# Patient Record
Sex: FEMALE | Race: OTHER | Employment: UNEMPLOYED | URBAN - METROPOLITAN AREA
[De-identification: names, ages, dates, MRNs, and addresses within clinical notes are randomized per-mention and may not be internally consistent; named-entity substitution may affect disease eponyms.]

---

## 2017-01-08 PROBLEM — Z72.0 TOBACCO USE: Status: ACTIVE | Noted: 2017-01-08

## 2017-01-08 PROBLEM — N30.00 ACUTE CYSTITIS WITHOUT HEMATURIA: Status: ACTIVE | Noted: 2017-01-08

## 2017-01-08 PROBLEM — J18.9 PNEUMONIA DUE TO INFECTIOUS ORGANISM: Status: ACTIVE | Noted: 2017-01-08

## 2017-02-14 ENCOUNTER — APPOINTMENT (OUTPATIENT)
Dept: GENERAL RADIOLOGY | Age: 47
DRG: 420 | End: 2017-02-14
Payer: MEDICARE

## 2017-02-14 ENCOUNTER — HOSPITAL ENCOUNTER (INPATIENT)
Age: 47
LOS: 2 days | Discharge: HOME | DRG: 420 | End: 2017-02-16
Attending: EMERGENCY MEDICINE | Admitting: INTERNAL MEDICINE
Payer: MEDICARE

## 2017-02-14 DIAGNOSIS — E13.10 DIABETIC KETOACIDOSIS WITHOUT COMA ASSOCIATED WITH OTHER SPECIFIED DIABETES MELLITUS (HCC): Primary | ICD-10-CM

## 2017-02-14 PROBLEM — E11.10 DIABETIC KETOACIDOSIS WITHOUT COMA ASSOCIATED WITH TYPE 2 DIABETES MELLITUS (HCC): Status: ACTIVE | Noted: 2017-02-14

## 2017-02-14 PROBLEM — E87.3 RESPIRATORY ALKALOSIS: Status: ACTIVE | Noted: 2017-02-14

## 2017-02-14 PROBLEM — E87.1 HYPONATREMIA: Status: ACTIVE | Noted: 2017-02-14

## 2017-02-14 PROBLEM — E87.29 HIGH ANION GAP METABOLIC ACIDOSIS: Status: ACTIVE | Noted: 2017-02-14

## 2017-02-14 LAB
-: NORMAL
ABSOLUTE EOS #: 0.1 K/UL (ref 0–0.4)
ABSOLUTE LYMPH #: 1.9 K/UL (ref 1–4.8)
ABSOLUTE MONO #: 0.4 K/UL (ref 0.1–1.2)
ALBUMIN SERPL-MCNC: 4.5 G/DL (ref 3.5–5.2)
ALBUMIN/GLOBULIN RATIO: 1.3 (ref 1–2.5)
ALP BLD-CCNC: 99 U/L (ref 35–104)
ALT SERPL-CCNC: 33 U/L (ref 5–33)
AMORPHOUS: NORMAL
AMPHETAMINE SCREEN URINE: NEGATIVE
ANION GAP SERPL CALCULATED.3IONS-SCNC: 16 MMOL/L (ref 9–17)
ANION GAP SERPL CALCULATED.3IONS-SCNC: 17 MMOL/L (ref 9–17)
ANION GAP SERPL CALCULATED.3IONS-SCNC: 23 MMOL/L (ref 9–17)
ANION GAP: 13 MMOL/L (ref 8–16)
AST SERPL-CCNC: 32 U/L
BACTERIA: NORMAL
BARBITURATE SCREEN URINE: NEGATIVE
BASOPHILS # BLD: 1 % (ref 0–2)
BASOPHILS ABSOLUTE: 0 K/UL (ref 0–0.2)
BENZODIAZEPINE SCREEN, URINE: NEGATIVE
BETA-HYDROXYBUTYRATE: 0.82 MMOL/L (ref 0.02–0.27)
BILIRUB SERPL-MCNC: 0.43 MG/DL (ref 0.3–1.2)
BILIRUBIN DIRECT: 0.19 MG/DL
BILIRUBIN URINE: NEGATIVE
BILIRUBIN, INDIRECT: 0.24 MG/DL (ref 0–1)
BUN BLDV-MCNC: 7 MG/DL (ref 6–20)
BUN BLDV-MCNC: 7 MG/DL (ref 6–20)
BUN BLDV-MCNC: 8 MG/DL (ref 6–20)
BUN/CREAT BLD: ABNORMAL (ref 9–20)
BUPRENORPHINE URINE: ABNORMAL
CALCIUM IONIZED: 1.08 MMOL/L (ref 1.13–1.33)
CALCIUM SERPL-MCNC: 8.3 MG/DL (ref 8.6–10.4)
CALCIUM SERPL-MCNC: 8.3 MG/DL (ref 8.6–10.4)
CALCIUM SERPL-MCNC: 9.2 MG/DL (ref 8.6–10.4)
CANNABINOID SCREEN URINE: NEGATIVE
CASTS UA: NORMAL /LPF (ref 0–8)
CHLORIDE BLD-SCNC: 100 MMOL/L (ref 98–107)
CHLORIDE BLD-SCNC: 92 MMOL/L (ref 98–107)
CHLORIDE BLD-SCNC: 99 MMOL/L (ref 98–107)
CO2: 16 MMOL/L (ref 20–31)
CO2: 20 MMOL/L (ref 20–31)
CO2: 21 MMOL/L (ref 20–31)
COCAINE METABOLITE, URINE: POSITIVE
COLOR: ABNORMAL
COMMENT UA: ABNORMAL
CREAT SERPL-MCNC: 0.31 MG/DL (ref 0.5–0.9)
CREAT SERPL-MCNC: 0.36 MG/DL (ref 0.5–0.9)
CREAT SERPL-MCNC: 0.38 MG/DL (ref 0.5–0.9)
CRYSTALS, UA: NORMAL /HPF
DIFFERENTIAL TYPE: ABNORMAL
DIRECT EXAM: NORMAL
EOSINOPHILS RELATIVE PERCENT: 1 % (ref 1–4)
EPITHELIAL CELLS UA: NORMAL /HPF (ref 0–5)
ESTIMATED AVERAGE GLUCOSE: 194 MG/DL
ETHANOL PERCENT: <0.01 %
ETHANOL: <10 MG/DL
FIO2: ABNORMAL
GFR AFRICAN AMERICAN: >60 ML/MIN
GFR NON-AFRICAN AMERICAN: >60 ML/MIN
GFR SERPL CREATININE-BSD FRML MDRD: ABNORMAL ML/MIN/{1.73_M2}
GLOBULIN: ABNORMAL G/DL (ref 1.5–3.8)
GLUCOSE BLD-MCNC: 117 MG/DL (ref 70–99)
GLUCOSE BLD-MCNC: 127 MG/DL (ref 65–105)
GLUCOSE BLD-MCNC: 130 MG/DL (ref 65–105)
GLUCOSE BLD-MCNC: 131 MG/DL (ref 70–99)
GLUCOSE BLD-MCNC: 170 MG/DL (ref 65–105)
GLUCOSE BLD-MCNC: 195 MG/DL (ref 65–105)
GLUCOSE BLD-MCNC: 202 MG/DL (ref 65–105)
GLUCOSE BLD-MCNC: 234 MG/DL (ref 65–105)
GLUCOSE BLD-MCNC: 286 MG/DL (ref 65–105)
GLUCOSE BLD-MCNC: 328 MG/DL (ref 65–105)
GLUCOSE BLD-MCNC: 330 MG/DL (ref 70–99)
GLUCOSE BLD-MCNC: 339 MG/DL (ref 74–106)
GLUCOSE URINE: ABNORMAL
HBA1C MFR BLD: 8.4 % (ref 4–6)
HCG QUALITATIVE: NEGATIVE
HCO3 VENOUS: 18.2 MMOL/L (ref 24–30)
HCO3 VENOUS: 18.7 MMOL/L (ref 24–30)
HCT VFR BLD CALC: 43.2 % (ref 36–46)
HEMOGLOBIN: 15 G/DL (ref 12–16)
KETONES, URINE: ABNORMAL
LACTIC ACID, WHOLE BLOOD: 1.7 MMOL/L (ref 0.7–2.1)
LACTIC ACID: NORMAL MMOL/L
LEUKOCYTE ESTERASE, URINE: NEGATIVE
LIPASE: 39 U/L (ref 13–60)
LYMPHOCYTES # BLD: 25 % (ref 24–44)
Lab: NORMAL
MAGNESIUM: 1.8 MG/DL (ref 1.6–2.6)
MAGNESIUM: 1.9 MG/DL (ref 1.6–2.6)
MCH RBC QN AUTO: 29.6 PG (ref 26–34)
MCHC RBC AUTO-ENTMCNC: 34.8 G/DL (ref 31–37)
MCV RBC AUTO: 85.3 FL (ref 80–100)
MDMA URINE: ABNORMAL
METHADONE SCREEN, URINE: NEGATIVE
METHAMPHETAMINE, URINE: ABNORMAL
MONOCYTES # BLD: 5 % (ref 2–11)
MUCUS: NORMAL
NEGATIVE BASE EXCESS, VEN: 4 (ref 0–2)
NEGATIVE BASE EXCESS, VEN: 5 (ref 0–2)
NITRITE, URINE: NEGATIVE
O2 DEVICE/FLOW/%: ABNORMAL
O2 SAT, VEN: 85 %
OPIATES, URINE: POSITIVE
OTHER OBSERVATIONS UA: NORMAL
OXYCODONE SCREEN URINE: NEGATIVE
PATIENT TEMP: ABNORMAL
PCO2, VEN: 21 MM HG (ref 39–55)
PCO2, VEN: 23 MM HG (ref 39–55)
PDW BLD-RTO: 15.7 % (ref 12.5–15.4)
PH UA: 6.5 (ref 5–8)
PH VENOUS: 7.51 (ref 7.32–7.42)
PH VENOUS: 7.56 (ref 7.32–7.42)
PHENCYCLIDINE, URINE: NEGATIVE
PHOSPHORUS: 1.8 MG/DL (ref 2.6–4.5)
PHOSPHORUS: 2 MG/DL (ref 2.6–4.5)
PLATELET # BLD: 258 K/UL (ref 140–450)
PLATELET ESTIMATE: ABNORMAL
PMV BLD AUTO: 7 FL (ref 6–12)
PO2, VEN: 43 MM HG (ref 30–50)
POC BUN: 8 MG/DL (ref 6–20)
POC CHLORIDE: 104 MMOL/L (ref 98–110)
POC HEMATOCRIT: 45 % (ref 36–46)
POC HEMOGLOBIN: 15.3 GM/DL (ref 12–16)
POC LACTIC ACID, VEN: 2.6 MMOL/L (ref 0.9–1.7)
POC PCO2 TEMP: ABNORMAL MM HG
POC PH TEMP: ABNORMAL
POC PO2 TEMP: ABNORMAL MM HG
POC POTASSIUM: 4 MMOL/L (ref 3.5–5.1)
POC SODIUM: 132 MMOL/L (ref 136–145)
POSITIVE BASE EXCESS, VEN: ABNORMAL (ref 0–2)
POSITIVE BASE EXCESS, VEN: ABNORMAL (ref 0–2)
POTASSIUM SERPL-SCNC: 3.2 MMOL/L (ref 3.7–5.3)
POTASSIUM SERPL-SCNC: 3.3 MMOL/L (ref 3.7–5.3)
POTASSIUM SERPL-SCNC: 4.2 MMOL/L (ref 3.7–5.3)
PROPOXYPHENE, URINE: ABNORMAL
PROTEIN UA: ABNORMAL
RBC # BLD: 5.07 M/UL (ref 4–5.2)
RBC # BLD: ABNORMAL 10*6/UL
RBC UA: NORMAL /HPF (ref 0–4)
RENAL EPITHELIAL, UA: NORMAL /HPF
SEG NEUTROPHILS: 68 % (ref 36–66)
SEGMENTED NEUTROPHILS ABSOLUTE COUNT: 5.4 K/UL (ref 1.8–7.7)
SODIUM BLD-SCNC: 131 MMOL/L (ref 135–144)
SODIUM BLD-SCNC: 136 MMOL/L (ref 135–144)
SODIUM BLD-SCNC: 137 MMOL/L (ref 135–144)
SPECIFIC GRAVITY UA: 1.03 (ref 1–1.03)
SPECIMEN DESCRIPTION: NORMAL
STATUS: NORMAL
TEST INFORMATION: ABNORMAL
TOTAL CK: 35 U/L (ref 26–192)
TOTAL CO2, VENOUS: 19 MM HG (ref 25–31)
TOTAL CO2, VENOUS: 19 MM HG (ref 25–31)
TOTAL PROTEIN: 8.1 G/DL (ref 6.4–8.3)
TRICHOMONAS: NORMAL
TRICYCLIC ANTIDEPRESSANTS, UR: ABNORMAL
TURBIDITY: ABNORMAL
URINE HGB: ABNORMAL
UROBILINOGEN, URINE: NORMAL
WBC # BLD: 7.9 K/UL (ref 3.5–11)
WBC # BLD: ABNORMAL 10*3/UL
WBC UA: NORMAL /HPF (ref 0–5)
YEAST: NORMAL

## 2017-02-14 PROCEDURE — 99285 EMERGENCY DEPT VISIT HI MDM: CPT

## 2017-02-14 PROCEDURE — 96376 TX/PRO/DX INJ SAME DRUG ADON: CPT

## 2017-02-14 PROCEDURE — 96375 TX/PRO/DX INJ NEW DRUG ADDON: CPT

## 2017-02-14 PROCEDURE — 83605 ASSAY OF LACTIC ACID: CPT

## 2017-02-14 PROCEDURE — 82435 ASSAY OF BLOOD CHLORIDE: CPT

## 2017-02-14 PROCEDURE — 85014 HEMATOCRIT: CPT

## 2017-02-14 PROCEDURE — 81001 URINALYSIS AUTO W/SCOPE: CPT

## 2017-02-14 PROCEDURE — 80076 HEPATIC FUNCTION PANEL: CPT

## 2017-02-14 PROCEDURE — 82803 BLOOD GASES ANY COMBINATION: CPT

## 2017-02-14 PROCEDURE — 6370000000 HC RX 637 (ALT 250 FOR IP): Performed by: EMERGENCY MEDICINE

## 2017-02-14 PROCEDURE — G0480 DRUG TEST DEF 1-7 CLASSES: HCPCS

## 2017-02-14 PROCEDURE — 85025 COMPLETE CBC W/AUTO DIFF WBC: CPT

## 2017-02-14 PROCEDURE — 83690 ASSAY OF LIPASE: CPT

## 2017-02-14 PROCEDURE — 96365 THER/PROPH/DIAG IV INF INIT: CPT

## 2017-02-14 PROCEDURE — 6370000000 HC RX 637 (ALT 250 FOR IP): Performed by: INTERNAL MEDICINE

## 2017-02-14 PROCEDURE — 84703 CHORIONIC GONADOTROPIN ASSAY: CPT

## 2017-02-14 PROCEDURE — 87086 URINE CULTURE/COLONY COUNT: CPT

## 2017-02-14 PROCEDURE — 6360000002 HC RX W HCPCS: Performed by: INTERNAL MEDICINE

## 2017-02-14 PROCEDURE — 6360000002 HC RX W HCPCS

## 2017-02-14 PROCEDURE — 36415 COLL VENOUS BLD VENIPUNCTURE: CPT

## 2017-02-14 PROCEDURE — 84132 ASSAY OF SERUM POTASSIUM: CPT

## 2017-02-14 PROCEDURE — 82947 ASSAY GLUCOSE BLOOD QUANT: CPT

## 2017-02-14 PROCEDURE — 71020 XR CHEST STANDARD TWO VW: CPT

## 2017-02-14 PROCEDURE — 6370000000 HC RX 637 (ALT 250 FOR IP): Performed by: STUDENT IN AN ORGANIZED HEALTH CARE EDUCATION/TRAINING PROGRAM

## 2017-02-14 PROCEDURE — 76937 US GUIDE VASCULAR ACCESS: CPT

## 2017-02-14 PROCEDURE — 2580000003 HC RX 258: Performed by: STUDENT IN AN ORGANIZED HEALTH CARE EDUCATION/TRAINING PROGRAM

## 2017-02-14 PROCEDURE — 82330 ASSAY OF CALCIUM: CPT

## 2017-02-14 PROCEDURE — 84295 ASSAY OF SERUM SODIUM: CPT

## 2017-02-14 PROCEDURE — 71020 XR CHEST STANDARD TWO VW: CPT | Performed by: RADIOLOGY

## 2017-02-14 PROCEDURE — 80048 BASIC METABOLIC PNL TOTAL CA: CPT

## 2017-02-14 PROCEDURE — 2580000003 HC RX 258: Performed by: INTERNAL MEDICINE

## 2017-02-14 PROCEDURE — 84100 ASSAY OF PHOSPHORUS: CPT

## 2017-02-14 PROCEDURE — 82550 ASSAY OF CK (CPK): CPT

## 2017-02-14 PROCEDURE — 80307 DRUG TEST PRSMV CHEM ANLYZR: CPT

## 2017-02-14 PROCEDURE — 87804 INFLUENZA ASSAY W/OPTIC: CPT

## 2017-02-14 PROCEDURE — 84520 ASSAY OF UREA NITROGEN: CPT

## 2017-02-14 PROCEDURE — 6360000002 HC RX W HCPCS: Performed by: EMERGENCY MEDICINE

## 2017-02-14 PROCEDURE — 83036 HEMOGLOBIN GLYCOSYLATED A1C: CPT

## 2017-02-14 PROCEDURE — 2500000003 HC RX 250 WO HCPCS: Performed by: STUDENT IN AN ORGANIZED HEALTH CARE EDUCATION/TRAINING PROGRAM

## 2017-02-14 PROCEDURE — 82010 KETONE BODYS QUAN: CPT

## 2017-02-14 PROCEDURE — 83735 ASSAY OF MAGNESIUM: CPT

## 2017-02-14 PROCEDURE — 2060000000 HC ICU INTERMEDIATE R&B

## 2017-02-14 PROCEDURE — 2580000003 HC RX 258: Performed by: EMERGENCY MEDICINE

## 2017-02-14 RX ORDER — INSULIN GLARGINE 100 [IU]/ML
3 INJECTION, SOLUTION SUBCUTANEOUS NIGHTLY
Status: DISCONTINUED | OUTPATIENT
Start: 2017-02-14 | End: 2017-02-15

## 2017-02-14 RX ORDER — ONDANSETRON 2 MG/ML
4 INJECTION INTRAMUSCULAR; INTRAVENOUS ONCE
Status: COMPLETED | OUTPATIENT
Start: 2017-02-14 | End: 2017-02-14

## 2017-02-14 RX ORDER — QUETIAPINE FUMARATE 100 MG/1
100 TABLET, FILM COATED ORAL 2 TIMES DAILY
Status: DISCONTINUED | OUTPATIENT
Start: 2017-02-14 | End: 2017-02-16 | Stop reason: HOSPADM

## 2017-02-14 RX ORDER — 0.9 % SODIUM CHLORIDE 0.9 %
15 INTRAVENOUS SOLUTION INTRAVENOUS ONCE
Status: DISCONTINUED | OUTPATIENT
Start: 2017-02-14 | End: 2017-02-15

## 2017-02-14 RX ORDER — POTASSIUM CHLORIDE 7.45 MG/ML
10 INJECTION INTRAVENOUS PRN
Status: DISCONTINUED | OUTPATIENT
Start: 2017-02-14 | End: 2017-02-16 | Stop reason: HOSPADM

## 2017-02-14 RX ORDER — SODIUM CHLORIDE 9 MG/ML
INJECTION, SOLUTION INTRAVENOUS CONTINUOUS
Status: DISCONTINUED | OUTPATIENT
Start: 2017-02-14 | End: 2017-02-15

## 2017-02-14 RX ORDER — MORPHINE SULFATE 4 MG/ML
4 INJECTION, SOLUTION INTRAMUSCULAR; INTRAVENOUS EVERY 4 HOURS PRN
Status: CANCELLED | OUTPATIENT
Start: 2017-02-14

## 2017-02-14 RX ORDER — ONDANSETRON 2 MG/ML
4 INJECTION INTRAMUSCULAR; INTRAVENOUS EVERY 6 HOURS PRN
Status: DISCONTINUED | OUTPATIENT
Start: 2017-02-14 | End: 2017-02-16 | Stop reason: HOSPADM

## 2017-02-14 RX ORDER — DEXTROSE MONOHYDRATE 25 G/50ML
12.5 INJECTION, SOLUTION INTRAVENOUS PRN
Status: DISCONTINUED | OUTPATIENT
Start: 2017-02-14 | End: 2017-02-16 | Stop reason: HOSPADM

## 2017-02-14 RX ORDER — VENLAFAXINE HYDROCHLORIDE 75 MG/1
150 CAPSULE, EXTENDED RELEASE ORAL
Status: DISCONTINUED | OUTPATIENT
Start: 2017-02-15 | End: 2017-02-16 | Stop reason: HOSPADM

## 2017-02-14 RX ORDER — ROPINIROLE 1 MG/1
1 TABLET, FILM COATED ORAL 2 TIMES DAILY
Status: DISCONTINUED | OUTPATIENT
Start: 2017-02-14 | End: 2017-02-16 | Stop reason: HOSPADM

## 2017-02-14 RX ORDER — ONDANSETRON 2 MG/ML
4 INJECTION INTRAMUSCULAR; INTRAVENOUS EVERY 6 HOURS PRN
Status: CANCELLED | OUTPATIENT
Start: 2017-02-14

## 2017-02-14 RX ORDER — SODIUM CHLORIDE AND POTASSIUM CHLORIDE .9; .15 G/100ML; G/100ML
150 SOLUTION INTRAVENOUS CONTINUOUS
Status: CANCELLED | OUTPATIENT
Start: 2017-02-14 | End: 2017-02-15

## 2017-02-14 RX ORDER — GABAPENTIN 300 MG/1
300 CAPSULE ORAL 3 TIMES DAILY
Status: DISCONTINUED | OUTPATIENT
Start: 2017-02-14 | End: 2017-02-16 | Stop reason: HOSPADM

## 2017-02-14 RX ORDER — 0.9 % SODIUM CHLORIDE 0.9 %
1000 INTRAVENOUS SOLUTION INTRAVENOUS ONCE
Status: COMPLETED | OUTPATIENT
Start: 2017-02-14 | End: 2017-02-14

## 2017-02-14 RX ORDER — POTASSIUM CHLORIDE 20 MEQ/1
40 TABLET, EXTENDED RELEASE ORAL PRN
Status: DISCONTINUED | OUTPATIENT
Start: 2017-02-14 | End: 2017-02-16 | Stop reason: HOSPADM

## 2017-02-14 RX ORDER — DEXTROSE AND SODIUM CHLORIDE 5; .45 G/100ML; G/100ML
INJECTION, SOLUTION INTRAVENOUS CONTINUOUS PRN
Status: DISCONTINUED | OUTPATIENT
Start: 2017-02-14 | End: 2017-02-15

## 2017-02-14 RX ORDER — 0.9 % SODIUM CHLORIDE 0.9 %
1000 INTRAVENOUS SOLUTION INTRAVENOUS ONCE
Status: DISCONTINUED | OUTPATIENT
Start: 2017-02-14 | End: 2017-02-16 | Stop reason: HOSPADM

## 2017-02-14 RX ORDER — POTASSIUM CHLORIDE 20MEQ/15ML
40 LIQUID (ML) ORAL PRN
Status: DISCONTINUED | OUTPATIENT
Start: 2017-02-14 | End: 2017-02-16 | Stop reason: HOSPADM

## 2017-02-14 RX ORDER — DIPHENHYDRAMINE HCL 25 MG
25 TABLET ORAL EVERY 6 HOURS PRN
Status: DISCONTINUED | OUTPATIENT
Start: 2017-02-14 | End: 2017-02-16 | Stop reason: HOSPADM

## 2017-02-14 RX ORDER — ONDANSETRON 2 MG/ML
INJECTION INTRAMUSCULAR; INTRAVENOUS
Status: COMPLETED
Start: 2017-02-14 | End: 2017-02-14

## 2017-02-14 RX ORDER — POTASSIUM CHLORIDE 7.45 MG/ML
10 INJECTION INTRAVENOUS PRN
Status: DISCONTINUED | OUTPATIENT
Start: 2017-02-14 | End: 2017-02-14

## 2017-02-14 RX ORDER — MORPHINE SULFATE 4 MG/ML
4 INJECTION, SOLUTION INTRAMUSCULAR; INTRAVENOUS ONCE
Status: COMPLETED | OUTPATIENT
Start: 2017-02-14 | End: 2017-02-14

## 2017-02-14 RX ADMIN — QUETIAPINE FUMARATE 100 MG: 100 TABLET ORAL at 18:01

## 2017-02-14 RX ADMIN — INSULIN GLARGINE 3 UNITS: 100 INJECTION, SOLUTION SUBCUTANEOUS at 20:54

## 2017-02-14 RX ADMIN — POTASSIUM CHLORIDE 10 MEQ: 7.46 INJECTION, SOLUTION INTRAVENOUS at 18:01

## 2017-02-14 RX ADMIN — POTASSIUM CHLORIDE 40 MEQ: 40 SOLUTION ORAL at 20:51

## 2017-02-14 RX ADMIN — ONDANSETRON 4 MG: 2 INJECTION, SOLUTION INTRAMUSCULAR; INTRAVENOUS at 16:30

## 2017-02-14 RX ADMIN — SODIUM PHOSPHATE, MONOBASIC, MONOHYDRATE AND SODIUM PHOSPHATE, DIBASIC, ANHYDROUS 9.12 MMOL: 276; 142 INJECTION, SOLUTION INTRAVENOUS at 21:36

## 2017-02-14 RX ADMIN — SODIUM CHLORIDE 1000 ML: 9 INJECTION, SOLUTION INTRAVENOUS at 10:33

## 2017-02-14 RX ADMIN — SODIUM CHLORIDE: 9 INJECTION, SOLUTION INTRAVENOUS at 21:38

## 2017-02-14 RX ADMIN — MORPHINE SULFATE 4 MG: 4 INJECTION, SOLUTION INTRAMUSCULAR; INTRAVENOUS at 10:35

## 2017-02-14 RX ADMIN — ONDANSETRON 4 MG: 2 INJECTION, SOLUTION INTRAMUSCULAR; INTRAVENOUS at 10:37

## 2017-02-14 RX ADMIN — DEXTROSE AND SODIUM CHLORIDE: 5; 450 INJECTION, SOLUTION INTRAVENOUS at 15:26

## 2017-02-14 RX ADMIN — SODIUM CHLORIDE 2 UNITS/HR: 9 INJECTION, SOLUTION INTRAVENOUS at 11:40

## 2017-02-14 RX ADMIN — ENOXAPARIN SODIUM 40 MG: 40 INJECTION SUBCUTANEOUS at 18:01

## 2017-02-14 RX ADMIN — ONDANSETRON 4 MG: 2 INJECTION, SOLUTION INTRAMUSCULAR; INTRAVENOUS at 12:18

## 2017-02-14 RX ADMIN — GABAPENTIN 300 MG: 300 CAPSULE ORAL at 20:50

## 2017-02-14 RX ADMIN — ROPINIROLE HYDROCHLORIDE 1 MG: 1 TABLET, FILM COATED ORAL at 20:50

## 2017-02-14 ASSESSMENT — PAIN DESCRIPTION - ORIENTATION: ORIENTATION: MID

## 2017-02-14 ASSESSMENT — ENCOUNTER SYMPTOMS
WHEEZING: 0
VOMITING: 1
ORTHOPNEA: 0
ABDOMINAL PAIN: 1
BLURRED VISION: 0
COUGH: 1
NAUSEA: 1
HEMOPTYSIS: 0
SHORTNESS OF BREATH: 0
RHINORRHEA: 1
ANAL BLEEDING: 0
RECTAL PAIN: 0
DIARRHEA: 0
CONSTIPATION: 0
DOUBLE VISION: 0
ABDOMINAL DISTENTION: 0
BLOOD IN STOOL: 0

## 2017-02-14 ASSESSMENT — PAIN DESCRIPTION - DESCRIPTORS
DESCRIPTORS: ACHING;DISCOMFORT;STABBING
DESCRIPTORS: ACHING

## 2017-02-14 ASSESSMENT — PAIN SCALES - GENERAL
PAINLEVEL_OUTOF10: 7
PAINLEVEL_OUTOF10: 7
PAINLEVEL_OUTOF10: 10

## 2017-02-14 ASSESSMENT — PAIN DESCRIPTION - ONSET: ONSET: UNABLE TO TELL

## 2017-02-14 ASSESSMENT — PAIN DESCRIPTION - LOCATION
LOCATION: ABDOMEN
LOCATION: ABDOMEN

## 2017-02-14 ASSESSMENT — PAIN DESCRIPTION - PROGRESSION: CLINICAL_PROGRESSION: NOT CHANGED

## 2017-02-14 ASSESSMENT — PAIN DESCRIPTION - PAIN TYPE: TYPE: ACUTE PAIN

## 2017-02-14 ASSESSMENT — PAIN DESCRIPTION - FREQUENCY: FREQUENCY: CONTINUOUS

## 2017-02-15 LAB
ABSOLUTE EOS #: 0.2 K/UL (ref 0–0.4)
ABSOLUTE LYMPH #: 3.1 K/UL (ref 1–4.8)
ABSOLUTE MONO #: 0.5 K/UL (ref 0.1–1.2)
ANION GAP SERPL CALCULATED.3IONS-SCNC: 12 MMOL/L (ref 9–17)
ANION GAP SERPL CALCULATED.3IONS-SCNC: 12 MMOL/L (ref 9–17)
ANION GAP SERPL CALCULATED.3IONS-SCNC: 15 MMOL/L (ref 9–17)
BASOPHILS # BLD: 1 % (ref 0–2)
BASOPHILS ABSOLUTE: 0 K/UL (ref 0–0.2)
BUN BLDV-MCNC: 6 MG/DL (ref 6–20)
BUN BLDV-MCNC: 7 MG/DL (ref 6–20)
BUN BLDV-MCNC: 8 MG/DL (ref 6–20)
BUN/CREAT BLD: ABNORMAL (ref 9–20)
CALCIUM SERPL-MCNC: 7.9 MG/DL (ref 8.6–10.4)
CALCIUM SERPL-MCNC: 8.5 MG/DL (ref 8.6–10.4)
CALCIUM SERPL-MCNC: 8.6 MG/DL (ref 8.6–10.4)
CHLORIDE BLD-SCNC: 100 MMOL/L (ref 98–107)
CHLORIDE BLD-SCNC: 102 MMOL/L (ref 98–107)
CHLORIDE BLD-SCNC: 103 MMOL/L (ref 98–107)
CO2: 19 MMOL/L (ref 20–31)
CO2: 21 MMOL/L (ref 20–31)
CO2: 21 MMOL/L (ref 20–31)
CREAT SERPL-MCNC: 0.37 MG/DL (ref 0.5–0.9)
CREAT SERPL-MCNC: 0.48 MG/DL (ref 0.5–0.9)
CREAT SERPL-MCNC: 0.52 MG/DL (ref 0.5–0.9)
CULTURE: NORMAL
CULTURE: NORMAL
DIFFERENTIAL TYPE: ABNORMAL
EOSINOPHILS RELATIVE PERCENT: 3 % (ref 1–4)
GFR AFRICAN AMERICAN: >60 ML/MIN
GFR NON-AFRICAN AMERICAN: >60 ML/MIN
GFR SERPL CREATININE-BSD FRML MDRD: ABNORMAL ML/MIN/{1.73_M2}
GLUCOSE BLD-MCNC: 178 MG/DL (ref 65–105)
GLUCOSE BLD-MCNC: 242 MG/DL (ref 65–105)
GLUCOSE BLD-MCNC: 242 MG/DL (ref 65–105)
GLUCOSE BLD-MCNC: 257 MG/DL (ref 65–105)
GLUCOSE BLD-MCNC: 262 MG/DL (ref 70–99)
GLUCOSE BLD-MCNC: 269 MG/DL (ref 70–99)
GLUCOSE BLD-MCNC: 280 MG/DL (ref 65–105)
GLUCOSE BLD-MCNC: 326 MG/DL (ref 70–99)
GLUCOSE BLD-MCNC: 93 MG/DL (ref 65–105)
HCT VFR BLD CALC: 32.9 % (ref 36–46)
HEMOGLOBIN: 11.3 G/DL (ref 12–16)
LYMPHOCYTES # BLD: 49 % (ref 24–44)
Lab: NORMAL
MAGNESIUM: 1.5 MG/DL (ref 1.6–2.6)
MAGNESIUM: 1.7 MG/DL (ref 1.6–2.6)
MAGNESIUM: 1.7 MG/DL (ref 1.6–2.6)
MCH RBC QN AUTO: 30 PG (ref 26–34)
MCHC RBC AUTO-ENTMCNC: 34.5 G/DL (ref 31–37)
MCV RBC AUTO: 86.9 FL (ref 80–100)
MONOCYTES # BLD: 8 % (ref 2–11)
PDW BLD-RTO: 15.7 % (ref 12.5–15.4)
PHOSPHORUS: 2.4 MG/DL (ref 2.6–4.5)
PHOSPHORUS: 2.7 MG/DL (ref 2.6–4.5)
PHOSPHORUS: 2.8 MG/DL (ref 2.6–4.5)
PLATELET # BLD: 214 K/UL (ref 140–450)
PLATELET ESTIMATE: ABNORMAL
PMV BLD AUTO: 7 FL (ref 6–12)
POTASSIUM SERPL-SCNC: 3.9 MMOL/L (ref 3.7–5.3)
POTASSIUM SERPL-SCNC: 4 MMOL/L (ref 3.7–5.3)
POTASSIUM SERPL-SCNC: 4 MMOL/L (ref 3.7–5.3)
RBC # BLD: 3.79 M/UL (ref 4–5.2)
RBC # BLD: ABNORMAL 10*6/UL
SEG NEUTROPHILS: 39 % (ref 36–66)
SEGMENTED NEUTROPHILS ABSOLUTE COUNT: 2.5 K/UL (ref 1.8–7.7)
SODIUM BLD-SCNC: 134 MMOL/L (ref 135–144)
SODIUM BLD-SCNC: 135 MMOL/L (ref 135–144)
SODIUM BLD-SCNC: 136 MMOL/L (ref 135–144)
SPECIMEN DESCRIPTION: NORMAL
STATUS: NORMAL
WBC # BLD: 6.3 K/UL (ref 3.5–11)
WBC # BLD: ABNORMAL 10*3/UL

## 2017-02-15 PROCEDURE — 6370000000 HC RX 637 (ALT 250 FOR IP): Performed by: INTERNAL MEDICINE

## 2017-02-15 PROCEDURE — 36415 COLL VENOUS BLD VENIPUNCTURE: CPT

## 2017-02-15 PROCEDURE — 99223 1ST HOSP IP/OBS HIGH 75: CPT | Performed by: INTERNAL MEDICINE

## 2017-02-15 PROCEDURE — 82947 ASSAY GLUCOSE BLOOD QUANT: CPT

## 2017-02-15 PROCEDURE — 2580000003 HC RX 258: Performed by: INTERNAL MEDICINE

## 2017-02-15 PROCEDURE — 84100 ASSAY OF PHOSPHORUS: CPT

## 2017-02-15 PROCEDURE — 83735 ASSAY OF MAGNESIUM: CPT

## 2017-02-15 PROCEDURE — 2580000003 HC RX 258: Performed by: HOSPITALIST

## 2017-02-15 PROCEDURE — 6360000002 HC RX W HCPCS: Performed by: STUDENT IN AN ORGANIZED HEALTH CARE EDUCATION/TRAINING PROGRAM

## 2017-02-15 PROCEDURE — 80048 BASIC METABOLIC PNL TOTAL CA: CPT

## 2017-02-15 PROCEDURE — 2580000003 HC RX 258: Performed by: STUDENT IN AN ORGANIZED HEALTH CARE EDUCATION/TRAINING PROGRAM

## 2017-02-15 PROCEDURE — 6360000002 HC RX W HCPCS: Performed by: HOSPITALIST

## 2017-02-15 PROCEDURE — 85025 COMPLETE CBC W/AUTO DIFF WBC: CPT

## 2017-02-15 PROCEDURE — 94762 N-INVAS EAR/PLS OXIMTRY CONT: CPT

## 2017-02-15 PROCEDURE — 6360000002 HC RX W HCPCS: Performed by: INTERNAL MEDICINE

## 2017-02-15 PROCEDURE — 6370000000 HC RX 637 (ALT 250 FOR IP): Performed by: HOSPITALIST

## 2017-02-15 PROCEDURE — 2060000000 HC ICU INTERMEDIATE R&B

## 2017-02-15 RX ORDER — NICOTINE POLACRILEX 4 MG
15 LOZENGE BUCCAL PRN
Status: DISCONTINUED | OUTPATIENT
Start: 2017-02-15 | End: 2017-02-16 | Stop reason: HOSPADM

## 2017-02-15 RX ORDER — DEXTROSE MONOHYDRATE 25 G/50ML
12.5 INJECTION, SOLUTION INTRAVENOUS PRN
Status: DISCONTINUED | OUTPATIENT
Start: 2017-02-15 | End: 2017-02-16 | Stop reason: HOSPADM

## 2017-02-15 RX ORDER — DEXTROSE MONOHYDRATE 50 MG/ML
100 INJECTION, SOLUTION INTRAVENOUS PRN
Status: DISCONTINUED | OUTPATIENT
Start: 2017-02-15 | End: 2017-02-16 | Stop reason: HOSPADM

## 2017-02-15 RX ORDER — SODIUM CHLORIDE 450 MG/100ML
INJECTION, SOLUTION INTRAVENOUS CONTINUOUS
Status: DISCONTINUED | OUTPATIENT
Start: 2017-02-15 | End: 2017-02-16 | Stop reason: HOSPADM

## 2017-02-15 RX ORDER — INSULIN GLARGINE 100 [IU]/ML
20 INJECTION, SOLUTION SUBCUTANEOUS 2 TIMES DAILY
Status: DISCONTINUED | OUTPATIENT
Start: 2017-02-15 | End: 2017-02-15

## 2017-02-15 RX ADMIN — SODIUM CHLORIDE: 4.5 INJECTION, SOLUTION INTRAVENOUS at 08:37

## 2017-02-15 RX ADMIN — INSULIN HUMAN 50 UNITS: 100 INJECTION, SUSPENSION SUBCUTANEOUS at 21:32

## 2017-02-15 RX ADMIN — ENOXAPARIN SODIUM 40 MG: 40 INJECTION SUBCUTANEOUS at 08:37

## 2017-02-15 RX ADMIN — GABAPENTIN 300 MG: 300 CAPSULE ORAL at 08:37

## 2017-02-15 RX ADMIN — INSULIN LISPRO 6 UNITS: 100 INJECTION, SOLUTION INTRAVENOUS; SUBCUTANEOUS at 13:06

## 2017-02-15 RX ADMIN — SODIUM CHLORIDE: 9 INJECTION, SOLUTION INTRAVENOUS at 05:50

## 2017-02-15 RX ADMIN — MAGNESIUM SULFATE IN DEXTROSE 1 G: 10 INJECTION, SOLUTION INTRAVENOUS at 11:42

## 2017-02-15 RX ADMIN — QUETIAPINE FUMARATE 100 MG: 100 TABLET ORAL at 13:32

## 2017-02-15 RX ADMIN — INSULIN LISPRO 2 UNITS: 100 INJECTION, SOLUTION INTRAVENOUS; SUBCUTANEOUS at 22:50

## 2017-02-15 RX ADMIN — GABAPENTIN 300 MG: 300 CAPSULE ORAL at 21:30

## 2017-02-15 RX ADMIN — QUETIAPINE FUMARATE 100 MG: 100 TABLET ORAL at 08:37

## 2017-02-15 RX ADMIN — GABAPENTIN 300 MG: 300 CAPSULE ORAL at 13:32

## 2017-02-15 RX ADMIN — ROPINIROLE HYDROCHLORIDE 1 MG: 1 TABLET, FILM COATED ORAL at 21:30

## 2017-02-15 RX ADMIN — ROPINIROLE HYDROCHLORIDE 1 MG: 1 TABLET, FILM COATED ORAL at 08:37

## 2017-02-15 RX ADMIN — VENLAFAXINE HYDROCHLORIDE 150 MG: 75 CAPSULE, EXTENDED RELEASE ORAL at 08:37

## 2017-02-15 RX ADMIN — INSULIN HUMAN 40 UNITS: 100 INJECTION, SUSPENSION SUBCUTANEOUS at 08:38

## 2017-02-15 RX ADMIN — MAGNESIUM SULFATE IN DEXTROSE 1 G: 10 INJECTION, SOLUTION INTRAVENOUS at 10:16

## 2017-02-15 RX ADMIN — CALCIUM GLUCONATE 2 G: 94 INJECTION, SOLUTION INTRAVENOUS at 02:25

## 2017-02-15 ASSESSMENT — PAIN SCALES - GENERAL: PAINLEVEL_OUTOF10: 0

## 2017-02-16 VITALS
RESPIRATION RATE: 16 BRPM | OXYGEN SATURATION: 99 % | HEART RATE: 75 BPM | BODY MASS INDEX: 22.36 KG/M2 | DIASTOLIC BLOOD PRESSURE: 63 MMHG | TEMPERATURE: 98.4 F | WEIGHT: 131 LBS | SYSTOLIC BLOOD PRESSURE: 129 MMHG | HEIGHT: 64 IN

## 2017-02-16 LAB
ANION GAP SERPL CALCULATED.3IONS-SCNC: 12 MMOL/L (ref 9–17)
BUN BLDV-MCNC: 13 MG/DL (ref 6–20)
BUN/CREAT BLD: ABNORMAL (ref 9–20)
CALCIUM SERPL-MCNC: 9.2 MG/DL (ref 8.6–10.4)
CHLORIDE BLD-SCNC: 101 MMOL/L (ref 98–107)
CO2: 23 MMOL/L (ref 20–31)
CREAT SERPL-MCNC: 0.4 MG/DL (ref 0.5–0.9)
GFR AFRICAN AMERICAN: >60 ML/MIN
GFR NON-AFRICAN AMERICAN: >60 ML/MIN
GFR SERPL CREATININE-BSD FRML MDRD: ABNORMAL ML/MIN/{1.73_M2}
GFR SERPL CREATININE-BSD FRML MDRD: ABNORMAL ML/MIN/{1.73_M2}
GLUCOSE BLD-MCNC: 130 MG/DL (ref 70–99)
GLUCOSE BLD-MCNC: 141 MG/DL (ref 65–105)
GLUCOSE BLD-MCNC: 276 MG/DL (ref 65–105)
MAGNESIUM: 1.8 MG/DL (ref 1.6–2.6)
POTASSIUM SERPL-SCNC: 3.8 MMOL/L (ref 3.7–5.3)
SODIUM BLD-SCNC: 136 MMOL/L (ref 135–144)

## 2017-02-16 PROCEDURE — 82947 ASSAY GLUCOSE BLOOD QUANT: CPT

## 2017-02-16 PROCEDURE — 6370000000 HC RX 637 (ALT 250 FOR IP): Performed by: HOSPITALIST

## 2017-02-16 PROCEDURE — 83735 ASSAY OF MAGNESIUM: CPT

## 2017-02-16 PROCEDURE — 94762 N-INVAS EAR/PLS OXIMTRY CONT: CPT

## 2017-02-16 PROCEDURE — 99238 HOSP IP/OBS DSCHRG MGMT 30/<: CPT | Performed by: INTERNAL MEDICINE

## 2017-02-16 PROCEDURE — 36415 COLL VENOUS BLD VENIPUNCTURE: CPT

## 2017-02-16 PROCEDURE — 6370000000 HC RX 637 (ALT 250 FOR IP): Performed by: INTERNAL MEDICINE

## 2017-02-16 PROCEDURE — 80048 BASIC METABOLIC PNL TOTAL CA: CPT

## 2017-02-16 RX ORDER — UBIQUINOL 100 MG
CAPSULE ORAL
Qty: 100 EACH | Refills: 3 | Status: SHIPPED | OUTPATIENT
Start: 2017-02-16 | End: 2017-08-11

## 2017-02-16 RX ORDER — GLUCOSAMINE HCL/CHONDROITIN SU 500-400 MG
CAPSULE ORAL
Qty: 100 STRIP | Refills: 11 | Status: SHIPPED | OUTPATIENT
Start: 2017-02-16 | End: 2017-08-11

## 2017-02-16 RX ORDER — BLOOD-GLUCOSE METER
KIT MISCELLANEOUS
Qty: 1 KIT | Refills: 0 | Status: ON HOLD | OUTPATIENT
Start: 2017-02-16 | End: 2018-08-27

## 2017-02-16 RX ORDER — CALCIUM CARB/VITAMIN D3/VIT K1 500-100-40
1 TABLET,CHEWABLE ORAL DAILY
Qty: 100 EACH | Refills: 3 | Status: SHIPPED | OUTPATIENT
Start: 2017-02-16 | End: 2017-08-11

## 2017-02-16 RX ORDER — LANCETS 28 GAUGE
1 EACH MISCELLANEOUS DAILY
Qty: 100 EACH | Refills: 3 | Status: ON HOLD | OUTPATIENT
Start: 2017-02-16 | End: 2018-08-27

## 2017-02-16 RX ORDER — SYRING-NEEDL,DISP,INSUL,0.3 ML 30 GX5/16"
1 SYRINGE, EMPTY DISPOSABLE MISCELLANEOUS ONCE
Qty: 100 DEVICE | Refills: 0 | Status: SHIPPED | OUTPATIENT
Start: 2017-02-16 | End: 2017-08-11

## 2017-02-16 RX ADMIN — GABAPENTIN 300 MG: 300 CAPSULE ORAL at 08:05

## 2017-02-16 RX ADMIN — QUETIAPINE FUMARATE 100 MG: 100 TABLET ORAL at 08:05

## 2017-02-16 RX ADMIN — INSULIN HUMAN 40 UNITS: 100 INJECTION, SUSPENSION SUBCUTANEOUS at 10:29

## 2017-02-16 RX ADMIN — ROPINIROLE HYDROCHLORIDE 1 MG: 1 TABLET, FILM COATED ORAL at 08:08

## 2017-02-16 RX ADMIN — GABAPENTIN 300 MG: 300 CAPSULE ORAL at 14:04

## 2017-02-16 RX ADMIN — QUETIAPINE FUMARATE 100 MG: 100 TABLET ORAL at 14:04

## 2017-02-16 RX ADMIN — INSULIN LISPRO 6 UNITS: 100 INJECTION, SOLUTION INTRAVENOUS; SUBCUTANEOUS at 14:04

## 2017-02-16 RX ADMIN — VENLAFAXINE HYDROCHLORIDE 150 MG: 75 CAPSULE, EXTENDED RELEASE ORAL at 08:05

## 2017-08-11 ENCOUNTER — HOSPITAL ENCOUNTER (EMERGENCY)
Age: 47
Discharge: HOME | End: 2017-08-11
Attending: EMERGENCY MEDICINE

## 2017-08-11 VITALS
DIASTOLIC BLOOD PRESSURE: 80 MMHG | TEMPERATURE: 97.8 F | SYSTOLIC BLOOD PRESSURE: 129 MMHG | WEIGHT: 120 LBS | HEIGHT: 64 IN | RESPIRATION RATE: 16 BRPM | HEART RATE: 82 BPM | BODY MASS INDEX: 20.49 KG/M2 | OXYGEN SATURATION: 99 %

## 2017-08-11 DIAGNOSIS — G47.00 INSOMNIA, UNSPECIFIED TYPE: ICD-10-CM

## 2017-08-11 DIAGNOSIS — F31.9 BIPOLAR 1 DISORDER (HCC): ICD-10-CM

## 2017-08-11 DIAGNOSIS — R73.9 HYPERGLYCEMIA: Primary | ICD-10-CM

## 2017-08-11 LAB
ABSOLUTE EOS #: 0.1 K/UL (ref 0–0.4)
ABSOLUTE LYMPH #: 1.7 K/UL (ref 1–4.8)
ABSOLUTE MONO #: 0.4 K/UL (ref 0.1–1.3)
ANION GAP SERPL CALCULATED.3IONS-SCNC: 16 MMOL/L (ref 9–17)
BASOPHILS # BLD: 1 %
BASOPHILS ABSOLUTE: 0 K/UL (ref 0–0.2)
BETA-HYDROXYBUTYRATE: 0.12 MMOL/L (ref 0.02–0.27)
BUN BLDV-MCNC: 15 MG/DL (ref 6–20)
BUN/CREAT BLD: ABNORMAL (ref 9–20)
CALCIUM SERPL-MCNC: 9.8 MG/DL (ref 8.6–10.4)
CHLORIDE BLD-SCNC: 89 MMOL/L (ref 98–107)
CHP ED QC CHECK: NORMAL
CHP ED QC CHECK: YES
CO2: 24 MMOL/L (ref 20–31)
CREAT SERPL-MCNC: 0.4 MG/DL (ref 0.5–0.9)
DIFFERENTIAL TYPE: NORMAL
EOSINOPHILS RELATIVE PERCENT: 3 %
GFR AFRICAN AMERICAN: >60 ML/MIN
GFR NON-AFRICAN AMERICAN: >60 ML/MIN
GFR SERPL CREATININE-BSD FRML MDRD: ABNORMAL ML/MIN/{1.73_M2}
GFR SERPL CREATININE-BSD FRML MDRD: ABNORMAL ML/MIN/{1.73_M2}
GLUCOSE BLD-MCNC: 272 MG/DL
GLUCOSE BLD-MCNC: 272 MG/DL (ref 65–105)
GLUCOSE BLD-MCNC: 359 MG/DL
GLUCOSE BLD-MCNC: 359 MG/DL (ref 65–105)
GLUCOSE BLD-MCNC: 458 MG/DL (ref 70–99)
HCT VFR BLD CALC: 45.1 % (ref 36–46)
HEMOGLOBIN: 15.4 G/DL (ref 12–16)
LYMPHOCYTES # BLD: 34 %
MCH RBC QN AUTO: 29.9 PG (ref 26–34)
MCHC RBC AUTO-ENTMCNC: 34.1 G/DL (ref 31–37)
MCV RBC AUTO: 87.7 FL (ref 80–100)
MONOCYTES # BLD: 8 %
PDW BLD-RTO: 14.3 % (ref 11.5–14.9)
PLATELET # BLD: 204 K/UL (ref 150–450)
PLATELET ESTIMATE: NORMAL
PMV BLD AUTO: 7.7 FL (ref 6–12)
POTASSIUM SERPL-SCNC: 4.1 MMOL/L (ref 3.7–5.3)
RBC # BLD: 5.14 M/UL (ref 4–5.2)
RBC # BLD: NORMAL 10*6/UL
SEG NEUTROPHILS: 54 %
SEGMENTED NEUTROPHILS ABSOLUTE COUNT: 2.8 K/UL (ref 1.3–9.1)
SODIUM BLD-SCNC: 129 MMOL/L (ref 135–144)
WBC # BLD: 5 K/UL (ref 3.5–11)
WBC # BLD: NORMAL 10*3/UL

## 2017-08-11 PROCEDURE — 2580000003 HC RX 258: Performed by: EMERGENCY MEDICINE

## 2017-08-11 PROCEDURE — 36415 COLL VENOUS BLD VENIPUNCTURE: CPT

## 2017-08-11 PROCEDURE — 96372 THER/PROPH/DIAG INJ SC/IM: CPT

## 2017-08-11 PROCEDURE — 80048 BASIC METABOLIC PNL TOTAL CA: CPT

## 2017-08-11 PROCEDURE — 99284 EMERGENCY DEPT VISIT MOD MDM: CPT

## 2017-08-11 PROCEDURE — 82947 ASSAY GLUCOSE BLOOD QUANT: CPT

## 2017-08-11 PROCEDURE — 82010 KETONE BODYS QUAN: CPT

## 2017-08-11 PROCEDURE — 6370000000 HC RX 637 (ALT 250 FOR IP): Performed by: EMERGENCY MEDICINE

## 2017-08-11 PROCEDURE — 85025 COMPLETE CBC W/AUTO DIFF WBC: CPT

## 2017-08-11 RX ORDER — QUETIAPINE FUMARATE 100 MG/1
100 TABLET, FILM COATED ORAL ONCE
Status: COMPLETED | OUTPATIENT
Start: 2017-08-11 | End: 2017-08-11

## 2017-08-11 RX ORDER — SYRING-NEEDL,DISP,INSUL,0.3 ML 30 GX5/16"
1 SYRINGE, EMPTY DISPOSABLE MISCELLANEOUS ONCE
Qty: 100 DEVICE | Refills: 0 | Status: ON HOLD | OUTPATIENT
Start: 2017-08-11 | End: 2018-08-27 | Stop reason: HOSPADM

## 2017-08-11 RX ORDER — 0.9 % SODIUM CHLORIDE 0.9 %
1000 INTRAVENOUS SOLUTION INTRAVENOUS ONCE
Status: COMPLETED | OUTPATIENT
Start: 2017-08-11 | End: 2017-08-11

## 2017-08-11 RX ORDER — GLUCOSAMINE HCL/CHONDROITIN SU 500-400 MG
CAPSULE ORAL
Qty: 100 STRIP | Refills: 11 | Status: ON HOLD | OUTPATIENT
Start: 2017-08-11 | End: 2018-08-27

## 2017-08-11 RX ORDER — CALCIUM CARB/VITAMIN D3/VIT K1 500-100-40
1 TABLET,CHEWABLE ORAL DAILY
Qty: 100 EACH | Refills: 3 | Status: ON HOLD | OUTPATIENT
Start: 2017-08-11 | End: 2018-08-27

## 2017-08-11 RX ORDER — QUETIAPINE FUMARATE 100 MG/1
100 TABLET, FILM COATED ORAL 2 TIMES DAILY
Qty: 10 TABLET | Refills: 0 | Status: ON HOLD | OUTPATIENT
Start: 2017-08-11 | End: 2018-08-27 | Stop reason: HOSPADM

## 2017-08-11 RX ORDER — QUETIAPINE FUMARATE 100 MG/1
100 TABLET, FILM COATED ORAL 2 TIMES DAILY
Status: DISCONTINUED | OUTPATIENT
Start: 2017-08-11 | End: 2017-08-11

## 2017-08-11 RX ORDER — UBIQUINOL 100 MG
CAPSULE ORAL
Qty: 100 EACH | Refills: 3 | Status: ON HOLD | OUTPATIENT
Start: 2017-08-11 | End: 2018-08-27

## 2017-08-11 RX ADMIN — INSULIN LISPRO 10 UNITS: 100 INJECTION, SOLUTION INTRAVENOUS; SUBCUTANEOUS at 17:12

## 2017-08-11 RX ADMIN — QUETIAPINE FUMARATE 100 MG: 100 TABLET, FILM COATED ORAL at 17:39

## 2017-08-11 RX ADMIN — SODIUM CHLORIDE 1000 ML: 9 INJECTION, SOLUTION INTRAVENOUS at 17:12

## 2017-08-11 ASSESSMENT — ENCOUNTER SYMPTOMS
BACK PAIN: 0
NAUSEA: 0
VOMITING: 0
ABDOMINAL PAIN: 0
COUGH: 0
SHORTNESS OF BREATH: 0

## 2017-08-11 ASSESSMENT — PAIN DESCRIPTION - LOCATION: LOCATION: GENERALIZED

## 2017-08-11 ASSESSMENT — PAIN DESCRIPTION - DESCRIPTORS: DESCRIPTORS: ACHING

## 2017-08-11 ASSESSMENT — PAIN SCALES - GENERAL: PAINLEVEL_OUTOF10: 9

## 2017-08-11 ASSESSMENT — PAIN DESCRIPTION - PAIN TYPE: TYPE: CHRONIC PAIN

## 2018-05-23 ENCOUNTER — HOSPITAL ENCOUNTER (EMERGENCY)
Age: 48
Discharge: HOME | End: 2018-05-24
Attending: EMERGENCY MEDICINE
Payer: MEDICAID

## 2018-05-23 DIAGNOSIS — H60.392 INFECTIVE OTITIS EXTERNA OF LEFT EAR: Primary | ICD-10-CM

## 2018-05-23 PROCEDURE — G0381 LEV 2 HOSP TYPE B ED VISIT: HCPCS

## 2018-05-23 PROCEDURE — 6370000000 HC RX 637 (ALT 250 FOR IP): Performed by: EMERGENCY MEDICINE

## 2018-05-23 RX ORDER — OXYCODONE HYDROCHLORIDE AND ACETAMINOPHEN 5; 325 MG/1; MG/1
2 TABLET ORAL ONCE
Status: COMPLETED | OUTPATIENT
Start: 2018-05-23 | End: 2018-05-23

## 2018-05-23 RX ORDER — IBUPROFEN 600 MG/1
600 TABLET ORAL EVERY 6 HOURS PRN
Qty: 30 TABLET | Refills: 0 | Status: ON HOLD | OUTPATIENT
Start: 2018-05-23 | End: 2018-08-27 | Stop reason: HOSPADM

## 2018-05-23 RX ORDER — NEOMYCIN SULFATE, POLYMYXIN B SULFATE AND HYDROCORTISONE 10; 3.5; 1 MG/ML; MG/ML; [USP'U]/ML
3 SUSPENSION/ DROPS AURICULAR (OTIC) EVERY 6 HOURS SCHEDULED
Status: DISCONTINUED | OUTPATIENT
Start: 2018-05-24 | End: 2018-05-24 | Stop reason: HOSPADM

## 2018-05-23 RX ORDER — AMOXICILLIN AND CLAVULANATE POTASSIUM 875; 125 MG/1; MG/1
1 TABLET, FILM COATED ORAL EVERY 12 HOURS SCHEDULED
Status: DISCONTINUED | OUTPATIENT
Start: 2018-05-23 | End: 2018-05-24 | Stop reason: HOSPADM

## 2018-05-23 RX ORDER — NEOMYCIN SULFATE, POLYMYXIN B SULFATE, HYDROCORTISONE 3.5; 10000; 1 MG/ML; [USP'U]/ML; MG/ML
2 SOLUTION/ DROPS AURICULAR (OTIC) EVERY 8 HOURS SCHEDULED
Qty: 1 BOTTLE | Refills: 0 | Status: SHIPPED | OUTPATIENT
Start: 2018-05-23 | End: 2018-05-24

## 2018-05-23 RX ORDER — CETIRIZINE HYDROCHLORIDE 10 MG/1
10 TABLET ORAL DAILY
Status: DISCONTINUED | OUTPATIENT
Start: 2018-05-24 | End: 2018-05-24 | Stop reason: HOSPADM

## 2018-05-23 RX ORDER — AMOXICILLIN AND CLAVULANATE POTASSIUM 875; 125 MG/1; MG/1
1 TABLET, FILM COATED ORAL 2 TIMES DAILY
Qty: 20 TABLET | Refills: 0 | Status: SHIPPED | OUTPATIENT
Start: 2018-05-23 | End: 2018-05-24

## 2018-05-23 RX ORDER — IBUPROFEN 800 MG/1
800 TABLET ORAL ONCE
Status: COMPLETED | OUTPATIENT
Start: 2018-05-24 | End: 2018-05-23

## 2018-05-23 RX ORDER — OXYCODONE HYDROCHLORIDE AND ACETAMINOPHEN 5; 325 MG/1; MG/1
1-2 TABLET ORAL EVERY 6 HOURS PRN
Qty: 16 TABLET | Refills: 0 | Status: SHIPPED | OUTPATIENT
Start: 2018-05-23 | End: 2018-05-30

## 2018-05-23 RX ADMIN — OXYCODONE HYDROCHLORIDE AND ACETAMINOPHEN 2 TABLET: 5; 325 TABLET ORAL at 23:59

## 2018-05-23 RX ADMIN — IBUPROFEN 800 MG: 800 TABLET ORAL at 23:58

## 2018-05-23 RX ADMIN — NEOMYCIN SULFATE, POLYMYXIN B SULFATE AND HYDROCORTISONE 3 DROP: 10; 3.5; 1 SUSPENSION/ DROPS AURICULAR (OTIC) at 23:59

## 2018-05-23 RX ADMIN — AMOXICILLIN AND CLAVULANATE POTASSIUM 1 TABLET: 875; 125 TABLET, FILM COATED ORAL at 23:58

## 2018-05-23 ASSESSMENT — PAIN DESCRIPTION - LOCATION: LOCATION: EAR

## 2018-05-23 ASSESSMENT — PAIN DESCRIPTION - PROGRESSION: CLINICAL_PROGRESSION: RAPIDLY WORSENING

## 2018-05-23 ASSESSMENT — PAIN DESCRIPTION - ONSET: ONSET: PROGRESSIVE

## 2018-05-23 ASSESSMENT — PAIN DESCRIPTION - PAIN TYPE: TYPE: ACUTE PAIN

## 2018-05-23 ASSESSMENT — PAIN DESCRIPTION - DESCRIPTORS: DESCRIPTORS: BURNING;ACHING

## 2018-05-23 ASSESSMENT — PAIN DESCRIPTION - FREQUENCY: FREQUENCY: CONTINUOUS

## 2018-05-23 ASSESSMENT — PAIN SCALES - GENERAL: PAINLEVEL_OUTOF10: 10

## 2018-05-23 ASSESSMENT — PAIN DESCRIPTION - ORIENTATION: ORIENTATION: LEFT

## 2018-05-24 VITALS
OXYGEN SATURATION: 100 % | RESPIRATION RATE: 18 BRPM | SYSTOLIC BLOOD PRESSURE: 130 MMHG | DIASTOLIC BLOOD PRESSURE: 79 MMHG | TEMPERATURE: 97.7 F | HEART RATE: 100 BPM

## 2018-05-24 VITALS
HEART RATE: 98 BPM | HEIGHT: 64 IN | BODY MASS INDEX: 20.49 KG/M2 | WEIGHT: 120 LBS | SYSTOLIC BLOOD PRESSURE: 137 MMHG | DIASTOLIC BLOOD PRESSURE: 71 MMHG | OXYGEN SATURATION: 99 % | TEMPERATURE: 98.2 F | RESPIRATION RATE: 18 BRPM

## 2018-05-24 DIAGNOSIS — H60.392 INFECTIVE OTITIS EXTERNA OF LEFT EAR: Primary | ICD-10-CM

## 2018-05-24 PROCEDURE — 99282 EMERGENCY DEPT VISIT SF MDM: CPT

## 2018-05-24 RX ORDER — AMOXICILLIN AND CLAVULANATE POTASSIUM 875; 125 MG/1; MG/1
1 TABLET, FILM COATED ORAL 2 TIMES DAILY
Qty: 20 TABLET | Refills: 0 | Status: SHIPPED | OUTPATIENT
Start: 2018-05-24 | End: 2018-05-24 | Stop reason: SDUPTHER

## 2018-05-24 RX ORDER — NEOMYCIN SULFATE, POLYMYXIN B SULFATE, HYDROCORTISONE 3.5; 10000; 1 MG/ML; [USP'U]/ML; MG/ML
2 SOLUTION/ DROPS AURICULAR (OTIC) EVERY 8 HOURS SCHEDULED
Qty: 1 BOTTLE | Refills: 0 | Status: SHIPPED | OUTPATIENT
Start: 2018-05-24 | End: 2018-05-24 | Stop reason: SDUPTHER

## 2018-05-24 ASSESSMENT — PAIN DESCRIPTION - DESCRIPTORS: DESCRIPTORS: DISCOMFORT

## 2018-05-24 ASSESSMENT — PAIN DESCRIPTION - ORIENTATION: ORIENTATION: LEFT

## 2018-05-24 ASSESSMENT — PAIN DESCRIPTION - PAIN TYPE: TYPE: ACUTE PAIN

## 2018-05-24 ASSESSMENT — PAIN SCALES - GENERAL: PAINLEVEL_OUTOF10: 9

## 2018-05-24 ASSESSMENT — PAIN DESCRIPTION - LOCATION: LOCATION: EAR

## 2018-05-26 ASSESSMENT — ENCOUNTER SYMPTOMS
CONSTIPATION: 0
SHORTNESS OF BREATH: 0
EYE DISCHARGE: 0
NAUSEA: 0
DIARRHEA: 0
ABDOMINAL PAIN: 0
SINUS PRESSURE: 1
CHEST TIGHTNESS: 0
SORE THROAT: 0

## 2018-08-26 ENCOUNTER — HOSPITAL ENCOUNTER (OUTPATIENT)
Age: 48
Setting detail: OBSERVATION
Discharge: HOME | End: 2018-08-27
Attending: EMERGENCY MEDICINE | Admitting: EMERGENCY MEDICINE
Payer: MEDICAID

## 2018-08-26 DIAGNOSIS — R73.9 HYPERGLYCEMIA: ICD-10-CM

## 2018-08-26 DIAGNOSIS — R10.13 ABDOMINAL PAIN, EPIGASTRIC: Primary | ICD-10-CM

## 2018-08-26 DIAGNOSIS — R11.2 NON-INTRACTABLE VOMITING WITH NAUSEA, UNSPECIFIED VOMITING TYPE: ICD-10-CM

## 2018-08-26 LAB
ABSOLUTE EOS #: 0.14 K/UL (ref 0–0.44)
ABSOLUTE IMMATURE GRANULOCYTE: 0.03 K/UL (ref 0–0.3)
ABSOLUTE LYMPH #: 1.63 K/UL (ref 1.1–3.7)
ABSOLUTE MONO #: 0.55 K/UL (ref 0.1–1.2)
ALBUMIN SERPL-MCNC: 3.9 G/DL (ref 3.5–5.2)
ALBUMIN/GLOBULIN RATIO: 1.1 (ref 1–2.5)
ALLEN TEST: ABNORMAL
ALP BLD-CCNC: 131 U/L (ref 35–104)
ALT SERPL-CCNC: 16 U/L (ref 5–33)
ANION GAP SERPL CALCULATED.3IONS-SCNC: 14 MMOL/L (ref 9–17)
ANION GAP: 5 MMOL/L (ref 7–16)
AST SERPL-CCNC: 25 U/L
BASOPHILS # BLD: 1 % (ref 0–2)
BASOPHILS ABSOLUTE: 0.04 K/UL (ref 0–0.2)
BETA-HYDROXYBUTYRATE: 0.18 MMOL/L (ref 0.02–0.27)
BILIRUB SERPL-MCNC: 0.2 MG/DL (ref 0.3–1.2)
BUN BLDV-MCNC: 12 MG/DL (ref 6–20)
BUN/CREAT BLD: ABNORMAL (ref 9–20)
CALCIUM SERPL-MCNC: 9 MG/DL (ref 8.6–10.4)
CHLORIDE BLD-SCNC: 90 MMOL/L (ref 98–107)
CHP ED QC CHECK: YES
CHP ED QC CHECK: YES
CO2: 18 MMOL/L (ref 20–31)
CREAT SERPL-MCNC: 0.37 MG/DL (ref 0.5–0.9)
DIFFERENTIAL TYPE: ABNORMAL
EOSINOPHILS RELATIVE PERCENT: 2 % (ref 1–4)
FIO2: ABNORMAL
GFR AFRICAN AMERICAN: >60 ML/MIN
GFR NON-AFRICAN AMERICAN: >60 ML/MIN
GFR SERPL CREATININE-BSD FRML MDRD: ABNORMAL ML/MIN/{1.73_M2}
GFR SERPL CREATININE-BSD FRML MDRD: ABNORMAL ML/MIN/{1.73_M2}
GLUCOSE BLD-MCNC: 188 MG/DL (ref 65–105)
GLUCOSE BLD-MCNC: 298 MG/DL
GLUCOSE BLD-MCNC: 298 MG/DL (ref 65–105)
GLUCOSE BLD-MCNC: 340 MG/DL (ref 65–105)
GLUCOSE BLD-MCNC: 383 MG/DL
GLUCOSE BLD-MCNC: 383 MG/DL (ref 70–99)
GLUCOSE BLD-MCNC: 383 MG/DL (ref 74–100)
HCG QUANTITATIVE: <1 IU/L
HCO3 VENOUS: 25.6 MMOL/L (ref 22–29)
HCT VFR BLD CALC: 41 % (ref 36.3–47.1)
HEMOGLOBIN: 15.3 G/DL (ref 11.9–15.1)
IMMATURE GRANULOCYTES: 0 %
LIPASE: 46 U/L (ref 13–60)
LYMPHOCYTES # BLD: 22 % (ref 24–43)
MCH RBC QN AUTO: 31.4 PG (ref 25.2–33.5)
MCHC RBC AUTO-ENTMCNC: 37.3 G/DL (ref 28.4–34.8)
MCV RBC AUTO: 84.2 FL (ref 82.6–102.9)
MODE: ABNORMAL
MONOCYTES # BLD: 7 % (ref 3–12)
NEGATIVE BASE EXCESS, VEN: 2 (ref 0–2)
NRBC AUTOMATED: 0 PER 100 WBC
O2 DEVICE/FLOW/%: ABNORMAL
O2 SAT, VEN: 53 % (ref 60–85)
PATIENT TEMP: ABNORMAL
PCO2, VEN: 53.6 MM HG (ref 41–51)
PDW BLD-RTO: 12.8 % (ref 11.8–14.4)
PH VENOUS: 7.29 (ref 7.32–7.43)
PLATELET # BLD: 236 K/UL (ref 138–453)
PLATELET ESTIMATE: ABNORMAL
PMV BLD AUTO: 10 FL (ref 8.1–13.5)
PO2, VEN: 32.1 MM HG (ref 30–50)
POC CHLORIDE: 104 MMOL/L (ref 98–107)
POC HEMATOCRIT: 46 % (ref 36–46)
POC HEMOGLOBIN: 15.8 G/DL (ref 12–16)
POC IONIZED CALCIUM: 1.1 MMOL/L (ref 1.15–1.33)
POC LACTIC ACID: 2.58 MMOL/L (ref 0.56–1.39)
POC PCO2 TEMP: ABNORMAL MM HG
POC PH TEMP: ABNORMAL
POC PO2 TEMP: ABNORMAL MM HG
POC SODIUM: 135 MMOL/L (ref 138–146)
POSITIVE BASE EXCESS, VEN: ABNORMAL (ref 0–3)
POTASSIUM SERPL-SCNC: 4.7 MMOL/L (ref 3.7–5.3)
RBC # BLD: 4.87 M/UL (ref 3.95–5.11)
RBC # BLD: ABNORMAL 10*6/UL
SAMPLE SITE: ABNORMAL
SEG NEUTROPHILS: 68 % (ref 36–65)
SEGMENTED NEUTROPHILS ABSOLUTE COUNT: 5.01 K/UL (ref 1.5–8.1)
SODIUM BLD-SCNC: 122 MMOL/L (ref 135–144)
TOTAL CO2, VENOUS: 27 MMOL/L (ref 23–30)
TOTAL PROTEIN: 7.5 G/DL (ref 6.4–8.3)
WBC # BLD: 7.4 K/UL (ref 3.5–11.3)
WBC # BLD: ABNORMAL 10*3/UL

## 2018-08-26 PROCEDURE — 85025 COMPLETE CBC W/AUTO DIFF WBC: CPT

## 2018-08-26 PROCEDURE — 84295 ASSAY OF SERUM SODIUM: CPT

## 2018-08-26 PROCEDURE — 6360000002 HC RX W HCPCS: Performed by: EMERGENCY MEDICINE

## 2018-08-26 PROCEDURE — 82565 ASSAY OF CREATININE: CPT

## 2018-08-26 PROCEDURE — 82330 ASSAY OF CALCIUM: CPT

## 2018-08-26 PROCEDURE — 96375 TX/PRO/DX INJ NEW DRUG ADDON: CPT

## 2018-08-26 PROCEDURE — 82010 KETONE BODYS QUAN: CPT

## 2018-08-26 PROCEDURE — 6370000000 HC RX 637 (ALT 250 FOR IP): Performed by: STUDENT IN AN ORGANIZED HEALTH CARE EDUCATION/TRAINING PROGRAM

## 2018-08-26 PROCEDURE — 96376 TX/PRO/DX INJ SAME DRUG ADON: CPT

## 2018-08-26 PROCEDURE — 82435 ASSAY OF BLOOD CHLORIDE: CPT

## 2018-08-26 PROCEDURE — 85014 HEMATOCRIT: CPT

## 2018-08-26 PROCEDURE — 82947 ASSAY GLUCOSE BLOOD QUANT: CPT

## 2018-08-26 PROCEDURE — 83690 ASSAY OF LIPASE: CPT

## 2018-08-26 PROCEDURE — G0378 HOSPITAL OBSERVATION PER HR: HCPCS

## 2018-08-26 PROCEDURE — 6360000002 HC RX W HCPCS: Performed by: STUDENT IN AN ORGANIZED HEALTH CARE EDUCATION/TRAINING PROGRAM

## 2018-08-26 PROCEDURE — 2580000003 HC RX 258: Performed by: STUDENT IN AN ORGANIZED HEALTH CARE EDUCATION/TRAINING PROGRAM

## 2018-08-26 PROCEDURE — 82803 BLOOD GASES ANY COMBINATION: CPT

## 2018-08-26 PROCEDURE — 96361 HYDRATE IV INFUSION ADD-ON: CPT

## 2018-08-26 PROCEDURE — 99285 EMERGENCY DEPT VISIT HI MDM: CPT

## 2018-08-26 PROCEDURE — 6370000000 HC RX 637 (ALT 250 FOR IP): Performed by: EMERGENCY MEDICINE

## 2018-08-26 PROCEDURE — 84132 ASSAY OF SERUM POTASSIUM: CPT

## 2018-08-26 PROCEDURE — 96372 THER/PROPH/DIAG INJ SC/IM: CPT

## 2018-08-26 PROCEDURE — 84702 CHORIONIC GONADOTROPIN TEST: CPT

## 2018-08-26 PROCEDURE — 96374 THER/PROPH/DIAG INJ IV PUSH: CPT

## 2018-08-26 PROCEDURE — 80053 COMPREHEN METABOLIC PANEL: CPT

## 2018-08-26 PROCEDURE — 2580000003 HC RX 258: Performed by: EMERGENCY MEDICINE

## 2018-08-26 PROCEDURE — 83605 ASSAY OF LACTIC ACID: CPT

## 2018-08-26 RX ORDER — ONDANSETRON 4 MG/1
4 TABLET, ORALLY DISINTEGRATING ORAL ONCE
Status: COMPLETED | OUTPATIENT
Start: 2018-08-26 | End: 2018-08-26

## 2018-08-26 RX ORDER — FENTANYL CITRATE 50 UG/ML
50 INJECTION, SOLUTION INTRAMUSCULAR; INTRAVENOUS EVERY 4 HOURS PRN
Status: DISCONTINUED | OUTPATIENT
Start: 2018-08-26 | End: 2018-08-27 | Stop reason: HOSPADM

## 2018-08-26 RX ORDER — SODIUM CHLORIDE 0.9 % (FLUSH) 0.9 %
10 SYRINGE (ML) INJECTION PRN
Status: DISCONTINUED | OUTPATIENT
Start: 2018-08-26 | End: 2018-08-27 | Stop reason: HOSPADM

## 2018-08-26 RX ORDER — ONDANSETRON 2 MG/ML
4 INJECTION INTRAMUSCULAR; INTRAVENOUS EVERY 6 HOURS PRN
Status: DISCONTINUED | OUTPATIENT
Start: 2018-08-26 | End: 2018-08-27 | Stop reason: HOSPADM

## 2018-08-26 RX ORDER — PROMETHAZINE HYDROCHLORIDE 25 MG/ML
12.5 INJECTION, SOLUTION INTRAMUSCULAR; INTRAVENOUS ONCE
Status: COMPLETED | OUTPATIENT
Start: 2018-08-26 | End: 2018-08-26

## 2018-08-26 RX ORDER — 0.9 % SODIUM CHLORIDE 0.9 %
75 INTRAVENOUS SOLUTION INTRAVENOUS ONCE
Status: COMPLETED | OUTPATIENT
Start: 2018-08-26 | End: 2018-08-27

## 2018-08-26 RX ORDER — FENTANYL CITRATE 50 UG/ML
50 INJECTION, SOLUTION INTRAMUSCULAR; INTRAVENOUS ONCE
Status: COMPLETED | OUTPATIENT
Start: 2018-08-26 | End: 2018-08-26

## 2018-08-26 RX ORDER — SODIUM CHLORIDE 0.9 % (FLUSH) 0.9 %
10 SYRINGE (ML) INJECTION EVERY 12 HOURS SCHEDULED
Status: DISCONTINUED | OUTPATIENT
Start: 2018-08-26 | End: 2018-08-27 | Stop reason: HOSPADM

## 2018-08-26 RX ORDER — 0.9 % SODIUM CHLORIDE 0.9 %
1000 INTRAVENOUS SOLUTION INTRAVENOUS ONCE
Status: COMPLETED | OUTPATIENT
Start: 2018-08-26 | End: 2018-08-26

## 2018-08-26 RX ADMIN — SODIUM CHLORIDE 1000 ML: 9 INJECTION, SOLUTION INTRAVENOUS at 13:44

## 2018-08-26 RX ADMIN — INSULIN LISPRO 2 UNITS: 100 INJECTION, SOLUTION INTRAVENOUS; SUBCUTANEOUS at 22:49

## 2018-08-26 RX ADMIN — FENTANYL CITRATE 50 MCG: 50 INJECTION, SOLUTION INTRAMUSCULAR; INTRAVENOUS at 19:34

## 2018-08-26 RX ADMIN — ONDANSETRON 4 MG: 4 TABLET, ORALLY DISINTEGRATING ORAL at 13:44

## 2018-08-26 RX ADMIN — INSULIN HUMAN 5 UNITS: 100 INJECTION, SOLUTION PARENTERAL at 15:25

## 2018-08-26 RX ADMIN — FENTANYL CITRATE 50 MCG: 50 INJECTION, SOLUTION INTRAMUSCULAR; INTRAVENOUS at 14:06

## 2018-08-26 RX ADMIN — FENTANYL CITRATE 50 MCG: 50 INJECTION, SOLUTION INTRAMUSCULAR; INTRAVENOUS at 15:24

## 2018-08-26 RX ADMIN — Medication 10 ML: at 22:49

## 2018-08-26 RX ADMIN — PROMETHAZINE HYDROCHLORIDE 12.5 MG: 25 INJECTION, SOLUTION INTRAMUSCULAR; INTRAVENOUS at 15:38

## 2018-08-26 RX ADMIN — ONDANSETRON 4 MG: 2 INJECTION INTRAMUSCULAR; INTRAVENOUS at 19:12

## 2018-08-26 ASSESSMENT — PAIN DESCRIPTION - PAIN TYPE
TYPE: ACUTE PAIN
TYPE: CHRONIC PAIN

## 2018-08-26 ASSESSMENT — PAIN SCALES - GENERAL
PAINLEVEL_OUTOF10: 7
PAINLEVEL_OUTOF10: 7
PAINLEVEL_OUTOF10: 8
PAINLEVEL_OUTOF10: 7
PAINLEVEL_OUTOF10: 9

## 2018-08-26 ASSESSMENT — PAIN DESCRIPTION - LOCATION
LOCATION: ABDOMEN
LOCATION: ABDOMEN

## 2018-08-26 ASSESSMENT — PAIN DESCRIPTION - PROGRESSION: CLINICAL_PROGRESSION: NOT CHANGED

## 2018-08-26 ASSESSMENT — PAIN DESCRIPTION - FREQUENCY: FREQUENCY: CONTINUOUS

## 2018-08-26 ASSESSMENT — ENCOUNTER SYMPTOMS
SHORTNESS OF BREATH: 0
SORE THROAT: 0
BACK PAIN: 1
ABDOMINAL PAIN: 1
NAUSEA: 1
VOMITING: 1

## 2018-08-26 ASSESSMENT — PAIN DESCRIPTION - DESCRIPTORS: DESCRIPTORS: ACHING

## 2018-08-26 ASSESSMENT — PAIN DESCRIPTION - ONSET: ONSET: ON-GOING

## 2018-08-26 ASSESSMENT — PAIN DESCRIPTION - ORIENTATION: ORIENTATION: UPPER

## 2018-08-26 NOTE — ED PROVIDER NOTES
[address] [facility]     Emergency Department     Faculty Attestation    I performed a history and physical examination of the patient and discussed management with the resident. I reviewed the residents note and agree with the documented findings including all diagnostic interpretations and plan of care. Any areas of disagreement are noted on the chart. I was personally present for the key portions of any procedures. I have documented in the chart those procedures where I was not present during the key portions. I have reviewed the emergency nurses triage note. I agree with the chief complaint, past medical history, past surgical history, allergies, medications, social and family history as documented unless otherwise noted below. Documentation of the HPI, Physical Exam and Medical Decision Making performed by scribankit is based on my personal performance of the HPI, PE and MDM. For Physician Assistant/ Nurse Practitioner cases/documentation I have personally evaluated this patient and have completed at least one if not all key elements of the E/M (history, physical exam, and MDM). Additional findings are as noted. Primary Care Physician: Avtar Del Toro MD    History: This is a 51 y.o. female who presents to the Emergency Department with complaint of abdominal pain, vomiting. Ongoing for 3 days. No specific trigger. History of pancreatitis. No diarrhea. No pain or burning urination. No vaginal discharge. Physical:     height is 5' 4\" (1.626 m) and weight is 120 lb (54.4 kg). Her oral temperature is 98.5 °F (36.9 °C). Her blood pressure is 150/78 (abnormal) and her pulse is 115.  Her respiration is 16 and oxygen saturation is 97%.    51 y.o. female no acute distress, appears uncomfortable, cardiac exam regular rate and rhythm no murmurs or gallops complications A clear to auscultation bilaterally, abdomen is soft, tender in the epigastrium no rebound no guarding    Impression: Abdominal pain, suspect exacerbation of chronic pancreatitis    Plan: Fluids, belly labs, analgesia, antiemetics      Narinder Lowery MD  Attending Emergency Physician        Karli Rosario MD  08/26/18 [id number]

## 2018-08-26 NOTE — ED PROVIDER NOTES
Topics Concern    Not on file     Social History Narrative    No narrative on file       Family History   Problem Relation Age of Onset    Diabetes Mother     Coronary Art Dis Mother     Cancer Father         Colon    Diabetes Sister     Diabetes Brother     Bipolar Disorder Brother     Alcohol Abuse Brother     Substance Abuse Brother        Allergies:  Patient has no known allergies. Home Medications:  Prior to Admission medications    Medication Sig Start Date End Date Taking?  Authorizing Provider   ibuprofen (ADVIL;MOTRIN) 600 MG tablet Take 1 tablet by mouth every 6 hours as needed for Pain 5/23/18   Darvin Ba MD   QUEtiapine (SEROQUEL) 100 MG tablet Take 1 tablet by mouth 2 times daily 8/11/17   Meka Espana MD   insulin lispro protamine & lispro (HUMALOG MIX 75/25) (75-25) 100 UNIT per ML SUSP injection vial Inject 50 Units into the skin 2 times daily (with meals) 8/11/17   Mkea Espana MD   Glucose Blood (BLOOD GLUCOSE TEST STRIPS) STRP Test 4 times daily Diagnosis 8/11/17   Meka Espana MD   Alcohol Swabs (ALCOHOL PREP) 70 % PADS 4 time daily 8/11/17   Meka Espana MD   Lancet Device MISC 1 Device by Does not apply route once for 1 dose 8/11/17 8/11/17  Meka Espana MD   Insulin Syringe-Needle U-100 (INSULIN SYRINGE 1CC/31GX5/16\") 31G X 5/16\" 1 ML MISC 1 each by Does not apply route daily 8/11/17   Meka Espana MD   glucose monitoring kit (FREESTYLE) monitoring kit 4 times daily 2/16/17   Gillian Bingham MD   FREESTYLE LANCETS MISC 1 each by Does not apply route daily 2/16/17   Gillian Bingham MD   gabapentin (NEURONTIN) 300 MG capsule Take 1 capsule by mouth 3 times daily 12/2/16   Primitivo Bender MD   mirtazapine (REMERON) 15 MG tablet Take 1 tablet by mouth nightly 12/2/16   Primitivo Bender MD   venlafaxine (EFFEXOR XR) 150 MG extended release capsule Take 1 capsule by mouth daily (with breakfast) 12/2/16   Primitivo Bender MD   rOPINIRole (REQUIP) 1 MG tablet Take 1 tablet by mouth 2 times daily 12/2/16   Nisreen Seaman MD   QUEtiapine (SEROQUEL) 100 MG tablet Take 1 tablet by mouth 2 times daily at 0800 and 1400 11/22/16  Exact dose unknown. Pt's pharmacy closed for the day. 12/2/16   Nisreen Seaman MD   ibuprofen (ADVIL;MOTRIN) 800 MG tablet Take 1 tablet by mouth every 8 hours as needed for Pain. 2/16/15   Lopez Knight MD       REVIEW OF SYSTEMS    (2-9 systems for level 4, 10 or more for level 5)      Review of Systems   Constitutional: Negative for chills and fever. HENT: Negative for congestion and sore throat. Eyes: Negative for visual disturbance. Respiratory: Negative for shortness of breath. Cardiovascular: Negative for chest pain. Gastrointestinal: Positive for abdominal pain, nausea and vomiting. Genitourinary: Negative for dysuria, hematuria and urgency. Musculoskeletal: Positive for back pain. Negative for neck pain and neck stiffness. Skin: Negative for pallor. Neurological: Negative for headaches. PHYSICAL EXAM   (up to 7 for level 4, 8 or more for level 5)      INITIAL VITALS:   /78   Pulse 89   Temp 97.9 °F (36.6 °C) (Oral)   Resp 18   Ht 5' 4\" (1.626 m)   Wt 120 lb (54.4 kg)   SpO2 99%   BMI 20.60 kg/m²     Physical Exam   Constitutional: She is oriented to person, place, and time. She appears well-developed and well-nourished. She appears distressed. HENT:   Head: Normocephalic and atraumatic. Eyes: Pupils are equal, round, and reactive to light. EOM are normal.   Neck: Normal range of motion. Cardiovascular: Regular rhythm and normal heart sounds. tachycardia   Pulmonary/Chest: Effort normal. No stridor. No respiratory distress. She has no wheezes. Abdominal: Soft. She exhibits no distension. There is tenderness in the epigastric area. There is no rigidity, no guarding and no CVA tenderness. Musculoskeletal: Normal range of motion. She exhibits no edema.    Neurological: She (HUMALOG) injection vial 0-3 Units    0.9 % sodium chloride bolus    fentaNYL (SUBLIMAZE) injection 50 mcg       DDX: GERD, PUD, pancreatitis, cholangitis, DKA    DIAGNOSTIC RESULTS / EMERGENCY DEPARTMENT COURSE / MDM     LABS:  Results for orders placed or performed during the hospital encounter of 08/26/18   CBC Auto Differential   Result Value Ref Range    WBC 7.4 3.5 - 11.3 k/uL    RBC 4.87 3.95 - 5.11 m/uL    Hemoglobin 15.3 (H) 11.9 - 15.1 g/dL    Hematocrit 41.0 36.3 - 47.1 %    MCV 84.2 82.6 - 102.9 fL    MCH 31.4 25.2 - 33.5 pg    MCHC 37.3 (H) 28.4 - 34.8 g/dL    RDW 12.8 11.8 - 14.4 %    Platelets 702 202 - 729 k/uL    MPV 10.0 8.1 - 13.5 fL    NRBC Automated 0.0 0.0 per 100 WBC    Differential Type NOT REPORTED     Seg Neutrophils 68 (H) 36 - 65 %    Lymphocytes 22 (L) 24 - 43 %    Monocytes 7 3 - 12 %    Eosinophils % 2 1 - 4 %    Basophils 1 0 - 2 %    Immature Granulocytes 0 0 %    Segs Absolute 5.01 1.50 - 8.10 k/uL    Absolute Lymph # 1.63 1.10 - 3.70 k/uL    Absolute Mono # 0.55 0.10 - 1.20 k/uL    Absolute Eos # 0.14 0.00 - 0.44 k/uL    Basophils # 0.04 0.00 - 0.20 k/uL    Absolute Immature Granulocyte 0.03 0.00 - 0.30 k/uL    WBC Morphology NOT REPORTED     RBC Morphology NOT REPORTED     Platelet Estimate NOT REPORTED    Comprehensive Metabolic Panel   Result Value Ref Range    Glucose 383 (H) 70 - 99 mg/dL    BUN 12 6 - 20 mg/dL    CREATININE 0.37 (L) 0.50 - 0.90 mg/dL    Bun/Cre Ratio NOT REPORTED 9 - 20    Calcium 9.0 8.6 - 10.4 mg/dL    Sodium 122 (L) 135 - 144 mmol/L    Potassium 4.7 3.7 - 5.3 mmol/L    Chloride 90 (L) 98 - 107 mmol/L    CO2 18 (L) 20 - 31 mmol/L    Anion Gap 14 9 - 17 mmol/L    Alkaline Phosphatase 131 (H) 35 - 104 U/L    ALT 16 5 - 33 U/L    AST 25 <32 U/L    Total Bilirubin 0.20 (L) 0.3 - 1.2 mg/dL    Total Protein 7.5 6.4 - 8.3 g/dL    Alb 3.9 3.5 - 5.2 g/dL    Albumin/Globulin Ratio 1.1 1.0 - 2.5    GFR Non-African American >60 >60 mL/min    GFR African American >60 vomiting with nausea, unspecified vomiting type          DISPOSITION / PLAN     DISPOSITION  Admission      PATIENT REFERRED TO:  Estephania Franz MD  [address] [address] [facility] [phone]            DISCHARGE MEDICATIONS:  Current Discharge Medication List          Violette Adame DO  Emergency Medicine Resident    (Please note that portions of this note were completed with a voice recognition program.  Efforts were made to edit the dictations but occasionally words are mis-transcribed.)        Violette Adame DO  08/26/18 DO Price  08/30/18 [id number]

## 2018-08-27 VITALS
SYSTOLIC BLOOD PRESSURE: 135 MMHG | DIASTOLIC BLOOD PRESSURE: 85 MMHG | HEIGHT: 64 IN | BODY MASS INDEX: 20.49 KG/M2 | RESPIRATION RATE: 18 BRPM | TEMPERATURE: 98.2 F | OXYGEN SATURATION: 100 % | HEART RATE: 89 BPM | WEIGHT: 120 LBS

## 2018-08-27 LAB
ABSOLUTE EOS #: 0.25 K/UL (ref 0–0.44)
ABSOLUTE IMMATURE GRANULOCYTE: 0.03 K/UL (ref 0–0.3)
ABSOLUTE LYMPH #: 3.35 K/UL (ref 1.1–3.7)
ABSOLUTE MONO #: 0.58 K/UL (ref 0.1–1.2)
ANION GAP SERPL CALCULATED.3IONS-SCNC: 12 MMOL/L (ref 9–17)
BASOPHILS # BLD: 1 % (ref 0–2)
BASOPHILS ABSOLUTE: 0.06 K/UL (ref 0–0.2)
BUN BLDV-MCNC: 6 MG/DL (ref 6–20)
BUN/CREAT BLD: ABNORMAL (ref 9–20)
CALCIUM SERPL-MCNC: 8.5 MG/DL (ref 8.6–10.4)
CHLORIDE BLD-SCNC: 104 MMOL/L (ref 98–107)
CO2: 23 MMOL/L (ref 20–31)
CREAT SERPL-MCNC: 0.36 MG/DL (ref 0.5–0.9)
DIFFERENTIAL TYPE: ABNORMAL
EOSINOPHILS RELATIVE PERCENT: 4 % (ref 1–4)
GFR AFRICAN AMERICAN: >60 ML/MIN
GFR NON-AFRICAN AMERICAN: >60 ML/MIN
GFR NON-AFRICAN AMERICAN: >60 ML/MIN
GFR SERPL CREATININE-BSD FRML MDRD: >60 ML/MIN
GFR SERPL CREATININE-BSD FRML MDRD: ABNORMAL ML/MIN/{1.73_M2}
GFR SERPL CREATININE-BSD FRML MDRD: ABNORMAL ML/MIN/{1.73_M2}
GFR SERPL CREATININE-BSD FRML MDRD: NORMAL ML/MIN/{1.73_M2}
GLUCOSE BLD-MCNC: 219 MG/DL (ref 70–99)
GLUCOSE BLD-MCNC: 239 MG/DL (ref 65–105)
GLUCOSE BLD-MCNC: 266 MG/DL (ref 65–105)
HCT VFR BLD CALC: 35.7 % (ref 36.3–47.1)
HEMOGLOBIN: 12.5 G/DL (ref 11.9–15.1)
IMMATURE GRANULOCYTES: 1 %
LYMPHOCYTES # BLD: 56 % (ref 24–43)
MCH RBC QN AUTO: 29.2 PG (ref 25.2–33.5)
MCHC RBC AUTO-ENTMCNC: 35 G/DL (ref 28.4–34.8)
MCV RBC AUTO: 83.4 FL (ref 82.6–102.9)
MONOCYTES # BLD: 10 % (ref 3–12)
NRBC AUTOMATED: 0 PER 100 WBC
PDW BLD-RTO: 13.2 % (ref 11.8–14.4)
PLATELET # BLD: 170 K/UL (ref 138–453)
PLATELET ESTIMATE: ABNORMAL
PMV BLD AUTO: 10.3 FL (ref 8.1–13.5)
POC CREATININE: 0.51 MG/DL (ref 0.51–1.19)
POC POTASSIUM: 7.9 MMOL/L (ref 3.5–4.5)
POTASSIUM SERPL-SCNC: 4 MMOL/L (ref 3.7–5.3)
RBC # BLD: 4.28 M/UL (ref 3.95–5.11)
RBC # BLD: ABNORMAL 10*6/UL
SEG NEUTROPHILS: 28 % (ref 36–65)
SEGMENTED NEUTROPHILS ABSOLUTE COUNT: 1.67 K/UL (ref 1.5–8.1)
SODIUM BLD-SCNC: 139 MMOL/L (ref 135–144)
WBC # BLD: 5.9 K/UL (ref 3.5–11.3)
WBC # BLD: ABNORMAL 10*3/UL

## 2018-08-27 PROCEDURE — G0378 HOSPITAL OBSERVATION PER HR: HCPCS

## 2018-08-27 PROCEDURE — 82947 ASSAY GLUCOSE BLOOD QUANT: CPT

## 2018-08-27 PROCEDURE — 36415 COLL VENOUS BLD VENIPUNCTURE: CPT

## 2018-08-27 PROCEDURE — 6370000000 HC RX 637 (ALT 250 FOR IP): Performed by: EMERGENCY MEDICINE

## 2018-08-27 PROCEDURE — 96376 TX/PRO/DX INJ SAME DRUG ADON: CPT

## 2018-08-27 PROCEDURE — 6370000000 HC RX 637 (ALT 250 FOR IP): Performed by: STUDENT IN AN ORGANIZED HEALTH CARE EDUCATION/TRAINING PROGRAM

## 2018-08-27 PROCEDURE — 96372 THER/PROPH/DIAG INJ SC/IM: CPT

## 2018-08-27 PROCEDURE — 6360000002 HC RX W HCPCS: Performed by: EMERGENCY MEDICINE

## 2018-08-27 PROCEDURE — 2580000003 HC RX 258: Performed by: EMERGENCY MEDICINE

## 2018-08-27 PROCEDURE — 80048 BASIC METABOLIC PNL TOTAL CA: CPT

## 2018-08-27 PROCEDURE — 85025 COMPLETE CBC W/AUTO DIFF WBC: CPT

## 2018-08-27 PROCEDURE — G0108 DIAB MANAGE TRN  PER INDIV: HCPCS

## 2018-08-27 RX ORDER — CALCIUM CARB/VITAMIN D3/VIT K1 500-100-40
1 TABLET,CHEWABLE ORAL DAILY
Qty: 100 EACH | Refills: 3 | Status: SHIPPED | OUTPATIENT
Start: 2018-08-27 | End: 2018-11-12 | Stop reason: SDUPTHER

## 2018-08-27 RX ORDER — ONDANSETRON 4 MG/1
4 TABLET, FILM COATED ORAL EVERY 8 HOURS PRN
Qty: 15 TABLET | Refills: 0 | Status: SHIPPED | OUTPATIENT
Start: 2018-08-27 | End: 2018-09-01

## 2018-08-27 RX ORDER — GLUCOSAMINE HCL/CHONDROITIN SU 500-400 MG
CAPSULE ORAL
Qty: 100 STRIP | Refills: 11 | Status: SHIPPED | OUTPATIENT
Start: 2018-08-27 | End: 2018-11-12 | Stop reason: SDUPTHER

## 2018-08-27 RX ORDER — MIRTAZAPINE 15 MG/1
15 TABLET, FILM COATED ORAL NIGHTLY
Qty: 30 TABLET | Refills: 0 | Status: SHIPPED | OUTPATIENT
Start: 2018-08-27 | End: 2018-11-12 | Stop reason: SDUPTHER

## 2018-08-27 RX ORDER — VENLAFAXINE HYDROCHLORIDE 150 MG/1
150 CAPSULE, EXTENDED RELEASE ORAL
Qty: 30 CAPSULE | Refills: 0 | Status: SHIPPED | OUTPATIENT
Start: 2018-08-27 | End: 2018-11-12 | Stop reason: SDUPTHER

## 2018-08-27 RX ORDER — QUETIAPINE FUMARATE 100 MG/1
100 TABLET, FILM COATED ORAL 2 TIMES DAILY
Qty: 60 TABLET | Refills: 0 | Status: SHIPPED | OUTPATIENT
Start: 2018-08-27 | End: 2018-11-12 | Stop reason: SDUPTHER

## 2018-08-27 RX ORDER — DEXTROSE MONOHYDRATE 25 G/50ML
12.5 INJECTION, SOLUTION INTRAVENOUS PRN
Status: DISCONTINUED | OUTPATIENT
Start: 2018-08-27 | End: 2018-08-27 | Stop reason: HOSPADM

## 2018-08-27 RX ORDER — ROPINIROLE 1 MG/1
1 TABLET, FILM COATED ORAL NIGHTLY
Qty: 30 TABLET | Refills: 0 | Status: SHIPPED | OUTPATIENT
Start: 2018-08-27 | End: 2018-11-12 | Stop reason: SDUPTHER

## 2018-08-27 RX ORDER — NICOTINE POLACRILEX 4 MG
15 LOZENGE BUCCAL PRN
Status: DISCONTINUED | OUTPATIENT
Start: 2018-08-27 | End: 2018-08-27 | Stop reason: HOSPADM

## 2018-08-27 RX ORDER — GABAPENTIN 300 MG/1
300 CAPSULE ORAL 2 TIMES DAILY
Status: DISCONTINUED | OUTPATIENT
Start: 2018-08-27 | End: 2018-08-27 | Stop reason: HOSPADM

## 2018-08-27 RX ORDER — LANCETS 28 GAUGE
1 EACH MISCELLANEOUS DAILY
Qty: 100 EACH | Refills: 3 | Status: SHIPPED | OUTPATIENT
Start: 2018-08-27 | End: 2018-11-12 | Stop reason: SDUPTHER

## 2018-08-27 RX ORDER — GABAPENTIN 300 MG/1
300 CAPSULE ORAL 2 TIMES DAILY
Qty: 60 CAPSULE | Refills: 0 | Status: SHIPPED | OUTPATIENT
Start: 2018-08-27 | End: 2018-09-21 | Stop reason: SDUPTHER

## 2018-08-27 RX ORDER — OXYCODONE HYDROCHLORIDE AND ACETAMINOPHEN 5; 325 MG/1; MG/1
1 TABLET ORAL EVERY 6 HOURS PRN
Qty: 28 TABLET | Refills: 0 | Status: SHIPPED | OUTPATIENT
Start: 2018-08-27 | End: 2018-09-03

## 2018-08-27 RX ORDER — PROMETHAZINE HYDROCHLORIDE 25 MG/1
25 TABLET ORAL EVERY 8 HOURS PRN
Qty: 15 TABLET | Refills: 0 | Status: SHIPPED | OUTPATIENT
Start: 2018-08-27 | End: 2018-09-01

## 2018-08-27 RX ORDER — BLOOD-GLUCOSE METER
KIT MISCELLANEOUS
Qty: 1 KIT | Refills: 0 | Status: SHIPPED | OUTPATIENT
Start: 2018-08-27 | End: 2020-02-18 | Stop reason: SDUPTHER

## 2018-08-27 RX ORDER — OXYCODONE HYDROCHLORIDE AND ACETAMINOPHEN 5; 325 MG/1; MG/1
2 TABLET ORAL EVERY 6 HOURS PRN
Status: DISCONTINUED | OUTPATIENT
Start: 2018-08-27 | End: 2018-08-27 | Stop reason: HOSPADM

## 2018-08-27 RX ORDER — DEXTROSE MONOHYDRATE 50 MG/ML
100 INJECTION, SOLUTION INTRAVENOUS PRN
Status: DISCONTINUED | OUTPATIENT
Start: 2018-08-27 | End: 2018-08-27 | Stop reason: HOSPADM

## 2018-08-27 RX ORDER — UBIQUINOL 100 MG
CAPSULE ORAL
Qty: 100 EACH | Refills: 3 | Status: SHIPPED | OUTPATIENT
Start: 2018-08-27 | End: 2018-11-12 | Stop reason: SDUPTHER

## 2018-08-27 RX ADMIN — Medication 10 ML: at 08:14

## 2018-08-27 RX ADMIN — INSULIN LISPRO 2 UNITS: 100 INJECTION, SOLUTION INTRAVENOUS; SUBCUTANEOUS at 12:51

## 2018-08-27 RX ADMIN — OXYCODONE HYDROCHLORIDE AND ACETAMINOPHEN 2 TABLET: 5; 325 TABLET ORAL at 12:50

## 2018-08-27 RX ADMIN — INSULIN LISPRO 50 UNITS: 100 INJECTION, SUSPENSION SUBCUTANEOUS at 09:29

## 2018-08-27 RX ADMIN — INSULIN LISPRO 3 UNITS: 100 INJECTION, SOLUTION INTRAVENOUS; SUBCUTANEOUS at 08:15

## 2018-08-27 RX ADMIN — SODIUM CHLORIDE 75 ML: 0.9 INJECTION, SOLUTION INTRAVENOUS at 16:09

## 2018-08-27 RX ADMIN — ENOXAPARIN SODIUM 40 MG: 40 INJECTION SUBCUTANEOUS at 08:14

## 2018-08-27 RX ADMIN — FENTANYL CITRATE 50 MCG: 50 INJECTION, SOLUTION INTRAMUSCULAR; INTRAVENOUS at 01:28

## 2018-08-27 RX ADMIN — FLUCONAZOLE 150 MG: 100 TABLET ORAL at 09:38

## 2018-08-27 RX ADMIN — GABAPENTIN 300 MG: 300 CAPSULE ORAL at 10:01

## 2018-08-27 RX ADMIN — FENTANYL CITRATE 50 MCG: 50 INJECTION, SOLUTION INTRAMUSCULAR; INTRAVENOUS at 05:01

## 2018-08-27 RX ADMIN — FENTANYL CITRATE 50 MCG: 50 INJECTION, SOLUTION INTRAMUSCULAR; INTRAVENOUS at 09:38

## 2018-08-27 ASSESSMENT — PAIN DESCRIPTION - DESCRIPTORS
DESCRIPTORS: ACHING
DESCRIPTORS: ACHING

## 2018-08-27 ASSESSMENT — PAIN SCALES - GENERAL
PAINLEVEL_OUTOF10: 7
PAINLEVEL_OUTOF10: 8

## 2018-08-27 ASSESSMENT — PAIN DESCRIPTION - LOCATION
LOCATION: ABDOMEN
LOCATION: ABDOMEN

## 2018-08-27 ASSESSMENT — PAIN DESCRIPTION - PAIN TYPE
TYPE: ACUTE PAIN
TYPE: ACUTE PAIN

## 2018-08-27 ASSESSMENT — PAIN DESCRIPTION - ONSET
ONSET: ON-GOING
ONSET: ON-GOING

## 2018-08-27 ASSESSMENT — PAIN DESCRIPTION - ORIENTATION
ORIENTATION: UPPER
ORIENTATION: UPPER

## 2018-08-27 ASSESSMENT — PAIN DESCRIPTION - FREQUENCY
FREQUENCY: CONTINUOUS
FREQUENCY: CONTINUOUS

## 2018-08-27 NOTE — PLAN OF CARE
Problem: Safety:  Goal: Free from accidental physical injury  Free from accidental physical injury   Outcome: Ongoing      Problem: Pain:  Goal: Control of acute pain  Control of acute pain   Outcome: Ongoing

## 2018-08-27 NOTE — CARE COORDINATION
Followed up with pt this morning. Pt states that she plans to return home to her boyfriends house. Pt confirmed that pt is verbally abusive and controlling but has never physically hurt her. She has information on support groups and shelters provided to her from previous . She has 4 sons and a daughter in the area she can stay with if she feels unsafe in the home. Pt has been seen at the [facility] for depression. Her last appointment was approximately a year ago. Pt has received inpatient treatment at [facility] and [facility] units. One inpatient stay was after a suicide attempt and the others were for depression. Pt denies any current feelings of harming herself. She states she has a strong maurice that keeps her going. She has applied for social security/disabililty benefits in the past but did not qualify. She plans to follow up with the [facility] clinic at discharge and is hopeful she will now qualify due to her medical issues. Provided her web site to apply for disability benefits. Pt has united healthcare insurance which covers prescriptions. Pt also receives food stamps. Pts boyfriend will provide transport at discharge.

## 2018-08-27 NOTE — PROGRESS NOTES
[facility] [facility]  CDU / OBSERVATION eNCOUnter  Attending NOte       I performed a history and physical examination of the patient and discussed management with the resident. I reviewed the residents note and agree with the documented findings and plan of care. Any areas of disagreement are noted on the chart. I was personally present for the key portions of any procedures. I have documented in the chart those procedures where I was not present during the key portions. I have reviewed the nurses notes. I agree with the chief complaint, past medical history, past surgical history, allergies, medications, social and family history as documented unless otherwise noted below. The Family history, social history, and ROS are effectively unchanged since admission unless noted elsewhere in the chart. History of chronic pancreatitis. Patient without primary care physician. Patient with poor glucose control. We will treat symptomatically. Will advance diet. We will make PCP appointment for patient.     Morgan Alonzo MD  Attending Emergency  Physician

## 2018-08-27 NOTE — H&P
[address] [facility]  CDU / OBSERVATION eNCOUnter  Resident Note     Pt Name: Amparo Bailon  MRN: [id number]  [place] 1970  Date of evaluation: 8/27/18  Patient's PCP is :  Cheko Marks MD    [address] [place] [address]       Chief Complaint   Patient presents with    Pancreatitis         HISTORY OF PRESENT ILLNESS    Amparo Bailon is a 51 y.o. female who presents acute on chronic epigastric Abdominal pain for last 4 days. She states that she is a history of chronic otitis and states that this feels exactly the same. She describes her pain is a sharp, burning, tearing pain located over her epigastrium and radiating around to the back bilaterally. She rates her pain 7/10 currently, and is unaware of any alleviating or palliating factors for her pain. She does report that she has some vaginal itching, a similar to past yeast infections, but denies any unusual vaginal bleeding or vaginal discharge. She further denies any recent fevers, chills, chest pain, shortness of breath, vomiting, diarrhea, constipation, hematochezia, melena, dysuria, hematuria, or other urinary symptoms. Her workup in the emergency department did not reveal an etiology for her pain, and she was admitted for symptom control. Additionally, she reports that she has not been able to control her diabetes well because she does have a primary care physician. She denies any problems with medication affordability. Location/Symptom: 4 days   Timing/Onset: constant  Provocation: none  Quality: sharp, burning tearing. Radiation: around the side to the back.   Severity: 7/10, 9/10  Timing/Duration: constant  Modifying Factors: none    REVIEW OF SYSTEMS     General ROS - No fevers, No malaise   Ophthalmic ROS - No discharge, No changes in vision  ENT ROS -  No sore throat, No rhinorrhea,   Respiratory ROS - no shortness of breath, no cough, no  wheezing  Cardiovascular ROS - No chest pain, no dyspnea on exertion  Gastrointestinal ROS - No abdominal pain, positive for nausea, no vomiting, no change in bowel habits, no black or bloody stools  Genito-Urinary ROS - No dysuria, trouble voiding, or hematuria. Positive for mild vaginal itching, denies unusual vaginal bleeding or discharge. Musculoskeletal ROS - No myalgias, No arthalgias  Neurological ROS - No headache, no dizziness/lightheadedness, No focal weakness, no loss of sensation  Dermatological ROS - No lesions, No rash     (PQRS) Advance directives on face sheet per hospital policy. No change unless specifically mentioned in chart    PAST MEDICAL HISTORY    has a past medical history of Depression; Diabetes mellitus ([facility] [address] Hyperlipidemia; Pancreatitis; Pneumonia due to infectious organism; and Substance abuse. I have reviewed the past medical history with the patient and it is pertinent to this complaint. SURGICAL HISTORY      has a past surgical history that includes Cholecystectomy and Ectopic pregnancy surgery. I have reviewed and agree with Surgical History entered and it is pertinent to this complaint. CURRENT MEDICATIONS       insulin lispro protamine & lispro (HUMALOG MIX) (75-25) 100 UNIT per ML injection vial SUSP 50 Units BID WC   sodium chloride flush 0.9 % injection 10 mL 2 times per day   sodium chloride flush 0.9 % injection 10 mL PRN   magnesium hydroxide (MILK OF MAGNESIA) 400 MG/5ML suspension 30 mL Daily PRN   ondansetron (ZOFRAN) injection 4 mg Q6H PRN   enoxaparin (LOVENOX) injection 40 mg Daily   insulin lispro (HUMALOG) injection vial 0-6 Units TID WC   insulin lispro (HUMALOG) injection vial 0-3 Units Nightly   0.9 % sodium chloride bolus Once   fentaNYL (SUBLIMAZE) injection 50 mcg Q4H PRN     All medication charted and reviewed. ALLERGIES     has No Known Allergies. FAMILY HISTORY     indicated that the status of her mother is unknown. She indicated that the status of her father is unknown. She indicated that the status of her sister is unknown.  She indicated that the status of her brother is unknown.      family history includes Alcohol Abuse in her brother; Bipolar Disorder in her brother; Cancer in her father; Coronary Art Dis in her mother; Diabetes in her brother, mother, and sister; Substance Abuse in her brother. The patient denies any pertinent family history. I have reviewed and agree with the family history entered. I have reviewed the Family History and it is not significant to the case    SOCIAL HISTORY      reports that she has been smoking Cigarettes. She has a 30.00 pack-year smoking history. She has never used smokeless tobacco. She reports that she uses drugs, including Cocaine. She reports that she does not drink alcohol. I have reviewed and agree with all Social.  There are no concerns for substance abuse/use. PHYSICAL EXAM     INITIAL VITALS:  height is 5' 4\" (1.626 m) and weight is 120 lb (54.4 kg). Her temperature is 98.2 °F (36.8 °C). Her blood pressure is 135/85 and her pulse is 89. Her respiration is 18 and oxygen saturation is 100%. CONSTITUTIONAL: AOx4, no apparent distress, appears stated age   HEAD: normocephalic, atraumatic   EYES: PERRLA, No scleral icterus    ENT: moist mucous membranes, uvula midline   NECK: supple, symmetric   BACK: symmetric   LUNGS: clear to auscultation bilaterally   CARDIOVASCULAR: regular rate and rhythm, no murmurs, rubs or gallops   ABDOMEN: Tenderness to palpation over the epigastric area. Abdomen is otherwise soft, non-distended. No rebound or guarding. Normal bowel sounds.      NEUROLOGIC:  MAEx4, no focal sensory or motor deficits   MUSCULOSKELETAL: no clubbing, cyanosis or edema   SKIN: no rash or wounds over exposed skin     DIFFERENTIAL DIAGNOSIS/MDM:   Abdominal Pain:  DDX: GERD, PUD, pancreatitis, cholecystitis, GB colic, cholangitis, [id number], ACS/ MI, pneumonia, SBO, DKA, AAA, mesenteric ischemia, perforated viscous, acute gastroenteritis, NSAP, pyelonephritis, kidney limits   VENOUS BLOOD GAS, POINT OF CARE - Abnormal; Notable for the following:     pH, Sony 7.288 (*)     pCO2, Sony 53.6 (*)     O2 Sat, Sony 53 (*)     All other components within normal limits   LACTIC ACID,POINT OF CARE - Abnormal; Notable for the following:     POC Lactic Acid 2.58 (*)     All other components within normal limits   POCT GLUCOSE - Abnormal; Notable for the following:     POC Glucose 383 (*)     All other components within normal limits   ANION GAP (CALC) POC - Abnormal; Notable for the following: Anion Gap 5 (*)     All other components within normal limits   POC GLUCOSE FINGERSTICK - Abnormal; Notable for the following:     POC Glucose 298 (*)     All other components within normal limits   POC GLUCOSE FINGERSTICK - Abnormal; Notable for the following:     POC Glucose 188 (*)     All other components within normal limits   POC GLUCOSE FINGERSTICK - Abnormal; Notable for the following:     POC Glucose 340 (*)     All other components within normal limits   POC GLUCOSE FINGERSTICK - Abnormal; Notable for the following:     POC Glucose 266 (*)     All other components within normal limits   POCT GLUCOSE - Normal   POCT GLUCOSE - Normal   LIPASE   HCG, QUANTITATIVE, PREGNANCY   BETA-HYDROXYBUTYRATE   HGB/HCT   CHLORIDE (POC)   POC BLOOD GAS   CREATININE W/GFR POINT OF CARE   POCT GLUCOSE   POCT GLUCOSE   POCT GLUCOSE     CDU IMPRESSION / PLAN      Moncho Acevedo is a 51 y.o. female who presents with:    1. Acute on chronic epigastric abdominal pain with associated nausea, secondary to chronic pancreatitis:  -Workup in the emergency department did not reveal any acute abnormalities, and patient states that this is consistent with her chronic pancreatitis. -Patient is without vomiting, but is quite nauseated. She expressed improvement in the emergency department with the IV Phenergan and IV fentanyl.   She will be treated symptomatically with IV Zofran, IV fentanyl or by mouth Percocet if she is

## 2018-08-28 NOTE — DISCHARGE SUMMARY
CDU Discharge Summary        Patient:  Ravi Jaquez  YOB: 1970    MRN: [id number]   Acct: [de-identified]    Primary Care Physician: Sheryl Rider MD    Admit date:  2018  1:21 PM  Discharge date: 2018  4:33 PM     Discharge Diagnoses:     Acute on chronic epigastric abdominal pain with associated nausea, secondary to chronic pancreatitis:  Treated with IV zofran, IV fentanyl, PO percocet    Chronic diabetes mellitus type 2, with current long-term use of insulin  -Treated with sliding scale.  -Patient saw diabetic educator and given prescription of insulin. Follow-up:  Call today/tomorrow for a follow up appointment with Sheryl Rider MD , or return to the Emergency Room with worsening symptoms    Stressed to patient the importance of following up with primary care doctor for further workup/management of symptoms. Pt verbalizes understanding and agrees with plan. Discharge Medications:  Changes to medications: refilled patient's medication and started on insulin regimen. Shmuel GU   Home Medication Instructions [facility]    Printed on:18   Medication Information                      Alcohol Swabs (ALCOHOL PREP) 70 % PADS  4 time daily             blood glucose monitor strips  Test 4 times daily Diagnosis             blood glucose test strips (FREESTYLE LITE) strip  1 each by In Vitro route daily As needed. FREESTYLE LANCETS MISC  1 each by Does not apply route daily             gabapentin (NEURONTIN) 300 MG capsule  Take 1 capsule by mouth 2 times daily for 30 days. Uptake [employer]              glucose monitoring kit (FREESTYLE) monitoring kit  4 times daily             insulin lispro protamine & lispro (HUMALOG MIX 75/25) (75-25) 100 UNIT per ML SUSP injection vial  Inject 50 Units into the skin 2 times daily (with meals)             Insulin Syringe-Needle U-100 (INSULIN SYRINGE 1CC/31GX5/16\") 31G X 5/16\" 1 ML MISC  1 each by Does not apply route daily mirtazapine (REMERON) 15 MG tablet  Take 1 tablet by mouth nightly             ondansetron (ZOFRAN) 4 MG tablet  Take 1 tablet by mouth every 8 hours as needed for Nausea or Vomiting             oxyCODONE-acetaminophen (PERCOCET) 5-325 MG per tablet  Take 1 tablet by mouth every 6 hours as needed for Pain for up to 7 days. .             promethazine (PHENERGAN) 25 MG tablet  Take 1 tablet by mouth every 8 hours as needed for Nausea WARNING:  May cause drowsiness. May impair ability to operate vehicles or machinery. Do not use in combination with alcohol. QUEtiapine (SEROQUEL) 100 MG tablet  Take 1 tablet by mouth 2 times daily 11/22/16  Exact dose unknown. Pt's pharmacy closed for the day.              rOPINIRole (REQUIP) 1 MG tablet  Take 1 tablet by mouth nightly             venlafaxine (EFFEXOR XR) 150 MG extended release capsule  Take 1 capsule by mouth daily (with breakfast)                 Diet:    , Advance as tolerated     Activity:  As tolerated    Consultants: IP CONSULT TO SOCIAL WORK  IP CONSULT TO DIABETES EDUCATOR  IP CONSULT TO SOCIAL WORK  IP CONSULT TO SPIRITUAL SERVICES    Procedures:  Not indicated    Diagnostic Test:   Results for orders placed or performed during the hospital encounter of 08/26/18   CBC Auto Differential   Result Value Ref Range    WBC 7.4 3.5 - 11.3 k/uL    RBC 4.87 3.95 - 5.11 m/uL    Hemoglobin 15.3 (H) 11.9 - 15.1 g/dL    Hematocrit 41.0 36.3 - 47.1 %    MCV 84.2 82.6 - 102.9 fL    MCH 31.4 25.2 - 33.5 pg    MCHC 37.3 (H) 28.4 - 34.8 g/dL    RDW 12.8 11.8 - 14.4 %    Platelets 691 300 - 733 k/uL    MPV 10.0 8.1 - 13.5 fL    NRBC Automated 0.0 0.0 per 100 WBC    Differential Type NOT REPORTED     Seg Neutrophils 68 (H) 36 - 65 %    Lymphocytes 22 (L) 24 - 43 %    Monocytes 7 3 - 12 %    Eosinophils % 2 1 - 4 %    Basophils 1 0 - 2 %    Immature Granulocytes 0 0 %    Segs Absolute 5.01 1.50 - 8.10 k/uL    Absolute Lymph # 1.63 1.10 - 3.70 k/uL    Absolute Mono Sodium 139 135 - 144 mmol/L    Potassium 4.0 3.7 - 5.3 mmol/L    Chloride 104 98 - 107 mmol/L    CO2 23 20 - 31 mmol/L    Anion Gap 12 9 - 17 mmol/L    GFR Non-African American >60 >60 mL/min    GFR African American >60 >60 mL/min    GFR Comment          GFR Staging NOT REPORTED    CBC auto differential   Result Value Ref Range    WBC 5.9 3.5 - 11.3 k/uL    RBC 4.28 3.95 - 5.11 m/uL    Hemoglobin 12.5 11.9 - 15.1 g/dL    Hematocrit 35.7 (L) 36.3 - 47.1 %    MCV 83.4 82.6 - 102.9 fL    MCH 29.2 25.2 - 33.5 pg    MCHC 35.0 (H) 28.4 - 34.8 g/dL    RDW 13.2 11.8 - 14.4 %    Platelets 599 713 - 523 k/uL    MPV 10.3 8.1 - 13.5 fL    NRBC Automated 0.0 0.0 per 100 WBC    Differential Type NOT REPORTED     WBC Morphology NOT REPORTED     RBC Morphology NOT REPORTED     Platelet Estimate NOT REPORTED     Seg Neutrophils 28 (L) 36 - 65 %    Lymphocytes 56 (H) 24 - 43 %    Monocytes 10 3 - 12 %    Eosinophils % 4 1 - 4 %    Basophils 1 0 - 2 %    Immature Granulocytes 1 (H) 0 %    Segs Absolute 1.67 1.50 - 8.10 k/uL    Absolute Lymph # 3.35 1.10 - 3.70 k/uL    Absolute Mono # 0.58 0.10 - 1.20 k/uL    Absolute Eos # 0.25 0.00 - 0.44 k/uL    Basophils # 0.06 0.00 - 0.20 k/uL    Absolute Immature Granulocyte 0.03 0.00 - 0.30 k/uL   POCT Glucose   Result Value Ref Range    Glucose 383 mg/dL    QC OK?  yes    Venous Blood Gas, POC   Result Value Ref Range    pH, Sony 7.288 (L) 7.320 - 7.430    pCO2, Sony 53.6 (H) 41.0 - 51.0 mm Hg    pO2, Sony 32.1 30.0 - 50.0 mm Hg    HCO3, Venous 25.6 22.0 - 29.0 mmol/L    Total CO2, Venous 27 23.0 - 30.0 mmol/L    Negative Base Excess, Sony 2 0.0 - 2.0    Positive Base Excess, Sony NOT REPORTED 0.0 - 3.0    O2 Sat, Sony 53 (L) 60.0 - 85.0 %    O2 Device/Flow/% NOT REPORTED     Nura Test NOT REPORTED     Sample Site NOT REPORTED     Mode NOT REPORTED     FIO2 NOT REPORTED     Pt Temp NOT REPORTED     POC pH Temp NOT REPORTED     POC pCO2 Temp NOT REPORTED mm Hg    POC pO2 Temp NOT REPORTED mm Hg Creatinine W/GFR Point of Care   Result Value Ref Range    POC Creatinine 0.51 0.51 - 1.19 mg/dL    GFR Comment >60 >60 mL/min    GFR Non-African American >60 >60 mL/min    GFR Comment         Lactic Acid, POC   Result Value Ref Range    POC Lactic Acid 2.58 (H) 0.56 - 1.39 mmol/L   POCT Glucose   Result Value Ref Range    POC Glucose 383 (H) 74 - 100 mg/dL   Anion Gap (Calc) POC   Result Value Ref Range    Anion Gap 5 (L) 7 - 16 mmol/L   POCT Glucose   Result Value Ref Range    Glucose 298 mg/dL    QC OK? yes    POC Glucose Fingerstick   Result Value Ref Range    POC Glucose 298 (H) 65 - 105 mg/dL   POC Glucose Fingerstick   Result Value Ref Range    POC Glucose 188 (H) 65 - 105 mg/dL   POC Glucose Fingerstick   Result Value Ref Range    POC Glucose 340 (H) 65 - 105 mg/dL   POC Glucose Fingerstick   Result Value Ref Range    POC Glucose 266 (H) 65 - 105 mg/dL   POC Glucose Fingerstick   Result Value Ref Range    POC Glucose 239 (H) 65 - 105 mg/dL     No results found. Physical Exam:    General appearance - NAD, AOx 3   Lungs -CTAB, no R/R/R  Heart - RRR, no M/R/G  Abdomen - TTP over the epigastrium. Otherwise Soft, NT/ND  Neurological:  MAEx4, No focal motor deficit, sensory loss  Extremities - No leg TTP or edema. Skin -warm, dry      Hospital Course:  Clinical course has improved, labs and imaging reviewed. Saúl Grover originally presented to the hospital on 8/26/2018  1:21 PM. with Acute on chronic epigastric abdominal pain with associated nausea, secondary to chronic pancreatitis. At that time it was determined that She required further observation and symptom control. She was admitted and labs and imaging were followed daily. Her diet was advanced and she felt significant improvement in her symptoms. Imaging results as above. She saw the diabetes educator as her blood glucose was high on admission, and she stated that she had not taken her insulin in some time.   She is medically stable to be discharged. Disposition: Home    Patient stated that they will not drive themselves home from the hospital if they have gotten pain killers/ narcotics earlier that day and that they will arrange for transportation on their own or work with the  for a ride. Patient counseled NOT to drive while under the influence of narcotics/ pain killers. Condition: Good    Patient stable and ready for discharge home. I have discussed plan of care with patient and they are in understanding. They were instructed to read discharge paperwork. All of their questions and concerns were addressed. Time Spent: 0 day    --  DO Clau Serrato  Emergency Medicine Resident Physician    This dictation was generated by voice recognition computer software. Although all attempts are made to edit the dictation for accuracy, there may be errors in the transcription that are not intended.

## 2018-09-21 ENCOUNTER — OFFICE VISIT (OUTPATIENT)
Dept: FAMILY MEDICINE CLINIC | Age: 48
End: 2018-09-21
Payer: MEDICAID

## 2018-09-21 VITALS
DIASTOLIC BLOOD PRESSURE: 82 MMHG | SYSTOLIC BLOOD PRESSURE: 121 MMHG | BODY MASS INDEX: 19.33 KG/M2 | TEMPERATURE: 97.3 F | WEIGHT: 113.2 LBS | HEART RATE: 116 BPM | HEIGHT: 64 IN

## 2018-09-21 DIAGNOSIS — E11.42 TYPE 2 DIABETES MELLITUS WITH DIABETIC POLYNEUROPATHY, WITH LONG-TERM CURRENT USE OF INSULIN (HCC): Primary | ICD-10-CM

## 2018-09-21 DIAGNOSIS — Z87.19 H/O CHRONIC PANCREATITIS: ICD-10-CM

## 2018-09-21 DIAGNOSIS — Z23 ENCOUNTER FOR VACCINATION: ICD-10-CM

## 2018-09-21 DIAGNOSIS — Z79.4 TYPE 2 DIABETES MELLITUS WITH DIABETIC POLYNEUROPATHY, WITH LONG-TERM CURRENT USE OF INSULIN (HCC): Primary | ICD-10-CM

## 2018-09-21 DIAGNOSIS — K21.00 GERD WITH ESOPHAGITIS: ICD-10-CM

## 2018-09-21 LAB — HBA1C MFR BLD: 13.3 %

## 2018-09-21 PROCEDURE — 99214 OFFICE O/P EST MOD 30 MIN: CPT | Performed by: STUDENT IN AN ORGANIZED HEALTH CARE EDUCATION/TRAINING PROGRAM

## 2018-09-21 PROCEDURE — G0009 ADMIN PNEUMOCOCCAL VACCINE: HCPCS | Performed by: FAMILY MEDICINE

## 2018-09-21 PROCEDURE — G8427 DOCREV CUR MEDS BY ELIG CLIN: HCPCS | Performed by: STUDENT IN AN ORGANIZED HEALTH CARE EDUCATION/TRAINING PROGRAM

## 2018-09-21 PROCEDURE — 3046F HEMOGLOBIN A1C LEVEL >9.0%: CPT | Performed by: STUDENT IN AN ORGANIZED HEALTH CARE EDUCATION/TRAINING PROGRAM

## 2018-09-21 PROCEDURE — 83036 HEMOGLOBIN GLYCOSYLATED A1C: CPT | Performed by: STUDENT IN AN ORGANIZED HEALTH CARE EDUCATION/TRAINING PROGRAM

## 2018-09-21 PROCEDURE — G8420 CALC BMI NORM PARAMETERS: HCPCS | Performed by: STUDENT IN AN ORGANIZED HEALTH CARE EDUCATION/TRAINING PROGRAM

## 2018-09-21 PROCEDURE — 4004F PT TOBACCO SCREEN RCVD TLK: CPT | Performed by: STUDENT IN AN ORGANIZED HEALTH CARE EDUCATION/TRAINING PROGRAM

## 2018-09-21 PROCEDURE — 90715 TDAP VACCINE 7 YRS/> IM: CPT

## 2018-09-21 PROCEDURE — G0008 ADMIN INFLUENZA VIRUS VAC: HCPCS | Performed by: FAMILY MEDICINE

## 2018-09-21 PROCEDURE — 96160 PT-FOCUSED HLTH RISK ASSMT: CPT | Performed by: STUDENT IN AN ORGANIZED HEALTH CARE EDUCATION/TRAINING PROGRAM

## 2018-09-21 PROCEDURE — 99213 OFFICE O/P EST LOW 20 MIN: CPT | Performed by: STUDENT IN AN ORGANIZED HEALTH CARE EDUCATION/TRAINING PROGRAM

## 2018-09-21 PROCEDURE — G0008 ADMIN INFLUENZA VIRUS VAC: HCPCS

## 2018-09-21 PROCEDURE — 87804 INFLUENZA ASSAY W/OPTIC: CPT | Performed by: STUDENT IN AN ORGANIZED HEALTH CARE EDUCATION/TRAINING PROGRAM

## 2018-09-21 PROCEDURE — 2022F DILAT RTA XM EVC RTNOPTHY: CPT | Performed by: STUDENT IN AN ORGANIZED HEALTH CARE EDUCATION/TRAINING PROGRAM

## 2018-09-21 RX ORDER — OMEPRAZOLE 20 MG/1
20 CAPSULE, DELAYED RELEASE ORAL 2 TIMES DAILY
Qty: 30 CAPSULE | Refills: 3 | Status: SHIPPED | OUTPATIENT
Start: 2018-09-21 | End: 2018-11-12 | Stop reason: SDUPTHER

## 2018-09-21 RX ORDER — GABAPENTIN 400 MG/1
400 CAPSULE ORAL 2 TIMES DAILY
Qty: 60 CAPSULE | Refills: 0 | Status: SHIPPED | OUTPATIENT
Start: 2018-09-21 | End: 2018-11-12 | Stop reason: SDUPTHER

## 2018-09-21 ASSESSMENT — PATIENT HEALTH QUESTIONNAIRE - PHQ9
SUM OF ALL RESPONSES TO PHQ QUESTIONS 1-9: 15
4. FEELING TIRED OR HAVING LITTLE ENERGY: 3
3. TROUBLE FALLING OR STAYING ASLEEP: 3
5. POOR APPETITE OR OVEREATING: 3
10. IF YOU CHECKED OFF ANY PROBLEMS, HOW DIFFICULT HAVE THESE PROBLEMS MADE IT FOR YOU TO DO YOUR WORK, TAKE CARE OF THINGS AT HOME, OR GET ALONG WITH OTHER PEOPLE: 2
9. THOUGHTS THAT YOU WOULD BE BETTER OFF DEAD, OR OF HURTING YOURSELF: 0
7. TROUBLE CONCENTRATING ON THINGS, SUCH AS READING THE NEWSPAPER OR WATCHING TELEVISION: 1
1. LITTLE INTEREST OR PLEASURE IN DOING THINGS: 0
SUM OF ALL RESPONSES TO PHQ9 QUESTIONS 1 & 2: 3
6. FEELING BAD ABOUT YOURSELF - OR THAT YOU ARE A FAILURE OR HAVE LET YOURSELF OR YOUR FAMILY DOWN: 1
SUM OF ALL RESPONSES TO PHQ QUESTIONS 1-9: 15
2. FEELING DOWN, DEPRESSED OR HOPELESS: 3
8. MOVING OR SPEAKING SO SLOWLY THAT OTHER PEOPLE COULD HAVE NOTICED. OR THE OPPOSITE, BEING SO FIGETY OR RESTLESS THAT YOU HAVE BEEN MOVING AROUND A LOT MORE THAN USUAL: 1

## 2018-09-21 ASSESSMENT — ENCOUNTER SYMPTOMS
PHOTOPHOBIA: 0
NAUSEA: 0
RHINORRHEA: 0
VOMITING: 0
COUGH: 0
SORE THROAT: 0
WHEEZING: 0
SINUS PRESSURE: 1
SHORTNESS OF BREATH: 0
EYES NEGATIVE: 1
EYE PAIN: 0
ABDOMINAL PAIN: 1
RESPIRATORY NEGATIVE: 1

## 2018-09-21 NOTE — PROGRESS NOTES
Visit Information    Have you changed or started any medications since your last visit including any over-the-counter medicines, vitamins, or herbal medicines? no   Have you stopped taking any of your medications? Is so, why? -  no  Are you having any side effects from any of your medications? - no    Have you seen any other physician or provider since your last visit? Yes - [address] [facility].  Have you had any other diagnostic tests since your last visit?  no   Have you been seen in the emergency room and/or had an admission in a hospital since we last saw you?  yes - [facility]   Have you had your routine dental cleaning in the past 6 months?  no     Do you have an active MyChart account? If no, what is the barrier?   No: Pending    Patient Care Team:  Kendra Olivares MD as PCP - General (Family Medicine)    Medical History Review  Past Medical, Family, and Social History reviewed and does not contribute to the patient presenting condition    Health Maintenance   Topic Date Due    Diabetic foot exam  08/13/1979    Diabetic retinal exam  08/13/1979    Diabetic microalbuminuria test  08/13/1987    DTaP/Tdap/Td vaccine (1 - Tdap) 08/13/1988    Pneumococcal med risk (1 of 1 - PPSV23) 08/13/1988    Cervical cancer screen  08/13/1990    Lipid screen  11/15/2013    A1C test (Diabetic or Prediabetic)  02/14/2018    Flu vaccine (1) 09/01/2018    HIV screen  Completed
8.5 (L) 08/27/2018    PHOS 2.4 (L) 02/15/2017     Lab Results   Component Value Date    LDLCHOLESTEROL Unable to calculate due to Trig >400 11/15/2012    LDLDIRECT 31 11/15/2012       Assessment and Plan:    1. Type 2 diabetes mellitus with diabetic polyneuropathy, with long-term current use of insulin (HCC)  - not well controlled, A1c 13.3 today  - start Lantus 20 units QD   - sliding-scale Novolog before meals; patient given scale  - BGL log and close follow up with this clinic    2. Chronic Pancreatitis  - Stable today  - Lipase lab ordered    3. Health maintenance  - Pt received flu, pneumonia and Tdap vaccines  - Microalbumin ordered today  - Lipid screen ordered  - Will address other needs at follow up visit       Requested Prescriptions     Signed Prescriptions Disp Refills    insulin glargine (LANTUS SOLOSTAR) 100 UNIT/ML injection pen 5 pen 3     Sig: Inject 20 Units into the skin nightly    insulin aspart (NOVOLOG FLEXPEN) 100 UNIT/ML injection pen 5 pen 3     Sig: Follow scale:    Sugars 150-200: 2 Units   Sugars 201-300: 3 Units  Sugars 301-400: 4 Units  Sugars >401: 5 Units    gabapentin (NEURONTIN) 400 MG capsule 60 capsule 0     Sig: Take 1 capsule by mouth 2 times daily for 30 days. Suzette Kerns omeprazole (PRILOSEC) 20 MG delayed release capsule 30 capsule 3     Sig: Take 1 capsule by mouth 2 times daily       Medications Discontinued During This Encounter   Medication Reason    insulin lispro protamine & lispro (HUMALOG MIX 75/25) (75-25) 100 UNIT per ML SUSP injection vial Alternate therapy    gabapentin (NEURONTIN) 300 MG capsule REORDER       Fang received counseling on the following healthy behaviors: nutrition, exercise and medication adherence    Discussed use, benefit, and side effects of prescribed medications. Barriers to medication compliance addressed. All patient questions answered. Pt voiced understanding.      Return in about 4 weeks (around 10/19/2018) for DMII, chronic

## 2018-09-27 ENCOUNTER — TELEPHONE (OUTPATIENT)
Dept: FAMILY MEDICINE CLINIC | Age: 48
End: 2018-09-27

## 2018-11-12 ENCOUNTER — OFFICE VISIT (OUTPATIENT)
Dept: FAMILY MEDICINE CLINIC | Age: 48
End: 2018-11-12

## 2018-11-12 VITALS
TEMPERATURE: 97.9 F | DIASTOLIC BLOOD PRESSURE: 74 MMHG | BODY MASS INDEX: 19.91 KG/M2 | SYSTOLIC BLOOD PRESSURE: 120 MMHG | HEART RATE: 107 BPM | WEIGHT: 116 LBS

## 2018-11-12 DIAGNOSIS — M25.512 LEFT SHOULDER PAIN, UNSPECIFIED CHRONICITY: ICD-10-CM

## 2018-11-12 DIAGNOSIS — Z76.0 MEDICATION REFILL: ICD-10-CM

## 2018-11-12 DIAGNOSIS — E11.42 TYPE 2 DIABETES MELLITUS WITH DIABETIC POLYNEUROPATHY, WITH LONG-TERM CURRENT USE OF INSULIN (HCC): Primary | ICD-10-CM

## 2018-11-12 DIAGNOSIS — Z79.4 TYPE 2 DIABETES MELLITUS WITH DIABETIC POLYNEUROPATHY, WITH LONG-TERM CURRENT USE OF INSULIN (HCC): Primary | ICD-10-CM

## 2018-11-12 PROCEDURE — 99213 OFFICE O/P EST LOW 20 MIN: CPT | Performed by: STUDENT IN AN ORGANIZED HEALTH CARE EDUCATION/TRAINING PROGRAM

## 2018-11-12 RX ORDER — GABAPENTIN 400 MG/1
800 CAPSULE ORAL 2 TIMES DAILY
Qty: 120 CAPSULE | Refills: 0 | Status: ON HOLD | OUTPATIENT
Start: 2018-11-12 | End: 2019-02-17 | Stop reason: HOSPADM

## 2018-11-12 RX ORDER — OMEPRAZOLE 20 MG/1
20 CAPSULE, DELAYED RELEASE ORAL 2 TIMES DAILY
Qty: 30 CAPSULE | Refills: 3 | Status: SHIPPED | OUTPATIENT
Start: 2018-11-12 | End: 2019-02-28

## 2018-11-12 RX ORDER — LANCETS 28 GAUGE
1 EACH MISCELLANEOUS DAILY
Qty: 100 EACH | Refills: 3 | Status: SHIPPED | OUTPATIENT
Start: 2018-11-12 | End: 2021-12-10 | Stop reason: SDUPTHER

## 2018-11-12 RX ORDER — IBUPROFEN 600 MG/1
600 TABLET ORAL EVERY 6 HOURS PRN
Qty: 120 TABLET | Refills: 3 | Status: SHIPPED | OUTPATIENT
Start: 2018-11-12 | End: 2019-03-22 | Stop reason: ALTCHOICE

## 2018-11-12 RX ORDER — CALCIUM CARB/VITAMIN D3/VIT K1 500-100-40
1 TABLET,CHEWABLE ORAL DAILY
Qty: 100 EACH | Refills: 3 | Status: SHIPPED | OUTPATIENT
Start: 2018-11-12 | End: 2021-12-03 | Stop reason: CLARIF

## 2018-11-12 RX ORDER — ROPINIROLE 1 MG/1
1 TABLET, FILM COATED ORAL NIGHTLY
Qty: 30 TABLET | Refills: 0 | Status: SHIPPED | OUTPATIENT
Start: 2018-11-12 | End: 2019-02-28

## 2018-11-12 RX ORDER — VENLAFAXINE HYDROCHLORIDE 150 MG/1
150 CAPSULE, EXTENDED RELEASE ORAL
Qty: 30 CAPSULE | Refills: 0 | Status: SHIPPED | OUTPATIENT
Start: 2018-11-12 | End: 2019-02-28

## 2018-11-12 RX ORDER — GLUCOSAMINE HCL/CHONDROITIN SU 500-400 MG
CAPSULE ORAL
Qty: 100 STRIP | Refills: 11 | Status: SHIPPED | OUTPATIENT
Start: 2018-11-12 | End: 2019-05-20 | Stop reason: SDUPTHER

## 2018-11-12 RX ORDER — MIRTAZAPINE 15 MG/1
15 TABLET, FILM COATED ORAL NIGHTLY
Qty: 30 TABLET | Refills: 0 | Status: SHIPPED | OUTPATIENT
Start: 2018-11-12 | End: 2019-02-28

## 2018-11-12 RX ORDER — QUETIAPINE FUMARATE 100 MG/1
100 TABLET, FILM COATED ORAL 2 TIMES DAILY
Qty: 60 TABLET | Refills: 0 | Status: SHIPPED | OUTPATIENT
Start: 2018-11-12 | End: 2019-02-28

## 2018-11-12 RX ORDER — UBIQUINOL 100 MG
CAPSULE ORAL
Qty: 100 EACH | Refills: 3 | Status: SHIPPED | OUTPATIENT
Start: 2018-11-12 | End: 2019-05-27 | Stop reason: SDUPTHER

## 2018-11-12 ASSESSMENT — ENCOUNTER SYMPTOMS
SHORTNESS OF BREATH: 0
CHEST TIGHTNESS: 0

## 2018-11-12 NOTE — PROGRESS NOTES
Sitting, Cuff Size: Medium Adult)   Pulse 107   Temp 97.9 °F (36.6 °C) (Oral)   Wt 116 lb (52.6 kg)   LMP 05/21/2018   BMI 19.91 kg/m²    BP Readings from Last 3 Encounters:   11/12/18 120/74   09/21/18 121/82   08/27/18 135/85       Physical Exam   Constitutional: She is oriented to person, place, and time. She appears well-developed and well-nourished. Cardiovascular: Normal rate and regular rhythm. Pulmonary/Chest: Effort normal and breath sounds normal.   Musculoskeletal:        Left shoulder: She exhibits decreased range of motion, pain and decreased strength. She exhibits no tenderness, no swelling and no spasm. Neurological: She is alert and oriented to person, place, and time. Lab Results   Component Value Date    WBC 5.9 08/27/2018    HGB 12.5 08/27/2018    HCT 35.7 (L) 08/27/2018     08/27/2018    CHOL 499 (H) 11/15/2012    TRIG 533 (H) 11/18/2012    HDL 23 (L) 11/15/2012    LDLDIRECT 31 11/15/2012    ALT 16 08/26/2018    AST 25 08/26/2018     08/27/2018    K 4.0 08/27/2018     08/27/2018    CREATININE 0.36 (L) 08/27/2018    BUN 6 08/27/2018    CO2 23 08/27/2018    INR 1.0 11/16/2012    LABA1C 13.3 09/21/2018     Lab Results   Component Value Date    CALCIUM 8.5 (L) 08/27/2018    PHOS 2.4 (L) 02/15/2017     Lab Results   Component Value Date    LDLCHOLESTEROL Unable to calculate due to Trig >400 11/15/2012    LDLDIRECT 31 11/15/2012       Assessment and Plan:    1. Type 2 diabetes mellitus with diabetic polyneuropathy, with long-term current use of insulin (HCC)  - Alcohol Swabs (ALCOHOL PREP) 70 % PADS; 4 time daily  Dispense: 100 each; Refill: 3  - blood glucose monitor strips; Test 4 times daily Diagnosis  Dispense: 100 strip; Refill: 11  - blood glucose test strips (FREESTYLE LITE) strip; 1 each by In Vitro route daily As needed. Dispense: 100 each; Refill: 3  - FREESTYLE LANCETS MISC; 1 each by Does not apply route daily  Dispense: 100 each;  Refill: 3  - LANCETS MISC 100 each 3     Si each by Does not apply route daily    gabapentin (NEURONTIN) 400 MG capsule 120 capsule 0     Sig: Take 2 capsules by mouth 2 times daily for 30 days. Burnis Begun insulin aspart (NOVOLOG FLEXPEN) 100 UNIT/ML injection pen 5 pen 3     Sig: Follow scale:    Sugars 150-200: 2 Units   Sugars 201-300: 3 Units  Sugars 301-400: 4 Units  Sugars >401: 5 Units    insulin glargine (LANTUS SOLOSTAR) 100 UNIT/ML injection pen 5 pen 3     Sig: Inject 20 Units into the skin nightly    Insulin Syringe-Needle U-100 (INSULIN SYRINGE 1CC/31GX5/16\") 31G X \" 1 ML MISC 100 each 3     Si each by Does not apply route daily    mirtazapine (REMERON) 15 MG tablet 30 tablet 0     Sig: Take 1 tablet by mouth nightly    omeprazole (PRILOSEC) 20 MG delayed release capsule 30 capsule 3     Sig: Take 1 capsule by mouth 2 times daily    QUEtiapine (SEROQUEL) 100 MG tablet 60 tablet 0     Sig: Take 1 tablet by mouth 2 times daily 16  Exact dose unknown. Pt's pharmacy closed for the day.     rOPINIRole (REQUIP) 1 MG tablet 30 tablet 0     Sig: Take 1 tablet by mouth nightly    venlafaxine (EFFEXOR XR) 150 MG extended release capsule 30 capsule 0     Sig: Take 1 capsule by mouth daily (with breakfast)    Diabetic Shoe MISC 1 each 0     Sig: by Does not apply route    ibuprofen (ADVIL;MOTRIN) 600 MG tablet 120 tablet 3     Sig: Take 1 tablet by mouth every 6 hours as needed for Pain       Medications Discontinued During This Encounter   Medication Reason    Alcohol Swabs (ALCOHOL PREP) 70 % PADS REORDER    blood glucose monitor strips REORDER    blood glucose test strips (FREESTYLE LITE) strip REORDER    FREESTYLE LANCETS MISC REORDER    gabapentin (NEURONTIN) 400 MG capsule REORDER    insulin aspart (NOVOLOG FLEXPEN) 100 UNIT/ML injection pen REORDER    insulin glargine (LANTUS SOLOSTAR) 100 UNIT/ML injection pen REORDER    Insulin Syringe-Needle U-100 (INSULIN SYRINGE 1CC/31GX5/16\") 31G X

## 2018-11-12 NOTE — PROGRESS NOTES
Visit Information    Have you changed or started any medications since your last visit including any over-the-counter medicines, vitamins, or herbal medicines? no   Have you stopped taking any of your medications? Is so, why? -  no  Are you having any side effects from any of your medications? - no    Have you seen any other physician or provider since your last visit?  no   Have you had any other diagnostic tests since your last visit?  no   Have you been seen in the emergency room and/or had an admission in a hospital since we last saw you?  no   Have you had your routine dental cleaning in the past 6 months?  no     Do you have an active MyChart account? If no, what is the barrier?   No:     Patient Care Team:  Tigre Jasso MD as PCP - General (Family Medicine)    Medical History Review  Past Medical, Family, and Social History reviewed and does not contribute to the patient presenting condition    Health Maintenance   Topic Date Due    Diabetic foot exam  08/13/1979    Diabetic retinal exam  08/13/1979    Diabetic microalbuminuria test  08/13/1987    Cervical cancer screen  08/13/1990    Lipid screen  11/15/2013    A1C test (Diabetic or Prediabetic)  12/21/2018    DTaP/Tdap/Td vaccine (2 - Td) 09/21/2028    Flu vaccine  Completed    Pneumococcal med risk  Completed    HIV screen  Completed

## 2018-11-12 NOTE — PROGRESS NOTES
Attending Physician Statement    Wt Readings from Last 3 Encounters:   11/12/18 116 lb (52.6 kg)   09/21/18 113 lb 3.2 oz (51.3 kg)   08/26/18 120 lb (54.4 kg)     Temp Readings from Last 3 Encounters:   11/12/18 97.9 °F (36.6 °C) (Oral)   09/21/18 97.3 °F (36.3 °C) (Oral)   08/27/18 98.2 °F (36.8 °C) (Oral)     BP Readings from Last 3 Encounters:   11/12/18 120/74   09/21/18 121/82   08/27/18 135/85     Pulse Readings from Last 3 Encounters:   11/12/18 107   09/21/18 116   08/27/18 89         I have discussed the care of Latonya Vaz, including pertinent history and exam findings,  with the resident. I have reviewed the key elements of all parts of the encounter with the resident. I agree with the assessment, plan and orders as documented by the resident.   (GE Modifier)  Noncompliance an issue  Watch lipids

## 2018-12-03 ENCOUNTER — HOSPITAL ENCOUNTER (EMERGENCY)
Age: 48
Discharge: HOME | End: 2018-12-03
Attending: EMERGENCY MEDICINE
Payer: MEDICAID

## 2018-12-03 ENCOUNTER — APPOINTMENT (OUTPATIENT)
Dept: GENERAL RADIOLOGY | Age: 48
End: 2018-12-03
Payer: MEDICAID

## 2018-12-03 VITALS
BODY MASS INDEX: 20.49 KG/M2 | OXYGEN SATURATION: 97 % | DIASTOLIC BLOOD PRESSURE: 79 MMHG | SYSTOLIC BLOOD PRESSURE: 145 MMHG | HEART RATE: 105 BPM | TEMPERATURE: 97.5 F | RESPIRATION RATE: 18 BRPM | WEIGHT: 120 LBS | HEIGHT: 64 IN

## 2018-12-03 DIAGNOSIS — Z79.4 TYPE 2 DIABETES MELLITUS WITH DIABETIC POLYNEUROPATHY, WITH LONG-TERM CURRENT USE OF INSULIN (HCC): ICD-10-CM

## 2018-12-03 DIAGNOSIS — E11.42 TYPE 2 DIABETES MELLITUS WITH DIABETIC POLYNEUROPATHY, WITH LONG-TERM CURRENT USE OF INSULIN (HCC): ICD-10-CM

## 2018-12-03 DIAGNOSIS — R73.9 HYPERGLYCEMIA: ICD-10-CM

## 2018-12-03 DIAGNOSIS — R10.13 EPIGASTRIC PAIN: Primary | ICD-10-CM

## 2018-12-03 LAB
ABSOLUTE EOS #: 0.14 K/UL (ref 0–0.44)
ABSOLUTE IMMATURE GRANULOCYTE: <0.03 K/UL (ref 0–0.3)
ABSOLUTE LYMPH #: 2.68 K/UL (ref 1.1–3.7)
ABSOLUTE MONO #: 0.51 K/UL (ref 0.1–1.2)
ALBUMIN SERPL-MCNC: 4.1 G/DL (ref 3.5–5.2)
ALBUMIN/GLOBULIN RATIO: 1.4 (ref 1–2.5)
ALP BLD-CCNC: 102 U/L (ref 35–104)
ALT SERPL-CCNC: 19 U/L (ref 5–33)
ANION GAP SERPL CALCULATED.3IONS-SCNC: 11 MMOL/L (ref 9–17)
AST SERPL-CCNC: 16 U/L
BASOPHILS # BLD: 1 % (ref 0–2)
BASOPHILS ABSOLUTE: 0.04 K/UL (ref 0–0.2)
BETA-HYDROXYBUTYRATE: 0.07 MMOL/L (ref 0.02–0.27)
BILIRUB SERPL-MCNC: 0.35 MG/DL (ref 0.3–1.2)
BILIRUBIN DIRECT: 0.09 MG/DL
BILIRUBIN, INDIRECT: 0.26 MG/DL (ref 0–1)
BUN BLDV-MCNC: 11 MG/DL (ref 6–20)
BUN/CREAT BLD: ABNORMAL (ref 9–20)
CALCIUM SERPL-MCNC: 9.6 MG/DL (ref 8.6–10.4)
CHLORIDE BLD-SCNC: 99 MMOL/L (ref 98–107)
CO2: 26 MMOL/L (ref 20–31)
CREAT SERPL-MCNC: 0.44 MG/DL (ref 0.5–0.9)
DIFFERENTIAL TYPE: NORMAL
EKG ATRIAL RATE: 97 BPM
EKG P AXIS: 63 DEGREES
EKG P-R INTERVAL: 124 MS
EKG Q-T INTERVAL: 360 MS
EKG QRS DURATION: 84 MS
EKG QTC CALCULATION (BAZETT): 457 MS
EKG R AXIS: 85 DEGREES
EKG T AXIS: 70 DEGREES
EKG VENTRICULAR RATE: 97 BPM
EOSINOPHILS RELATIVE PERCENT: 2 % (ref 1–4)
GFR AFRICAN AMERICAN: >60 ML/MIN
GFR NON-AFRICAN AMERICAN: >60 ML/MIN
GFR SERPL CREATININE-BSD FRML MDRD: ABNORMAL ML/MIN/{1.73_M2}
GFR SERPL CREATININE-BSD FRML MDRD: ABNORMAL ML/MIN/{1.73_M2}
GLOBULIN: NORMAL G/DL (ref 1.5–3.8)
GLUCOSE BLD-MCNC: 305 MG/DL (ref 70–99)
HCG QUALITATIVE: NEGATIVE
HCT VFR BLD CALC: 39.4 % (ref 36.3–47.1)
HEMOGLOBIN: 13.4 G/DL (ref 11.9–15.1)
IMMATURE GRANULOCYTES: 0 %
LIPASE: 28 U/L (ref 13–60)
LYMPHOCYTES # BLD: 35 % (ref 24–43)
MCH RBC QN AUTO: 28.9 PG (ref 25.2–33.5)
MCHC RBC AUTO-ENTMCNC: 34 G/DL (ref 28.4–34.8)
MCV RBC AUTO: 85.1 FL (ref 82.6–102.9)
MONOCYTES # BLD: 7 % (ref 3–12)
NRBC AUTOMATED: 0 PER 100 WBC
PDW BLD-RTO: 13.7 % (ref 11.8–14.4)
PLATELET # BLD: 276 K/UL (ref 138–453)
PLATELET ESTIMATE: NORMAL
PMV BLD AUTO: 9.2 FL (ref 8.1–13.5)
POTASSIUM SERPL-SCNC: 4.2 MMOL/L (ref 3.7–5.3)
RBC # BLD: 4.63 M/UL (ref 3.95–5.11)
RBC # BLD: NORMAL 10*6/UL
SEG NEUTROPHILS: 55 % (ref 36–65)
SEGMENTED NEUTROPHILS ABSOLUTE COUNT: 4.22 K/UL (ref 1.5–8.1)
SODIUM BLD-SCNC: 136 MMOL/L (ref 135–144)
TOTAL PROTEIN: 7.1 G/DL (ref 6.4–8.3)
WBC # BLD: 7.6 K/UL (ref 3.5–11.3)
WBC # BLD: NORMAL 10*3/UL

## 2018-12-03 PROCEDURE — 99284 EMERGENCY DEPT VISIT MOD MDM: CPT

## 2018-12-03 PROCEDURE — 96374 THER/PROPH/DIAG INJ IV PUSH: CPT

## 2018-12-03 PROCEDURE — 96372 THER/PROPH/DIAG INJ SC/IM: CPT

## 2018-12-03 PROCEDURE — 2580000003 HC RX 258: Performed by: EMERGENCY MEDICINE

## 2018-12-03 PROCEDURE — 82010 KETONE BODYS QUAN: CPT

## 2018-12-03 PROCEDURE — 80076 HEPATIC FUNCTION PANEL: CPT

## 2018-12-03 PROCEDURE — 93005 ELECTROCARDIOGRAM TRACING: CPT

## 2018-12-03 PROCEDURE — 84703 CHORIONIC GONADOTROPIN ASSAY: CPT

## 2018-12-03 PROCEDURE — 80048 BASIC METABOLIC PNL TOTAL CA: CPT

## 2018-12-03 PROCEDURE — 6360000002 HC RX W HCPCS: Performed by: EMERGENCY MEDICINE

## 2018-12-03 PROCEDURE — 74022 RADEX COMPL AQT ABD SERIES: CPT

## 2018-12-03 PROCEDURE — 85025 COMPLETE CBC W/AUTO DIFF WBC: CPT

## 2018-12-03 PROCEDURE — 6370000000 HC RX 637 (ALT 250 FOR IP): Performed by: EMERGENCY MEDICINE

## 2018-12-03 PROCEDURE — 83690 ASSAY OF LIPASE: CPT

## 2018-12-03 RX ORDER — ONDANSETRON 2 MG/ML
4 INJECTION INTRAMUSCULAR; INTRAVENOUS ONCE
Status: COMPLETED | OUTPATIENT
Start: 2018-12-03 | End: 2018-12-03

## 2018-12-03 RX ORDER — 0.9 % SODIUM CHLORIDE 0.9 %
1000 INTRAVENOUS SOLUTION INTRAVENOUS ONCE
Status: COMPLETED | OUTPATIENT
Start: 2018-12-03 | End: 2018-12-03

## 2018-12-03 RX ORDER — DICYCLOMINE HYDROCHLORIDE 10 MG/ML
10 INJECTION INTRAMUSCULAR ONCE
Status: COMPLETED | OUTPATIENT
Start: 2018-12-03 | End: 2018-12-03

## 2018-12-03 RX ADMIN — INSULIN LISPRO 6 UNITS: 100 INJECTION, SOLUTION INTRAVENOUS; SUBCUTANEOUS at 14:39

## 2018-12-03 RX ADMIN — ONDANSETRON 4 MG: 2 INJECTION INTRAMUSCULAR; INTRAVENOUS at 13:27

## 2018-12-03 RX ADMIN — DICYCLOMINE HYDROCHLORIDE 10 MG: 20 INJECTION, SOLUTION INTRAMUSCULAR at 13:27

## 2018-12-03 RX ADMIN — SODIUM CHLORIDE 1000 ML: 9 INJECTION, SOLUTION INTRAVENOUS at 13:26

## 2018-12-03 ASSESSMENT — ENCOUNTER SYMPTOMS
EYE PAIN: 0
COUGH: 0
EYE DISCHARGE: 0
ABDOMINAL PAIN: 1
SORE THROAT: 0
NAUSEA: 1
VOMITING: 0
SHORTNESS OF BREATH: 0
DIARRHEA: 0

## 2018-12-03 ASSESSMENT — PAIN DESCRIPTION - PAIN TYPE: TYPE: ACUTE PAIN

## 2018-12-03 ASSESSMENT — PAIN DESCRIPTION - ONSET: ONSET: GRADUAL

## 2018-12-03 ASSESSMENT — PAIN DESCRIPTION - ORIENTATION: ORIENTATION: LEFT;UPPER

## 2018-12-03 ASSESSMENT — PAIN DESCRIPTION - FREQUENCY: FREQUENCY: CONTINUOUS

## 2018-12-03 ASSESSMENT — PAIN DESCRIPTION - PROGRESSION: CLINICAL_PROGRESSION: GRADUALLY WORSENING

## 2018-12-03 ASSESSMENT — PAIN SCALES - GENERAL: PAINLEVEL_OUTOF10: 10

## 2018-12-03 ASSESSMENT — PAIN DESCRIPTION - DESCRIPTORS: DESCRIPTORS: ACHING;CONSTANT;CRAMPING

## 2018-12-03 ASSESSMENT — PAIN DESCRIPTION - LOCATION: LOCATION: ABDOMEN;BACK

## 2018-12-03 NOTE — ED NOTES
JACEK met with patient who has no insurance currently and needs her diabetes medications. Patient states she is working and did not re-certify for her Medicaid due to her work hours. HELP met with patient and will apply on her behalf. She was given an envelope to send in her proof-of-income. Patient approved for the Med Assist Program.  SW faxed approval for Lantus and Novolog, patient will pick them up at discharge today. Blood sugar monitor provided to patient as she states she does not have one. Patient stating she feels sick, was given water and crackers. Bing Carter.  Kevin Swanson  12/03/18 [id number]

## 2018-12-03 NOTE — ED PROVIDER NOTES
Diabetes Mother     Coronary Art Dis Mother     Cancer Father         Colon    Diabetes Sister     Diabetes Brother     Bipolar Disorder Brother     Alcohol Abuse Brother     Substance Abuse Brother        Allergies:  Patient has no known allergies. Home Medications:  Prior to Admission medications    Medication Sig Start Date End Date Taking? Authorizing Provider   insulin aspart (NOVOLOG FLEXPEN) 100 UNIT/ML injection pen Follow scale:    Sugars 150-200: 2 Units   Sugars 201-300: 3 Units  Sugars 301-400: 4 Units  Sugars >401: 5 Units 12/3/18  Yes RICCARDO, DO   insulin glargine (LANTUS SOLOSTAR) 100 UNIT/ML injection pen Inject 20 Units into the skin nightly 12/3/18  Yes Shubham Bobo, DO   Alcohol Swabs (ALCOHOL PREP) 70 % PADS 4 time daily 11/12/18   Esther Craft MD   blood glucose monitor strips Test 4 times daily Diagnosis 11/12/18   Esther Craft MD   blood glucose test strips (FREESTYLE LITE) strip 1 each by In Vitro route daily As needed. 11/12/18   Esther Craft MD   FREESTYLE LANCETS MISC 1 each by Does not apply route daily 11/12/18   Esther Craft MD   gabapentin (NEURONTIN) 400 MG capsule Take 2 capsules by mouth 2 times daily for 30 days. . 11/12/18 12/12/18  Esther Craft MD   Insulin Syringe-Needle U-100 (INSULIN SYRINGE 1CC/31GX5/16\") 31G X 5/16\" 1 ML MISC 1 each by Does not apply route daily 11/12/18   Esther Craft MD   mirtazapine (REMERON) 15 MG tablet Take 1 tablet by mouth nightly 11/12/18   Esther Craft MD   omeprazole (PRILOSEC) 20 MG delayed release capsule Take 1 capsule by mouth 2 times daily 11/12/18   Esther Craft MD   QUEtiapine (SEROQUEL) 100 MG tablet Take 1 tablet by mouth 2 times daily 11/22/16  Exact dose unknown. Pt's pharmacy closed for the day.  11/12/18   Esther Craft MD   rOPINIRole (REQUIP) 1 MG tablet Take 1 tablet by mouth nightly 11/12/18   Esther Craft MD   venlafaxine (EFFEXOR XR) 150 MG extended release capsule Take 1 capsule by mouth daily (with breakfast) 11/12/18   Nina Dietz MD   Diabetic Shoe MISC by Does not apply route 11/12/18   Nina Dietz MD   ibuprofen (ADVIL;MOTRIN) 600 MG tablet Take 1 tablet by mouth every 6 hours as needed for Pain 11/12/18   Nina Dietz MD   glucose monitoring kit (FREESTYLE) monitoring kit 4 times daily 8/27/18   Mart Micah, DO       REVIEW OF SYSTEMS    (2-9 systems for level 4, 10 or more for level 5)      Review of Systems   Constitutional: Negative for chills, diaphoresis and fever. HENT: Negative for congestion and sore throat. Eyes: Negative for pain and discharge. Respiratory: Negative for cough and shortness of breath. Cardiovascular: Positive for chest pain. Negative for leg swelling. Gastrointestinal: Positive for abdominal pain and nausea. Negative for diarrhea and vomiting. Endocrine: Negative for polydipsia and polyuria. Genitourinary: Negative for dysuria and hematuria. Musculoskeletal: Negative for neck pain and neck stiffness. Skin: Negative for pallor and rash. Allergic/Immunologic: Negative for environmental allergies and food allergies. Neurological: Positive for headaches. Negative for dizziness, weakness and numbness. Hematological: Negative for adenopathy. Does not bruise/bleed easily. Psychiatric/Behavioral: Negative for hallucinations and suicidal ideas. PHYSICAL EXAM   (up to 7 for level 4, 8 or more for level 5)      INITIAL VITALS:   BP (!) 145/79   Pulse 105   Temp 97.5 °F (36.4 °C) (Oral)   Resp 18   Ht 5' 4\" (1.626 m)   Wt 120 lb (54.4 kg)   LMP 05/21/2018   SpO2 97%   BMI 20.60 kg/m²     Physical Exam   Constitutional: She is oriented to person, place, and time. She appears well-developed and well-nourished. No distress. Patient appears dehydrated, uncomfortable but in no distress   HENT:   Head: Normocephalic and atraumatic.    Mouth/Throat: Oropharynx is clear and moist.   No signs of trauma to scalp or face   Eyes: Pupils are equal,

## 2018-12-13 ENCOUNTER — TELEPHONE (OUTPATIENT)
Dept: FAMILY MEDICINE CLINIC | Age: 48
End: 2018-12-13

## 2019-02-14 ENCOUNTER — APPOINTMENT (OUTPATIENT)
Dept: GENERAL RADIOLOGY | Age: 49
DRG: 282 | End: 2019-02-14
Payer: MEDICAID

## 2019-02-14 ENCOUNTER — HOSPITAL ENCOUNTER (INPATIENT)
Age: 49
LOS: 2 days | Discharge: HOME | DRG: 282 | End: 2019-02-17
Attending: EMERGENCY MEDICINE | Admitting: EMERGENCY MEDICINE
Payer: MEDICAID

## 2019-02-14 DIAGNOSIS — K86.1 ACUTE ON CHRONIC PANCREATITIS (HCC): Primary | ICD-10-CM

## 2019-02-14 DIAGNOSIS — K85.90 ACUTE ON CHRONIC PANCREATITIS (HCC): Primary | ICD-10-CM

## 2019-02-14 LAB
ABSOLUTE EOS #: 0.33 K/UL (ref 0–0.44)
ABSOLUTE IMMATURE GRANULOCYTE: 0.1 K/UL (ref 0–0.3)
ABSOLUTE LYMPH #: 1.96 K/UL (ref 1.1–3.7)
ABSOLUTE MONO #: 0.62 K/UL (ref 0.1–1.2)
ALBUMIN SERPL-MCNC: 4 G/DL (ref 3.5–5.2)
ALBUMIN/GLOBULIN RATIO: 1.1 (ref 1–2.5)
ALP BLD-CCNC: 153 U/L (ref 35–104)
ALT SERPL-CCNC: 26 U/L (ref 5–33)
ANION GAP SERPL CALCULATED.3IONS-SCNC: 12 MMOL/L (ref 9–17)
AST SERPL-CCNC: 17 U/L
BASOPHILS # BLD: 1 % (ref 0–2)
BASOPHILS ABSOLUTE: 0.06 K/UL (ref 0–0.2)
BILIRUB SERPL-MCNC: 0.26 MG/DL (ref 0.3–1.2)
BILIRUBIN DIRECT: 0.11 MG/DL
BILIRUBIN URINE: NEGATIVE
BILIRUBIN, INDIRECT: 0.15 MG/DL (ref 0–1)
BUN BLDV-MCNC: 10 MG/DL (ref 6–20)
BUN/CREAT BLD: ABNORMAL (ref 9–20)
CALCIUM SERPL-MCNC: 9.4 MG/DL (ref 8.6–10.4)
CHLORIDE BLD-SCNC: 95 MMOL/L (ref 98–107)
CO2: 23 MMOL/L (ref 20–31)
COLOR: YELLOW
COMMENT UA: ABNORMAL
CREAT SERPL-MCNC: 0.4 MG/DL (ref 0.5–0.9)
DIFFERENTIAL TYPE: ABNORMAL
EKG ATRIAL RATE: 93 BPM
EKG P AXIS: 60 DEGREES
EKG P-R INTERVAL: 134 MS
EKG Q-T INTERVAL: 360 MS
EKG QRS DURATION: 82 MS
EKG QTC CALCULATION (BAZETT): 447 MS
EKG R AXIS: 86 DEGREES
EKG T AXIS: 75 DEGREES
EKG VENTRICULAR RATE: 93 BPM
EOSINOPHILS RELATIVE PERCENT: 3 % (ref 1–4)
GFR AFRICAN AMERICAN: >60 ML/MIN
GFR NON-AFRICAN AMERICAN: >60 ML/MIN
GFR SERPL CREATININE-BSD FRML MDRD: ABNORMAL ML/MIN/{1.73_M2}
GFR SERPL CREATININE-BSD FRML MDRD: ABNORMAL ML/MIN/{1.73_M2}
GLOBULIN: ABNORMAL G/DL (ref 1.5–3.8)
GLUCOSE BLD-MCNC: 301 MG/DL (ref 65–105)
GLUCOSE BLD-MCNC: 359 MG/DL (ref 65–105)
GLUCOSE BLD-MCNC: 392 MG/DL (ref 70–99)
GLUCOSE URINE: ABNORMAL
HCT VFR BLD CALC: 40.2 % (ref 36.3–47.1)
HEMOGLOBIN: 13.8 G/DL (ref 11.9–15.1)
IMMATURE GRANULOCYTES: 1 %
KETONES, URINE: NEGATIVE
LEUKOCYTE ESTERASE, URINE: NEGATIVE
LIPASE: 108 U/L (ref 13–60)
LYMPHOCYTES # BLD: 20 % (ref 24–43)
MCH RBC QN AUTO: 28.9 PG (ref 25.2–33.5)
MCHC RBC AUTO-ENTMCNC: 34.3 G/DL (ref 28.4–34.8)
MCV RBC AUTO: 84.1 FL (ref 82.6–102.9)
MONOCYTES # BLD: 6 % (ref 3–12)
NITRITE, URINE: NEGATIVE
NRBC AUTOMATED: 0 PER 100 WBC
PDW BLD-RTO: 12.8 % (ref 11.8–14.4)
PH UA: 5 (ref 5–8)
PLATELET # BLD: 267 K/UL (ref 138–453)
PLATELET ESTIMATE: ABNORMAL
PMV BLD AUTO: 9.6 FL (ref 8.1–13.5)
POTASSIUM SERPL-SCNC: 4.2 MMOL/L (ref 3.7–5.3)
PROTEIN UA: NEGATIVE
RBC # BLD: 4.78 M/UL (ref 3.95–5.11)
RBC # BLD: ABNORMAL 10*6/UL
SEG NEUTROPHILS: 69 % (ref 36–65)
SEGMENTED NEUTROPHILS ABSOLUTE COUNT: 6.79 K/UL (ref 1.5–8.1)
SODIUM BLD-SCNC: 130 MMOL/L (ref 135–144)
SPECIFIC GRAVITY UA: 1.03 (ref 1–1.03)
TOTAL PROTEIN: 7.6 G/DL (ref 6.4–8.3)
TROPONIN INTERP: NORMAL
TROPONIN T: NORMAL NG/ML
TROPONIN, HIGH SENSITIVITY: <6 NG/L (ref 0–14)
TURBIDITY: CLEAR
URINE HGB: NEGATIVE
UROBILINOGEN, URINE: NORMAL
WBC # BLD: 9.9 K/UL (ref 3.5–11.3)
WBC # BLD: ABNORMAL 10*3/UL

## 2019-02-14 PROCEDURE — 6370000000 HC RX 637 (ALT 250 FOR IP): Performed by: STUDENT IN AN ORGANIZED HEALTH CARE EDUCATION/TRAINING PROGRAM

## 2019-02-14 PROCEDURE — G0378 HOSPITAL OBSERVATION PER HR: HCPCS

## 2019-02-14 PROCEDURE — 6360000002 HC RX W HCPCS: Performed by: STUDENT IN AN ORGANIZED HEALTH CARE EDUCATION/TRAINING PROGRAM

## 2019-02-14 PROCEDURE — 96376 TX/PRO/DX INJ SAME DRUG ADON: CPT

## 2019-02-14 PROCEDURE — 73030 X-RAY EXAM OF SHOULDER: CPT

## 2019-02-14 PROCEDURE — 84484 ASSAY OF TROPONIN QUANT: CPT

## 2019-02-14 PROCEDURE — 93005 ELECTROCARDIOGRAM TRACING: CPT

## 2019-02-14 PROCEDURE — 81003 URINALYSIS AUTO W/O SCOPE: CPT

## 2019-02-14 PROCEDURE — 96375 TX/PRO/DX INJ NEW DRUG ADDON: CPT

## 2019-02-14 PROCEDURE — 80076 HEPATIC FUNCTION PANEL: CPT

## 2019-02-14 PROCEDURE — 96374 THER/PROPH/DIAG INJ IV PUSH: CPT

## 2019-02-14 PROCEDURE — 85025 COMPLETE CBC W/AUTO DIFF WBC: CPT

## 2019-02-14 PROCEDURE — 80048 BASIC METABOLIC PNL TOTAL CA: CPT

## 2019-02-14 PROCEDURE — 2580000003 HC RX 258: Performed by: STUDENT IN AN ORGANIZED HEALTH CARE EDUCATION/TRAINING PROGRAM

## 2019-02-14 PROCEDURE — 99285 EMERGENCY DEPT VISIT HI MDM: CPT

## 2019-02-14 PROCEDURE — 82947 ASSAY GLUCOSE BLOOD QUANT: CPT

## 2019-02-14 PROCEDURE — 96361 HYDRATE IV INFUSION ADD-ON: CPT

## 2019-02-14 PROCEDURE — 83690 ASSAY OF LIPASE: CPT

## 2019-02-14 RX ORDER — DEXTROSE MONOHYDRATE 25 G/50ML
12.5 INJECTION, SOLUTION INTRAVENOUS PRN
Status: DISCONTINUED | OUTPATIENT
Start: 2019-02-14 | End: 2019-02-17 | Stop reason: HOSPADM

## 2019-02-14 RX ORDER — 0.9 % SODIUM CHLORIDE 0.9 %
1000 INTRAVENOUS SOLUTION INTRAVENOUS ONCE
Status: COMPLETED | OUTPATIENT
Start: 2019-02-14 | End: 2019-02-14

## 2019-02-14 RX ORDER — OXYCODONE HYDROCHLORIDE AND ACETAMINOPHEN 5; 325 MG/1; MG/1
1 TABLET ORAL EVERY 6 HOURS PRN
Status: DISCONTINUED | OUTPATIENT
Start: 2019-02-14 | End: 2019-02-15

## 2019-02-14 RX ORDER — NICOTINE POLACRILEX 4 MG
15 LOZENGE BUCCAL PRN
Status: DISCONTINUED | OUTPATIENT
Start: 2019-02-14 | End: 2019-02-17 | Stop reason: HOSPADM

## 2019-02-14 RX ORDER — PANTOPRAZOLE SODIUM 40 MG/1
40 TABLET, DELAYED RELEASE ORAL
Status: DISCONTINUED | OUTPATIENT
Start: 2019-02-15 | End: 2019-02-17 | Stop reason: HOSPADM

## 2019-02-14 RX ORDER — MIRTAZAPINE 15 MG/1
15 TABLET, FILM COATED ORAL NIGHTLY
Status: DISCONTINUED | OUTPATIENT
Start: 2019-02-14 | End: 2019-02-17 | Stop reason: HOSPADM

## 2019-02-14 RX ORDER — DEXTROSE MONOHYDRATE 50 MG/ML
100 INJECTION, SOLUTION INTRAVENOUS PRN
Status: DISCONTINUED | OUTPATIENT
Start: 2019-02-14 | End: 2019-02-17 | Stop reason: HOSPADM

## 2019-02-14 RX ORDER — FENTANYL CITRATE 50 UG/ML
50 INJECTION, SOLUTION INTRAMUSCULAR; INTRAVENOUS ONCE
Status: DISCONTINUED | OUTPATIENT
Start: 2019-02-14 | End: 2019-02-14

## 2019-02-14 RX ORDER — ONDANSETRON 2 MG/ML
4 INJECTION INTRAMUSCULAR; INTRAVENOUS EVERY 8 HOURS PRN
Status: DISCONTINUED | OUTPATIENT
Start: 2019-02-14 | End: 2019-02-17 | Stop reason: HOSPADM

## 2019-02-14 RX ORDER — MORPHINE SULFATE 4 MG/ML
4 INJECTION, SOLUTION INTRAMUSCULAR; INTRAVENOUS ONCE
Status: DISCONTINUED | OUTPATIENT
Start: 2019-02-14 | End: 2019-02-14

## 2019-02-14 RX ORDER — SODIUM CHLORIDE 9 MG/ML
INJECTION, SOLUTION INTRAVENOUS CONTINUOUS
Status: DISCONTINUED | OUTPATIENT
Start: 2019-02-14 | End: 2019-02-17 | Stop reason: HOSPADM

## 2019-02-14 RX ORDER — MORPHINE SULFATE 4 MG/ML
4 INJECTION, SOLUTION INTRAMUSCULAR; INTRAVENOUS ONCE
Status: COMPLETED | OUTPATIENT
Start: 2019-02-14 | End: 2019-02-14

## 2019-02-14 RX ORDER — SODIUM CHLORIDE 0.9 % (FLUSH) 0.9 %
10 SYRINGE (ML) INJECTION PRN
Status: DISCONTINUED | OUTPATIENT
Start: 2019-02-14 | End: 2019-02-17 | Stop reason: HOSPADM

## 2019-02-14 RX ORDER — MORPHINE SULFATE 2 MG/ML
2 INJECTION, SOLUTION INTRAMUSCULAR; INTRAVENOUS
Status: COMPLETED | OUTPATIENT
Start: 2019-02-14 | End: 2019-02-14

## 2019-02-14 RX ORDER — SODIUM CHLORIDE 0.9 % (FLUSH) 0.9 %
10 SYRINGE (ML) INJECTION EVERY 12 HOURS SCHEDULED
Status: DISCONTINUED | OUTPATIENT
Start: 2019-02-14 | End: 2019-02-17 | Stop reason: HOSPADM

## 2019-02-14 RX ORDER — FENTANYL CITRATE 50 UG/ML
50 INJECTION, SOLUTION INTRAMUSCULAR; INTRAVENOUS ONCE
Status: COMPLETED | OUTPATIENT
Start: 2019-02-14 | End: 2019-02-14

## 2019-02-14 RX ORDER — ACETAMINOPHEN 325 MG/1
650 TABLET ORAL EVERY 4 HOURS PRN
Status: DISCONTINUED | OUTPATIENT
Start: 2019-02-14 | End: 2019-02-17 | Stop reason: HOSPADM

## 2019-02-14 RX ORDER — KETOROLAC TROMETHAMINE 30 MG/ML
15 INJECTION, SOLUTION INTRAMUSCULAR; INTRAVENOUS ONCE
Status: COMPLETED | OUTPATIENT
Start: 2019-02-14 | End: 2019-02-14

## 2019-02-14 RX ORDER — ONDANSETRON 2 MG/ML
4 INJECTION INTRAMUSCULAR; INTRAVENOUS ONCE
Status: COMPLETED | OUTPATIENT
Start: 2019-02-14 | End: 2019-02-14

## 2019-02-14 RX ADMIN — KETOROLAC TROMETHAMINE 15 MG: 30 INJECTION INTRAMUSCULAR; INTRAVENOUS at 13:00

## 2019-02-14 RX ADMIN — ONDANSETRON 4 MG: 2 INJECTION INTRAMUSCULAR; INTRAVENOUS at 13:00

## 2019-02-14 RX ADMIN — MORPHINE SULFATE 2 MG: 2 INJECTION, SOLUTION INTRAMUSCULAR; INTRAVENOUS at 21:11

## 2019-02-14 RX ADMIN — MIRTAZAPINE 15 MG: 15 TABLET, FILM COATED ORAL at 21:11

## 2019-02-14 RX ADMIN — SODIUM CHLORIDE 1000 ML: 9 INJECTION, SOLUTION INTRAVENOUS at 12:59

## 2019-02-14 RX ADMIN — MORPHINE SULFATE 4 MG: 4 INJECTION INTRAVENOUS at 15:23

## 2019-02-14 RX ADMIN — ONDANSETRON 4 MG: 2 INJECTION INTRAMUSCULAR; INTRAVENOUS at 23:07

## 2019-02-14 RX ADMIN — SODIUM CHLORIDE: 9 INJECTION, SOLUTION INTRAVENOUS at 18:25

## 2019-02-14 RX ADMIN — FENTANYL CITRATE 50 MCG: 50 INJECTION, SOLUTION INTRAMUSCULAR; INTRAVENOUS at 14:03

## 2019-02-14 RX ADMIN — INSULIN LISPRO 2 UNITS: 100 INJECTION, SOLUTION INTRAVENOUS; SUBCUTANEOUS at 22:56

## 2019-02-14 RX ADMIN — OXYCODONE HYDROCHLORIDE AND ACETAMINOPHEN 1 TABLET: 5; 325 TABLET ORAL at 18:25

## 2019-02-14 ASSESSMENT — PAIN SCALES - GENERAL
PAINLEVEL_OUTOF10: 9
PAINLEVEL_OUTOF10: 10
PAINLEVEL_OUTOF10: 7
PAINLEVEL_OUTOF10: 8
PAINLEVEL_OUTOF10: 7
PAINLEVEL_OUTOF10: 0
PAINLEVEL_OUTOF10: 7

## 2019-02-14 ASSESSMENT — PAIN DESCRIPTION - PAIN TYPE
TYPE: ACUTE PAIN
TYPE: ACUTE PAIN

## 2019-02-14 ASSESSMENT — PAIN DESCRIPTION - FREQUENCY: FREQUENCY: CONTINUOUS

## 2019-02-14 ASSESSMENT — PAIN DESCRIPTION - LOCATION
LOCATION: ABDOMEN
LOCATION: ABDOMEN

## 2019-02-14 ASSESSMENT — PAIN DESCRIPTION - ORIENTATION
ORIENTATION: LEFT;UPPER
ORIENTATION: RIGHT;UPPER

## 2019-02-14 ASSESSMENT — ENCOUNTER SYMPTOMS
VOMITING: 0
SHORTNESS OF BREATH: 0
SORE THROAT: 0
NAUSEA: 1
ABDOMINAL PAIN: 1

## 2019-02-14 ASSESSMENT — PAIN - FUNCTIONAL ASSESSMENT: PAIN_FUNCTIONAL_ASSESSMENT: PREVENTS OR INTERFERES SOME ACTIVE ACTIVITIES AND ADLS

## 2019-02-14 ASSESSMENT — PAIN DESCRIPTION - ONSET: ONSET: ON-GOING

## 2019-02-14 ASSESSMENT — PAIN DESCRIPTION - DESCRIPTORS: DESCRIPTORS: ACHING

## 2019-02-14 ASSESSMENT — PAIN DESCRIPTION - PROGRESSION: CLINICAL_PROGRESSION: NOT CHANGED

## 2019-02-15 LAB
ABSOLUTE EOS #: 0.18 K/UL (ref 0–0.44)
ABSOLUTE IMMATURE GRANULOCYTE: 0.04 K/UL (ref 0–0.3)
ABSOLUTE LYMPH #: 2.24 K/UL (ref 1.1–3.7)
ABSOLUTE MONO #: 0.48 K/UL (ref 0.1–1.2)
ALBUMIN SERPL-MCNC: 2.9 G/DL (ref 3.5–5.2)
ALBUMIN/GLOBULIN RATIO: 1.1 (ref 1–2.5)
ALP BLD-CCNC: 111 U/L (ref 35–104)
ALT SERPL-CCNC: 95 U/L (ref 5–33)
ANION GAP SERPL CALCULATED.3IONS-SCNC: 9 MMOL/L (ref 9–17)
AST SERPL-CCNC: 155 U/L
BASOPHILS # BLD: 0 % (ref 0–2)
BASOPHILS ABSOLUTE: <0.03 K/UL (ref 0–0.2)
BILIRUB SERPL-MCNC: 0.3 MG/DL (ref 0.3–1.2)
BILIRUBIN DIRECT: 0.17 MG/DL
BILIRUBIN, INDIRECT: 0.13 MG/DL (ref 0–1)
BUN BLDV-MCNC: 6 MG/DL (ref 6–20)
BUN/CREAT BLD: ABNORMAL (ref 9–20)
CALCIUM SERPL-MCNC: 7.9 MG/DL (ref 8.6–10.4)
CHLORIDE BLD-SCNC: 105 MMOL/L (ref 98–107)
CO2: 21 MMOL/L (ref 20–31)
CREAT SERPL-MCNC: 0.32 MG/DL (ref 0.5–0.9)
DIFFERENTIAL TYPE: ABNORMAL
EOSINOPHILS RELATIVE PERCENT: 3 % (ref 1–4)
GFR AFRICAN AMERICAN: >60 ML/MIN
GFR NON-AFRICAN AMERICAN: >60 ML/MIN
GFR SERPL CREATININE-BSD FRML MDRD: ABNORMAL ML/MIN/{1.73_M2}
GFR SERPL CREATININE-BSD FRML MDRD: ABNORMAL ML/MIN/{1.73_M2}
GLOBULIN: ABNORMAL G/DL (ref 1.5–3.8)
GLUCOSE BLD-MCNC: 133 MG/DL (ref 65–105)
GLUCOSE BLD-MCNC: 162 MG/DL (ref 65–105)
GLUCOSE BLD-MCNC: 193 MG/DL (ref 70–99)
GLUCOSE BLD-MCNC: 195 MG/DL (ref 65–105)
GLUCOSE BLD-MCNC: 196 MG/DL (ref 65–105)
HCT VFR BLD CALC: 31.3 % (ref 36.3–47.1)
HCT VFR BLD CALC: 32.7 % (ref 36.3–47.1)
HEMOGLOBIN: 10.4 G/DL (ref 11.9–15.1)
HEMOGLOBIN: 10.8 G/DL (ref 11.9–15.1)
IMMATURE GRANULOCYTES: 1 %
LIPASE: 32 U/L (ref 13–60)
LYMPHOCYTES # BLD: 34 % (ref 24–43)
MCH RBC QN AUTO: 29.4 PG (ref 25.2–33.5)
MCHC RBC AUTO-ENTMCNC: 33.2 G/DL (ref 28.4–34.8)
MCV RBC AUTO: 88.4 FL (ref 82.6–102.9)
MONOCYTES # BLD: 7 % (ref 3–12)
NRBC AUTOMATED: 0 PER 100 WBC
PDW BLD-RTO: 13 % (ref 11.8–14.4)
PLATELET # BLD: 204 K/UL (ref 138–453)
PLATELET ESTIMATE: ABNORMAL
PMV BLD AUTO: 9.8 FL (ref 8.1–13.5)
POTASSIUM SERPL-SCNC: 3.7 MMOL/L (ref 3.7–5.3)
RBC # BLD: 3.54 M/UL (ref 3.95–5.11)
RBC # BLD: ABNORMAL 10*6/UL
SEG NEUTROPHILS: 56 % (ref 36–65)
SEGMENTED NEUTROPHILS ABSOLUTE COUNT: 3.72 K/UL (ref 1.5–8.1)
SODIUM BLD-SCNC: 135 MMOL/L (ref 135–144)
TOTAL PROTEIN: 5.5 G/DL (ref 6.4–8.3)
WBC # BLD: 6.7 K/UL (ref 3.5–11.3)
WBC # BLD: ABNORMAL 10*3/UL

## 2019-02-15 PROCEDURE — 6370000000 HC RX 637 (ALT 250 FOR IP): Performed by: STUDENT IN AN ORGANIZED HEALTH CARE EDUCATION/TRAINING PROGRAM

## 2019-02-15 PROCEDURE — 96376 TX/PRO/DX INJ SAME DRUG ADON: CPT

## 2019-02-15 PROCEDURE — 96361 HYDRATE IV INFUSION ADD-ON: CPT

## 2019-02-15 PROCEDURE — G0108 DIAB MANAGE TRN  PER INDIV: HCPCS

## 2019-02-15 PROCEDURE — 6360000002 HC RX W HCPCS: Performed by: STUDENT IN AN ORGANIZED HEALTH CARE EDUCATION/TRAINING PROGRAM

## 2019-02-15 PROCEDURE — 36415 COLL VENOUS BLD VENIPUNCTURE: CPT

## 2019-02-15 PROCEDURE — 80076 HEPATIC FUNCTION PANEL: CPT

## 2019-02-15 PROCEDURE — 83690 ASSAY OF LIPASE: CPT

## 2019-02-15 PROCEDURE — 80048 BASIC METABOLIC PNL TOTAL CA: CPT

## 2019-02-15 PROCEDURE — 85025 COMPLETE CBC W/AUTO DIFF WBC: CPT

## 2019-02-15 PROCEDURE — 1200000000 HC SEMI PRIVATE

## 2019-02-15 PROCEDURE — 82947 ASSAY GLUCOSE BLOOD QUANT: CPT

## 2019-02-15 PROCEDURE — 85018 HEMOGLOBIN: CPT

## 2019-02-15 PROCEDURE — 2580000003 HC RX 258: Performed by: STUDENT IN AN ORGANIZED HEALTH CARE EDUCATION/TRAINING PROGRAM

## 2019-02-15 PROCEDURE — 85014 HEMATOCRIT: CPT

## 2019-02-15 RX ORDER — MORPHINE SULFATE 4 MG/ML
4 INJECTION, SOLUTION INTRAMUSCULAR; INTRAVENOUS EVERY 4 HOURS PRN
Status: DISCONTINUED | OUTPATIENT
Start: 2019-02-15 | End: 2019-02-16

## 2019-02-15 RX ORDER — MORPHINE SULFATE 4 MG/ML
4 INJECTION, SOLUTION INTRAMUSCULAR; INTRAVENOUS EVERY 6 HOURS PRN
Status: DISCONTINUED | OUTPATIENT
Start: 2019-02-15 | End: 2019-02-15

## 2019-02-15 RX ADMIN — MORPHINE SULFATE 4 MG: 4 INJECTION INTRAVENOUS at 09:21

## 2019-02-15 RX ADMIN — ONDANSETRON 4 MG: 2 INJECTION INTRAMUSCULAR; INTRAVENOUS at 08:38

## 2019-02-15 RX ADMIN — MORPHINE SULFATE 4 MG: 4 INJECTION INTRAVENOUS at 22:11

## 2019-02-15 RX ADMIN — OXYCODONE HYDROCHLORIDE AND ACETAMINOPHEN 1 TABLET: 5; 325 TABLET ORAL at 08:28

## 2019-02-15 RX ADMIN — PANTOPRAZOLE SODIUM 40 MG: 40 TABLET, DELAYED RELEASE ORAL at 08:28

## 2019-02-15 RX ADMIN — MORPHINE SULFATE 4 MG: 4 INJECTION INTRAVENOUS at 13:07

## 2019-02-15 RX ADMIN — OXYCODONE HYDROCHLORIDE AND ACETAMINOPHEN 1 TABLET: 5; 325 TABLET ORAL at 01:19

## 2019-02-15 RX ADMIN — ONDANSETRON 4 MG: 2 INJECTION INTRAMUSCULAR; INTRAVENOUS at 19:40

## 2019-02-15 RX ADMIN — MORPHINE SULFATE 4 MG: 4 INJECTION INTRAVENOUS at 17:38

## 2019-02-15 RX ADMIN — MIRTAZAPINE 15 MG: 15 TABLET, FILM COATED ORAL at 22:10

## 2019-02-15 RX ADMIN — INSULIN LISPRO 1 UNITS: 100 INJECTION, SOLUTION INTRAVENOUS; SUBCUTANEOUS at 13:07

## 2019-02-15 RX ADMIN — INSULIN LISPRO 1 UNITS: 100 INJECTION, SOLUTION INTRAVENOUS; SUBCUTANEOUS at 08:32

## 2019-02-15 RX ADMIN — SODIUM CHLORIDE: 9 INJECTION, SOLUTION INTRAVENOUS at 17:38

## 2019-02-15 ASSESSMENT — PAIN SCALES - GENERAL
PAINLEVEL_OUTOF10: 7
PAINLEVEL_OUTOF10: 7
PAINLEVEL_OUTOF10: 3
PAINLEVEL_OUTOF10: 6
PAINLEVEL_OUTOF10: 7
PAINLEVEL_OUTOF10: 8
PAINLEVEL_OUTOF10: 5
PAINLEVEL_OUTOF10: 3
PAINLEVEL_OUTOF10: 8
PAINLEVEL_OUTOF10: 3
PAINLEVEL_OUTOF10: 7

## 2019-02-15 ASSESSMENT — PAIN DESCRIPTION - LOCATION: LOCATION: ABDOMEN

## 2019-02-15 ASSESSMENT — PAIN DESCRIPTION - PAIN TYPE: TYPE: ACUTE PAIN;CHRONIC PAIN

## 2019-02-16 LAB
ABSOLUTE EOS #: 0.22 K/UL (ref 0–0.44)
ABSOLUTE IMMATURE GRANULOCYTE: 0.03 K/UL (ref 0–0.3)
ABSOLUTE LYMPH #: 2.67 K/UL (ref 1.1–3.7)
ABSOLUTE MONO #: 0.76 K/UL (ref 0.1–1.2)
ALBUMIN SERPL-MCNC: 3.2 G/DL (ref 3.5–5.2)
ALBUMIN/GLOBULIN RATIO: 1.1 (ref 1–2.5)
ALP BLD-CCNC: 140 U/L (ref 35–104)
ALT SERPL-CCNC: 116 U/L (ref 5–33)
ANION GAP SERPL CALCULATED.3IONS-SCNC: 11 MMOL/L (ref 9–17)
AST SERPL-CCNC: 81 U/L
BASOPHILS # BLD: 0 % (ref 0–2)
BASOPHILS ABSOLUTE: 0.04 K/UL (ref 0–0.2)
BILIRUB SERPL-MCNC: 0.34 MG/DL (ref 0.3–1.2)
BUN BLDV-MCNC: 4 MG/DL (ref 6–20)
BUN/CREAT BLD: ABNORMAL (ref 9–20)
CALCIUM SERPL-MCNC: 8.7 MG/DL (ref 8.6–10.4)
CHLORIDE BLD-SCNC: 105 MMOL/L (ref 98–107)
CHOLESTEROL/HDL RATIO: 6.9
CHOLESTEROL: 213 MG/DL
CO2: 23 MMOL/L (ref 20–31)
CREAT SERPL-MCNC: 0.33 MG/DL (ref 0.5–0.9)
DIFFERENTIAL TYPE: ABNORMAL
EOSINOPHILS RELATIVE PERCENT: 2 % (ref 1–4)
GFR AFRICAN AMERICAN: >60 ML/MIN
GFR NON-AFRICAN AMERICAN: >60 ML/MIN
GFR SERPL CREATININE-BSD FRML MDRD: ABNORMAL ML/MIN/{1.73_M2}
GFR SERPL CREATININE-BSD FRML MDRD: ABNORMAL ML/MIN/{1.73_M2}
GLUCOSE BLD-MCNC: 182 MG/DL (ref 65–105)
GLUCOSE BLD-MCNC: 194 MG/DL (ref 70–99)
GLUCOSE BLD-MCNC: 215 MG/DL (ref 65–105)
GLUCOSE BLD-MCNC: 267 MG/DL (ref 65–105)
GLUCOSE BLD-MCNC: 335 MG/DL (ref 65–105)
HCT VFR BLD CALC: 33.1 % (ref 36.3–47.1)
HDLC SERPL-MCNC: 31 MG/DL
HEMOGLOBIN: 10.9 G/DL (ref 11.9–15.1)
IMMATURE GRANULOCYTES: 0 %
LDL CHOLESTEROL DIRECT: 91 MG/DL
LDL CHOLESTEROL: ABNORMAL MG/DL (ref 0–130)
LYMPHOCYTES # BLD: 27 % (ref 24–43)
MCH RBC QN AUTO: 28.1 PG (ref 25.2–33.5)
MCHC RBC AUTO-ENTMCNC: 32.9 G/DL (ref 28.4–34.8)
MCV RBC AUTO: 85.3 FL (ref 82.6–102.9)
MONOCYTES # BLD: 8 % (ref 3–12)
NRBC AUTOMATED: 0 PER 100 WBC
PDW BLD-RTO: 12.9 % (ref 11.8–14.4)
PLATELET # BLD: 214 K/UL (ref 138–453)
PLATELET ESTIMATE: ABNORMAL
PMV BLD AUTO: 9.8 FL (ref 8.1–13.5)
POTASSIUM SERPL-SCNC: 4.1 MMOL/L (ref 3.7–5.3)
RBC # BLD: 3.88 M/UL (ref 3.95–5.11)
RBC # BLD: ABNORMAL 10*6/UL
SEG NEUTROPHILS: 63 % (ref 36–65)
SEGMENTED NEUTROPHILS ABSOLUTE COUNT: 6.27 K/UL (ref 1.5–8.1)
SODIUM BLD-SCNC: 139 MMOL/L (ref 135–144)
TOTAL PROTEIN: 6.1 G/DL (ref 6.4–8.3)
TRIGL SERPL-MCNC: 508 MG/DL
VLDLC SERPL CALC-MCNC: ABNORMAL MG/DL (ref 1–30)
WBC # BLD: 10 K/UL (ref 3.5–11.3)
WBC # BLD: ABNORMAL 10*3/UL

## 2019-02-16 PROCEDURE — 82947 ASSAY GLUCOSE BLOOD QUANT: CPT

## 2019-02-16 PROCEDURE — 1200000000 HC SEMI PRIVATE

## 2019-02-16 PROCEDURE — 6360000002 HC RX W HCPCS: Performed by: STUDENT IN AN ORGANIZED HEALTH CARE EDUCATION/TRAINING PROGRAM

## 2019-02-16 PROCEDURE — 80061 LIPID PANEL: CPT

## 2019-02-16 PROCEDURE — 6370000000 HC RX 637 (ALT 250 FOR IP): Performed by: STUDENT IN AN ORGANIZED HEALTH CARE EDUCATION/TRAINING PROGRAM

## 2019-02-16 PROCEDURE — 85025 COMPLETE CBC W/AUTO DIFF WBC: CPT

## 2019-02-16 PROCEDURE — 6370000000 HC RX 637 (ALT 250 FOR IP): Performed by: EMERGENCY MEDICINE

## 2019-02-16 PROCEDURE — 83721 ASSAY OF BLOOD LIPOPROTEIN: CPT

## 2019-02-16 PROCEDURE — 36415 COLL VENOUS BLD VENIPUNCTURE: CPT

## 2019-02-16 PROCEDURE — 2580000003 HC RX 258: Performed by: STUDENT IN AN ORGANIZED HEALTH CARE EDUCATION/TRAINING PROGRAM

## 2019-02-16 PROCEDURE — 80053 COMPREHEN METABOLIC PANEL: CPT

## 2019-02-16 RX ORDER — DICYCLOMINE HYDROCHLORIDE 10 MG/5ML
20 SOLUTION ORAL 3 TIMES DAILY PRN
Status: DISCONTINUED | OUTPATIENT
Start: 2019-02-16 | End: 2019-02-17 | Stop reason: HOSPADM

## 2019-02-16 RX ORDER — FENOFIBRATE 145 MG/1
145 TABLET, COATED ORAL DAILY
Qty: 30 TABLET | Refills: 3 | Status: CANCELLED | OUTPATIENT
Start: 2019-02-16

## 2019-02-16 RX ORDER — OXYCODONE HYDROCHLORIDE AND ACETAMINOPHEN 5; 325 MG/1; MG/1
1 TABLET ORAL EVERY 6 HOURS PRN
Status: DISCONTINUED | OUTPATIENT
Start: 2019-02-16 | End: 2019-02-17 | Stop reason: HOSPADM

## 2019-02-16 RX ORDER — FENOFIBRATE 160 MG/1
160 TABLET ORAL DAILY
Status: DISCONTINUED | OUTPATIENT
Start: 2019-02-16 | End: 2019-02-17 | Stop reason: HOSPADM

## 2019-02-16 RX ADMIN — INSULIN LISPRO 2 UNITS: 100 INJECTION, SOLUTION INTRAVENOUS; SUBCUTANEOUS at 21:35

## 2019-02-16 RX ADMIN — PANTOPRAZOLE SODIUM 40 MG: 40 TABLET, DELAYED RELEASE ORAL at 08:14

## 2019-02-16 RX ADMIN — INSULIN LISPRO 3 UNITS: 100 INJECTION, SOLUTION INTRAVENOUS; SUBCUTANEOUS at 12:25

## 2019-02-16 RX ADMIN — ONDANSETRON 4 MG: 2 INJECTION INTRAMUSCULAR; INTRAVENOUS at 08:14

## 2019-02-16 RX ADMIN — DICYCLOMINE HYDROCHLORIDE 20 MG: 10 SOLUTION ORAL at 21:30

## 2019-02-16 RX ADMIN — INSULIN LISPRO 2 UNITS: 100 INJECTION, SOLUTION INTRAVENOUS; SUBCUTANEOUS at 16:00

## 2019-02-16 RX ADMIN — OXYCODONE HYDROCHLORIDE AND ACETAMINOPHEN 1 TABLET: 5; 325 TABLET ORAL at 16:32

## 2019-02-16 RX ADMIN — MORPHINE SULFATE 4 MG: 4 INJECTION INTRAVENOUS at 08:14

## 2019-02-16 RX ADMIN — MIRTAZAPINE 15 MG: 15 TABLET, FILM COATED ORAL at 21:35

## 2019-02-16 RX ADMIN — Medication 10 ML: at 08:14

## 2019-02-16 RX ADMIN — OXYCODONE HYDROCHLORIDE AND ACETAMINOPHEN 1 TABLET: 5; 325 TABLET ORAL at 22:34

## 2019-02-16 RX ADMIN — MORPHINE SULFATE 4 MG: 4 INJECTION INTRAVENOUS at 03:27

## 2019-02-16 RX ADMIN — INSULIN LISPRO 1 UNITS: 100 INJECTION, SOLUTION INTRAVENOUS; SUBCUTANEOUS at 08:14

## 2019-02-16 RX ADMIN — SODIUM CHLORIDE: 9 INJECTION, SOLUTION INTRAVENOUS at 03:30

## 2019-02-16 RX ADMIN — MORPHINE SULFATE 4 MG: 4 INJECTION INTRAVENOUS at 12:22

## 2019-02-16 RX ADMIN — FENOFIBRATE 160 MG: 160 TABLET ORAL at 15:59

## 2019-02-16 ASSESSMENT — PAIN DESCRIPTION - FREQUENCY: FREQUENCY: CONTINUOUS

## 2019-02-16 ASSESSMENT — PAIN SCALES - GENERAL
PAINLEVEL_OUTOF10: 3
PAINLEVEL_OUTOF10: 5
PAINLEVEL_OUTOF10: 7
PAINLEVEL_OUTOF10: 3
PAINLEVEL_OUTOF10: 2
PAINLEVEL_OUTOF10: 7
PAINLEVEL_OUTOF10: 6
PAINLEVEL_OUTOF10: 8
PAINLEVEL_OUTOF10: 6
PAINLEVEL_OUTOF10: 5
PAINLEVEL_OUTOF10: 2

## 2019-02-16 ASSESSMENT — PAIN DESCRIPTION - ORIENTATION: ORIENTATION: RIGHT;MID

## 2019-02-16 ASSESSMENT — PAIN DESCRIPTION - ONSET: ONSET: ON-GOING

## 2019-02-16 ASSESSMENT — PAIN DESCRIPTION - PAIN TYPE: TYPE: ACUTE PAIN

## 2019-02-16 ASSESSMENT — PAIN DESCRIPTION - LOCATION: LOCATION: ABDOMEN

## 2019-02-16 ASSESSMENT — PAIN DESCRIPTION - DESCRIPTORS: DESCRIPTORS: ACHING;CRAMPING

## 2019-02-16 ASSESSMENT — PAIN DESCRIPTION - PROGRESSION: CLINICAL_PROGRESSION: NOT CHANGED

## 2019-02-17 VITALS
WEIGHT: 120 LBS | SYSTOLIC BLOOD PRESSURE: 134 MMHG | HEART RATE: 88 BPM | RESPIRATION RATE: 15 BRPM | BODY MASS INDEX: 20.49 KG/M2 | TEMPERATURE: 98.4 F | HEIGHT: 64 IN | OXYGEN SATURATION: 95 % | DIASTOLIC BLOOD PRESSURE: 66 MMHG

## 2019-02-17 LAB — GLUCOSE BLD-MCNC: 351 MG/DL (ref 65–105)

## 2019-02-17 PROCEDURE — 6370000000 HC RX 637 (ALT 250 FOR IP): Performed by: STUDENT IN AN ORGANIZED HEALTH CARE EDUCATION/TRAINING PROGRAM

## 2019-02-17 PROCEDURE — 6370000000 HC RX 637 (ALT 250 FOR IP): Performed by: EMERGENCY MEDICINE

## 2019-02-17 PROCEDURE — 2580000003 HC RX 258: Performed by: STUDENT IN AN ORGANIZED HEALTH CARE EDUCATION/TRAINING PROGRAM

## 2019-02-17 PROCEDURE — 82947 ASSAY GLUCOSE BLOOD QUANT: CPT

## 2019-02-17 RX ORDER — FENOFIBRATE 160 MG/1
160 TABLET ORAL DAILY
Qty: 30 TABLET | Refills: 3 | Status: SHIPPED | OUTPATIENT
Start: 2019-02-18 | End: 2020-02-18

## 2019-02-17 RX ORDER — FENOFIBRATE 160 MG/1
160 TABLET ORAL DAILY
Qty: 30 TABLET | Refills: 3 | Status: SHIPPED | OUTPATIENT
Start: 2019-02-18 | End: 2019-02-17

## 2019-02-17 RX ORDER — OXYCODONE HYDROCHLORIDE AND ACETAMINOPHEN 5; 325 MG/1; MG/1
1 TABLET ORAL EVERY 6 HOURS PRN
Qty: 12 TABLET | Refills: 0 | Status: SHIPPED | OUTPATIENT
Start: 2019-02-17 | End: 2019-02-20

## 2019-02-17 RX ADMIN — DICYCLOMINE HYDROCHLORIDE 20 MG: 10 SOLUTION ORAL at 04:20

## 2019-02-17 RX ADMIN — SODIUM CHLORIDE: 9 INJECTION, SOLUTION INTRAVENOUS at 04:20

## 2019-02-17 RX ADMIN — OXYCODONE HYDROCHLORIDE AND ACETAMINOPHEN 1 TABLET: 5; 325 TABLET ORAL at 04:50

## 2019-02-17 RX ADMIN — FENOFIBRATE 160 MG: 160 TABLET ORAL at 08:12

## 2019-02-17 RX ADMIN — INSULIN LISPRO 5 UNITS: 100 INJECTION, SOLUTION INTRAVENOUS; SUBCUTANEOUS at 07:28

## 2019-02-17 RX ADMIN — OXYCODONE HYDROCHLORIDE AND ACETAMINOPHEN 1 TABLET: 5; 325 TABLET ORAL at 09:45

## 2019-02-17 RX ADMIN — PANTOPRAZOLE SODIUM 40 MG: 40 TABLET, DELAYED RELEASE ORAL at 08:12

## 2019-02-17 ASSESSMENT — PAIN SCALES - GENERAL
PAINLEVEL_OUTOF10: 3
PAINLEVEL_OUTOF10: 7
PAINLEVEL_OUTOF10: 5

## 2019-02-19 ENCOUNTER — TELEPHONE (OUTPATIENT)
Dept: FAMILY MEDICINE CLINIC | Age: 49
End: 2019-02-19

## 2019-02-21 ENCOUNTER — TELEPHONE (OUTPATIENT)
Dept: PHARMACY | Age: 49
End: 2019-02-21

## 2019-02-21 ENCOUNTER — HOSPITAL ENCOUNTER (OUTPATIENT)
Age: 49
Setting detail: SPECIMEN
Discharge: HOME | End: 2019-02-21

## 2019-02-21 ENCOUNTER — OFFICE VISIT (OUTPATIENT)
Dept: FAMILY MEDICINE CLINIC | Age: 49
End: 2019-02-21
Payer: MEDICAID

## 2019-02-21 VITALS
BODY MASS INDEX: 20.11 KG/M2 | TEMPERATURE: 98.3 F | SYSTOLIC BLOOD PRESSURE: 113 MMHG | WEIGHT: 117.8 LBS | HEART RATE: 102 BPM | HEIGHT: 64 IN | DIASTOLIC BLOOD PRESSURE: 76 MMHG

## 2019-02-21 DIAGNOSIS — M25.519 CHRONIC SHOULDER PAIN, UNSPECIFIED LATERALITY: ICD-10-CM

## 2019-02-21 DIAGNOSIS — K86.1 OTHER CHRONIC PANCREATITIS (HCC): ICD-10-CM

## 2019-02-21 DIAGNOSIS — E11.42 TYPE 2 DIABETES MELLITUS WITH DIABETIC POLYNEUROPATHY, WITH LONG-TERM CURRENT USE OF INSULIN (HCC): ICD-10-CM

## 2019-02-21 DIAGNOSIS — Z79.4 TYPE 2 DIABETES MELLITUS WITH DIABETIC POLYNEUROPATHY, WITH LONG-TERM CURRENT USE OF INSULIN (HCC): ICD-10-CM

## 2019-02-21 DIAGNOSIS — E11.42 TYPE 2 DIABETES MELLITUS WITH DIABETIC POLYNEUROPATHY, WITH LONG-TERM CURRENT USE OF INSULIN (HCC): Primary | ICD-10-CM

## 2019-02-21 DIAGNOSIS — G89.29 CHRONIC SHOULDER PAIN, UNSPECIFIED LATERALITY: ICD-10-CM

## 2019-02-21 DIAGNOSIS — Z79.4 TYPE 2 DIABETES MELLITUS WITH DIABETIC POLYNEUROPATHY, WITH LONG-TERM CURRENT USE OF INSULIN (HCC): Primary | ICD-10-CM

## 2019-02-21 LAB
CREATININE URINE: 34.7 MG/DL (ref 28–217)
HBA1C MFR BLD: ABNORMAL %
MICROALBUMIN/CREAT 24H UR: 51 MG/L
MICROALBUMIN/CREAT UR-RTO: 147 MCG/MG CREAT

## 2019-02-21 PROCEDURE — 83036 HEMOGLOBIN GLYCOSYLATED A1C: CPT | Performed by: STUDENT IN AN ORGANIZED HEALTH CARE EDUCATION/TRAINING PROGRAM

## 2019-02-21 PROCEDURE — 99213 OFFICE O/P EST LOW 20 MIN: CPT | Performed by: STUDENT IN AN ORGANIZED HEALTH CARE EDUCATION/TRAINING PROGRAM

## 2019-02-21 PROCEDURE — 99211 OFF/OP EST MAY X REQ PHY/QHP: CPT | Performed by: STUDENT IN AN ORGANIZED HEALTH CARE EDUCATION/TRAINING PROGRAM

## 2019-02-21 RX ORDER — INSULIN GLARGINE 100 [IU]/ML
20 INJECTION, SOLUTION SUBCUTANEOUS NIGHTLY
Qty: 1 VIAL | Refills: 3 | Status: SHIPPED | OUTPATIENT
Start: 2019-02-21 | End: 2019-05-20 | Stop reason: SDUPTHER

## 2019-02-21 RX ORDER — ATORVASTATIN CALCIUM 40 MG/1
40 TABLET, FILM COATED ORAL DAILY
Qty: 30 TABLET | Refills: 5 | Status: SHIPPED | OUTPATIENT
Start: 2019-02-21 | End: 2019-05-20 | Stop reason: SDUPTHER

## 2019-02-21 ASSESSMENT — ENCOUNTER SYMPTOMS
VOMITING: 0
SORE THROAT: 0
COUGH: 0
WHEEZING: 0
NAUSEA: 0
SHORTNESS OF BREATH: 0
RHINORRHEA: 0
PHOTOPHOBIA: 0
EYE PAIN: 0
ABDOMINAL PAIN: 0

## 2019-02-28 ENCOUNTER — HOSPITAL ENCOUNTER (OUTPATIENT)
Dept: PAIN MANAGEMENT | Age: 49
Discharge: HOME | End: 2019-02-28
Payer: MEDICAID

## 2019-02-28 VITALS
SYSTOLIC BLOOD PRESSURE: 108 MMHG | DIASTOLIC BLOOD PRESSURE: 69 MMHG | TEMPERATURE: 98.2 F | RESPIRATION RATE: 14 BRPM | HEART RATE: 68 BPM

## 2019-02-28 DIAGNOSIS — M47.817 LUMBOSACRAL SPONDYLOSIS WITHOUT MYELOPATHY: ICD-10-CM

## 2019-02-28 DIAGNOSIS — M47.812 CERVICAL SPONDYLOSIS WITHOUT MYELOPATHY: Primary | ICD-10-CM

## 2019-02-28 PROCEDURE — 99204 OFFICE O/P NEW MOD 45 MIN: CPT | Performed by: PAIN MEDICINE

## 2019-02-28 PROCEDURE — 99204 OFFICE O/P NEW MOD 45 MIN: CPT

## 2019-02-28 ASSESSMENT — ENCOUNTER SYMPTOMS
BACK PAIN: 1
COUGH: 0
BLURRED VISION: 0
BOWEL INCONTINENCE: 0
NAIL CHANGES: 0

## 2019-03-05 ENCOUNTER — HOSPITAL ENCOUNTER (OUTPATIENT)
Dept: PHYSICAL THERAPY | Age: 49
Setting detail: THERAPIES SERIES
Discharge: HOME | End: 2019-03-05
Payer: MEDICAID

## 2019-03-12 ENCOUNTER — HOSPITAL ENCOUNTER (OUTPATIENT)
Dept: MRI IMAGING | Age: 49
Discharge: HOME | End: 2019-03-14
Payer: MEDICAID

## 2019-03-12 DIAGNOSIS — M47.817 LUMBOSACRAL SPONDYLOSIS WITHOUT MYELOPATHY: ICD-10-CM

## 2019-03-12 DIAGNOSIS — M47.812 CERVICAL SPONDYLOSIS WITHOUT MYELOPATHY: ICD-10-CM

## 2019-03-12 PROCEDURE — 72141 MRI NECK SPINE W/O DYE: CPT

## 2019-03-12 PROCEDURE — 72148 MRI LUMBAR SPINE W/O DYE: CPT

## 2019-03-22 ENCOUNTER — HOSPITAL ENCOUNTER (OUTPATIENT)
Dept: PAIN MANAGEMENT | Age: 49
Discharge: HOME | End: 2019-03-22
Payer: MEDICAID

## 2019-03-22 VITALS
RESPIRATION RATE: 16 BRPM | WEIGHT: 117 LBS | DIASTOLIC BLOOD PRESSURE: 76 MMHG | SYSTOLIC BLOOD PRESSURE: 118 MMHG | TEMPERATURE: 97.8 F | HEIGHT: 64 IN | HEART RATE: 68 BPM | BODY MASS INDEX: 19.97 KG/M2

## 2019-03-22 DIAGNOSIS — M54.2 NECK PAIN: Primary | ICD-10-CM

## 2019-03-22 DIAGNOSIS — M25.512 LEFT SHOULDER PAIN, UNSPECIFIED CHRONICITY: ICD-10-CM

## 2019-03-22 PROCEDURE — 99214 OFFICE O/P EST MOD 30 MIN: CPT | Performed by: PAIN MEDICINE

## 2019-03-22 PROCEDURE — 99214 OFFICE O/P EST MOD 30 MIN: CPT

## 2019-03-22 ASSESSMENT — ENCOUNTER SYMPTOMS
BOWEL INCONTINENCE: 0
PHOTOPHOBIA: 0
TROUBLE SWALLOWING: 0
BACK PAIN: 1
ABDOMINAL PAIN: 1
VISUAL CHANGE: 0

## 2019-04-02 DIAGNOSIS — M54.5 CHRONIC LOW BACK PAIN, UNSPECIFIED BACK PAIN LATERALITY, WITH SCIATICA PRESENCE UNSPECIFIED: ICD-10-CM

## 2019-04-02 DIAGNOSIS — M54.2 NECK PAIN: Primary | ICD-10-CM

## 2019-04-02 DIAGNOSIS — G89.29 CHRONIC LOW BACK PAIN, UNSPECIFIED BACK PAIN LATERALITY, WITH SCIATICA PRESENCE UNSPECIFIED: ICD-10-CM

## 2019-04-03 ENCOUNTER — OFFICE VISIT (OUTPATIENT)
Dept: ORTHOPEDIC SURGERY | Age: 49
End: 2019-04-03
Payer: MEDICAID

## 2019-04-03 VITALS — BODY MASS INDEX: 19.99 KG/M2 | HEIGHT: 64 IN | WEIGHT: 117.06 LBS

## 2019-04-03 DIAGNOSIS — M75.82 ROTATOR CUFF TENDONITIS, LEFT: Primary | ICD-10-CM

## 2019-04-03 DIAGNOSIS — M75.22 BICEPS TENDONITIS, LEFT: ICD-10-CM

## 2019-04-03 PROCEDURE — G8420 CALC BMI NORM PARAMETERS: HCPCS | Performed by: ORTHOPAEDIC SURGERY

## 2019-04-03 PROCEDURE — G8427 DOCREV CUR MEDS BY ELIG CLIN: HCPCS | Performed by: ORTHOPAEDIC SURGERY

## 2019-04-03 PROCEDURE — 99243 OFF/OP CNSLTJ NEW/EST LOW 30: CPT | Performed by: ORTHOPAEDIC SURGERY

## 2019-04-03 RX ORDER — MELOXICAM 15 MG/1
15 TABLET ORAL DAILY
Qty: 30 TABLET | Refills: 3 | Status: SHIPPED | OUTPATIENT
Start: 2019-04-03 | End: 2019-05-20 | Stop reason: SDUPTHER

## 2019-04-03 RX ORDER — METHYLPREDNISOLONE 4 MG/1
TABLET ORAL
Qty: 1 KIT | Refills: 0 | Status: SHIPPED | OUTPATIENT
Start: 2019-04-03 | End: 2019-04-09

## 2019-04-03 ASSESSMENT — ENCOUNTER SYMPTOMS
BACK PAIN: 1
NAUSEA: 0
DIARRHEA: 0
COUGH: 0
CONSTIPATION: 0

## 2019-04-03 NOTE — PROGRESS NOTES
[facility]  [facility]  [address] [place], [place] [address] [address] [address]  Dept: [phone]    Ambulatory Orthopedic Consult      CHIEF COMPLAINT:    Chief Complaint   Patient presents with    Shoulder Pain     left       HISTORY OF PRESENT ILLNESS:      The patient is a 51 y.o. female who is being seen at the request of  Zaheer Bhatti MD for consultation and evaluation of  Left shoulder pain. Ty Gar  is a 51 y.o. Right hand dominant  female who has had left shoulder pain for 2 year(s). The pain is  worse at night and when doing overhead activities. Weakness of the shoulder has been noted. The pain restricts activities such as having trouble washing her underarm and doing any activites that causes her to raise her left arm. The pain has not improved with time. The pain is  exacerbated at night. The patient does notice loss in ROM of the shoulder. Physical therapy has not been tried. Corticosteriod injection has not been administered. There has not been previous history of surgery performed on the shoulder. Only intervention that has been done so far for the left shoulder was a \"cream\" that was prescribed by Pain Management. Patient does not recall the name. Patient is not currently on any anti-inflammatories. Patient has a referral to [address] [place] [address] for her neck and will continue to see  for her back. Past Medical History:    Past Medical History:   Diagnosis Date    Depression     Diabetes mellitus ([facility] [place] [address]     Hyperlipidemia     Pancreatitis     Pneumonia due to infectious organism 1/8/2017    Substance abuse ([facility] [place] [address]        Past Surgical History:    Past Surgical History:   Procedure Laterality Date    CARPAL TUNNEL RELEASE  2014    jerome carpal tunnel    CHOLECYSTECTOMY      ECTOPIC PREGNANCY SURGERY         Current Medications:   Current Outpatient Medications   Medication Sig Dispense Refill    methylPREDNISolone (MEDROL DOSEPACK) 4 MG tablet Take by mouth.  1 kit 0    EXAM:  Ht 5' 4.02\" (1.626 m)   Wt 117 lb 1 oz (53.1 kg)   LMP 05/21/2018   BMI 20.08 kg/m²  Body mass index is 20.08 kg/m². Physical Exam  Gen: alert and oriented to person and place. Psych:  Appropriate affect; Appropriate knowledge base; Appropriate mood; No hallucinations; Head: normocephalic, atraumatic   Chest: symmetric chest excursion; nonlabored respiratory effort. Pelvis: stable; no obvious pelvis deformity  Ortho Exam  Extremity:  Left shoulder evaluation shows no significant shoulder girdle muscle atrophy or skin lesions. Left shoulder F=90, AB=90, FR= left flank. Tender over biceps and sub-scap insertion on the left shoulder. Sub maximal effort on muscle strength testing on the left shoulder. As a result, rotator cuff integrity and strength is a little difficult to assess fully. No gross rotator cuff deficiency is appreciated however. No shoulder instability on the left is noted. Patient's full range of motion of the left elbow wrist and fingers. Motor, sensory, vascular examination to left upper extremity is grossly intact without focal deficits. Mild decrease extension cervical spine. Otherwise patient's full range of motion of the cervical spine. Negative Spurling's test is noted bilaterally. Biceps, triceps, brachioradialis reflexes are symmetric and intact bilaterally.     Radiology:     Narrative   EXAMINATION:   MRI OF THE CERVICAL SPINE WITHOUT CONTRAST 3/12/2019 11:42 am       TECHNIQUE:   Multiplanar multisequence MRI of the cervical spine was performed without the   administration of intravenous contrast.       COMPARISON:   None.       HISTORY:   ORDERING SYSTEM PROVIDED HISTORY: Cervical spondylosis without myelopathy       FINDINGS:   BONES/ALIGNMENT: Vertebral body heights are maintained.  There is   age-appropriate bone marrow signal.  There is multilevel degenerative disc   disease evidenced by loss of disc signal.  There is disc space narrowing most   pronounced at the acromioclavicular   joints appear maintained.  There is no evidence of dislocation.  The soft   tissues appear unremarkable.           Impression   No acute abnormality identified of the left shoulder. ASSESSMENT:     1. Rotator cuff tendonitis, left    2. Biceps tendonitis, left       PLAN:       Discussed etiology and natural history of left rotator cuff and left biceps tendonitis. The treatment options may include oral anti-inflammatories, bracing, injections, advanced imaging, activity modification, physical therapy and/or surgical intervention. The patient would like to proceed with physical therapy  medrol dose pack and mobic. Did tell patient to watch her blood sugars and use her sliding scale as directed  The patient will follow up in 6 weeks. We discussed that the patient should call us with any concerns or questions. Return in about 6 weeks (around 5/15/2019) for re- evaluation. Orders Placed This Encounter   Medications    methylPREDNISolone (MEDROL DOSEPACK) 4 MG tablet     Sig: Take by mouth. Dispense:  1 kit     Refill:  0    meloxicam (MOBIC) 15 MG tablet     Sig: Take 1 tablet by mouth daily     Dispense:  30 tablet     Refill:  3       Orders Placed This Encounter   Procedures    Ambulatory referral to Physical Therapy     Referral Priority:   Routine     Referral Type:   Eval and Treat     Referral Reason:   Specialty Services Required     Number of Visits Requested:   1     I, Javier Nissen, RN am scribing for and in the presence of Dr. Marlena Evans  4/4/2019 2:14 PM    I have reviewed and made changes accordingly to the work scribed by Javier Nissen, RN. The documentation accurately reflects work and decisions made by me. I have also reviewed documentation completed by clinical staff.     Marlena Evans DO, [facility]  4/4/2019 2:16 PM    This note is created with the assistance of a speech recognition program.  While intending to generate a document that actually reflects the content of the visit, the document can still have some errors including those of syntax and sound a like substitutions which may escape proof reading.  In such instances, actual meaning can be extrapolated by contextual diversion      Electronically signed by Henderson Sandifer, DO, FAOAO on 4/4/2019 at 2:14 PM

## 2019-04-05 ENCOUNTER — OFFICE VISIT (OUTPATIENT)
Dept: NEUROSURGERY | Age: 49
End: 2019-04-05
Payer: MEDICAID

## 2019-04-05 VITALS
BODY MASS INDEX: 19.29 KG/M2 | SYSTOLIC BLOOD PRESSURE: 116 MMHG | HEIGHT: 64 IN | DIASTOLIC BLOOD PRESSURE: 77 MMHG | HEART RATE: 84 BPM | WEIGHT: 113 LBS

## 2019-04-05 DIAGNOSIS — M47.816 LUMBAR SPONDYLOSIS: ICD-10-CM

## 2019-04-05 DIAGNOSIS — M47.12 CERVICAL SPONDYLOSIS WITH MYELOPATHY AND RADICULOPATHY: Primary | ICD-10-CM

## 2019-04-05 DIAGNOSIS — M47.22 CERVICAL SPONDYLOSIS WITH MYELOPATHY AND RADICULOPATHY: Primary | ICD-10-CM

## 2019-04-05 DIAGNOSIS — R26.81 GAIT INSTABILITY: ICD-10-CM

## 2019-04-05 PROCEDURE — 99202 OFFICE O/P NEW SF 15 MIN: CPT | Performed by: PHYSICIAN ASSISTANT

## 2019-04-05 PROCEDURE — G8420 CALC BMI NORM PARAMETERS: HCPCS | Performed by: PHYSICIAN ASSISTANT

## 2019-04-05 PROCEDURE — G8427 DOCREV CUR MEDS BY ELIG CLIN: HCPCS | Performed by: PHYSICIAN ASSISTANT
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
## 2019-04-11 ENCOUNTER — HOSPITAL ENCOUNTER (OUTPATIENT)
Dept: PAIN MANAGEMENT | Age: 49
Discharge: HOME | End: 2019-04-11
Payer: MEDICAID

## 2019-04-11 VITALS
BODY MASS INDEX: 19.29 KG/M2 | HEART RATE: 108 BPM | WEIGHT: 113 LBS | TEMPERATURE: 98 F | DIASTOLIC BLOOD PRESSURE: 107 MMHG | RESPIRATION RATE: 16 BRPM | HEIGHT: 64 IN | SYSTOLIC BLOOD PRESSURE: 124 MMHG

## 2019-04-11 DIAGNOSIS — E11.42 TYPE 2 DIABETES MELLITUS WITH DIABETIC POLYNEUROPATHY, WITH LONG-TERM CURRENT USE OF INSULIN (HCC): Primary | ICD-10-CM

## 2019-04-11 DIAGNOSIS — Z79.4 TYPE 2 DIABETES MELLITUS WITH DIABETIC POLYNEUROPATHY, WITH LONG-TERM CURRENT USE OF INSULIN (HCC): Primary | ICD-10-CM

## 2019-04-11 LAB
GLUCOSE BLD-MCNC: 320 MG/DL (ref 65–105)
HCG, PREGNANCY URINE (POC): NEGATIVE

## 2019-04-11 PROCEDURE — 99214 OFFICE O/P EST MOD 30 MIN: CPT

## 2019-04-11 PROCEDURE — 84703 CHORIONIC GONADOTROPIN ASSAY: CPT

## 2019-04-11 PROCEDURE — 99212 OFFICE O/P EST SF 10 MIN: CPT | Performed by: PAIN MEDICINE

## 2019-04-11 PROCEDURE — 82947 ASSAY GLUCOSE BLOOD QUANT: CPT

## 2019-04-11 ASSESSMENT — ENCOUNTER SYMPTOMS
COUGH: 0
ABDOMINAL PAIN: 0
BLURRED VISION: 0
BOWEL INCONTINENCE: 0
BACK PAIN: 1

## 2019-04-11 NOTE — PROGRESS NOTES
HPI:     Back Pain   This is a chronic problem. The current episode started more than 1 year ago. The problem occurs constantly. The problem is unchanged. The pain is present in the lumbar spine and gluteal. The quality of the pain is described as aching. The pain radiates to the left knee and right knee. The pain is at a severity of 7/10. The pain is moderate. The pain is worse during the night. The symptoms are aggravated by bending, twisting, standing, sitting, position and lying down. Stiffness is present all day. Associated symptoms include headaches, leg pain, numbness, tingling and weakness. Pertinent negatives include no abdominal pain, bladder incontinence, bowel incontinence, chest pain or fever. She has tried heat and NSAIDs for the symptoms. The treatment provided mild relief. Chronic low back pain. Presented today for diagnostic facet blocks. Blood sugar today over 300. MRI lumbar spine with degenerative changes at L4-5 and L5-S1    Patient denies any new neurological symptoms. Nobowel or bladder incontinence, no weakness, and no falling. Review of OARRS does not show any aberrant prescription behavior. Medication is helping the patient stay active. Patient denies any side effects and reports adequate analgesia. No sign ofmisuse/abuse.     Past Medical History:   Diagnosis Date    Depression     [facility] ([facility] [address]     Hyperlipidemia     Pancreatitis     Pneumonia due to infectious organism 1/8/2017    Substance abuse ([facility] [address]        Past Surgical History:   Procedure Laterality Date    CARPAL TUNNEL RELEASE  2014    jerome carpal tunnel    CHOLECYSTECTOMY      ECTOPIC PREGNANCY SURGERY         No Known Allergies      Current Outpatient Medications:     meloxicam (MOBIC) 15 MG tablet, Take 1 tablet by mouth daily, Disp: 30 tablet, Rfl: 3    atorvastatin (LIPITOR) 40 MG tablet, Take 1 tablet by mouth daily, Disp: 30 tablet, Rfl: 5    metFORMIN (GLUCOPHAGE) 500 MG tablet, Take 1 tablet by mouth 2 times daily (with meals), Disp: 180 tablet, Rfl: 1    insulin glargine (LANTUS) 100 UNIT/ML injection vial, Inject 20 Units into the skin nightly, Disp: 1 vial, Rfl: 3    fenofibrate 160 MG tablet, Take 1 tablet by mouth daily, Disp: 30 tablet, Rfl: 3    insulin aspart (NOVOLOG FLEXPEN) 100 UNIT/ML injection pen, Follow scale:  Sugars 150-200: 2 Units  Sugars 201-300: 3 Units Sugars 301-400: 4 Units Sugars >401: 5 Units, Disp: 5 pen, Rfl: 0    Alcohol Swabs (ALCOHOL PREP) 70 % PADS, 4 time daily, Disp: 100 each, Rfl: 3    blood glucose monitor strips, Test 4 times daily Diagnosis, Disp: 100 strip, Rfl: 11    blood glucose test strips (FREESTYLE LITE) strip, 1 each by In Vitro route daily As needed. , Disp: 100 each, Rfl: 3    FREESTYLE LANCETS MISC, 1 each by Does not apply route daily, Disp: 100 each, Rfl: 3    Insulin Syringe-Needle U-100 (INSULIN SYRINGE 1CC/31GX5/16\") 31G X 5/16\" 1 ML MISC, 1 each by Does not apply route daily, Disp: 100 each, Rfl: 3    Diabetic Shoe MISC, by Does not apply route, Disp: 1 each, Rfl: 0    glucose monitoring kit (FREESTYLE) monitoring kit, 4 times daily, Disp: 1 kit, Rfl: 0    Family History   Problem Relation Age of Onset    Diabetes Mother     Coronary Art Dis Mother     Cancer Father         Colon    Diabetes Sister     Diabetes Brother     Bipolar Disorder Brother     Alcohol Abuse Brother     Substance Abuse Brother        Social History     Socioeconomic History    Marital status: Single     Spouse name: Not on file    Number of children: Not on file    Years of education: Not on file    Highest education level: Not on file   Occupational History    Not on file   Social Needs    Financial resource strain: Not on file    Food insecurity:     Worry: Not on file     Inability: Not on file    Transportation needs:     Medical: Not on file     Non-medical: Not on file   Tobacco Use    Smoking status: Current Every Day Smoker     Packs/day: substance abuse      Treatment Plan:  DISCUSSION: Treatment options discussed with patient and allquestions answered to patient's satisfaction. OARRS Review: Reviewed and acceptable for medications prescribed. TREATMENT OPTIONS:     Discussed the importance of continued physical therapy and exercise program as well. We'll hold off on interventions until her blood sugar better controlled, over 300 today, we'll obtain an A1c today. Continue working with her primary care physician and consider endocrinology follow-up as well. History of substance abuse, no plans for opioid therapy      Izabella Proctor M.D. I have reviewed the chief complaint and history of present illness (including ROS and PFSH) and vital documentation by my staff and I agree with their documentation and have added where applicable.

## 2019-05-03 ENCOUNTER — HOSPITAL ENCOUNTER (EMERGENCY)
Age: 49
Discharge: HOME | End: 2019-05-03
Attending: EMERGENCY MEDICINE
Payer: MEDICAID

## 2019-05-03 VITALS
HEART RATE: 103 BPM | DIASTOLIC BLOOD PRESSURE: 78 MMHG | OXYGEN SATURATION: 99 % | SYSTOLIC BLOOD PRESSURE: 128 MMHG | TEMPERATURE: 96.6 F | RESPIRATION RATE: 17 BRPM

## 2019-05-03 DIAGNOSIS — K08.89 PAIN, DENTAL: Primary | ICD-10-CM

## 2019-05-03 PROCEDURE — 64400 NJX AA&/STRD TRIGEMINAL NRV: CPT

## 2019-05-03 PROCEDURE — 99282 EMERGENCY DEPT VISIT SF MDM: CPT

## 2019-05-03 PROCEDURE — 6370000000 HC RX 637 (ALT 250 FOR IP): Performed by: EMERGENCY MEDICINE

## 2019-05-03 RX ORDER — IBUPROFEN 800 MG/1
800 TABLET ORAL EVERY 8 HOURS PRN
Qty: 30 TABLET | Refills: 0 | Status: SHIPPED | OUTPATIENT
Start: 2019-05-03 | End: 2019-05-20

## 2019-05-03 RX ORDER — PENICILLIN V POTASSIUM 500 MG/1
500 TABLET ORAL 4 TIMES DAILY
Qty: 28 TABLET | Refills: 0 | Status: SHIPPED | OUTPATIENT
Start: 2019-05-03 | End: 2019-05-10

## 2019-05-03 RX ORDER — PENICILLIN V POTASSIUM 250 MG/1
500 TABLET ORAL ONCE
Status: COMPLETED | OUTPATIENT
Start: 2019-05-03 | End: 2019-05-03

## 2019-05-03 RX ORDER — ACETAMINOPHEN 325 MG/1
325 TABLET ORAL EVERY 6 HOURS PRN
Qty: 120 TABLET | Refills: 1 | Status: SHIPPED | OUTPATIENT
Start: 2019-05-03 | End: 2019-05-20

## 2019-05-03 RX ORDER — IBUPROFEN 800 MG/1
800 TABLET ORAL ONCE
Status: COMPLETED | OUTPATIENT
Start: 2019-05-03 | End: 2019-05-03

## 2019-05-03 RX ADMIN — IBUPROFEN 800 MG: 800 TABLET, FILM COATED ORAL at 01:55

## 2019-05-03 RX ADMIN — PENICILLIN V POTASSIUM 500 MG: 250 TABLET ORAL at 02:12

## 2019-05-03 ASSESSMENT — PAIN DESCRIPTION - LOCATION: LOCATION: TEETH

## 2019-05-03 ASSESSMENT — ENCOUNTER SYMPTOMS
GASTROINTESTINAL NEGATIVE: 1
RESPIRATORY NEGATIVE: 1
EYES NEGATIVE: 1
ALLERGIC/IMMUNOLOGIC NEGATIVE: 1

## 2019-05-03 ASSESSMENT — PAIN DESCRIPTION - FREQUENCY: FREQUENCY: CONTINUOUS

## 2019-05-03 ASSESSMENT — PAIN DESCRIPTION - DESCRIPTORS: DESCRIPTORS: ACHING

## 2019-05-03 ASSESSMENT — PAIN DESCRIPTION - PAIN TYPE: TYPE: ACUTE PAIN

## 2019-05-03 ASSESSMENT — PAIN SCALES - GENERAL
PAINLEVEL_OUTOF10: 10
PAINLEVEL_OUTOF10: 10

## 2019-05-03 ASSESSMENT — PAIN DESCRIPTION - ORIENTATION: ORIENTATION: LEFT

## 2019-05-03 NOTE — ED NOTES
[facility]  [address] [facility]  Phone: [phone]  Fax: [phone]    Patient Referral Fax     To:  Patient Referral Coordinator Fax:  [phone]  Referral from:  [address]  51 y.o.      [phone] (home)      Additional Notes: Tuesday, May 7th 2019 at 915am     Signature: Willem Glover Date: 5/3/19           Willem Glover RN  05/03/19 0210

## 2019-05-03 NOTE — ED NOTES
[facility]  [address] [facility]  Phone: [phone]  Fax: [phone]    Patient Appointment Information    To schedule an appointment at the [facility] for a patient please call:    [phone] for [address] / [phone] for Adults    Appointments for children are available the day the call is placed. Adult appointments are generally available within 48 to 72 hours. Information required making an appointment:    Rm Sy  51 y.o. 1970 [phone] (home)    Chief Complaint   Patient presents with    Dental Pain     left sided tooth pain         Appointment day and time: Tuesday, May, 7 2019 at 915am     Please as the patient to bring Medicaid card, Medicaid HMO card, or other insurance card. For self-pay, cost of emergency appointment is $38 for adults or $25 for children age 21 and under. The [facility] Center is not a free clinic and fees are due at time of service.       Signature:  Lupe Fernández Date:  5/3/19       Lupe Fernández RN  05/03/19 0210

## 2019-05-03 NOTE — ED NOTES
Pt presents to the ER with complaints of left sided tooth pain. She states this started a few days ago and called a dentist with no call back.        Bimal Lau RN  05/03/19 [id number]

## 2019-05-03 NOTE — ED PROVIDER NOTES
[facility]  ED  Emergency Department Encounter  EmergencyMedicine Resident     Pt Name:Fang Gomez  MRN: [id number]  [place] 1970  Date of evaluation: 5/3/19  PCP:  Sebastian Willis MD    [address] [place] [address]       Chief Complaint   Patient presents with    Dental Pain     left sided tooth pain       HISTORY OF PRESENT ILLNESS  (Location/Symptom, Timing/Onset, Context/Setting, Quality, Duration, Modifying Factors, Severity.)      Tristan Pan is a 51 y.o. female who presents with left sided tooth ache. Patient reports that she has a history of bad teeth but hasn't followed up with a dentist, states that the pain worsened since yesterday and now it is so intense that she has to gargle water every few seconds. She denies any fevers or chills, denies any difficulty swallowing or controlling oral secretions. PAST MEDICAL / SURGICAL / SOCIAL / FAMILY HISTORY      has a past medical history of Depression, Diabetes mellitus ([facility] [address] Hyperlipidemia, Pancreatitis, Pneumonia due to infectious organism, and Substance abuse ([facility] [address] has a past surgical history that includes Cholecystectomy; Ectopic pregnancy surgery; and Carpal tunnel release (2014).     Social History     Socioeconomic History    Marital status: Single     Spouse name: Not on file    Number of children: Not on file    Years of education: Not on file    Highest education level: Not on file   Occupational History    Not on file   Social Needs    Financial resource strain: Not on file    Food insecurity:     Worry: Not on file     Inability: Not on file    Transportation needs:     Medical: Not on file     Non-medical: Not on file   Tobacco Use    Smoking status: Current Every Day Smoker     Packs/day: 0.25     Years: 30.00     Pack years: 7.50     Types: Cigarettes    Smokeless tobacco: Never Used    Tobacco comment: 0-6 cigarettes per day   Substance and Sexual Activity    Alcohol use: Yes     Comment: Occasional    Drug insulin glargine (LANTUS) 100 UNIT/ML injection vial Inject 20 Units into the skin nightly 2/21/19   Sebastian Willis MD   fenofibrate 160 MG tablet Take 1 tablet by mouth daily 2/18/19   Kelli Cox,    insulin aspart (NOVOLOG FLEXPEN) 100 UNIT/ML injection pen Follow scale:    Sugars 150-200: 2 Units   Sugars 201-300: 3 Units  Sugars 301-400: 4 Units  Sugars >401: 5 Units 12/3/18   Josie Mcmahon, DO   Alcohol Swabs (ALCOHOL PREP) 70 % PADS 4 time daily 11/12/18   Radha Lambert MD   blood glucose monitor strips Test 4 times daily Diagnosis 11/12/18   Radha Lambert MD   blood glucose test strips (FREESTYLE LITE) strip 1 each by In Vitro route daily As needed. 11/12/18   Radha Lambert MD   FREESTYLE LANCETS MISC 1 each by Does not apply route daily 11/12/18   Radha Lambert MD   Insulin Syringe-Needle U-100 (INSULIN SYRINGE 1CC/31GX5/16\") 31G X 5/16\" 1 ML MISC 1 each by Does not apply route daily 11/12/18   Radha Lambert MD   Diabetic Shoe MISC by Does not apply route 11/12/18   Radha Lambert MD   glucose monitoring kit (FREESTYLE) monitoring kit 4 times daily 8/27/18   Juan Pablo Malhotra, DO       REVIEW OF SYSTEMS    (2-9 systems for level 4, 10 or more for level 5)      Review of Systems   Constitutional: Negative. HENT: Positive for dental problem. Eyes: Negative. Respiratory: Negative. Cardiovascular: Negative. Gastrointestinal: Negative. Endocrine: Negative. Genitourinary: Negative. Musculoskeletal: Negative. Skin: Negative. Allergic/Immunologic: Negative. Neurological: Negative. Hematological: Negative. Psychiatric/Behavioral: Negative. PHYSICAL EXAM   (up to 7 for level 4, 8 or more for level 5)      INITIAL VITALS:   /78   Pulse 103   Temp 96.6 °F (35.9 °C) (Oral)   Resp 17   LMP 05/21/2018   SpO2 99%     Physical Exam   Constitutional: She is oriented to person, place, and time. HENT:   Head: Normocephalic and atraumatic.    Right Ear: External ear normal.   Left Ear: External ear normal.   Mouth/Throat: Oropharynx is clear and moist and mucous membranes are normal. No trismus in the jaw. Abnormal dentition. Dental caries present. No dental abscesses or uvula swelling. No oropharyngeal exudate, posterior oropharyngeal edema or posterior oropharyngeal erythema. Tonsils are 1+ on the right. Tonsils are 1+ on the left. Eyes: Pupils are equal, round, and reactive to light. EOM are normal. Right eye exhibits no discharge. Left eye exhibits no discharge. Neck: Neck supple. No JVD present. No tracheal deviation present. Cardiovascular: Normal rate, regular rhythm and normal heart sounds. Exam reveals no gallop and no friction rub. No murmur heard. Pulmonary/Chest: No respiratory distress. She has no wheezes. She exhibits no tenderness. Abdominal: Soft. Bowel sounds are normal. She exhibits no distension. There is no tenderness. There is no guarding. Musculoskeletal: Normal range of motion. She exhibits no edema or deformity. Lymphadenopathy:     She has no cervical adenopathy. Neurological: She is alert and oriented to person, place, and time. No cranial nerve deficit. She exhibits normal muscle tone. Skin: Skin is warm and dry. No rash noted. She is not diaphoretic. Nursing note and vitals reviewed.       DIFFERENTIAL  DIAGNOSIS     PLAN (Kendra Ireland / Erin Jaime / EKG):  Orders Placed This Encounter   Procedures    Vital signs       MEDICATIONS ORDERED:  Orders Placed This Encounter   Medications    ibuprofen (ADVIL;MOTRIN) tablet 800 mg    acetaminophen (TYLENOL) 325 MG tablet     Sig: Take 1 tablet by mouth every 6 hours as needed for Pain     Dispense:  120 tablet     Refill:  1    ibuprofen (ADVIL;MOTRIN) 800 MG tablet     Sig: Take 1 tablet by mouth every 8 hours as needed for Pain     Dispense:  30 tablet     Refill:  0    penicillin v potassium (VEETID) 500 MG tablet     Sig: Take 1 tablet by mouth 4 times daily for 7 days     Dispense:  28 mouth every 6 hours as needed for Pain, Disp-120 tablet, R-1Print      ibuprofen (ADVIL;MOTRIN) 800 MG tablet Take 1 tablet by mouth every 8 hours as needed for Pain, Disp-30 tablet, R-0Print      penicillin v potassium (VEETID) 500 MG tablet Take 1 tablet by mouth 4 times daily for 7 days, Disp-28 tablet, R-0Print             Mervyn Sacks, MD  Emergency Medicine Resident    (Please note that portions of thisnote were completed with a voice recognition program.  Efforts were made to edit the dictations but occasionally words are mis-transcribed.)       Mervyn Sacks, MD  Resident  05/03/19 [id number]

## 2019-05-03 NOTE — ED PROVIDER NOTES
[facility]     Emergency Department     Faculty Attestation    I performed a history and physical examination of the patient and discussed management with the resident. I reviewed the residents note and agree with the documented findings and plan of care. Any areas of disagreement are noted on the chart. I was personally present for the key portions of any procedures. I have documented in the chart those procedures where I was not present during the key portions. I have reviewed the emergency nurses triage note. I agree with the chief complaint, past medical history, past surgical history, allergies, medications, social and family history as documented unless otherwise noted below. For Physician Assistant/ Nurse Practitioner cases/documentation I have personally evaluated this patient and have completed at least one if not all key elements of the E/M (history, physical exam, and MDM). Additional findings are as noted. Patient presents complaining of dental pain. She says it has been bothering her for the past few days. She denies fever, chills, nausea or vomiting. On exam, patient is sitting on the side of the bed and appears mildly comfortable. Lungs clear to auscultation bilaterally. Heart sounds are tachycardic, regular. Patient does have poor dentition but no dental abscesses seen. Resident will treat pain with a dental block and Motrin. We'll have patient follow up with the dental clinic.       Rocky De Luna MD  Attending Emergency  Physician              Jenaro Winters MD  05/03/19 [id number]

## 2019-05-17 ENCOUNTER — OFFICE VISIT (OUTPATIENT)
Dept: ORTHOPEDIC SURGERY | Age: 49
End: 2019-05-17
Payer: MEDICAID

## 2019-05-17 VITALS — WEIGHT: 113.1 LBS | BODY MASS INDEX: 19.31 KG/M2 | HEIGHT: 64 IN

## 2019-05-17 DIAGNOSIS — M75.82 ROTATOR CUFF TENDONITIS, LEFT: Primary | ICD-10-CM

## 2019-05-17 DIAGNOSIS — M75.22 BICEPS TENDONITIS, LEFT: ICD-10-CM

## 2019-05-17 PROCEDURE — 4004F PT TOBACCO SCREEN RCVD TLK: CPT | Performed by: ORTHOPAEDIC SURGERY

## 2019-05-17 PROCEDURE — 99213 OFFICE O/P EST LOW 20 MIN: CPT | Performed by: ORTHOPAEDIC SURGERY

## 2019-05-17 PROCEDURE — G8427 DOCREV CUR MEDS BY ELIG CLIN: HCPCS | Performed by: ORTHOPAEDIC SURGERY

## 2019-05-17 PROCEDURE — G8420 CALC BMI NORM PARAMETERS: HCPCS | Performed by: ORTHOPAEDIC SURGERY

## 2019-05-17 ASSESSMENT — ENCOUNTER SYMPTOMS
CHEST TIGHTNESS: 0
VOMITING: 0
APNEA: 0
COUGH: 0
ABDOMINAL PAIN: 0

## 2019-05-17 NOTE — PROGRESS NOTES
[address] [place] [address]  Dept: [phone]  Dept Fax: [phone]        Ambulatory Follow Up      Subjective:   Jessica Brink is a 51y.o. year old female who presents to our office today for routine followup regarding her   1. Rotator cuff tendonitis, left    2. Biceps tendonitis, left    . Chief Complaint   Patient presents with    Shoulder Pain     left       HPI- Jessica Brink  is a 51 y.o. Right hand dominant  female who presents today in follow for left rotator cuff tendonitis and left biceps tendonitis. The patient was last seen on 4/3/2019 and underwent treatment in the form of medrol dose pack, mobic daily, and formal physical therapy. The patient notes 0% improvement with the previous treatment. However, Patient states that her neurosurgeon told her not do physical therapy until she found out what was wrong with her shoulder. Review of Systems   Constitutional: Negative for chills and fever. Respiratory: Negative for apnea, cough and chest tightness. Cardiovascular: Negative for chest pain and palpitations. Gastrointestinal: Negative for abdominal pain and vomiting. Genitourinary: Negative for difficulty urinating. Musculoskeletal: Positive for arthralgias (Left shoulder). Negative for gait problem, joint swelling and myalgias. Neurological: Negative for dizziness, weakness and numbness. I have reviewed the CC, HPI, ROS, PMH, FHX, Social History, and if not present in this note, I have reviewed in the patient's chart. I agree with the documentation provided by other staff and have reviewed their documentation prior to providing my signature indicating agreement. Objective :   Ht 5' 4.02\" (1.626 m)   Wt 113 lb 1.5 oz (51.3 kg)   LMP 05/21/2018   BMI 19.40 kg/m²  Body mass index is 19.4 kg/m².   General: Jessica Brink is a 51 y.o. female who is alert and oriented and sitting comfortably in our made changes accordingly to the work scribed by Constantino Rush LPN. The documentation accurately reflects work and decisions made by me. I have also reviewed documentation completed by clinical staff.     Ema Vo DO, [facility]  5/19/2019 2:04 AM    This note is created with the assistance of a speech recognition program.  While intending to generate a document that actually reflects the content of the visit, the document can still have some errors including those of syntax and sound a like substitutions which may escape proof reading.  In such instances, actual meaning can be extrapolated by contextual diversion      Electronically signed by Guille Murdock on 5/19/2019 at 2:03 AM

## 2019-05-20 ENCOUNTER — OFFICE VISIT (OUTPATIENT)
Dept: FAMILY MEDICINE CLINIC | Age: 49
End: 2019-05-20
Payer: MEDICAID

## 2019-05-20 VITALS
DIASTOLIC BLOOD PRESSURE: 79 MMHG | SYSTOLIC BLOOD PRESSURE: 128 MMHG | BODY MASS INDEX: 18.71 KG/M2 | OXYGEN SATURATION: 100 % | HEART RATE: 93 BPM | WEIGHT: 109.6 LBS | HEIGHT: 64 IN

## 2019-05-20 DIAGNOSIS — Z79.4 TYPE 2 DIABETES MELLITUS WITH DIABETIC POLYNEUROPATHY, WITH LONG-TERM CURRENT USE OF INSULIN (HCC): Primary | ICD-10-CM

## 2019-05-20 DIAGNOSIS — M75.82 ROTATOR CUFF TENDONITIS, LEFT: ICD-10-CM

## 2019-05-20 DIAGNOSIS — E11.42 TYPE 2 DIABETES MELLITUS WITH DIABETIC POLYNEUROPATHY, WITH LONG-TERM CURRENT USE OF INSULIN (HCC): Primary | ICD-10-CM

## 2019-05-20 LAB — HBA1C MFR BLD: 11.1 %

## 2019-05-20 PROCEDURE — 3046F HEMOGLOBIN A1C LEVEL >9.0%: CPT | Performed by: STUDENT IN AN ORGANIZED HEALTH CARE EDUCATION/TRAINING PROGRAM

## 2019-05-20 PROCEDURE — G8420 CALC BMI NORM PARAMETERS: HCPCS | Performed by: STUDENT IN AN ORGANIZED HEALTH CARE EDUCATION/TRAINING PROGRAM

## 2019-05-20 PROCEDURE — 4004F PT TOBACCO SCREEN RCVD TLK: CPT | Performed by: STUDENT IN AN ORGANIZED HEALTH CARE EDUCATION/TRAINING PROGRAM

## 2019-05-20 PROCEDURE — G8427 DOCREV CUR MEDS BY ELIG CLIN: HCPCS | Performed by: STUDENT IN AN ORGANIZED HEALTH CARE EDUCATION/TRAINING PROGRAM

## 2019-05-20 PROCEDURE — 2022F DILAT RTA XM EVC RTNOPTHY: CPT | Performed by: STUDENT IN AN ORGANIZED HEALTH CARE EDUCATION/TRAINING PROGRAM

## 2019-05-20 PROCEDURE — 99213 OFFICE O/P EST LOW 20 MIN: CPT | Performed by: STUDENT IN AN ORGANIZED HEALTH CARE EDUCATION/TRAINING PROGRAM

## 2019-05-20 PROCEDURE — 83036 HEMOGLOBIN GLYCOSYLATED A1C: CPT | Performed by: STUDENT IN AN ORGANIZED HEALTH CARE EDUCATION/TRAINING PROGRAM

## 2019-05-20 RX ORDER — ATORVASTATIN CALCIUM 40 MG/1
40 TABLET, FILM COATED ORAL DAILY
Qty: 30 TABLET | Refills: 5 | Status: SHIPPED | OUTPATIENT
Start: 2019-05-20 | End: 2020-02-18 | Stop reason: SDUPTHER

## 2019-05-20 RX ORDER — MELOXICAM 15 MG/1
15 TABLET ORAL DAILY
Qty: 30 TABLET | Refills: 3 | Status: SHIPPED | OUTPATIENT
Start: 2019-05-20 | End: 2020-01-25

## 2019-05-20 RX ORDER — INSULIN GLARGINE 100 [IU]/ML
25 INJECTION, SOLUTION SUBCUTANEOUS NIGHTLY
Qty: 1 VIAL | Refills: 3 | Status: SHIPPED | OUTPATIENT
Start: 2019-05-20 | End: 2019-06-02 | Stop reason: ALTCHOICE

## 2019-05-20 ASSESSMENT — ENCOUNTER SYMPTOMS
CONSTIPATION: 0
DIARRHEA: 0
CHEST TIGHTNESS: 0
ALLERGIC/IMMUNOLOGIC NEGATIVE: 1
ABDOMINAL DISTENTION: 0
SORE THROAT: 0
EYES NEGATIVE: 1
ABDOMINAL PAIN: 0
WHEEZING: 0
RESPIRATORY NEGATIVE: 1
VOMITING: 0
NAUSEA: 0
COUGH: 0

## 2019-05-20 NOTE — PROGRESS NOTES
Subjective:    Richie Jimenez is a 51 y.o. female with  has a past medical history of Depression, Diabetes mellitus ([facility] [address] Hyperlipidemia, Pancreatitis, Pneumonia due to infectious organism, and Substance abuse ([facility] [address] Family History   Problem Relation Age of Onset    Diabetes Mother     Coronary Art Dis Mother     Cancer Father         Colon    Diabetes Sister     Diabetes Brother     Bipolar Disorder Brother     Alcohol Abuse Brother     Substance Abuse Brother        Presented tot office today for:  Chief Complaint   Patient presents with    Diabetes     3 month follow up       HPI   Patient is a 51year old female here for follow up of DM. Pt has a significant PMH of DM, Chronic pancreatitis and familial hypertriglyceridemia. She complains of chronic arm, shoulder, neck and foot pain for which she follows pain management  Chronic pancreatitis   Her chronic pancreatitis has been stable with no acute episodes. She has not been able to fill her prescription for her familial hypertriglyceridemia. Continues on Lipitor. No complaints. DM   Pt states she needs refill of her insulin, test strips, metformin and Novolog. She does have chronic numbness and tingling of the extremities. States her fasting sugars are in the high 200s. Currently on lantus 20 Units nightly. Will increase. A1C was 11.1 in the office today. Review of Systems   Constitutional: Negative. Negative for activity change, chills and fever. HENT: Negative. Negative for congestion and sore throat. Eyes: Negative. Respiratory: Negative. Negative for cough, chest tightness and wheezing. Cardiovascular: Negative. Negative for chest pain. Gastrointestinal: Negative for abdominal distention, abdominal pain, constipation, diarrhea, nausea and vomiting. Endocrine: Negative. Genitourinary: Negative. Negative for difficulty urinating. Musculoskeletal: Positive for arthralgias and neck pain.  Negative for gait problem and joint swelling. Allergic/Immunologic: Negative. Neurological: Negative. Psychiatric/Behavioral: Negative. Objective:    /79 (Site: Right Upper Arm, Position: Sitting, Cuff Size: Small Adult)   Pulse 93   Ht 5' 4.02\" (1.626 m)   Wt 109 lb 9.6 oz (49.7 kg)   LMP 05/21/2018   SpO2 100%   BMI 18.80 kg/m²    BP Readings from Last 3 Encounters:   05/20/19 128/79   05/03/19 128/78   04/11/19 (!) 124/107       Physical Exam   Constitutional: She is oriented to person, place, and time. She appears well-developed and well-nourished. HENT:   Head: Normocephalic and atraumatic. Eyes: Conjunctivae and EOM are normal.   Neck: Neck supple. Cardiovascular: Normal rate, regular rhythm and normal heart sounds. Exam reveals no gallop and no friction rub. No murmur heard. Pulmonary/Chest: Effort normal and breath sounds normal. No respiratory distress. She has no wheezes. Abdominal: Soft. Bowel sounds are normal. She exhibits no distension. Neurological: She is alert and oriented to person, place, and time. Skin: Skin is warm and dry.      Visual inspection:  Deformity/amputation: absent  Skin lesions/pre-ulcerative calluses: absent  Edema: right- negative, left- negative    Sensory exam:  Monofilament sensation: normal  (minimum of 5 random plantar locations tested, avoiding callused areas - > 1 area with absence of sensation is + for neuropathy)    Plus at least one of the following:  Pulses: normal,   Pinprick: Intact  Proprioception: Intact  Vibration (128 Hz): N/A      Lab Results   Component Value Date    WBC 10.0 02/16/2019    HGB 10.9 (L) 02/16/2019    HCT 33.1 (L) 02/16/2019     02/16/2019    CHOL 213 (H) 02/16/2019    TRIG 508 (H) 02/16/2019    HDL 31 (L) 02/16/2019    LDLDIRECT 91 02/16/2019     (H) 02/16/2019    AST 81 (H) 02/16/2019     02/16/2019    K 4.1 02/16/2019     02/16/2019    CREATININE 0.33 (L) 02/16/2019    BUN 4 (L) 02/16/2019    CO2 23 2019    INR 1.0 2012    LABA1C 13.3 2018    LABMICR 147 (H) 2019     Lab Results   Component Value Date    CALCIUM 8.7 2019    PHOS 2.4 (L) 02/15/2017     Lab Results   Component Value Date    LDLCHOLESTEROL      2019    LDLDIRECT 91 2019       Assessment and Plan:    1. Type 2 diabetes mellitus with diabetic polyneuropathy, with long-term current use of insulin (HCC)  - atorvastatin (LIPITOR) 40 MG tablet; Take 1 tablet by mouth daily  Dispense: 30 tablet; Refill: 5  - metFORMIN (GLUCOPHAGE) 500 MG tablet; Take 1 tablet by mouth 2 times daily (with meals)  Dispense: 180 tablet; Refill: 1  - blood glucose test strips (FREESTYLE LITE) strip; 1 each by In Vitro route daily As needed. Dispense: 100 each; Refill: 3  - POCT glycosylated hemoglobin (Hb A1C)  -  DIABETES FOOT EXAM  - insulin aspart (NOVOLOG FLEXPEN) 100 UNIT/ML injection pen; Follow scale:    Sugars 150-200: 2 Units   Sugars 201-300: 3 Units  Sugars 301-400: 4 Units  Sugars >401: 5 Units  Dispense: 5 pen; Refill: 5    2. Rotator cuff tendonitis, left  - meloxicam (MOBIC) 15 MG tablet; Take 1 tablet by mouth daily  Dispense: 30 tablet; Refill: 3      Requested Prescriptions     Signed Prescriptions Disp Refills    atorvastatin (LIPITOR) 40 MG tablet 30 tablet 5     Sig: Take 1 tablet by mouth daily    metFORMIN (GLUCOPHAGE) 500 MG tablet 180 tablet 1     Sig: Take 1 tablet by mouth 2 times daily (with meals)    meloxicam (MOBIC) 15 MG tablet 30 tablet 3     Sig: Take 1 tablet by mouth daily    blood glucose test strips (FREESTYLE LITE) strip 100 each 3     Si each by In Vitro route daily As needed.     insulin glargine (LANTUS) 100 UNIT/ML injection vial 1 vial 3     Sig: Inject 25 Units into the skin nightly    insulin aspart (NOVOLOG FLEXPEN) 100 UNIT/ML injection pen 5 pen 5     Sig: Follow scale:    Sugars 150-200: 2 Units   Sugars 201-300: 3 Units  Sugars 301-400: 4 Units  Sugars >401: 5 Units

## 2019-05-20 NOTE — PROGRESS NOTES
Attending Physician Statement  I have discussed the care of Joycelyn Lucas, including pertinent history and exam findings,  with the resident. I have reviewed the key elements of all parts of the encounter with the resident. I agree with the assessment, plan and orders as documented by the resident.   (GE Modifier)

## 2019-05-21 ENCOUNTER — TELEPHONE (OUTPATIENT)
Dept: FAMILY MEDICINE CLINIC | Age: 49
End: 2019-05-21

## 2019-05-27 DIAGNOSIS — E11.42 TYPE 2 DIABETES MELLITUS WITH DIABETIC POLYNEUROPATHY, WITH LONG-TERM CURRENT USE OF INSULIN (HCC): ICD-10-CM

## 2019-05-27 DIAGNOSIS — Z79.4 TYPE 2 DIABETES MELLITUS WITH DIABETIC POLYNEUROPATHY, WITH LONG-TERM CURRENT USE OF INSULIN (HCC): ICD-10-CM

## 2019-05-27 NOTE — TELEPHONE ENCOUNTER
Last visit:   Last Med refill:   Does patient have enough medication for 72 hours: Yes    Next Visit Date:  Future Appointments   Date Time Provider Alice Covington   7/12/2019 10:30 AM Nahed Bruce  [facility] Maintenance   Topic Date Due    Diabetic retinal exam  08/20/2019 (Originally 8/13/1980)    Hepatitis B Vaccine (1 of 3 - Risk 3-dose series) 05/20/2020 (Originally 8/13/1988)    Cervical cancer screen  05/20/2020 (Originally 8/13/1990)    A1C test (Diabetic or Prediabetic)  08/20/2019    Lipid screen  02/16/2020    Diabetic microalbuminuria test  02/21/2020    Diabetic foot exam  05/20/2020    DTaP/Tdap/Td vaccine (2 - Td) 09/21/2028    Flu vaccine  Completed    Pneumococcal 0-64 years Vaccine  Completed    HIV screen  Completed       Hemoglobin A1C (%)   Date Value   05/20/2019 11.1   09/21/2018 13.3   02/14/2017 8.4 (H)             ( goal A1C is < 7)   Microalb/Crt.  Ratio (mcg/mg creat)   Date Value   02/21/2019 147 (H)     LDL Cholesterol (mg/dL)   Date Value   02/16/2019        11/15/2012 Unable to calculate due to Trig >400       (goal LDL is <100)   AST (U/L)   Date Value   02/16/2019 81 (H)     ALT (U/L)   Date Value   02/16/2019 116 (H)     BUN (mg/dL)   Date Value   02/16/2019 4 (L)     BP Readings from Last 3 Encounters:   05/20/19 128/79   05/03/19 128/78   04/11/19 (!) 124/107          (goal 120/80)    All Future Testing planned in [facility]  Lab Frequency Next Occurrence   Lipid Panel Once 09/21/2019   Lipase Once 09/21/2019   XR SHOULDER LEFT (MIN 2 VIEWS) Once 11/12/2019   XR CERVICAL SPINE (2-3 VIEWS) Once 04/16/2019   XR LUMBAR SPINE (MIN 4 VIEWS) Once 04/16/2019   EMG Once 08/13/2019   Hemoglobin A1C Once 04/11/2019               Patient Active Problem List:     Type 2 diabetes mellitus with diabetic polyneuropathy, with long-term current use of insulin (HCC)     Depression     GERD with esophagitis     Type 2 diabetes mellitus with

## 2019-06-02 RX ORDER — SYRING-NEEDL,DISP,INSUL,0.3 ML 31GX15/64"
SYRINGE, EMPTY DISPOSABLE MISCELLANEOUS
Qty: 100 EACH | Refills: 5 | Status: SHIPPED | OUTPATIENT
Start: 2019-06-02 | End: 2021-12-03 | Stop reason: CLARIF

## 2019-06-02 RX ORDER — INSULIN GLARGINE 100 [IU]/ML
INJECTION, SOLUTION SUBCUTANEOUS
Qty: 6 ML | Refills: 3 | Status: SHIPPED | OUTPATIENT
Start: 2019-06-02 | End: 2020-03-30

## 2019-06-02 RX ORDER — DEXTROSE 4 G
TABLET,CHEWABLE ORAL
Qty: 100 EACH | Refills: 0 | Status: SHIPPED | OUTPATIENT
Start: 2019-06-02 | End: 2021-12-03 | Stop reason: CLARIF

## 2019-06-02 RX ORDER — SYRINGE-NEEDLE,INSULIN,0.5 ML 30 GX5/16"
SYRINGE, EMPTY DISPOSABLE MISCELLANEOUS
Qty: 100 EACH | Refills: 3 | Status: SHIPPED | OUTPATIENT
Start: 2019-06-02 | End: 2020-02-18 | Stop reason: SDUPTHER

## 2019-07-03 ENCOUNTER — HOSPITAL ENCOUNTER (EMERGENCY)
Age: 49
Discharge: HOME | End: 2019-07-04
Attending: EMERGENCY MEDICINE
Payer: MEDICAID

## 2019-07-03 VITALS
BODY MASS INDEX: 18.01 KG/M2 | RESPIRATION RATE: 13 BRPM | WEIGHT: 105 LBS | HEART RATE: 121 BPM | TEMPERATURE: 99.9 F | OXYGEN SATURATION: 100 % | DIASTOLIC BLOOD PRESSURE: 87 MMHG | SYSTOLIC BLOOD PRESSURE: 114 MMHG

## 2019-07-03 DIAGNOSIS — R10.9 ABDOMINAL PAIN, UNSPECIFIED ABDOMINAL LOCATION: Primary | ICD-10-CM

## 2019-07-03 LAB
ABSOLUTE EOS #: 0.09 K/UL (ref 0–0.44)
ABSOLUTE IMMATURE GRANULOCYTE: <0.03 K/UL (ref 0–0.3)
ABSOLUTE LYMPH #: 1.5 K/UL (ref 1.1–3.7)
ABSOLUTE MONO #: 0.37 K/UL (ref 0.1–1.2)
ALBUMIN SERPL-MCNC: 3.8 G/DL (ref 3.5–5.2)
ALBUMIN/GLOBULIN RATIO: 1.2 (ref 1–2.5)
ALP BLD-CCNC: 119 U/L (ref 35–104)
ALT SERPL-CCNC: 14 U/L (ref 5–33)
ANION GAP SERPL CALCULATED.3IONS-SCNC: 15 MMOL/L (ref 9–17)
AST SERPL-CCNC: 16 U/L
BASOPHILS # BLD: 1 % (ref 0–2)
BASOPHILS ABSOLUTE: 0.04 K/UL (ref 0–0.2)
BILIRUB SERPL-MCNC: 0.32 MG/DL (ref 0.3–1.2)
BUN BLDV-MCNC: 10 MG/DL (ref 6–20)
BUN/CREAT BLD: ABNORMAL (ref 9–20)
CALCIUM SERPL-MCNC: 9.2 MG/DL (ref 8.6–10.4)
CHLORIDE BLD-SCNC: 94 MMOL/L (ref 98–107)
CO2: 20 MMOL/L (ref 20–31)
CREAT SERPL-MCNC: 0.4 MG/DL (ref 0.5–0.9)
DIFFERENTIAL TYPE: ABNORMAL
EOSINOPHILS RELATIVE PERCENT: 1 % (ref 1–4)
GFR AFRICAN AMERICAN: >60 ML/MIN
GFR NON-AFRICAN AMERICAN: >60 ML/MIN
GFR SERPL CREATININE-BSD FRML MDRD: ABNORMAL ML/MIN/{1.73_M2}
GFR SERPL CREATININE-BSD FRML MDRD: ABNORMAL ML/MIN/{1.73_M2}
GLUCOSE BLD-MCNC: 280 MG/DL (ref 70–99)
GLUCOSE BLD-MCNC: 289 MG/DL (ref 65–105)
HCG QUANTITATIVE: <1 IU/L
HCT VFR BLD CALC: 40.7 % (ref 36.3–47.1)
HEMOGLOBIN: 14 G/DL (ref 11.9–15.1)
IMMATURE GRANULOCYTES: 0 %
LIPASE: 39 U/L (ref 13–60)
LYMPHOCYTES # BLD: 21 % (ref 24–43)
MCH RBC QN AUTO: 29.2 PG (ref 25.2–33.5)
MCHC RBC AUTO-ENTMCNC: 34.4 G/DL (ref 28.4–34.8)
MCV RBC AUTO: 84.8 FL (ref 82.6–102.9)
MONOCYTES # BLD: 5 % (ref 3–12)
NRBC AUTOMATED: 0 PER 100 WBC
PDW BLD-RTO: 12.9 % (ref 11.8–14.4)
PLATELET # BLD: 218 K/UL (ref 138–453)
PLATELET ESTIMATE: ABNORMAL
PMV BLD AUTO: 9.9 FL (ref 8.1–13.5)
POTASSIUM SERPL-SCNC: 4.2 MMOL/L (ref 3.7–5.3)
RBC # BLD: 4.8 M/UL (ref 3.95–5.11)
RBC # BLD: ABNORMAL 10*6/UL
SEG NEUTROPHILS: 72 % (ref 36–65)
SEGMENTED NEUTROPHILS ABSOLUTE COUNT: 5.01 K/UL (ref 1.5–8.1)
SODIUM BLD-SCNC: 129 MMOL/L (ref 135–144)
TOTAL PROTEIN: 7.1 G/DL (ref 6.4–8.3)
WBC # BLD: 7 K/UL (ref 3.5–11.3)
WBC # BLD: ABNORMAL 10*3/UL

## 2019-07-03 PROCEDURE — 96374 THER/PROPH/DIAG INJ IV PUSH: CPT

## 2019-07-03 PROCEDURE — 81001 URINALYSIS AUTO W/SCOPE: CPT

## 2019-07-03 PROCEDURE — 2580000003 HC RX 258: Performed by: STUDENT IN AN ORGANIZED HEALTH CARE EDUCATION/TRAINING PROGRAM

## 2019-07-03 PROCEDURE — 96372 THER/PROPH/DIAG INJ SC/IM: CPT

## 2019-07-03 PROCEDURE — 87086 URINE CULTURE/COLONY COUNT: CPT

## 2019-07-03 PROCEDURE — 83690 ASSAY OF LIPASE: CPT

## 2019-07-03 PROCEDURE — 99283 EMERGENCY DEPT VISIT LOW MDM: CPT

## 2019-07-03 PROCEDURE — 84702 CHORIONIC GONADOTROPIN TEST: CPT

## 2019-07-03 PROCEDURE — 80053 COMPREHEN METABOLIC PANEL: CPT

## 2019-07-03 PROCEDURE — 82947 ASSAY GLUCOSE BLOOD QUANT: CPT

## 2019-07-03 PROCEDURE — 85025 COMPLETE CBC W/AUTO DIFF WBC: CPT

## 2019-07-03 PROCEDURE — 6360000002 HC RX W HCPCS: Performed by: STUDENT IN AN ORGANIZED HEALTH CARE EDUCATION/TRAINING PROGRAM

## 2019-07-03 PROCEDURE — 96375 TX/PRO/DX INJ NEW DRUG ADDON: CPT

## 2019-07-03 RX ORDER — FENTANYL CITRATE 50 UG/ML
50 INJECTION, SOLUTION INTRAMUSCULAR; INTRAVENOUS ONCE
Status: COMPLETED | OUTPATIENT
Start: 2019-07-03 | End: 2019-07-03

## 2019-07-03 RX ORDER — ONDANSETRON 2 MG/ML
4 INJECTION INTRAMUSCULAR; INTRAVENOUS ONCE
Status: COMPLETED | OUTPATIENT
Start: 2019-07-03 | End: 2019-07-03

## 2019-07-03 RX ORDER — 0.9 % SODIUM CHLORIDE 0.9 %
1000 INTRAVENOUS SOLUTION INTRAVENOUS ONCE
Status: COMPLETED | OUTPATIENT
Start: 2019-07-03 | End: 2019-07-03

## 2019-07-03 RX ORDER — DICYCLOMINE HYDROCHLORIDE 10 MG/ML
20 INJECTION INTRAMUSCULAR ONCE
Status: COMPLETED | OUTPATIENT
Start: 2019-07-03 | End: 2019-07-03

## 2019-07-03 RX ADMIN — ONDANSETRON 4 MG: 2 INJECTION INTRAMUSCULAR; INTRAVENOUS at 22:14

## 2019-07-03 RX ADMIN — DICYCLOMINE HYDROCHLORIDE 20 MG: 10 INJECTION INTRAMUSCULAR at 22:16

## 2019-07-03 RX ADMIN — SODIUM CHLORIDE 1000 ML: 9 INJECTION, SOLUTION INTRAVENOUS at 22:14

## 2019-07-03 RX ADMIN — FENTANYL CITRATE 50 MCG: 50 INJECTION INTRAMUSCULAR; INTRAVENOUS at 22:14

## 2019-07-03 ASSESSMENT — PAIN DESCRIPTION - DESCRIPTORS: DESCRIPTORS: BURNING;CRAMPING

## 2019-07-03 ASSESSMENT — PAIN DESCRIPTION - ONSET: ONSET: ON-GOING

## 2019-07-03 ASSESSMENT — PAIN SCALES - GENERAL
PAINLEVEL_OUTOF10: 2
PAINLEVEL_OUTOF10: 9

## 2019-07-03 ASSESSMENT — PAIN DESCRIPTION - PROGRESSION: CLINICAL_PROGRESSION: GRADUALLY WORSENING

## 2019-07-03 ASSESSMENT — PAIN DESCRIPTION - FREQUENCY: FREQUENCY: CONTINUOUS

## 2019-07-03 ASSESSMENT — PAIN DESCRIPTION - LOCATION: LOCATION: ABDOMEN

## 2019-07-04 LAB
-: NORMAL
AMORPHOUS: NORMAL
BACTERIA: NORMAL
BILIRUBIN URINE: NEGATIVE
CASTS UA: NORMAL /LPF (ref 0–8)
COLOR: YELLOW
COMMENT UA: ABNORMAL
CRYSTALS, UA: NORMAL /HPF
CULTURE: NORMAL
EPITHELIAL CELLS UA: NORMAL /HPF (ref 0–5)
GLUCOSE URINE: ABNORMAL
KETONES, URINE: NEGATIVE
LEUKOCYTE ESTERASE, URINE: NEGATIVE
Lab: NORMAL
MUCUS: NORMAL
NITRITE, URINE: NEGATIVE
OTHER OBSERVATIONS UA: NORMAL
PH UA: 6 (ref 5–8)
PROTEIN UA: ABNORMAL
RBC UA: NORMAL /HPF (ref 0–4)
RENAL EPITHELIAL, UA: NORMAL /HPF
SPECIFIC GRAVITY UA: 1.02 (ref 1–1.03)
SPECIMEN DESCRIPTION: NORMAL
TRICHOMONAS: NORMAL
TURBIDITY: CLEAR
URINE HGB: NEGATIVE
UROBILINOGEN, URINE: NORMAL
WBC UA: NORMAL /HPF (ref 0–5)
YEAST: NORMAL

## 2019-07-04 RX ORDER — ONDANSETRON 4 MG/1
4 TABLET, FILM COATED ORAL EVERY 8 HOURS PRN
Qty: 20 TABLET | Refills: 0 | Status: SHIPPED | OUTPATIENT
Start: 2019-07-04 | End: 2021-12-01

## 2019-07-04 RX ORDER — DICYCLOMINE HCL 20 MG
20 TABLET ORAL 3 TIMES DAILY PRN
Qty: 20 TABLET | Refills: 3 | Status: SHIPPED | OUTPATIENT
Start: 2019-07-04 | End: 2021-12-03 | Stop reason: CLARIF

## 2019-07-04 ASSESSMENT — ENCOUNTER SYMPTOMS
NAUSEA: 0
WHEEZING: 0
PHOTOPHOBIA: 0
CHEST TIGHTNESS: 0
BACK PAIN: 0
SHORTNESS OF BREATH: 0
ABDOMINAL PAIN: 1
TROUBLE SWALLOWING: 0
COUGH: 0
SORE THROAT: 0
VOMITING: 0

## 2019-07-04 ASSESSMENT — PAIN SCALES - GENERAL: PAINLEVEL_OUTOF10: 9

## 2019-07-04 ASSESSMENT — PAIN DESCRIPTION - FREQUENCY: FREQUENCY: CONTINUOUS

## 2019-07-04 ASSESSMENT — PAIN DESCRIPTION - PAIN TYPE: TYPE: CHRONIC PAIN;ACUTE PAIN

## 2019-07-04 ASSESSMENT — PAIN DESCRIPTION - LOCATION: LOCATION: ABDOMEN

## 2019-07-04 NOTE — ED PROVIDER NOTES
Cigarettes    Smokeless tobacco: Never Used    Tobacco comment: 0-6 cigarettes per day   Substance and Sexual Activity    Alcohol use: Yes     Comment: Occasional    Drug use: Yes     Types: Cocaine     Comment: 12/5/16 relapsed    Sexual activity: Yes     Partners: Male   Lifestyle    Physical activity:     Days per week: Not on file     Minutes per session: Not on file    Stress: Not on file   Relationships    Social connections:     Talks on phone: Not on file     Gets together: Not on file     Attends Restoration service: Not on file     Active member of club or organization: Not on file     Attends meetings of clubs or organizations: Not on file     Relationship status: Not on file    Intimate partner violence:     Fear of current or ex partner: Not on file     Emotionally abused: Not on file     Physically abused: Not on file     Forced sexual activity: Not on file   Other Topics Concern    Not on file   Social History Narrative    Not on file       Family History   Problem Relation Age of Onset    Diabetes Mother     Coronary Art Dis Mother     Cancer Father         Colon    Diabetes Sister     Diabetes Brother     Bipolar Disorder Brother     Alcohol Abuse Brother     Substance Abuse Brother        Allergies:  Patient has no known allergies. Home Medications:  Prior to Admission medications    Medication Sig Start Date End Date Taking?  Authorizing Provider   ondansetron (ZOFRAN) 4 MG tablet Take 1 tablet by mouth every 8 hours as needed for Nausea 7/4/19  Yes Lee Lieberman MD   dicyclomine (BENTYL) 20 MG tablet Take 1 tablet by mouth 3 times daily as needed (abd pain) 7/4/19  Yes Fraser Budd, MD Severiano Barrack KWIKPEN 100 UNIT/ML injection pen inject 25 units subcutaneously nightly 6/2/19   William Davis MD RA PEN NEEDLES 31G X 5 MM MISC use three times a day 6/2/19   William Davis MD RA ALCOHOL SWABS 70 % PADS use as directed three times a day 6/2/19   William Davis MD ADMELOG 100 UNIT/ML injection vial SLIDING SCALE:150-200=2UNITS, 201-300=3UNITS,301-400=4UNITS, GREATER THAN 401=5UNITS TWICE DAILY. 6/2/19   Yung Bustos MD   BD VEO INSULIN SYRINGE U/F 31G X 15/64\" 0.3 ML MISC use twice a day 6/2/19   Yung Bustos MD   atorvastatin (LIPITOR) 40 MG tablet Take 1 tablet by mouth daily 5/20/19   Yung Bustos MD   metFORMIN (GLUCOPHAGE) 500 MG tablet Take 1 tablet by mouth 2 times daily (with meals) 5/20/19   Yung Bustos MD   meloxicam (MOBIC) 15 MG tablet Take 1 tablet by mouth daily 5/20/19   Yung Bustos MD   blood glucose test strips (FREESTYLE LITE) strip 1 each by In Vitro route daily As needed. 5/20/19   Yung Bustos MD   fenofibrate 160 MG tablet Take 1 tablet by mouth daily 2/18/19   Tiago Roa, DO   FREESTYLE LANCETS MISC 1 each by Does not apply route daily 11/12/18   Fox Mackey MD   Insulin Syringe-Needle U-100 (INSULIN SYRINGE 1CC/31GX5/16\") 31G X 5/16\" 1 ML MISC 1 each by Does not apply route daily 11/12/18   Fox Mackey MD   Diabetic Shoe MISC by Does not apply route 11/12/18   Fox Mackey MD   glucose monitoring kit (FREESTYLE) monitoring kit 4 times daily 8/27/18   Akila Chadwick, DO       REVIEW OF SYSTEMS    (2-9 systems for level 4, 10 or more for level 5)      Review of Systems   Constitutional: Negative for chills and fever. HENT: Negative for sore throat and trouble swallowing. Eyes: Negative for photophobia. Respiratory: Negative for cough, chest tightness, shortness of breath and wheezing. Cardiovascular: Negative for chest pain and palpitations. Gastrointestinal: Positive for abdominal pain. Negative for nausea and vomiting. Endocrine: Negative for polyuria. Genitourinary: Negative for dysuria and flank pain. Musculoskeletal: Negative for back pain and neck pain. Skin: Negative for rash and wound. Neurological: Negative for syncope, weakness, light-headedness and headaches.    Psychiatric/Behavioral: Dispense:  20 tablet     Refill:  0    dicyclomine (BENTYL) 20 MG tablet     Sig: Take 1 tablet by mouth 3 times daily as needed (abd pain)     Dispense:  20 tablet     Refill:  3       DDX: GERD, PUD, pancreatitis, cholecystitis, GB colic, cholangitis, Nmhz-Pnkh-Qrpwoy, ACS/ MI, pneumonia, SBO, DKA, AAA, mesenteric ischemia, perforated viscous, acute gastroenteritis, NSAP, pyelonephritis, kidney stone, appendicitis, hernia, UTI, constipation, ectopic, ovarian torsion, ovarian cyst, PID, tuboovarian abscess, period/ fibroid      DIAGNOSTIC RESULTS / EMERGENCY DEPARTMENT COURSE / MDM     LABS:  Results for orders placed or performed during the hospital encounter of 07/03/19   CBC Auto Differential   Result Value Ref Range    WBC 7.0 3.5 - 11.3 k/uL    RBC 4.80 3.95 - 5.11 m/uL    Hemoglobin 14.0 11.9 - 15.1 g/dL    Hematocrit 40.7 36.3 - 47.1 %    MCV 84.8 82.6 - 102.9 fL    MCH 29.2 25.2 - 33.5 pg    MCHC 34.4 28.4 - 34.8 g/dL    RDW 12.9 11.8 - 14.4 %    Platelets 689 392 - 503 k/uL    MPV 9.9 8.1 - 13.5 fL    NRBC Automated 0.0 0.0 per 100 WBC    Differential Type NOT REPORTED     Seg Neutrophils 72 (H) 36 - 65 %    Lymphocytes 21 (L) 24 - 43 %    Monocytes 5 3 - 12 %    Eosinophils % 1 1 - 4 %    Basophils 1 0 - 2 %    Immature Granulocytes 0 0 %    Segs Absolute 5.01 1.50 - 8.10 k/uL    Absolute Lymph # 1.50 1.10 - 3.70 k/uL    Absolute Mono # 0.37 0.10 - 1.20 k/uL    Absolute Eos # 0.09 0.00 - 0.44 k/uL    Basophils # 0.04 0.00 - 0.20 k/uL    Absolute Immature Granulocyte <0.03 0.00 - 0.30 k/uL    WBC Morphology NOT REPORTED     RBC Morphology NOT REPORTED     Platelet Estimate NOT REPORTED    Comprehensive Metabolic Panel   Result Value Ref Range    Glucose 280 (H) 70 - 99 mg/dL    BUN 10 6 - 20 mg/dL    CREATININE 0.40 (L) 0.50 - 0.90 mg/dL    Bun/Cre Ratio NOT REPORTED 9 - 20    Calcium 9.2 8.6 - 10.4 mg/dL    Sodium 129 (L) 135 - 144 mmol/L    Potassium 4.2 3.7 - 5.3 mmol/L    Chloride 94 (L) 98 - 107 mmol/L    CO2 20 20 - 31 mmol/L    Anion Gap 15 9 - 17 mmol/L    Alkaline Phosphatase 119 (H) 35 - 104 U/L    ALT 14 5 - 33 U/L    AST 16 <32 U/L    Total Bilirubin 0.32 0.3 - 1.2 mg/dL    Total Protein 7.1 6.4 - 8.3 g/dL    Alb 3.8 3.5 - 5.2 g/dL    Albumin/Globulin Ratio 1.2 1.0 - 2.5    GFR Non-African American >60 >60 mL/min    GFR African American >60 >60 mL/min    GFR Comment          GFR Staging NOT REPORTED    Lipase   Result Value Ref Range    Lipase 39 13 - 60 U/L   HCG, Quantitative, Pregnancy   Result Value Ref Range    hCG Quant <1 <5 IU/L   Urinalysis   Result Value Ref Range    Color, UA YELLOW YELLOW    Turbidity UA CLEAR CLEAR    Glucose, Ur 3+ (A) NEGATIVE    Bilirubin Urine NEGATIVE NEGATIVE    Ketones, Urine NEGATIVE NEGATIVE    Specific Gravity, UA 1.016 1.005 - 1.030    Urine Hgb NEGATIVE NEGATIVE    pH, UA 6.0 5.0 - 8.0    Protein, UA TRACE (A) NEGATIVE    Urobilinogen, Urine Normal Normal    Nitrite, Urine NEGATIVE NEGATIVE    Leukocyte Esterase, Urine NEGATIVE NEGATIVE    Urinalysis Comments NOT REPORTED    Microscopic Urinalysis   Result Value Ref Range    -          WBC, UA None 0 - 5 /HPF    RBC, UA 2 TO 5 0 - 4 /HPF    Casts UA  0 - 8 /LPF     0 TO 2 HYALINE Reference range defined for non-centrifuged specimen. Crystals UA NOT REPORTED None /HPF    Epithelial Cells UA 2 TO 5 0 - 5 /HPF    Renal Epithelial, Urine NOT REPORTED 0 /HPF    Bacteria, UA NOT REPORTED None    Mucus, UA NOT REPORTED None    Trichomonas, UA NOT REPORTED None    Amorphous, UA NOT REPORTED None    Other Observations UA NOT REPORTED NOT REQ.     Yeast, UA NOT REPORTED None   POC Glucose Fingerstick   Result Value Ref Range    POC Glucose 289 (H) 65 - 105 mg/dL         RADIOLOGY:  None    EKG  None    All EKG's are interpreted by the Emergency Department Physician who either signs or Co-signs this chart in the absence of a cardiologist.    EMERGENCY DEPARTMENT COURSE:  Patient with epigastric abdominal pain possibly

## 2019-07-04 NOTE — ED NOTES
Pt resting on cot with no distress, lights dimmed, blankets provided. Awaiting orders, will cont to monitor.      Juarez Rodriguez RN  07/04/19 [id number]

## 2019-07-04 NOTE — ED PROVIDER NOTES
evaluated and examined the patient in conjunction with the APC and agree with the treatment plan and disposition of the patient as recorded by the APC.     Magdalena Mack MD  Attending Emergency  Physician       Sharon Ge MD  07/04/19 [id number]

## 2019-08-08 ENCOUNTER — OFFICE VISIT (OUTPATIENT)
Dept: GASTROENTEROLOGY | Age: 49
End: 2019-08-08
Payer: MEDICAID

## 2019-08-08 VITALS
HEART RATE: 108 BPM | BODY MASS INDEX: 19.73 KG/M2 | DIASTOLIC BLOOD PRESSURE: 89 MMHG | WEIGHT: 115 LBS | SYSTOLIC BLOOD PRESSURE: 139 MMHG

## 2019-08-08 DIAGNOSIS — R79.89 ELEVATED LFTS: ICD-10-CM

## 2019-08-08 DIAGNOSIS — R63.4 WEIGHT LOSS: ICD-10-CM

## 2019-08-08 DIAGNOSIS — K59.09 CHRONIC CONSTIPATION: ICD-10-CM

## 2019-08-08 DIAGNOSIS — Z12.11 SCREEN FOR COLON CANCER: Primary | ICD-10-CM

## 2019-08-08 PROCEDURE — G8427 DOCREV CUR MEDS BY ELIG CLIN: HCPCS | Performed by: INTERNAL MEDICINE

## 2019-08-08 PROCEDURE — 4004F PT TOBACCO SCREEN RCVD TLK: CPT | Performed by: INTERNAL MEDICINE

## 2019-08-08 PROCEDURE — 99205 OFFICE O/P NEW HI 60 MIN: CPT | Performed by: INTERNAL MEDICINE

## 2019-08-08 PROCEDURE — G8420 CALC BMI NORM PARAMETERS: HCPCS | Performed by: INTERNAL MEDICINE

## 2019-08-08 RX ORDER — POLYETHYLENE GLYCOL 3350 17 G/17G
POWDER, FOR SOLUTION ORAL
Qty: 255 G | Refills: 0 | Status: SHIPPED | OUTPATIENT
Start: 2019-08-08 | End: 2019-09-07

## 2019-08-08 RX ORDER — OMEPRAZOLE 40 MG/1
40 CAPSULE, DELAYED RELEASE ORAL DAILY
Qty: 30 CAPSULE | Refills: 3 | Status: SHIPPED | OUTPATIENT
Start: 2019-08-08 | End: 2020-02-18 | Stop reason: SDUPTHER

## 2019-08-20 ENCOUNTER — TELEPHONE (OUTPATIENT)
Dept: GASTROENTEROLOGY | Age: 49
End: 2019-08-20

## 2019-08-20 DIAGNOSIS — K59.09 CHRONIC CONSTIPATION: Primary | ICD-10-CM

## 2019-09-13 ENCOUNTER — TELEPHONE (OUTPATIENT)
Dept: GASTROENTEROLOGY | Age: 49
End: 2019-09-13

## 2019-09-16 ENCOUNTER — ANESTHESIA EVENT (OUTPATIENT)
Dept: OPERATING ROOM | Age: 49
End: 2019-09-16
Payer: MEDICAID

## 2019-09-16 ENCOUNTER — HOSPITAL ENCOUNTER (OUTPATIENT)
Age: 49
Setting detail: OUTPATIENT SURGERY
Discharge: HOME | End: 2019-09-16
Attending: INTERNAL MEDICINE | Admitting: INTERNAL MEDICINE
Payer: MEDICAID

## 2019-09-16 ENCOUNTER — ANESTHESIA (OUTPATIENT)
Dept: OPERATING ROOM | Age: 49
End: 2019-09-16
Payer: MEDICAID

## 2019-09-16 VITALS
RESPIRATION RATE: 19 BRPM | DIASTOLIC BLOOD PRESSURE: 56 MMHG | OXYGEN SATURATION: 99 % | SYSTOLIC BLOOD PRESSURE: 103 MMHG

## 2019-09-16 VITALS
WEIGHT: 106.04 LBS | HEART RATE: 97 BPM | DIASTOLIC BLOOD PRESSURE: 65 MMHG | HEIGHT: 64 IN | SYSTOLIC BLOOD PRESSURE: 110 MMHG | TEMPERATURE: 97 F | RESPIRATION RATE: 16 BRPM | BODY MASS INDEX: 18.1 KG/M2 | OXYGEN SATURATION: 91 %

## 2019-09-16 DIAGNOSIS — Z79.4 TYPE 2 DIABETES MELLITUS WITH DIABETIC POLYNEUROPATHY, WITH LONG-TERM CURRENT USE OF INSULIN (HCC): Primary | ICD-10-CM

## 2019-09-16 DIAGNOSIS — E11.42 TYPE 2 DIABETES MELLITUS WITH DIABETIC POLYNEUROPATHY, WITH LONG-TERM CURRENT USE OF INSULIN (HCC): Primary | ICD-10-CM

## 2019-09-16 LAB
GLUCOSE BLD-MCNC: 210 MG/DL (ref 65–105)
GLUCOSE BLD-MCNC: 245 MG/DL (ref 65–105)

## 2019-09-16 PROCEDURE — 2500000003 HC RX 250 WO HCPCS: Performed by: NURSE ANESTHETIST, CERTIFIED REGISTERED

## 2019-09-16 PROCEDURE — 82947 ASSAY GLUCOSE BLOOD QUANT: CPT

## 2019-09-16 PROCEDURE — 3609010300 HC COLONOSCOPY W/BIOPSY SINGLE/MULTIPLE: Performed by: INTERNAL MEDICINE

## 2019-09-16 PROCEDURE — 45380 COLONOSCOPY AND BIOPSY: CPT | Performed by: INTERNAL MEDICINE

## 2019-09-16 PROCEDURE — 2709999900 HC NON-CHARGEABLE SUPPLY: Performed by: INTERNAL MEDICINE

## 2019-09-16 PROCEDURE — 7100000011 HC PHASE II RECOVERY - ADDTL 15 MIN: Performed by: INTERNAL MEDICINE

## 2019-09-16 PROCEDURE — 3700000001 HC ADD 15 MINUTES (ANESTHESIA): Performed by: INTERNAL MEDICINE

## 2019-09-16 PROCEDURE — 6360000002 HC RX W HCPCS: Performed by: NURSE ANESTHETIST, CERTIFIED REGISTERED

## 2019-09-16 PROCEDURE — 43235 EGD DIAGNOSTIC BRUSH WASH: CPT | Performed by: INTERNAL MEDICINE

## 2019-09-16 PROCEDURE — 2580000003 HC RX 258: Performed by: ANESTHESIOLOGY

## 2019-09-16 PROCEDURE — 3700000000 HC ANESTHESIA ATTENDED CARE: Performed by: INTERNAL MEDICINE

## 2019-09-16 PROCEDURE — 3609017100 HC EGD: Performed by: INTERNAL MEDICINE

## 2019-09-16 PROCEDURE — 7100000010 HC PHASE II RECOVERY - FIRST 15 MIN: Performed by: INTERNAL MEDICINE

## 2019-09-16 PROCEDURE — 88305 TISSUE EXAM BY PATHOLOGIST: CPT

## 2019-09-16 RX ORDER — FENTANYL CITRATE 50 UG/ML
25 INJECTION, SOLUTION INTRAMUSCULAR; INTRAVENOUS EVERY 5 MIN PRN
Status: DISCONTINUED | OUTPATIENT
Start: 2019-09-16 | End: 2019-09-16 | Stop reason: HOSPADM

## 2019-09-16 RX ORDER — MEPERIDINE HYDROCHLORIDE 50 MG/ML
12.5 INJECTION INTRAMUSCULAR; INTRAVENOUS; SUBCUTANEOUS EVERY 5 MIN PRN
Status: DISCONTINUED | OUTPATIENT
Start: 2019-09-16 | End: 2019-09-16 | Stop reason: HOSPADM

## 2019-09-16 RX ORDER — DIPHENHYDRAMINE HYDROCHLORIDE 50 MG/ML
12.5 INJECTION INTRAMUSCULAR; INTRAVENOUS
Status: DISCONTINUED | OUTPATIENT
Start: 2019-09-16 | End: 2019-09-16 | Stop reason: HOSPADM

## 2019-09-16 RX ORDER — PROPOFOL 10 MG/ML
INJECTION, EMULSION INTRAVENOUS PRN
Status: DISCONTINUED | OUTPATIENT
Start: 2019-09-16 | End: 2019-09-16 | Stop reason: SDUPTHER

## 2019-09-16 RX ORDER — LABETALOL HYDROCHLORIDE 5 MG/ML
5 INJECTION, SOLUTION INTRAVENOUS EVERY 10 MIN PRN
Status: DISCONTINUED | OUTPATIENT
Start: 2019-09-16 | End: 2019-09-16 | Stop reason: HOSPADM

## 2019-09-16 RX ORDER — HYDRALAZINE HYDROCHLORIDE 20 MG/ML
5 INJECTION INTRAMUSCULAR; INTRAVENOUS EVERY 10 MIN PRN
Status: DISCONTINUED | OUTPATIENT
Start: 2019-09-16 | End: 2019-09-16 | Stop reason: HOSPADM

## 2019-09-16 RX ORDER — ONDANSETRON 2 MG/ML
4 INJECTION INTRAMUSCULAR; INTRAVENOUS
Status: DISCONTINUED | OUTPATIENT
Start: 2019-09-16 | End: 2019-09-16 | Stop reason: HOSPADM

## 2019-09-16 RX ORDER — SODIUM CHLORIDE, SODIUM LACTATE, POTASSIUM CHLORIDE, CALCIUM CHLORIDE 600; 310; 30; 20 MG/100ML; MG/100ML; MG/100ML; MG/100ML
INJECTION, SOLUTION INTRAVENOUS CONTINUOUS
Status: DISCONTINUED | OUTPATIENT
Start: 2019-09-16 | End: 2019-09-16 | Stop reason: HOSPADM

## 2019-09-16 RX ORDER — HYDROMORPHONE HCL 110MG/55ML
0.5 PATIENT CONTROLLED ANALGESIA SYRINGE INTRAVENOUS EVERY 5 MIN PRN
Status: DISCONTINUED | OUTPATIENT
Start: 2019-09-16 | End: 2019-09-16 | Stop reason: HOSPADM

## 2019-09-16 RX ORDER — DEXAMETHASONE SODIUM PHOSPHATE 10 MG/ML
4 INJECTION INTRAMUSCULAR; INTRAVENOUS
Status: DISCONTINUED | OUTPATIENT
Start: 2019-09-16 | End: 2019-09-16 | Stop reason: HOSPADM

## 2019-09-16 RX ORDER — OXYCODONE HYDROCHLORIDE AND ACETAMINOPHEN 5; 325 MG/1; MG/1
2 TABLET ORAL PRN
Status: DISCONTINUED | OUTPATIENT
Start: 2019-09-16 | End: 2019-09-16 | Stop reason: HOSPADM

## 2019-09-16 RX ORDER — OXYCODONE HYDROCHLORIDE AND ACETAMINOPHEN 5; 325 MG/1; MG/1
1 TABLET ORAL PRN
Status: DISCONTINUED | OUTPATIENT
Start: 2019-09-16 | End: 2019-09-16 | Stop reason: HOSPADM

## 2019-09-16 RX ORDER — LIDOCAINE HYDROCHLORIDE 20 MG/ML
INJECTION, SOLUTION EPIDURAL; INFILTRATION; INTRACAUDAL; PERINEURAL PRN
Status: DISCONTINUED | OUTPATIENT
Start: 2019-09-16 | End: 2019-09-16 | Stop reason: SDUPTHER

## 2019-09-16 RX ADMIN — PROPOFOL 50 MG: 10 INJECTION, EMULSION INTRAVENOUS at 13:24

## 2019-09-16 RX ADMIN — PROPOFOL 50 MG: 10 INJECTION, EMULSION INTRAVENOUS at 13:20

## 2019-09-16 RX ADMIN — PROPOFOL 20 MG: 10 INJECTION, EMULSION INTRAVENOUS at 13:29

## 2019-09-16 RX ADMIN — PROPOFOL 20 MG: 10 INJECTION, EMULSION INTRAVENOUS at 13:31

## 2019-09-16 RX ADMIN — LIDOCAINE HYDROCHLORIDE 20 MG: 20 INJECTION, SOLUTION EPIDURAL; INFILTRATION; INTRACAUDAL; PERINEURAL at 13:20

## 2019-09-16 RX ADMIN — PROPOFOL 50 MG: 10 INJECTION, EMULSION INTRAVENOUS at 13:22

## 2019-09-16 RX ADMIN — SODIUM CHLORIDE, POTASSIUM CHLORIDE, SODIUM LACTATE AND CALCIUM CHLORIDE: 600; 310; 30; 20 INJECTION, SOLUTION INTRAVENOUS at 12:20

## 2019-09-16 RX ADMIN — PROPOFOL 50 MG: 10 INJECTION, EMULSION INTRAVENOUS at 13:21

## 2019-09-16 RX ADMIN — PROPOFOL 20 MG: 10 INJECTION, EMULSION INTRAVENOUS at 13:27

## 2019-09-16 RX ADMIN — PROPOFOL 20 MG: 10 INJECTION, EMULSION INTRAVENOUS at 13:26

## 2019-09-16 ASSESSMENT — PULMONARY FUNCTION TESTS
PIF_VALUE: 1

## 2019-09-16 ASSESSMENT — PAIN SCALES - GENERAL
PAINLEVEL_OUTOF10: 0

## 2019-09-16 ASSESSMENT — PAIN - FUNCTIONAL ASSESSMENT: PAIN_FUNCTIONAL_ASSESSMENT: 0-10

## 2019-09-16 NOTE — ANESTHESIA PRE PROCEDURE
tablet by mouth daily 5/20/19   Sergio Conde MD   blood glucose test strips (FREESTYLE LITE) strip 1 each by In Vitro route daily As needed. 5/20/19   Sergio Conde MD   fenofibrate 160 MG tablet Take 1 tablet by mouth daily 2/18/19   Peng Oconnor,    FREESTYLE LANCETS MISC 1 each by Does not apply route daily 11/12/18   Dagoberto Jaime MD   Insulin Syringe-Needle U-100 (INSULIN SYRINGE 1CC/31GX5/16\") 31G X 5/16\" 1 ML MISC 1 each by Does not apply route daily 11/12/18   Dagoberto Jaime MD   Diabetic Shoe MISC by Does not apply route 11/12/18   Dagoberto Jaime MD   glucose monitoring kit (FREESTYLE) monitoring kit 4 times daily 8/27/18   Rony Montes De Oca DO       Current medications:    No current facility-administered medications for this encounter. Current Outpatient Medications   Medication Sig Dispense Refill    lactulose (CEPHULAC) 20 g packet Take 1 packet by mouth 2 times daily 30 packet 2    omeprazole (PRILOSEC) 40 MG delayed release capsule Take 1 capsule by mouth daily 30 capsule 3    bisacodyl (DULCOLAX) 5 MG EC tablet TAKE 4 TABS AT 8 PM DAY PRIOR TO COLONOSCOPY 4 tablet 0    magnesium citrate solution Take 296 mLs by mouth once for 1 dose 296 mL 0    ondansetron (ZOFRAN) 4 MG tablet Take 1 tablet by mouth every 8 hours as needed for Nausea 20 tablet 0    dicyclomine (BENTYL) 20 MG tablet Take 1 tablet by mouth 3 times daily as needed (abd pain) 20 tablet 3    BASAGLAR KWIKPEN 100 UNIT/ML injection pen inject 25 units subcutaneously nightly 6 mL 3    RA PEN NEEDLES 31G X 5 MM MISC use three times a day 100 each 3    RA ALCOHOL SWABS 70 % PADS use as directed three times a day 100 each 0    ADMELOG 100 UNIT/ML injection vial SLIDING SCALE:150-200=2UNITS, 201-300=3UNITS,301-400=4UNITS, GREATER THAN 401=5UNITS TWICE DAILY.  15 mL 5    BD VEO INSULIN SYRINGE U/F 31G X 15/64\" 0.3 ML MISC use twice a day 100 each 5    atorvastatin (LIPITOR) 40 MG tablet Take 1 tablet by mouth daily 30 tablet 5    metFORMIN (GLUCOPHAGE) 500 MG tablet Take 1 tablet by mouth 2 times daily (with meals) 180 tablet 1    meloxicam (MOBIC) 15 MG tablet Take 1 tablet by mouth daily 30 tablet 3    blood glucose test strips (FREESTYLE LITE) strip 1 each by In Vitro route daily As needed.  100 each 3    fenofibrate 160 MG tablet Take 1 tablet by mouth daily 30 tablet 3    FREESTYLE LANCETS MISC 1 each by Does not apply route daily 100 each 3    Insulin Syringe-Needle U-100 (INSULIN SYRINGE 1CC/31GX5/16\") 31G X 5/16\" 1 ML MISC 1 each by Does not apply route daily 100 each 3    Diabetic Shoe MISC by Does not apply route 1 each 0    glucose monitoring kit (FREESTYLE) monitoring kit 4 times daily 1 kit 0       Allergies:  No Known Allergies    Problem List:    Patient Active Problem List   Diagnosis Code    Type 2 diabetes mellitus with diabetic polyneuropathy, with long-term current use of insulin ([facility]) E11.42, Z79.4    Depression F32.9    GERD with esophagitis K21.0    Type 2 diabetes mellitus with hyperglycemia, with long-term current use of insulin ([facility]) E11.65, Z79.4    Bipolar disorder, mixed ([facility]) F31.60    Cocaine abuse ([facility]) F14.10    Pneumonia due to infectious organism J18.9    Acute cystitis without hematuria N30.00    Tobacco use Z72.0    Sepsis due to other etiology ([facility] A41.89    High anion gap metabolic acidosis Z64.4    Hyponatremia E87.1    Diabetic ketoacidosis without coma ([facility]) E13.10    Respiratory alkalosis E87.3    Epigastric abdominal pain R10.13    Acute on chronic pancreatitis ([facility]) K85.90, K86.1       Past Medical History:        Diagnosis Date    Depression     Diabetes mellitus ([facility]     Hyperlipidemia     Pancreatitis     Pneumonia due to infectious organism 1/8/2017    [facility]        Past Surgical History:        Procedure Laterality Date    CARPAL TUNNEL RELEASE  2014    jerome carpal tunnel    CHOLECYSTECTOMY      ECTOPIC PREGNANCY SURGERY         Social NEGATIVE 04/11/2019    HCGQUANT <1 07/03/2019        ABGs: No results found for: PHART, PO2ART, KWI4AOX, FCN0YON, BEART, G8ARWOIG     Type & Screen (If Applicable):  No results found for: LABABO, [address]    Anesthesia Evaluation  Patient summary reviewed and Nursing notes reviewed  Airway: Mallampati: II  TM distance: >3 FB   Neck ROM: full  Mouth opening: > = 3 FB Dental: normal exam         Pulmonary:normal exam  breath sounds clear to auscultation                             Cardiovascular:  Exercise tolerance: good (>4 METS),           Rhythm: regular  Rate: normal                    Neuro/Psych:               GI/Hepatic/Renal:             Endo/Other:                     Abdominal:       Abdomen: soft. Vascular:                                        Anesthesia Plan      general and MAC     ASA 3       Induction: intravenous. MIPS: Postoperative opioids intended and Prophylactic antiemetics administered. Anesthetic plan and risks discussed with patient. Use of blood products discussed with patient whom consented to blood products. Plan discussed with attending and CRNA.     Attending anesthesiologist reviewed and agrees with Marla Zavala MD   9/16/2019

## 2019-09-16 NOTE — OP NOTE
endoscope was withdrawn and the mucosa was carefully inspected. The mucosal exam was normal. Random colon biopsies were obtained. Retroflexion was performed in the rectum and showed no pathology. RECOMMENDATIONS:   1) Follow up with referring provider, as previously scheduled. 2) Follow up on pathology report. 3) Follow up with me in office in 4-6 weeks.         Haseeb Peck MD [facility]

## 2019-09-16 NOTE — H&P
History and Physical Service   [facility]    HISTORY AND PHYSICAL EXAMINATION            Date of Evaluation: 9/16/2019  Patient name:  Oscar Peña  MRN:   [id number]  YOB: 1970  PCP:    Garo Tenorio MD    History Obtained From:     Patient, medical records     History of Present Illness: This is Oscar Peña a 47 y.o. female who presents today for colorectal cancer screening and EGD  by Dr. Louise Martin for colon screening and GERD. The patient c/o constipation with hard stool BM's every 5 days or so. She also has acid reflux with bloating and a weight loss of 20# the past month. She presents for testing She completed the prep as directed until watery yellow clear. +FH  colon cancer in father No previous colonoscopy. Today denies bloody tarry stools, diarrhea alternating with constipation, fever, chills, night sweats, pain or unexplained weight loss. No history of ulcers or hiatal hernia     +DM Last A1C 11.1 5/20/19   Today POC      Past Medical History:     Past Medical History:   Diagnosis Date    Depression     [facility] mellitus [facility]     Hyperlipidemia     Pancreatitis     Pneumonia due to infectious organism 1/8/2017    Substance abuse ([facility]         Past Surgical History:     Past Surgical History:   Procedure Laterality Date    CARPAL TUNNEL RELEASE  2014    jerome carpal tunnel    CHOLECYSTECTOMY      ECTOPIC PREGNANCY SURGERY          Medications Prior to Admission:     Prior to Admission medications    Medication Sig Start Date End Date Taking?  Authorizing Provider   lactulose (CEPHULAC) 20 g packet Take 1 packet by mouth 2 times daily 8/20/19  Yes Louise Martin MD   bisacodyl (DULCOLAX) 5 MG EC tablet TAKE 4 TABS AT 8 PM DAY PRIOR TO COLONOSCOPY 8/8/19  Yes Louise Martin MD   metFORMIN (GLUCOPHAGE) 500 MG tablet Take 1 tablet by mouth 2 times daily (with meals) 5/20/19  Yes Garo Tenorio MD   omeprazole (PRILOSEC) 40 MG delayed release capsule Take 1 capsule by mouth daily 8/8/19   Cyndee Villalobos MD   magnesium citrate solution Take 296 mLs by mouth once for 1 dose 8/8/19 8/8/19  Cyndee Villalobos MD   ondansetron (ZOFRAN) 4 MG tablet Take 1 tablet by mouth every 8 hours as needed for Nausea 7/4/19   Lacy Rose MD   dicyclomine (BENTYL) 20 MG tablet Take 1 tablet by mouth 3 times daily as needed (abd pain) 7/4/19   Lacy Rose MD   [facility] KWIKPEN 100 UNIT/ML injection pen inject 25 units subcutaneously nightly 6/2/19   Gagandeep Pinto MD RA PEN NEEDLES 31G X 5 MM MISC use three times a day 6/2/19   Gagandeep Pinto MD RA ALCOHOL SWABS 70 % PADS use as directed three times a day 6/2/19   Gagandeep Pinto MD   ADMELOG 100 UNIT/ML injection vial SLIDING SCALE:150-200=2UNITS, 201-300=3UNITS,301-400=4UNITS, GREATER THAN 401=5UNITS TWICE DAILY. 6/2/19   Gagandeep Pinto MD   BD VEO INSULIN SYRINGE U/F 31G X 15/64\" 0.3 ML MISC use twice a day 6/2/19   Gagandeep Pinto MD   atorvastatin (LIPITOR) 40 MG tablet Take 1 tablet by mouth daily 5/20/19   Gagandeep Pinto MD   meloxicam (MOBIC) 15 MG tablet Take 1 tablet by mouth daily 5/20/19   Gagandeep Pinto MD   blood glucose test strips (FREESTYLE LITE) strip 1 each by In Vitro route daily As needed. 5/20/19   Gagandeep Pinto MD   fenofibrate 160 MG tablet Take 1 tablet by mouth daily 2/18/19   Lestine Signs, DO   FREESTYLE LANCETS MISC 1 each by Does not apply route daily 11/12/18   Payton Catherine MD   Insulin Syringe-Needle U-100 (INSULIN SYRINGE 1CC/31GX5/16\") 31G X 5/16\" 1 ML MISC 1 each by Does not apply route daily 11/12/18   Payton Catherine MD   Diabetic Shoe MISC by Does not apply route 11/12/18   Payton Catherine MD   glucose monitoring kit (FREESTYLE) monitoring kit 4 times daily 8/27/18   Nini Flores DO        Allergies:     Patient has no known allergies. Social History:     Tobacco:    reports that she has been smoking cigarettes. She has a 7.50 pack-year smoking history.  She has never used smokeless tobacco.  Alcohol:      reports that she drinks alcohol. Drug Use:  reports that she has current or past drug history. Drug: Cocaine. Family History:     Family History   Problem Relation Age of Onset    Diabetes Mother     Coronary Art Dis Mother     Cancer Father         Colon    Diabetes Sister     Diabetes Brother     Bipolar Disorder Brother     Alcohol Abuse Brother     Substance Abuse Brother        Review of Systems:     Positive and Negative as described in HPI. CONSTITUTIONAL:  negative for fevers, chills, sweats, fatigue, weight loss with decreased appetite  HEENT:  negative for vision, hearing changes, runny nose, throat pain  RESPIRATORY: Trying to quit smoking   Down to 4 cigarettes/day negative for shortness of breath, cough, congestion, wheezing. CARDIOVASCULAR:  negative for chest pain, palpitations.   GASTROINTESTINAL: See HPI  negative for nausea, vomiting, diarrhea, abdominal pain +elevated LFT's monitored by GI specialist   GENITOURINARY:  negative for difficulty of urination, burning with urination, frequency   INTEGUMENT:  negative for rash, skin lesions, easy bruising   HEMATOLOGIC/LYMPHATIC:  negative for swelling/edema   ALLERGIC/IMMUNOLOGIC:  negative for urticaria , itching  ENDOCRINE: +DM sugars run high 200's-low 300's  A1C 11.1 On 5/20/19  negative increase in drinking, increase in urination, hot or cold intolerance  MUSCULOSKELETAL:  negative joint pains, muscle aches, swelling of joints  NEUROLOGICAL:  negative for headaches, dizziness, lightheadedness,+ neuropathy with severe numbness, pain, tingling lower extremities \"PCP stopped gabapentin   BEHAVIOR/PSYCH:  Gets anxious negative for depression, anxiety    Physical Exam:   BP (!) 95/55   Pulse 85   Temp 97.5 °F (36.4 °C) (Oral)   Resp 16   Ht 5' 4\" (1.626 m)   Wt 106 lb 0.7 oz (48.1 kg)   LMP 05/21/2018 (LMP Unknown)   SpO2 100%   BMI 18.20 kg/m²   Patient's last menstrual period was

## 2019-09-18 LAB — SURGICAL PATHOLOGY REPORT: NORMAL

## 2019-12-19 ENCOUNTER — HOSPITAL ENCOUNTER (EMERGENCY)
Age: 49
Discharge: HOME | End: 2019-12-20
Attending: EMERGENCY MEDICINE
Payer: MEDICAID

## 2019-12-19 VITALS
OXYGEN SATURATION: 100 % | HEART RATE: 96 BPM | HEIGHT: 64 IN | WEIGHT: 104 LBS | TEMPERATURE: 97 F | DIASTOLIC BLOOD PRESSURE: 81 MMHG | BODY MASS INDEX: 17.75 KG/M2 | SYSTOLIC BLOOD PRESSURE: 135 MMHG

## 2019-12-19 DIAGNOSIS — K08.89 PAIN, DENTAL: Primary | ICD-10-CM

## 2019-12-19 PROCEDURE — 99282 EMERGENCY DEPT VISIT SF MDM: CPT

## 2019-12-19 PROCEDURE — 64400 NJX AA&/STRD TRIGEMINAL NRV: CPT

## 2019-12-19 ASSESSMENT — PAIN DESCRIPTION - LOCATION: LOCATION: EAR;TEETH

## 2019-12-19 ASSESSMENT — PAIN DESCRIPTION - ORIENTATION: ORIENTATION: LEFT

## 2019-12-19 ASSESSMENT — PAIN SCALES - GENERAL: PAINLEVEL_OUTOF10: 9

## 2019-12-20 PROCEDURE — 6370000000 HC RX 637 (ALT 250 FOR IP): Performed by: EMERGENCY MEDICINE

## 2019-12-20 RX ORDER — PENICILLIN V POTASSIUM 500 MG/1
500 TABLET ORAL 4 TIMES DAILY
Qty: 28 TABLET | Refills: 0 | Status: SHIPPED | OUTPATIENT
Start: 2019-12-20 | End: 2019-12-27

## 2019-12-20 RX ORDER — IBUPROFEN 800 MG/1
800 TABLET ORAL ONCE
Status: COMPLETED | OUTPATIENT
Start: 2019-12-20 | End: 2019-12-20

## 2019-12-20 RX ORDER — PENICILLIN V POTASSIUM 250 MG/1
500 TABLET ORAL ONCE
Status: COMPLETED | OUTPATIENT
Start: 2019-12-20 | End: 2019-12-20

## 2019-12-20 RX ORDER — IBUPROFEN 800 MG/1
800 TABLET ORAL EVERY 8 HOURS PRN
Qty: 30 TABLET | Refills: 0 | Status: SHIPPED | OUTPATIENT
Start: 2019-12-20 | End: 2020-01-25

## 2019-12-20 RX ADMIN — IBUPROFEN 800 MG: 800 TABLET, FILM COATED ORAL at 00:19

## 2019-12-20 RX ADMIN — PENICILLIN V POTASSIUM 500 MG: 250 TABLET ORAL at 00:19

## 2019-12-20 ASSESSMENT — ENCOUNTER SYMPTOMS
VOICE CHANGE: 0
SORE THROAT: 0
BACK PAIN: 0
SHORTNESS OF BREATH: 0
TROUBLE SWALLOWING: 0
ABDOMINAL PAIN: 0
VOMITING: 0

## 2019-12-20 ASSESSMENT — PAIN SCALES - GENERAL: PAINLEVEL_OUTOF10: 9

## 2019-12-24 ENCOUNTER — TELEPHONE (OUTPATIENT)
Dept: FAMILY MEDICINE CLINIC | Age: 49
End: 2019-12-24

## 2020-01-25 ENCOUNTER — HOSPITAL ENCOUNTER (EMERGENCY)
Age: 50
Discharge: HOME | End: 2020-01-25
Attending: EMERGENCY MEDICINE
Payer: MEDICAID

## 2020-01-25 VITALS
SYSTOLIC BLOOD PRESSURE: 141 MMHG | OXYGEN SATURATION: 100 % | DIASTOLIC BLOOD PRESSURE: 87 MMHG | HEART RATE: 95 BPM | TEMPERATURE: 98.7 F | RESPIRATION RATE: 16 BRPM

## 2020-01-25 PROCEDURE — 99282 EMERGENCY DEPT VISIT SF MDM: CPT

## 2020-01-25 PROCEDURE — 6360000002 HC RX W HCPCS: Performed by: PHYSICIAN ASSISTANT

## 2020-01-25 PROCEDURE — 96372 THER/PROPH/DIAG INJ SC/IM: CPT

## 2020-01-25 RX ORDER — ACETAMINOPHEN 500 MG
500 TABLET ORAL EVERY 6 HOURS PRN
Qty: 20 TABLET | Refills: 0 | Status: SHIPPED | OUTPATIENT
Start: 2020-01-25 | End: 2020-01-30

## 2020-01-25 RX ORDER — KETOROLAC TROMETHAMINE 30 MG/ML
30 INJECTION, SOLUTION INTRAMUSCULAR; INTRAVENOUS ONCE
Status: COMPLETED | OUTPATIENT
Start: 2020-01-25 | End: 2020-01-25

## 2020-01-25 RX ORDER — PENICILLIN V POTASSIUM 500 MG/1
500 TABLET ORAL 4 TIMES DAILY
Qty: 28 TABLET | Refills: 0 | Status: SHIPPED | OUTPATIENT
Start: 2020-01-25 | End: 2020-01-30

## 2020-01-25 RX ORDER — IBUPROFEN 800 MG/1
800 TABLET ORAL EVERY 6 HOURS PRN
Qty: 20 TABLET | Refills: 0 | Status: SHIPPED | OUTPATIENT
Start: 2020-01-25 | End: 2020-01-30

## 2020-01-25 RX ADMIN — KETOROLAC TROMETHAMINE 30 MG: 30 INJECTION, SOLUTION INTRAMUSCULAR; INTRAVENOUS at 19:12

## 2020-01-25 ASSESSMENT — ENCOUNTER SYMPTOMS
VOMITING: 0
NAUSEA: 0
SORE THROAT: 0
BACK PAIN: 0
SHORTNESS OF BREATH: 0
COLOR CHANGE: 0
ABDOMINAL PAIN: 0
DIARRHEA: 0
COUGH: 0

## 2020-01-25 ASSESSMENT — PAIN DESCRIPTION - DESCRIPTORS: DESCRIPTORS: ACHING;THROBBING;SHARP

## 2020-01-25 ASSESSMENT — PAIN DESCRIPTION - PROGRESSION: CLINICAL_PROGRESSION: GRADUALLY WORSENING

## 2020-01-25 ASSESSMENT — PAIN DESCRIPTION - PAIN TYPE: TYPE: ACUTE PAIN

## 2020-01-25 ASSESSMENT — PAIN SCALES - GENERAL
PAINLEVEL_OUTOF10: 10
PAINLEVEL_OUTOF10: 10

## 2020-01-25 ASSESSMENT — PAIN DESCRIPTION - ONSET: ONSET: ON-GOING

## 2020-01-25 ASSESSMENT — PAIN DESCRIPTION - FREQUENCY: FREQUENCY: CONTINUOUS

## 2020-01-25 ASSESSMENT — PAIN DESCRIPTION - LOCATION: LOCATION: TEETH

## 2020-01-25 ASSESSMENT — PAIN DESCRIPTION - ORIENTATION: ORIENTATION: LEFT;LOWER;UPPER

## 2020-01-26 NOTE — ED PROVIDER NOTES
[facility]  ED  eMERGENCY dEPARTMENT eNCOUnter   Independent Attestation     Pt Name: Sierra Fragoso  MRN: [id number]  [place] 1970  Date of evaluation: 1/25/20       Sierra Fragoso is a 47 y.o. female who presents with Dental Pain      Vitals:   Vitals:    01/25/20 1859   BP: (!) 141/87   Pulse: 95   Resp: 16   Temp: 98.7 °F (37.1 °C)   TempSrc: Oral   SpO2: 100%       Impression:   1. Pain, dental          I was personally available for consultation in the Emergency Department. I have reviewed the chart and agree with the documentation as recorded by the [facility], including the assessment, treatment plan and disposition.     Maged Adams MD  Attending Emergency  Physician        Brisa Andrea MD  01/25/20 Kashmir Tang

## 2020-01-26 NOTE — ED PROVIDER NOTES
UPPER GASTROINTESTINAL ENDOSCOPY N/A 9/16/2019    EGD ESOPHAGOGASTRODUODENOSCOPY performed by Skylar Mercedes MD at [facility]       Discharge Medication List as of 1/25/2020  7:15 PM      CONTINUE these medications which have NOT CHANGED    Details   lactulose (CEPHULAC) 20 g packet Take 1 packet by mouth 2 times daily, Disp-30 packet, R-2Normal      omeprazole (PRILOSEC) 40 MG delayed release capsule Take 1 capsule by mouth daily, Disp-30 capsule, R-3Normal      bisacodyl (DULCOLAX) 5 MG EC tablet TAKE 4 TABS AT 8 PM DAY PRIOR TO COLONOSCOPY, Disp-4 tablet, R-0Normal      magnesium citrate solution Take 296 mLs by mouth once for 1 dose, Disp-296 mL, R-0Normal      ondansetron (ZOFRAN) 4 MG tablet Take 1 tablet by mouth every 8 hours as needed for Nausea, Disp-20 tablet, R-0Print      dicyclomine (BENTYL) 20 MG tablet Take 1 tablet by mouth 3 times daily as needed (abd pain), Disp-20 tablet, R-3Print      BASAGLAR KWIKPEN 100 UNIT/ML injection pen inject 25 units subcutaneously nightly, Disp-6 mL, R-3Normal      RA PEN NEEDLES 31G X 5 MM MISC Disp-100 each, R-3, Normal      RA ALCOHOL SWABS 70 % PADS Disp-100 each, R-0, Normaluse as directed three times a day      ADMELOG 100 UNIT/ML injection vial SLIDING SCALE:150-200=2UNITS, 201-300=3UNITS,301-400=4UNITS, GREATER THAN 401=5UNITS TWICE DAILY. , Disp-15 mL, R-5Normal      BD VEO INSULIN SYRINGE U/F 31G X 15/64\" 0.3 ML MISC use twice a day, Disp-100 each, R-5Normal      atorvastatin (LIPITOR) 40 MG tablet Take 1 tablet by mouth daily, Disp-30 tablet, R-5Normal      metFORMIN (GLUCOPHAGE) 500 MG tablet Take 1 tablet by mouth 2 times daily (with meals), Disp-180 tablet, R-1Normal      blood glucose test strips (FREESTYLE LITE) strip DAILY Starting Mon 5/20/2019, Disp-100 each, R-3, NormalAs needed.       fenofibrate 160 MG tablet Take 1 tablet by mouth daily, Disp-30 tablet, R-3Print      FREESTYLE LANCETS MISC DAILY Starting Mon 11/12/2018, Disp-100 each, R-3, Normal      Insulin Syringe-Needle U-100 (INSULIN SYRINGE 1CC/31GX5/16\") 31G X 5/16\" 1 ML MISC DAILY Starting Mon 11/12/2018, Disp-100 each, R-3, Normal      Diabetic Shoe MISC Starting Mon 11/12/2018, Disp-1 each, R-0, ZACK, Print      glucose monitoring kit (FREESTYLE) monitoring kit Disp-1 kit, R-0, Print4 times daily             ALLERGIES     Patient has no known allergies. Reviewed  FAMILY HISTORY           Problem Relation Age of Onset    Diabetes Mother     Coronary Art Dis Mother     Cancer Father         Colon    Diabetes Sister     Diabetes Brother     Bipolar Disorder Brother     Alcohol Abuse Brother     Substance Abuse Brother      Family Status   Relation Name Status    Mother  (Not Specified)    Father  (Not Specified)    Sister  (Not Specified)    Brother  (Not Specified)        SOCIAL HISTORY      reports that she has been smoking cigarettes. She has a 15.00 pack-year smoking history. She has never used smokeless tobacco. She reports current alcohol use. She reports current drug use. Drug: Cocaine. PHYSICAL EXAM    (up to 7 for level 4, 8 or more for level 5)     Vitals:    01/25/20 1859   BP: (!) 141/87   Pulse: 95   Resp: 16   Temp: 98.7 °F (37.1 °C)   TempSrc: Oral   SpO2: 100%       Physical Exam  Vitals signs and nursing note reviewed. Constitutional:       General: She is not in acute distress. Appearance: Normal appearance. She is normal weight. She is not ill-appearing, toxic-appearing or diaphoretic. HENT:      Head: Normocephalic and atraumatic. Nose: Nose normal.      Mouth/Throat:      Lips: Pink. Mouth: Mucous membranes are moist.      Dentition: Abnormal dentition. Does not have dentures. Dental tenderness and dental caries present. No gingival swelling, dental abscesses or gum lesions. Pharynx: Oropharynx is clear. Tonsils: No tonsillar exudate. Comments: No tongue elevation. No lip or tongue swelling.   No uvula

## 2020-01-28 ENCOUNTER — HOSPITAL ENCOUNTER (EMERGENCY)
Age: 50
Discharge: HOME | End: 2020-01-28
Attending: EMERGENCY MEDICINE
Payer: MEDICAID

## 2020-01-28 VITALS
BODY MASS INDEX: 17.75 KG/M2 | DIASTOLIC BLOOD PRESSURE: 133 MMHG | OXYGEN SATURATION: 99 % | HEART RATE: 105 BPM | TEMPERATURE: 97.5 F | SYSTOLIC BLOOD PRESSURE: 175 MMHG | RESPIRATION RATE: 20 BRPM | HEIGHT: 64 IN | WEIGHT: 104 LBS

## 2020-01-28 PROCEDURE — 96372 THER/PROPH/DIAG INJ SC/IM: CPT

## 2020-01-28 PROCEDURE — 64400 NJX AA&/STRD TRIGEMINAL NRV: CPT

## 2020-01-28 PROCEDURE — 99282 EMERGENCY DEPT VISIT SF MDM: CPT

## 2020-01-28 PROCEDURE — 6360000002 HC RX W HCPCS: Performed by: STUDENT IN AN ORGANIZED HEALTH CARE EDUCATION/TRAINING PROGRAM

## 2020-01-28 PROCEDURE — 2500000003 HC RX 250 WO HCPCS: Performed by: STUDENT IN AN ORGANIZED HEALTH CARE EDUCATION/TRAINING PROGRAM

## 2020-01-28 RX ORDER — KETOROLAC TROMETHAMINE 30 MG/ML
30 INJECTION, SOLUTION INTRAMUSCULAR; INTRAVENOUS ONCE
Status: COMPLETED | OUTPATIENT
Start: 2020-01-28 | End: 2020-01-28

## 2020-01-28 RX ORDER — BUPIVACAINE HYDROCHLORIDE 5 MG/ML
30 INJECTION, SOLUTION PERINEURAL ONCE
Status: COMPLETED | OUTPATIENT
Start: 2020-01-28 | End: 2020-01-28

## 2020-01-28 RX ADMIN — BUPIVACAINE HYDROCHLORIDE 150 MG: 5 INJECTION, SOLUTION PERINEURAL at 07:25

## 2020-01-28 RX ADMIN — KETOROLAC TROMETHAMINE 30 MG: 30 INJECTION, SOLUTION INTRAMUSCULAR; INTRAVENOUS at 08:10

## 2020-01-28 ASSESSMENT — PAIN SCALES - GENERAL
PAINLEVEL_OUTOF10: 10
PAINLEVEL_OUTOF10: 6
PAINLEVEL_OUTOF10: 10

## 2020-01-28 ASSESSMENT — PAIN DESCRIPTION - LOCATION: LOCATION: MOUTH

## 2020-01-28 ASSESSMENT — PAIN DESCRIPTION - FREQUENCY: FREQUENCY: CONTINUOUS

## 2020-01-28 ASSESSMENT — PAIN DESCRIPTION - ORIENTATION: ORIENTATION: LEFT

## 2020-01-28 ASSESSMENT — ENCOUNTER SYMPTOMS
VOICE CHANGE: 0
CHEST TIGHTNESS: 0
TROUBLE SWALLOWING: 0
ABDOMINAL PAIN: 0
FACIAL SWELLING: 1

## 2020-01-28 ASSESSMENT — PAIN DESCRIPTION - DESCRIPTORS: DESCRIPTORS: ACHING;SHARP

## 2020-01-28 ASSESSMENT — PAIN DESCRIPTION - PAIN TYPE: TYPE: ACUTE PAIN

## 2020-01-28 NOTE — ED PROVIDER NOTES
[facility]     Emergency Department     Faculty Attestation    I performed a history and physical examination of the patient and discussed management with the resident. I reviewed the residents note and agree with the documented findings and plan of care. Any areas of disagreement are noted on the chart. I was personally present for the key portions of any procedures. I have documented in the chart those procedures where I was not present during the key portions. I have reviewed the emergency nurses triage note. I agree with the chief complaint, past medical history, past surgical history, allergies, medications, social and family history as documented unless otherwise noted below. For Physician Assistant/ Nurse Practitioner cases/documentation I have personally evaluated this patient and have completed at least one if not all key elements of the E/M (history, physical exam, and MDM). Additional findings are as noted.       Primary Care Physician:  Donny Litten, MD    CHIEF COMPLAINT       Chief Complaint   Patient presents with    Dental Pain       RECENT VITALS:   Temp: 97.5 °F (36.4 °C),  Pulse: 105, Resp: 20, BP: (!) 175/133    LABS:  Labs Reviewed - No data to display      PERTINENT ATTENDING PHYSICIAN COMMENTS:    With dental pain history of cavities and poor dental care has not followed up with a dentist and she was seen she is been on penicillin for 3 days resident will do a dental block encourage her to continue her penicillin and strongly encouraged her to follow-up with a dentist    Britt Roy MD, [facility]  Attending Emergency  Physician              Lorena Mcnally MD  01/28/20 4405

## 2020-01-28 NOTE — ED NOTES
Pt to ED with c/o L sided dental pain for over a week. Pt reports she was seen here in the ED on Saturday. Pt reports they scheduled a dental appointment for her and she went but she waited too long so she left before seeing a dentist.   Pt reports she is not able to eat much and has not been sleeping because of the pain. Pt sitting on cot. No signs of distress noted at this time. Will continue to monitor.       Binta Hall RN  01/28/20 3848

## 2020-01-28 NOTE — ED PROVIDER NOTES
[facility] ED  Emergency Department Encounter  EmergencyMedicine Resident     Pt Name:Fang Pro  MRN: [id number]  [place] 1970  Date of evaluation: 1/28/20  PCP:  Rico Travis MD    [address] [place] [address]       Chief Complaint   Patient presents with    Dental Pain       HISTORY OF PRESENT ILLNESS  (Location/Symptom, Timing/Onset, Context/Setting, Quality, Duration, Modifying Factors, Severity.)      Jackquline Castleman is a 47 y.o. female who presents with going chronic dental pain. Patient was recently seen in the emergency department and discharged with a prescription for penicillin and instructions to follow-up in dental clinic. Patient went to dental clinic, left before being seen. Patient denies difficulty swallowing, voice changes, drooling. Denies fever chills headaches. Says that she is able to eat with opposite side of her mouth, no difficulty with liquids. Significant medical history of poorly compliant diabetes, takes insulin. Last blood sugar was in the 350s yesterday. Smokes about 5 cigarettes/day, denies alcohol, denies recreational drugs. PAST MEDICAL / SURGICAL / SOCIAL / FAMILY HISTORY      has a past medical history of Depression, Diabetes mellitus ([address] Hyperlipidemia, Pancreatitis, Pneumonia due to infectious organism, and Substance abuse ([facility] [address] has a past surgical history that includes Cholecystectomy; Ectopic pregnancy surgery; Carpal tunnel release (2014); Colonoscopy (N/A, 9/16/2019); and Upper gastrointestinal endoscopy (N/A, 9/16/2019).     Social History     Socioeconomic History    Marital status: Single     Spouse name: Not on file    Number of children: Not on file    Years of education: Not on file    Highest education level: Not on file   Occupational History    Not on file   Social Needs    Financial resource strain: Not on file    Food insecurity:     Worry: Not on file     Inability: Not on file    Transportation needs:     Medical: Not lactulose (CEPHULAC) 20 g packet Take 1 packet by mouth 2 times daily 8/20/19   Clau Salgado MD   omeprazole (PRILOSEC) 40 MG delayed release capsule Take 1 capsule by mouth daily 8/8/19   Clau Salgado MD   bisacodyl (DULCOLAX) 5 MG EC tablet TAKE 4 TABS AT 8 PM DAY PRIOR TO COLONOSCOPY 8/8/19   Clau Salgado MD   magnesium citrate solution Take 296 mLs by mouth once for 1 dose 8/8/19 8/8/19  Clau Salgado MD   ondansetron (ZOFRAN) 4 MG tablet Take 1 tablet by mouth every 8 hours as needed for Nausea 7/4/19   Lesly Olea MD   dicyclomine (BENTYL) 20 MG tablet Take 1 tablet by mouth 3 times daily as needed (abd pain) 7/4/19   MD Bryan Albright Okgerardo KWIKPEN 100 UNIT/ML injection pen inject 25 units subcutaneously nightly 6/2/19   Juan Dai MD RA PEN NEEDLES 31G X 5 MM MISC use three times a day 6/2/19   Juan Dai MD RA ALCOHOL SWABS 70 % PADS use as directed three times a day 6/2/19   Juan Dai MD   ADMELOG 100 UNIT/ML injection vial SLIDING SCALE:150-200=2UNITS, 201-300=3UNITS,301-400=4UNITS, GREATER THAN 401=5UNITS TWICE DAILY. 6/2/19   Juan Dai MD   BD VEO INSULIN SYRINGE U/F 31G X 15/64\" 0.3 ML MISC use twice a day 6/2/19   Juan Dai MD   atorvastatin (LIPITOR) 40 MG tablet Take 1 tablet by mouth daily 5/20/19   Juan Dai MD   metFORMIN (GLUCOPHAGE) 500 MG tablet Take 1 tablet by mouth 2 times daily (with meals) 5/20/19   Juan Dai MD   blood glucose test strips (FREESTYLE LITE) strip 1 each by In Vitro route daily As needed.  5/20/19   Juan Dai MD   fenofibrate 160 MG tablet Take 1 tablet by mouth daily 2/18/19   Rudolph Guzman, DO   FREESTYLE LANCETS MISC 1 each by Does not apply route daily 11/12/18   Sarah Arceo MD   Insulin Syringe-Needle U-100 (INSULIN SYRINGE 1CC/31GX5/16\") 31G X 5/16\" 1 ML MISC 1 each by Does not apply route daily 11/12/18   Sarah Arceo MD   Diabetic Shoe MISC by Does not apply route 11/12/18   Sarah Arceo,

## 2020-01-30 ENCOUNTER — HOSPITAL ENCOUNTER (EMERGENCY)
Age: 50
Discharge: HOME | End: 2020-01-30
Attending: EMERGENCY MEDICINE
Payer: MEDICAID

## 2020-01-30 VITALS
TEMPERATURE: 97.8 F | OXYGEN SATURATION: 99 % | HEART RATE: 98 BPM | WEIGHT: 104 LBS | SYSTOLIC BLOOD PRESSURE: 159 MMHG | RESPIRATION RATE: 15 BRPM | DIASTOLIC BLOOD PRESSURE: 80 MMHG | BODY MASS INDEX: 17.85 KG/M2

## 2020-01-30 LAB
GLUCOSE BLD-MCNC: 308 MG/DL (ref 65–105)
GLUCOSE BLD-MCNC: 408 MG/DL (ref 65–105)
GLUCOSE BLD-MCNC: 441 MG/DL (ref 65–105)

## 2020-01-30 PROCEDURE — 64450 NJX AA&/STRD OTHER PN/BRANCH: CPT

## 2020-01-30 PROCEDURE — 6360000002 HC RX W HCPCS: Performed by: PHYSICIAN ASSISTANT

## 2020-01-30 PROCEDURE — 99284 EMERGENCY DEPT VISIT MOD MDM: CPT

## 2020-01-30 PROCEDURE — 96374 THER/PROPH/DIAG INJ IV PUSH: CPT

## 2020-01-30 PROCEDURE — 6370000000 HC RX 637 (ALT 250 FOR IP): Performed by: PHYSICIAN ASSISTANT

## 2020-01-30 PROCEDURE — 2580000003 HC RX 258: Performed by: PHYSICIAN ASSISTANT

## 2020-01-30 PROCEDURE — 2500000003 HC RX 250 WO HCPCS: Performed by: PHYSICIAN ASSISTANT

## 2020-01-30 PROCEDURE — 82947 ASSAY GLUCOSE BLOOD QUANT: CPT

## 2020-01-30 PROCEDURE — 96372 THER/PROPH/DIAG INJ SC/IM: CPT

## 2020-01-30 RX ORDER — IBUPROFEN 800 MG/1
800 TABLET ORAL EVERY 8 HOURS PRN
Qty: 20 TABLET | Refills: 0 | Status: SHIPPED | OUTPATIENT
Start: 2020-01-30 | End: 2021-12-03 | Stop reason: CLARIF

## 2020-01-30 RX ORDER — 0.9 % SODIUM CHLORIDE 0.9 %
1000 INTRAVENOUS SOLUTION INTRAVENOUS ONCE
Status: COMPLETED | OUTPATIENT
Start: 2020-01-30 | End: 2020-01-30

## 2020-01-30 RX ORDER — 0.9 % SODIUM CHLORIDE 0.9 %
500 INTRAVENOUS SOLUTION INTRAVENOUS ONCE
Status: COMPLETED | OUTPATIENT
Start: 2020-01-30 | End: 2020-01-30

## 2020-01-30 RX ORDER — ACETAMINOPHEN 325 MG/1
650 TABLET ORAL EVERY 6 HOURS PRN
Qty: 30 TABLET | Refills: 0 | Status: SHIPPED | OUTPATIENT
Start: 2020-01-30 | End: 2021-12-03 | Stop reason: CLARIF

## 2020-01-30 RX ORDER — CLINDAMYCIN HYDROCHLORIDE 150 MG/1
300 CAPSULE ORAL 4 TIMES DAILY
Qty: 56 CAPSULE | Refills: 0 | Status: SHIPPED | OUTPATIENT
Start: 2020-01-30 | End: 2020-02-06

## 2020-01-30 RX ORDER — BUPIVACAINE HYDROCHLORIDE AND EPINEPHRINE 5; 5 MG/ML; UG/ML
1.8 INJECTION, SOLUTION PERINEURAL ONCE
Status: COMPLETED | OUTPATIENT
Start: 2020-01-30 | End: 2020-01-30

## 2020-01-30 RX ORDER — KETOROLAC TROMETHAMINE 15 MG/ML
15 INJECTION, SOLUTION INTRAMUSCULAR; INTRAVENOUS ONCE
Status: COMPLETED | OUTPATIENT
Start: 2020-01-30 | End: 2020-01-30

## 2020-01-30 RX ADMIN — INSULIN LISPRO 7 UNITS: 100 INJECTION, SOLUTION INTRAVENOUS; SUBCUTANEOUS at 13:17

## 2020-01-30 RX ADMIN — SODIUM CHLORIDE 500 ML: 0.9 INJECTION, SOLUTION INTRAVENOUS at 14:43

## 2020-01-30 RX ADMIN — SODIUM CHLORIDE 1000 ML: 9 INJECTION, SOLUTION INTRAVENOUS at 13:03

## 2020-01-30 RX ADMIN — KETOROLAC TROMETHAMINE 15 MG: 15 INJECTION, SOLUTION INTRAMUSCULAR; INTRAVENOUS at 13:36

## 2020-01-30 RX ADMIN — BUPIVACAINE HYDROCHLORIDE AND EPINEPHRINE BITARTRATE 1.8 ML: 5; .005 INJECTION, SOLUTION SUBCUTANEOUS at 13:04

## 2020-01-30 ASSESSMENT — ENCOUNTER SYMPTOMS
FACIAL SWELLING: 1
EYE ITCHING: 0
RHINORRHEA: 0
VOMITING: 0
NAUSEA: 0
COUGH: 0
EYE PAIN: 0
SORE THROAT: 0
BACK PAIN: 0
COLOR CHANGE: 0
WHEEZING: 0
EYE DISCHARGE: 0

## 2020-01-30 ASSESSMENT — PAIN SCALES - GENERAL
PAINLEVEL_OUTOF10: 10

## 2020-01-30 ASSESSMENT — PAIN DESCRIPTION - LOCATION: LOCATION: TEETH

## 2020-01-30 ASSESSMENT — PAIN DESCRIPTION - PAIN TYPE: TYPE: ACUTE PAIN

## 2020-01-30 ASSESSMENT — PAIN DESCRIPTION - DESCRIPTORS: DESCRIPTORS: DISCOMFORT

## 2020-01-30 NOTE — ED PROVIDER NOTES
the gumline. No trismus. No induration going into the neck. There are shotty cervical lymphadenopathy present  Eyes:      General: No scleral icterus. Right eye: No discharge. Left eye: No discharge. Neck:      Trachea: No tracheal deviation. Pulmonary:      Effort: Pulmonary effort is normal. No respiratory distress. Breath sounds: No stridor. Musculoskeletal: Normal range of motion. Skin:     General: Skin is warm and dry. Neurological:      Mental Status: She is alert and oriented to person, place, and time.       Coordination: Coordination normal.   Psychiatric:         Behavior: Behavior normal.           PLAN (LABS / IMAGING / EKG):  Orders Placed This Encounter   Procedures    POCT Glucose    POCT Glucose    POC Glucose Fingerstick    POCT Glucose    POC Glucose Fingerstick    POCT Glucose    POC Glucose Fingerstick    Insert peripheral IV       MEDICATIONS ORDERED:  Orders Placed This Encounter   Medications    bupivacaine-EPINEPHrine (MARCAINE-w/EPINEPHRINE) 0.5% -1:316310 injection 1.8 mL    insulin lispro (HUMALOG) injection vial 7 Units    0.9 % sodium chloride bolus    ketorolac (TORADOL) injection 15 mg    DISCONTD: insulin lispro (HUMALOG) injection vial 10 Units    0.9 % sodium chloride bolus    clindamycin (CLEOCIN) 150 MG capsule     Sig: Take 2 capsules by mouth 4 times daily for 7 days     Dispense:  56 capsule     Refill:  0    acetaminophen (TYLENOL) 325 MG tablet     Sig: Take 2 tablets by mouth every 6 hours as needed for Pain     Dispense:  30 tablet     Refill:  0    ibuprofen (ADVIL;MOTRIN) 800 MG tablet     Sig: Take 1 tablet by mouth every 8 hours as needed for Pain     Dispense:  20 tablet     Refill:  0    insulin glargine (BASAGLAR KWIKPEN) 100 UNIT/ML injection pen     Sig: Inject 25 Units into the skin nightly     Dispense:  2 pen     Refill:  0       Controlled Substances Monitoring:      DIAGNOSTIC RESULTS / EMERGENCY DEPARTMENT COURSE / MDM     With elevated blood glucose. Patient had eaten lunch just prior to arrival.  We will give patient insulin here, fluids which did bring her blood sugar down. We will switch her to clindamycin. Strongly encourage patient to follow-up with the dental center. No sign of Garland's angina. RADIOLOGY:   I directly visualized (with the attending physician) the following  imagesand reviewed the radiologist interpretations:  No results found. No orders to display       LABS:  Results for orders placed or performed during the hospital encounter of 01/30/20   POC Glucose Fingerstick   Result Value Ref Range    POC Glucose 441 (HH) 65 - 105 mg/dL   POC Glucose Fingerstick   Result Value Ref Range    POC Glucose 408 (HH) 65 - 105 mg/dL   POC Glucose Fingerstick   Result Value Ref Range    POC Glucose 308 (H) 65 - 105 mg/dL         CONSULTS:  None    PROCEDURES:  None    FINAL IMPRESSION      1. Dental abscess    2. Hyperglycemia          DISPOSITION / PLAN     DISPOSITION Decision To Discharge    PATIENT REFERRED TO:  Luca Christopher MD  [facility] [id number]  [phone]    Schedule an appointment as soon as possible for a visit       [address] [place] [id number]  [phone]    Schedule an appointment as soon as possible for a visit       Dunia Aguirre [facility] [address].  [address]  [place] [id number] [facility] [address] [address]  [phone]  Schedule an appointment as soon as possible for a visit         DISCHARGE MEDICATIONS:  Discharge Medication List as of 1/30/2020  3:47 PM      START taking these medications    Details   clindamycin (CLEOCIN) 150 MG capsule Take 2 capsules by mouth 4 times daily for 7 days, Disp-56 capsule, R-0Print      !! insulin glargine (BASAGLAR KWIKPEN) 100 UNIT/ML injection pen Inject 25 Units into the skin nightly, Disp-2 pen, R-0Print       !! - Potential duplicate medications found. Please discuss with provider.           Seven Hyman PA-C

## 2020-01-30 NOTE — ED PROVIDER NOTES
obvious abscess noted. But tenderness along the mandible, with induration noted. Patient does manifest significant pain with palpation. Her posterior pharynx is nonerythematous nonedematous, no swelling to the sublingual space, no swelling extending into the neck externally. Patient is awake and talking speaking in full sentences in no distress    Hyperglycemia. We will plan on insulin coverage, IV fluids. Will switch from penicillin to clindamycin.   Patient needs follow-up with dentist.  Mar Perez was provided by PARAMJIT Winkler D.O, M.P.H  Attending Emergency Medicine Physician         Richie Winkler DO  01/30/20 6085

## 2020-01-30 NOTE — ED NOTES
[facility] notified that pt is medically cleared w/ solid understanding.  RC states it may be 45 mins before they arrive to  pt     Bryan Mansfield RN  01/30/20 5179

## 2020-01-30 NOTE — PROGRESS NOTES
Social Work    Met with patient at bedside. She reported that she is presently at [facility] since yesterday to get back on her depression medication. She is not homeless presently, has been staying with her sister but her sister works all day. SW discussed medical cab through her insurance and she is aware. SW did provide her with the phone number. SW inquired about previous dental appt and she stated she had no transportation to get there and really wants an appt scheduled for her again. She did report that she has been off of all of her meds for about a yr. She stated that she is still having a lot of dental pain presently. Patient to get rescheduled at [address] also provided lists of dentists covered under [employer].

## 2020-01-31 ENCOUNTER — HOSPITAL ENCOUNTER (EMERGENCY)
Age: 50
Discharge: HOME | End: 2020-01-31
Attending: EMERGENCY MEDICINE
Payer: MEDICAID

## 2020-01-31 ENCOUNTER — TELEPHONE (OUTPATIENT)
Dept: FAMILY MEDICINE CLINIC | Age: 50
End: 2020-01-31

## 2020-01-31 VITALS
TEMPERATURE: 97.3 F | DIASTOLIC BLOOD PRESSURE: 91 MMHG | SYSTOLIC BLOOD PRESSURE: 176 MMHG | HEIGHT: 64 IN | OXYGEN SATURATION: 99 % | BODY MASS INDEX: 19.63 KG/M2 | RESPIRATION RATE: 17 BRPM | WEIGHT: 115 LBS | HEART RATE: 97 BPM

## 2020-01-31 LAB
ABSOLUTE EOS #: 0.16 K/UL (ref 0–0.44)
ABSOLUTE IMMATURE GRANULOCYTE: 0.03 K/UL (ref 0–0.3)
ABSOLUTE LYMPH #: 1.26 K/UL (ref 1.1–3.7)
ABSOLUTE MONO #: 0.51 K/UL (ref 0.1–1.2)
ALLEN TEST: ABNORMAL
ANION GAP SERPL CALCULATED.3IONS-SCNC: 14 MMOL/L (ref 9–17)
BASOPHILS # BLD: 0 % (ref 0–2)
BASOPHILS ABSOLUTE: <0.03 K/UL (ref 0–0.2)
BUN BLDV-MCNC: 11 MG/DL (ref 6–20)
BUN/CREAT BLD: ABNORMAL (ref 9–20)
CALCIUM SERPL-MCNC: 9.4 MG/DL (ref 8.6–10.4)
CHLORIDE BLD-SCNC: 96 MMOL/L (ref 98–107)
CHP ED QC CHECK: YES
CO2: 24 MMOL/L (ref 20–31)
CREAT SERPL-MCNC: 0.42 MG/DL (ref 0.5–0.9)
DIFFERENTIAL TYPE: ABNORMAL
EOSINOPHILS RELATIVE PERCENT: 3 % (ref 1–4)
FIO2: ABNORMAL
GFR AFRICAN AMERICAN: >60 ML/MIN
GFR NON-AFRICAN AMERICAN: >60 ML/MIN
GFR SERPL CREATININE-BSD FRML MDRD: ABNORMAL ML/MIN/{1.73_M2}
GFR SERPL CREATININE-BSD FRML MDRD: ABNORMAL ML/MIN/{1.73_M2}
GLUCOSE BLD-MCNC: 276 MG/DL
GLUCOSE BLD-MCNC: 276 MG/DL (ref 65–105)
GLUCOSE BLD-MCNC: 409 MG/DL (ref 65–105)
GLUCOSE BLD-MCNC: 420 MG/DL (ref 70–99)
HCO3 VENOUS: 24.8 MMOL/L (ref 22–29)
HCT VFR BLD CALC: 35 % (ref 36.3–47.1)
HEMOGLOBIN: 12 G/DL (ref 11.9–15.1)
IMMATURE GRANULOCYTES: 1 %
LYMPHOCYTES # BLD: 22 % (ref 24–43)
MCH RBC QN AUTO: 28.6 PG (ref 25.2–33.5)
MCHC RBC AUTO-ENTMCNC: 34.3 G/DL (ref 28.4–34.8)
MCV RBC AUTO: 83.3 FL (ref 82.6–102.9)
MODE: ABNORMAL
MONOCYTES # BLD: 9 % (ref 3–12)
NEGATIVE BASE EXCESS, VEN: ABNORMAL (ref 0–2)
NRBC AUTOMATED: 0 PER 100 WBC
O2 DEVICE/FLOW/%: ABNORMAL
O2 SAT, VEN: 96 % (ref 60–85)
PATIENT TEMP: ABNORMAL
PCO2, VEN: 33.8 MM HG (ref 41–51)
PDW BLD-RTO: 13.1 % (ref 11.8–14.4)
PH VENOUS: 7.47 (ref 7.32–7.43)
PLATELET # BLD: 218 K/UL (ref 138–453)
PLATELET ESTIMATE: ABNORMAL
PMV BLD AUTO: 9.2 FL (ref 8.1–13.5)
PO2, VEN: 74.6 MM HG (ref 30–50)
POC PCO2 TEMP: ABNORMAL MM HG
POC PH TEMP: ABNORMAL
POC PO2 TEMP: ABNORMAL MM HG
POSITIVE BASE EXCESS, VEN: 2 (ref 0–3)
POTASSIUM SERPL-SCNC: 4.4 MMOL/L (ref 3.7–5.3)
RBC # BLD: 4.2 M/UL (ref 3.95–5.11)
RBC # BLD: ABNORMAL 10*6/UL
SAMPLE SITE: ABNORMAL
SEG NEUTROPHILS: 65 % (ref 36–65)
SEGMENTED NEUTROPHILS ABSOLUTE COUNT: 3.65 K/UL (ref 1.5–8.1)
SODIUM BLD-SCNC: 134 MMOL/L (ref 135–144)
TOTAL CO2, VENOUS: 26 MMOL/L (ref 23–30)
WBC # BLD: 5.6 K/UL (ref 3.5–11.3)
WBC # BLD: ABNORMAL 10*3/UL

## 2020-01-31 PROCEDURE — 85025 COMPLETE CBC W/AUTO DIFF WBC: CPT

## 2020-01-31 PROCEDURE — 64400 NJX AA&/STRD TRIGEMINAL NRV: CPT

## 2020-01-31 PROCEDURE — 96372 THER/PROPH/DIAG INJ SC/IM: CPT

## 2020-01-31 PROCEDURE — 82803 BLOOD GASES ANY COMBINATION: CPT

## 2020-01-31 PROCEDURE — 6370000000 HC RX 637 (ALT 250 FOR IP): Performed by: STUDENT IN AN ORGANIZED HEALTH CARE EDUCATION/TRAINING PROGRAM

## 2020-01-31 PROCEDURE — 6360000002 HC RX W HCPCS: Performed by: STUDENT IN AN ORGANIZED HEALTH CARE EDUCATION/TRAINING PROGRAM

## 2020-01-31 PROCEDURE — 82947 ASSAY GLUCOSE BLOOD QUANT: CPT

## 2020-01-31 PROCEDURE — 99284 EMERGENCY DEPT VISIT MOD MDM: CPT

## 2020-01-31 PROCEDURE — 80048 BASIC METABOLIC PNL TOTAL CA: CPT

## 2020-01-31 PROCEDURE — 2580000003 HC RX 258: Performed by: STUDENT IN AN ORGANIZED HEALTH CARE EDUCATION/TRAINING PROGRAM

## 2020-01-31 PROCEDURE — 96374 THER/PROPH/DIAG INJ IV PUSH: CPT

## 2020-01-31 RX ORDER — HYDROCODONE BITARTRATE AND ACETAMINOPHEN 5; 325 MG/1; MG/1
1 TABLET ORAL EVERY 6 HOURS PRN
Qty: 10 TABLET | Refills: 0 | Status: SHIPPED | OUTPATIENT
Start: 2020-01-31 | End: 2020-02-03

## 2020-01-31 RX ORDER — ONDANSETRON 2 MG/ML
4 INJECTION INTRAMUSCULAR; INTRAVENOUS ONCE
Status: COMPLETED | OUTPATIENT
Start: 2020-01-31 | End: 2020-01-31

## 2020-01-31 RX ORDER — CLINDAMYCIN HYDROCHLORIDE 150 MG/1
300 CAPSULE ORAL ONCE
Status: COMPLETED | OUTPATIENT
Start: 2020-01-31 | End: 2020-01-31

## 2020-01-31 RX ORDER — FENTANYL CITRATE 50 UG/ML
25 INJECTION, SOLUTION INTRAMUSCULAR; INTRAVENOUS ONCE
Status: COMPLETED | OUTPATIENT
Start: 2020-01-31 | End: 2020-01-31

## 2020-01-31 RX ORDER — 0.9 % SODIUM CHLORIDE 0.9 %
1000 INTRAVENOUS SOLUTION INTRAVENOUS ONCE
Status: COMPLETED | OUTPATIENT
Start: 2020-01-31 | End: 2020-01-31

## 2020-01-31 RX ADMIN — INSULIN LISPRO 5 UNITS: 100 INJECTION, SOLUTION INTRAVENOUS; SUBCUTANEOUS at 20:27

## 2020-01-31 RX ADMIN — SODIUM CHLORIDE 1000 ML: 9 INJECTION, SOLUTION INTRAVENOUS at 19:03

## 2020-01-31 RX ADMIN — FENTANYL CITRATE 25 MCG: 50 INJECTION, SOLUTION INTRAMUSCULAR; INTRAVENOUS at 19:03

## 2020-01-31 RX ADMIN — CLINDAMYCIN HYDROCHLORIDE 300 MG: 150 CAPSULE ORAL at 21:29

## 2020-01-31 RX ADMIN — ONDANSETRON 4 MG: 2 INJECTION INTRAMUSCULAR; INTRAVENOUS at 21:29

## 2020-01-31 ASSESSMENT — PAIN DESCRIPTION - ORIENTATION: ORIENTATION: LEFT

## 2020-01-31 ASSESSMENT — PAIN SCALES - GENERAL
PAINLEVEL_OUTOF10: 10
PAINLEVEL_OUTOF10: 9

## 2020-01-31 ASSESSMENT — PAIN DESCRIPTION - LOCATION: LOCATION: JAW

## 2020-01-31 ASSESSMENT — PAIN DESCRIPTION - FREQUENCY: FREQUENCY: CONTINUOUS

## 2020-01-31 ASSESSMENT — PAIN DESCRIPTION - DESCRIPTORS: DESCRIPTORS: PENETRATING;THROBBING

## 2020-02-01 NOTE — ED PROVIDER NOTES
[facility]  ED  Emergency Department Encounter  EmergencyMedicine Resident     Pt Name:Fang Noble  MRN: [id number]  [place] 1970  Date of evaluation: 2/1/20  PCP:  Jan Romero MD    [address] [place] [address]          Chief Complaint   Patient presents with    Jaw Swelling    Hyperglycemia       HISTORY OF PRESENT ILLNESS  (Location/Symptom, Timing/Onset, Context/Setting, Quality, Duration, Modifying Factors, Severity.)       Nic Mckinley is a 47 y.o. female who presents with sided jaw swelling and hyperglycemia. Patient has been seen multiple times in the past several days, for known dental caries, patient is a known diabetic. She reports that she did take insulin this morning, but has not had any since then. Denies fevers or chills. She missed her initial dental appointment, and does not know when she will be able to get with another one. She denies fevers or chills, she states she has pain with eating on that side, but no difficulty swallowing, no shortness of breath. She states that the swelling is stable to yesterday. Any new symptoms, but rescue where she is currently staying center back for medical clearance. Denies current IV drug abuse, but is recovering cocaine addict. PAST MEDICAL / SURGICAL / SOCIAL / FAMILY HISTORY       has a past medical history of Depression, Diabetes mellitus ([address] Hyperlipidemia, Pancreatitis, Pneumonia due to infectious organism, and Substance abuse ([address] has a past surgical history that includes Cholecystectomy; Ectopic pregnancy surgery; Carpal tunnel release (2014); Colonoscopy (N/A, 9/16/2019); and Upper gastrointestinal endoscopy (N/A, 9/16/2019).        Social History     Socioeconomic History    Marital status: Single     Spouse name: Not on file    Number of children: Not on file    Years of education: Not on file    Highest education level: Not on file   Occupational History    Not on file   Social Needs    Financial resource strain: Not on file    Food insecurity:     Worry: Not on file     Inability: Not on file    Transportation needs:     Medical: Not on file     Non-medical: Not on file   Tobacco Use    Smoking status: Current Every Day Smoker     Packs/day: 0.50     Years: 30.00     Pack years: 15.00     Types: Cigarettes    Smokeless tobacco: Never Used    Tobacco comment: 0-6 cigarettes per day   Substance and Sexual Activity    Alcohol use: Yes     Comment: Rare    Drug use: Yes     Types: Cocaine     Comment: 12/5/16 relapsed    Sexual activity: Yes     Partners: Male   Lifestyle    Physical activity:     Days per week: Not on file     Minutes per session: Not on file    Stress: Not on file   Relationships    Social connections:     Talks on phone: Not on file     Gets together: Not on file     Attends Alevism service: Not on file     Active member of club or organization: Not on file     Attends meetings of clubs or organizations: Not on file     Relationship status: Not on file    Intimate partner violence:     Fear of current or ex partner: Not on file     Emotionally abused: Not on file     Physically abused: Not on file     Forced sexual activity: Not on file   Other Topics Concern    Not on file   Social History Narrative    Not on file          Family History   Problem Relation Age of Onset    Diabetes Mother     Coronary Art Dis Mother     Cancer Father         Colon    Diabetes Sister     Diabetes Brother     Bipolar Disorder Brother     Alcohol Abuse Brother     Substance Abuse Brother        Allergies:  Patient has no known allergies. Home Medications:     Prior to Admission medications    Medication Sig Start Date End Date Taking? Authorizing Provider   HYDROcodone-acetaminophen (NORCO) 5-325 MG per tablet Take 1 tablet by mouth every 6 hours as needed for Pain for up to 3 days.  1/31/20 2/3/20 Yes Ankit Cruz MD   clindamycin (CLEOCIN) 150 MG capsule Take 2 capsules by mouth Vitro route daily As needed. 5/20/19   Calli Stratton MD   fenofibrate 160 MG tablet Take 1 tablet by mouth daily 2/18/19   Henry Foster, DO   FREESTYLE LANCETS MISC 1 each by Does not apply route daily 11/12/18   Julian Bean MD   Insulin Syringe-Needle U-100 (INSULIN SYRINGE 1CC/31GX5/16\") 31G X 5/16\" 1 ML MISC 1 each by Does not apply route daily 11/12/18   Julian Bean MD   Diabetic Shoe MISC by Does not apply route 11/12/18   Julian Bean MD   glucose monitoring kit (FREESTYLE) monitoring kit 4 times daily 8/27/18   Lisa Haines, DO       REVIEW OF SYSTEMS    (2-9 systems for level 4, 10 or more for level 5)       Review of Systems   Constitutional: Negative for chills and fever. HENT: Positive for dental problem and facial swelling. Negative for congestion and rhinorrhea. Eyes: Negative for photophobia and visual disturbance. Respiratory: Negative for cough and shortness of breath. Cardiovascular: Negative for chest pain. Gastrointestinal: Negative for abdominal pain, nausea and vomiting. Genitourinary: Negative for decreased urine volume and urgency. Musculoskeletal: Negative for back pain and gait problem. Skin: Negative for rash. Neurological: Negative for light-headedness and headaches. PHYSICAL EXAM   (up to 7 for level 4, 8 or more for level 5)      INITIAL VITALS:    BP (!) 176/91   Pulse 97   Temp 97.3 °F (36.3 °C) (Oral)   Resp 17   Ht 5' 4\" (1.626 m)   Wt 115 lb (52.2 kg)   LMP 05/21/2018 (LMP Unknown)   SpO2 99%   BMI 19.74 kg/m²      Physical Exam  Vitals signs and nursing note reviewed. Constitutional:       General: She is not in acute distress. Appearance: She is normal weight. She is not toxic-appearing. HENT:      Head: Normocephalic and atraumatic.       Comments: Left sided of lower jaw is moderately swollen, there is no trismus, multiple dental caries on left lower molars, uvula is midline, no submandibular or submental fullness, no areas of fluctuance     Nose: Nose normal. No congestion. Mouth/Throat:      Mouth: Mucous membranes are moist.      Pharynx: No oropharyngeal exudate. Eyes:      Extraocular Movements: Extraocular movements intact. Conjunctiva/sclera: Conjunctivae normal.   Neck:      Musculoskeletal: Normal range of motion. No neck rigidity. Cardiovascular:      Rate and Rhythm: Normal rate. Heart sounds: No murmur. Pulmonary:      Effort: Pulmonary effort is normal.   Abdominal:      General: Abdomen is flat. There is no distension. Skin:     General: Skin is warm. Capillary Refill: Capillary refill takes less than 2 seconds. Neurological:      General: No focal deficit present. Mental Status: She is alert and oriented to person, place, and time. Cranial Nerves: No cranial nerve deficit. DIFFERENTIAL  DIAGNOSIS     PLAN (LABS / IMAGING / EKG):     Orders Placed This Encounter   Procedures    CBC Auto Differential    Basic Metabolic Panel    POC Glucose Fingerstick    POC Blood Gas and Chemistry    Venous Blood Gas, POC    POCT Glucose    POC Glucose Fingerstick       MEDICATIONS ORDERED:     Orders Placed This Encounter   Medications    0.9 % sodium chloride bolus    fentaNYL (SUBLIMAZE) injection 25 mcg    insulin lispro (HUMALOG) injection vial 5 Units    clindamycin (CLEOCIN) capsule 300 mg    HYDROcodone-acetaminophen (NORCO) 5-325 MG per tablet     Sig: Take 1 tablet by mouth every 6 hours as needed for Pain for up to 3 days.      Dispense:  10 tablet     Refill:  0    ondansetron (ZOFRAN) injection 4 mg       DDX: dental caries, dental infxn, doubt abscess, doubt ludwigs at this time    DIAGNOSTIC RESULTS / EMERGENCY DEPARTMENT COURSE / MDM     LABS:     Results for orders placed or performed during the hospital encounter of 01/31/20   CBC Auto Differential   Result Value Ref Range    WBC 5.6 3.5 - 11.3 k/uL    RBC 4.20 3.95 - 5.11 m/uL    Hemoglobin 12.0 11.9 - 15.1 g/dL Hematocrit 35.0 (L) 36.3 - 47.1 %    MCV 83.3 82.6 - 102.9 fL    MCH 28.6 25.2 - 33.5 pg    MCHC 34.3 28.4 - 34.8 g/dL    RDW 13.1 11.8 - 14.4 %    Platelets 483 308 - 059 k/uL    MPV 9.2 8.1 - 13.5 fL    NRBC Automated 0.0 0.0 per 100 WBC    Differential Type NOT REPORTED     Seg Neutrophils 65 36 - 65 %    Lymphocytes 22 (L) 24 - 43 %    Monocytes 9 3 - 12 %    Eosinophils % 3 1 - 4 %    Basophils 0 0 - 2 %    Immature Granulocytes 1 (H) 0 %    Segs Absolute 3.65 1.50 - 8.10 k/uL    Absolute Lymph # 1.26 1.10 - 3.70 k/uL    Absolute Mono # 0.51 0.10 - 1.20 k/uL    Absolute Eos # 0.16 0.00 - 0.44 k/uL    Basophils Absolute <0.03 0.00 - 0.20 k/uL    Absolute Immature Granulocyte 0.03 0.00 - 0.30 k/uL    WBC Morphology NOT REPORTED     RBC Morphology NOT REPORTED     Platelet Estimate NOT REPORTED    Basic Metabolic Panel   Result Value Ref Range    Glucose 420 (HH) 70 - 99 mg/dL    BUN 11 6 - 20 mg/dL    CREATININE 0.42 (L) 0.50 - 0.90 mg/dL    Bun/Cre Ratio NOT REPORTED 9 - 20    Calcium 9.4 8.6 - 10.4 mg/dL    Sodium 134 (L) 135 - 144 mmol/L    Potassium 4.4 3.7 - 5.3 mmol/L    Chloride 96 (L) 98 - 107 mmol/L    CO2 24 20 - 31 mmol/L    Anion Gap 14 9 - 17 mmol/L    GFR Non-African American >60 >60 mL/min    GFR African American >60 >60 mL/min    GFR Comment          GFR Staging NOT REPORTED    POC Glucose Fingerstick   Result Value Ref Range    POC Glucose 409 (HH) 65 - 105 mg/dL   Venous Blood Gas, POC   Result Value Ref Range    pH, Sony 7.474 (H) 7.320 - 7.430    pCO2, Sony 33.8 (L) 41.0 - 51.0 mm Hg    pO2, Sony 74.6 (H) 30.0 - 50.0 mm Hg    HCO3, Venous 24.8 22.0 - 29.0 mmol/L    Total CO2, Venous 26 23.0 - 30.0 mmol/L    Negative Base Excess, Sony NOT REPORTED 0.0 - 2.0    Positive Base Excess, Sony 2 0.0 - 3.0    O2 Sat, Sony 96 (H) 60.0 - 85.0 %    O2 Device/Flow/% NOT REPORTED     Nura Test NOT REPORTED     Sample Site NOT REPORTED     Mode NOT REPORTED     FIO2 NOT REPORTED     Pt Temp NOT REPORTED POC pH Temp NOT REPORTED     POC pCO2 Temp NOT REPORTED mm Hg    POC pO2 Temp NOT REPORTED mm Hg   POCT Glucose   Result Value Ref Range    Glucose 276 mg/dL    QC OK? yes    POC Glucose Fingerstick   Result Value Ref Range    POC Glucose 276 (H) 65 - 105 mg/dL       RADIOLOGY:   No results found. [facility]/EMERGENCY DEPARTMENT COURSE:  115 PM: 2year-old female presenting with left-sided jaw swelling and hyperglycemia. On review of records, patient has been seen several days in a row for similar symptoms. She was initially on a penicillin, and then was changed over to clindamycin 24 hours ago. Patient did not take her antibiotic earlier today, will give a dose in the emergency department. We will also obtain screening labs to evaluate for presence of DKA, give fluids, and subcutaneous insulin, analgesia and reassess. ED Course as of Feb 01 1515 Fri Jan 31, 2020 1929 Patient not in DKA. [AF]   [address] with Dr. Yamilka Kuhn, patient to call 8 am Monday.    [AF]      ED Course User Index  [AF] Thierno Piña MD       Prior to discharge patient requested a dental block. Please see procedure note for further details. I personally discussed with the patient that she needed to follow-up with oral surgery on Monday. Paperwork is faxed to their office, patient was provided with their phone number to be able to attend an appointment with them. Discussed specific return precautions, importance of taking antibiotics to prevent any worsening of the infection, she is agreeable with this plan. Social work was involved arrange for a ride for patient home.         PROCEDURES:  PROCEDURE NOTE - DENTAL BLOCK    PATIENT NAME: [facility]  MEDICAL RECORD NO. [id number]  DATE: 2/2/2020  ATTENDING PHYSICIAN: Mohamud Suresh    PREOPERATIVE DIAGNOSIS:  Dental Pain  POSTOPERATIVE DIAGNOSIS:  Same  PROCEDURE PERFORMED:  left inferior alveolar block  PERFORMING PHYSICIAN: Thierno Piña MD    DISCUSSION:  [facility]

## 2020-02-01 NOTE — ED PROVIDER NOTES
[facility]     Emergency Department     Faculty Attestation    I performed a history and physical examination of the patient and discussed management with the resident. I reviewed the residents note and agree with the documented findings and plan of care. Any areas of disagreement are noted on the chart. I was personally present for the key portions of any procedures. I have documented in the chart those procedures where I was not present during the key portions. I have reviewed the emergency nurses triage note. I agree with the chief complaint, past medical history, past surgical history, allergies, medications, social and family history as documented unless otherwise noted below. For Physician Assistant/ Nurse Practitioner cases/documentation I have personally evaluated this patient and have completed at least one if not all key elements of the E/M (history, physical exam, and MDM). Additional findings are as noted. I have personally seen and evaluated the patient. I find the patient's history and physical exam are consistent with the NP/PA documentation. I agree with the care provided, treatment rendered, disposition and follow-up plan. Still swelling of the left lower jaw without signs of cellulitis likely a dental abscess that requires the intervention of a dentist currently on antibiotic therapy      Critical Care     Aleida Judge M.D.   Attending Emergency  Physician             Betty Ding MD  01/31/20 5806

## 2020-02-02 ASSESSMENT — ENCOUNTER SYMPTOMS
RHINORRHEA: 0
FACIAL SWELLING: 1
NAUSEA: 0
SHORTNESS OF BREATH: 0
BACK PAIN: 0
PHOTOPHOBIA: 0
COUGH: 0
VOMITING: 0
ABDOMINAL PAIN: 0

## 2020-02-03 ENCOUNTER — CARE COORDINATION (OUTPATIENT)
Dept: CARE COORDINATION | Age: 50
End: 2020-02-03

## 2020-02-18 ENCOUNTER — OFFICE VISIT (OUTPATIENT)
Dept: FAMILY MEDICINE CLINIC | Age: 50
End: 2020-02-18
Payer: MEDICAID

## 2020-02-18 VITALS
DIASTOLIC BLOOD PRESSURE: 60 MMHG | BODY MASS INDEX: 20.05 KG/M2 | SYSTOLIC BLOOD PRESSURE: 93 MMHG | WEIGHT: 116.8 LBS | HEART RATE: 86 BPM | TEMPERATURE: 98.7 F

## 2020-02-18 LAB — HBA1C MFR BLD: 10 %

## 2020-02-18 PROCEDURE — 3046F HEMOGLOBIN A1C LEVEL >9.0%: CPT | Performed by: STUDENT IN AN ORGANIZED HEALTH CARE EDUCATION/TRAINING PROGRAM

## 2020-02-18 PROCEDURE — 2022F DILAT RTA XM EVC RTNOPTHY: CPT | Performed by: STUDENT IN AN ORGANIZED HEALTH CARE EDUCATION/TRAINING PROGRAM

## 2020-02-18 PROCEDURE — G8427 DOCREV CUR MEDS BY ELIG CLIN: HCPCS | Performed by: STUDENT IN AN ORGANIZED HEALTH CARE EDUCATION/TRAINING PROGRAM

## 2020-02-18 PROCEDURE — 99213 OFFICE O/P EST LOW 20 MIN: CPT | Performed by: STUDENT IN AN ORGANIZED HEALTH CARE EDUCATION/TRAINING PROGRAM

## 2020-02-18 PROCEDURE — 99211 OFF/OP EST MAY X REQ PHY/QHP: CPT | Performed by: FAMILY MEDICINE

## 2020-02-18 PROCEDURE — 83036 HEMOGLOBIN GLYCOSYLATED A1C: CPT | Performed by: STUDENT IN AN ORGANIZED HEALTH CARE EDUCATION/TRAINING PROGRAM

## 2020-02-18 PROCEDURE — G8420 CALC BMI NORM PARAMETERS: HCPCS | Performed by: STUDENT IN AN ORGANIZED HEALTH CARE EDUCATION/TRAINING PROGRAM

## 2020-02-18 PROCEDURE — 4004F PT TOBACCO SCREEN RCVD TLK: CPT | Performed by: STUDENT IN AN ORGANIZED HEALTH CARE EDUCATION/TRAINING PROGRAM

## 2020-02-18 PROCEDURE — 3017F COLORECTAL CA SCREEN DOC REV: CPT | Performed by: STUDENT IN AN ORGANIZED HEALTH CARE EDUCATION/TRAINING PROGRAM

## 2020-02-18 PROCEDURE — G8482 FLU IMMUNIZE ORDER/ADMIN: HCPCS | Performed by: STUDENT IN AN ORGANIZED HEALTH CARE EDUCATION/TRAINING PROGRAM

## 2020-02-18 PROCEDURE — 90686 IIV4 VACC NO PRSV 0.5 ML IM: CPT | Performed by: FAMILY MEDICINE

## 2020-02-18 RX ORDER — FENOFIBRATE 160 MG/1
160 TABLET ORAL DAILY
Qty: 30 TABLET | Refills: 3 | Status: SHIPPED | OUTPATIENT
Start: 2020-02-18 | End: 2021-12-03 | Stop reason: CLARIF

## 2020-02-18 RX ORDER — ATORVASTATIN CALCIUM 40 MG/1
40 TABLET, FILM COATED ORAL DAILY
Qty: 30 TABLET | Refills: 5 | Status: SHIPPED | OUTPATIENT
Start: 2020-02-18 | End: 2021-03-18

## 2020-02-18 RX ORDER — FENOFIBRATE 160 MG/1
160 TABLET ORAL DAILY
Qty: 30 TABLET | Refills: 3 | Status: SHIPPED | OUTPATIENT
Start: 2020-02-18 | End: 2020-02-18 | Stop reason: SDUPTHER

## 2020-02-18 RX ORDER — BLOOD-GLUCOSE METER
KIT MISCELLANEOUS
Qty: 1 KIT | Refills: 0 | Status: SHIPPED | OUTPATIENT
Start: 2020-02-18 | End: 2021-12-10 | Stop reason: SDUPTHER

## 2020-02-18 RX ORDER — ATORVASTATIN CALCIUM 40 MG/1
40 TABLET, FILM COATED ORAL DAILY
Qty: 30 TABLET | Refills: 5 | Status: SHIPPED | OUTPATIENT
Start: 2020-02-18 | End: 2020-02-18 | Stop reason: SDUPTHER

## 2020-02-18 RX ORDER — TRAZODONE HYDROCHLORIDE 50 MG/1
50 TABLET ORAL NIGHTLY
Qty: 30 TABLET | Refills: 0 | Status: SHIPPED | OUTPATIENT
Start: 2020-02-18 | End: 2020-02-18 | Stop reason: SINTOL

## 2020-02-18 RX ORDER — OMEPRAZOLE 40 MG/1
40 CAPSULE, DELAYED RELEASE ORAL DAILY
Qty: 30 CAPSULE | Refills: 3 | Status: SHIPPED | OUTPATIENT
Start: 2020-02-18 | End: 2020-02-18 | Stop reason: SDUPTHER

## 2020-02-18 RX ORDER — OMEPRAZOLE 40 MG/1
40 CAPSULE, DELAYED RELEASE ORAL DAILY
Qty: 30 CAPSULE | Refills: 3 | Status: SHIPPED | OUTPATIENT
Start: 2020-02-18 | End: 2021-12-03 | Stop reason: CLARIF

## 2020-02-18 ASSESSMENT — ENCOUNTER SYMPTOMS
WHEEZING: 0
CONSTIPATION: 0
BACK PAIN: 1
ABDOMINAL PAIN: 0
SHORTNESS OF BREATH: 0
DIARRHEA: 0

## 2020-02-18 NOTE — PROGRESS NOTES
colonoscopy  09/16/2029    Pneumococcal 0-64 years Vaccine  Completed    HIV screen  Completed    Hepatitis A vaccine  Aged Out    Hib vaccine  Aged Out    Meningococcal (ACWY) vaccine  Aged Out   Thank you for letting us take care of you today. We hope all your questions were addressed. If a question was overlooked or something else comes to mind after you return home, please contact a member of your Care Team listed below. Please make sure you have a routine office visit set up to follow-up on [address]. Your Care Team at [place] [address] is Team #2  Elizabeth Bolaños DO (Faculty)  Alvaro Banda MD (Faculty)  Christen Castillo MD (Resident)  Isi Cisneros MD (Resident)  Dale Lucas MD (Resident)  Becca Reed MD (Resident)  SHARIF Moore. ,EDDIE GILLIAM, [facility] office)  [facility] office)  Ifeanyi Keyes ([facility])  Paul Nassar [place] ([id number] )  Mert Martinez [facility] (Clinical Pharmacist)     Office phone number: [phone]    If you need to get in right away due to illness, please be advised we have \"Same Day\" appointments available Monday-Friday. Please call us at [phone] option #3 to schedule your \"Same Day\" appointment.
errors, please do not hesitate to contact our office if there Santhosh Cabrera concern with the understanding of this note.

## 2020-03-30 RX ORDER — INSULIN GLARGINE 100 [IU]/ML
INJECTION, SOLUTION SUBCUTANEOUS
Qty: 23 ML | Refills: 3 | Status: SHIPPED | OUTPATIENT
Start: 2020-03-30 | End: 2021-12-03 | Stop reason: CLARIF

## 2020-03-30 NOTE — TELEPHONE ENCOUNTER
Please address the medication refill and close the encounter. If I can be of assistance, please route to the applicable pool. Thank you. Last visit: 02/18/20  Last Med refill: 06/02/19  Does patient have enough medication for 72 hours: No:     Next Visit Date:  No future appointments. Health Maintenance   Topic Date Due    Diabetic retinal exam  08/13/1979    Breast cancer screen  08/13/2019    Shingles Vaccine (1 of 2) 08/13/2019    Lipid screen  02/16/2020    Diabetic microalbuminuria test  02/21/2020    Hepatitis B vaccine (1 of 3 - Risk 3-dose series) 05/20/2020 (Originally 8/13/1988)    Cervical cancer screen  05/20/2020 (Originally 8/13/1990)    A1C test (Diabetic or Prediabetic)  05/18/2020    Diabetic foot exam  05/20/2020    DTaP/Tdap/Td vaccine (2 - Td) 09/21/2028    Colon cancer screen colonoscopy  09/16/2029    Flu vaccine  Completed    Pneumococcal 0-64 years Vaccine  Completed    HIV screen  Completed    Hepatitis A vaccine  Aged Out    Hib vaccine  Aged Out    Meningococcal (ACWY) vaccine  Aged Out       Hemoglobin A1C (%)   Date Value   02/18/2020 10.0   05/20/2019 11.1   09/21/2018 13.3             ( goal A1C is < 7)   Microalb/Crt.  Ratio (mcg/mg creat)   Date Value   02/21/2019 147 (H)     LDL Cholesterol (mg/dL)   Date Value   02/16/2019        11/15/2012 Unable to calculate due to Trig >400       (goal LDL is <100)   AST (U/L)   Date Value   07/03/2019 16     ALT (U/L)   Date Value   07/03/2019 14     BUN (mg/dL)   Date Value   01/31/2020 11     BP Readings from Last 3 Encounters:   02/18/20 93/60   01/31/20 (!) 176/91   01/30/20 (!) 159/80          (goal 120/80)    All Future Testing planned in [facility]  Lab Frequency Next Occurrence   XR CERVICAL SPINE (2-3 VIEWS) Once 04/16/2019   XR LUMBAR SPINE (MIN 4 VIEWS) Once 04/16/2019   EMG Once 04/05/2020   Hemoglobin A1C Once 04/11/2019   Hepatitis A Antibody, Total Once 11/08/2019   Hepatitis B Core Antibody, Total Once

## 2020-05-22 RX ORDER — GABAPENTIN 600 MG/1
600 TABLET ORAL 2 TIMES DAILY
Qty: 90 TABLET | Refills: 0 | Status: SHIPPED | OUTPATIENT
Start: 2020-05-22 | End: 2021-12-03 | Stop reason: CLARIF

## 2020-10-02 ENCOUNTER — TELEPHONE (OUTPATIENT)
Dept: FAMILY MEDICINE CLINIC | Age: 50
End: 2020-10-02

## 2021-02-01 ENCOUNTER — TELEPHONE (OUTPATIENT)
Dept: FAMILY MEDICINE CLINIC | Age: 51
End: 2021-02-01

## 2021-02-01 NOTE — TELEPHONE ENCOUNTER
Writer called patient direct number and no longer in services. Writer called emergency contact and left voice mail to call office for a1c appoinment.

## 2021-02-25 ENCOUNTER — TELEPHONE (OUTPATIENT)
Dept: FAMILY MEDICINE CLINIC | Age: 51
End: 2021-02-25

## 2021-02-25 NOTE — TELEPHONE ENCOUNTER
Phone number no  Longer in service, was trying to reach patient to [facility] appt for DM.

## 2021-03-17 DIAGNOSIS — E11.42 TYPE 2 DIABETES MELLITUS WITH DIABETIC POLYNEUROPATHY, WITH LONG-TERM CURRENT USE OF INSULIN (HCC): ICD-10-CM

## 2021-03-17 DIAGNOSIS — Z79.4 TYPE 2 DIABETES MELLITUS WITH DIABETIC POLYNEUROPATHY, WITH LONG-TERM CURRENT USE OF INSULIN (HCC): ICD-10-CM

## 2021-03-17 NOTE — TELEPHONE ENCOUNTER
E-scribe request for atorvastatin. Please review and e-scribe if applicable. Last Visit Date: 02/18/2020  Next Visit Date:  Visit date not found    Hemoglobin A1C (%)   Date Value   02/18/2020 10.0   05/20/2019 11.1   09/21/2018 13.3             ( goal A1C is < 7)   Microalb/Crt.  Ratio (mcg/mg creat)   Date Value   02/21/2019 147 (H)     LDL Cholesterol (mg/dL)   Date Value   02/16/2019            (goal LDL is <100)   AST (U/L)   Date Value   07/03/2019 16     ALT (U/L)   Date Value   07/03/2019 14     BUN (mg/dL)   Date Value   01/31/2020 11     BP Readings from Last 3 Encounters:   02/18/20 93/60   01/31/20 (!) 176/91   01/30/20 (!) 159/80          (goal 120/80)        Patient Active Problem List:     Type 2 diabetes mellitus with diabetic polyneuropathy, with long-term current use of insulin (HCC)     Depression     GERD with esophagitis     Type 2 diabetes mellitus with hyperglycemia, with long-term current use of insulin (HCC)     Bipolar disorder, mixed (HCC)     Cocaine abuse ([address]     Pneumonia due to infectious organism     Acute cystitis without hematuria     Tobacco use     Sepsis due to other etiology ([address]     High anion gap metabolic acidosis     Hyponatremia     Diabetic ketoacidosis without coma (HCC)     Respiratory alkalosis     Epigastric abdominal pain     Acute on chronic pancreatitis ([address]

## 2021-03-18 RX ORDER — ATORVASTATIN CALCIUM 40 MG/1
TABLET, FILM COATED ORAL
Qty: 30 TABLET | Refills: 5 | Status: SHIPPED | OUTPATIENT
Start: 2021-03-18 | End: 2021-12-03 | Stop reason: CLARIF

## 2021-10-29 ENCOUNTER — HOSPITAL ENCOUNTER (EMERGENCY)
Age: 51
Discharge: HOME | End: 2021-10-29
Attending: EMERGENCY MEDICINE
Payer: MEDICAID

## 2021-10-29 ENCOUNTER — APPOINTMENT (OUTPATIENT)
Dept: GENERAL RADIOLOGY | Age: 51
End: 2021-10-29
Payer: MEDICAID

## 2021-10-29 VITALS
BODY MASS INDEX: 20.49 KG/M2 | TEMPERATURE: 99.9 F | OXYGEN SATURATION: 92 % | DIASTOLIC BLOOD PRESSURE: 61 MMHG | HEIGHT: 64 IN | WEIGHT: 120 LBS | HEART RATE: 83 BPM | SYSTOLIC BLOOD PRESSURE: 127 MMHG | RESPIRATION RATE: 21 BRPM

## 2021-10-29 DIAGNOSIS — R10.10 PAIN OF UPPER ABDOMEN: ICD-10-CM

## 2021-10-29 DIAGNOSIS — N30.00 ACUTE CYSTITIS WITHOUT HEMATURIA: Primary | ICD-10-CM

## 2021-10-29 LAB
-: ABNORMAL
ABSOLUTE EOS #: 0.34 K/UL (ref 0–0.44)
ABSOLUTE IMMATURE GRANULOCYTE: 0.03 K/UL (ref 0–0.3)
ABSOLUTE LYMPH #: 1.32 K/UL (ref 1.1–3.7)
ABSOLUTE MONO #: 0.72 K/UL (ref 0.1–1.2)
ALBUMIN SERPL-MCNC: 3.9 G/DL (ref 3.5–5.2)
ALBUMIN/GLOBULIN RATIO: 1.3 (ref 1–2.5)
ALP BLD-CCNC: 215 U/L (ref 35–104)
ALT SERPL-CCNC: 52 U/L (ref 5–33)
AMORPHOUS: ABNORMAL
ANION GAP SERPL CALCULATED.3IONS-SCNC: 13 MMOL/L (ref 9–17)
AST SERPL-CCNC: 34 U/L
BACTERIA: ABNORMAL
BASOPHILS # BLD: 1 % (ref 0–2)
BASOPHILS ABSOLUTE: 0.04 K/UL (ref 0–0.2)
BILIRUB SERPL-MCNC: 0.2 MG/DL (ref 0.3–1.2)
BILIRUBIN URINE: NEGATIVE
BUN BLDV-MCNC: 14 MG/DL (ref 6–20)
BUN/CREAT BLD: ABNORMAL (ref 9–20)
CALCIUM SERPL-MCNC: 9.1 MG/DL (ref 8.6–10.4)
CASTS UA: ABNORMAL /LPF (ref 0–8)
CHLORIDE BLD-SCNC: 100 MMOL/L (ref 98–107)
CO2: 22 MMOL/L (ref 20–31)
COLOR: YELLOW
COMMENT UA: ABNORMAL
CREAT SERPL-MCNC: 0.57 MG/DL (ref 0.5–0.9)
CRYSTALS, UA: ABNORMAL /HPF
DIFFERENTIAL TYPE: ABNORMAL
EOSINOPHILS RELATIVE PERCENT: 4 % (ref 1–4)
EPITHELIAL CELLS UA: ABNORMAL /HPF (ref 0–5)
GFR AFRICAN AMERICAN: >60 ML/MIN
GFR NON-AFRICAN AMERICAN: >60 ML/MIN
GFR SERPL CREATININE-BSD FRML MDRD: ABNORMAL ML/MIN/{1.73_M2}
GFR SERPL CREATININE-BSD FRML MDRD: ABNORMAL ML/MIN/{1.73_M2}
GLUCOSE BLD-MCNC: 98 MG/DL (ref 70–99)
GLUCOSE URINE: NEGATIVE
HCG QUALITATIVE: NEGATIVE
HCT VFR BLD CALC: 27.8 % (ref 36.3–47.1)
HEMOGLOBIN: 9.4 G/DL (ref 11.9–15.1)
IMMATURE GRANULOCYTES: 0 %
KETONES, URINE: NEGATIVE
LEUKOCYTE ESTERASE, URINE: ABNORMAL
LIPASE: 18 U/L (ref 13–60)
LYMPHOCYTES # BLD: 17 % (ref 24–43)
MCH RBC QN AUTO: 30 PG (ref 25.2–33.5)
MCHC RBC AUTO-ENTMCNC: 33.8 G/DL (ref 28.4–34.8)
MCV RBC AUTO: 88.8 FL (ref 82.6–102.9)
MONOCYTES # BLD: 9 % (ref 3–12)
MUCUS: ABNORMAL
NITRITE, URINE: NEGATIVE
NRBC AUTOMATED: 0 PER 100 WBC
OTHER OBSERVATIONS UA: ABNORMAL
PDW BLD-RTO: 12.6 % (ref 11.8–14.4)
PH UA: 5.5 (ref 5–8)
PLATELET # BLD: 255 K/UL (ref 138–453)
PLATELET ESTIMATE: ABNORMAL
PMV BLD AUTO: 8.9 FL (ref 8.1–13.5)
POTASSIUM SERPL-SCNC: 4.3 MMOL/L (ref 3.7–5.3)
PROTEIN UA: ABNORMAL
RBC # BLD: 3.13 M/UL (ref 3.95–5.11)
RBC # BLD: ABNORMAL 10*6/UL
RBC UA: ABNORMAL /HPF (ref 0–4)
RENAL EPITHELIAL, UA: ABNORMAL /HPF
SARS-COV-2, RAPID: NOT DETECTED
SEG NEUTROPHILS: 69 % (ref 36–65)
SEGMENTED NEUTROPHILS ABSOLUTE COUNT: 5.34 K/UL (ref 1.5–8.1)
SODIUM BLD-SCNC: 135 MMOL/L (ref 135–144)
SPECIFIC GRAVITY UA: 1.01 (ref 1–1.03)
SPECIMEN DESCRIPTION: NORMAL
TOTAL PROTEIN: 7 G/DL (ref 6.4–8.3)
TRICHOMONAS: ABNORMAL
TROPONIN INTERP: NORMAL
TROPONIN T: NORMAL NG/ML
TROPONIN, HIGH SENSITIVITY: 14 NG/L (ref 0–14)
TURBIDITY: ABNORMAL
URINE HGB: NEGATIVE
UROBILINOGEN, URINE: NORMAL
WBC # BLD: 7.8 K/UL (ref 3.5–11.3)
WBC # BLD: ABNORMAL 10*3/UL
WBC UA: ABNORMAL /HPF (ref 0–5)
YEAST: ABNORMAL

## 2021-10-29 PROCEDURE — 99285 EMERGENCY DEPT VISIT HI MDM: CPT

## 2021-10-29 PROCEDURE — 6370000000 HC RX 637 (ALT 250 FOR IP): Performed by: STUDENT IN AN ORGANIZED HEALTH CARE EDUCATION/TRAINING PROGRAM

## 2021-10-29 PROCEDURE — 87186 SC STD MICRODIL/AGAR DIL: CPT

## 2021-10-29 PROCEDURE — 84484 ASSAY OF TROPONIN QUANT: CPT

## 2021-10-29 PROCEDURE — 81001 URINALYSIS AUTO W/SCOPE: CPT

## 2021-10-29 PROCEDURE — 96375 TX/PRO/DX INJ NEW DRUG ADDON: CPT

## 2021-10-29 PROCEDURE — 80053 COMPREHEN METABOLIC PANEL: CPT

## 2021-10-29 PROCEDURE — 83690 ASSAY OF LIPASE: CPT

## 2021-10-29 PROCEDURE — 2580000003 HC RX 258: Performed by: STUDENT IN AN ORGANIZED HEALTH CARE EDUCATION/TRAINING PROGRAM

## 2021-10-29 PROCEDURE — 84703 CHORIONIC GONADOTROPIN ASSAY: CPT

## 2021-10-29 PROCEDURE — 96361 HYDRATE IV INFUSION ADD-ON: CPT

## 2021-10-29 PROCEDURE — 96374 THER/PROPH/DIAG INJ IV PUSH: CPT

## 2021-10-29 PROCEDURE — 87088 URINE BACTERIA CULTURE: CPT

## 2021-10-29 PROCEDURE — 2500000003 HC RX 250 WO HCPCS: Performed by: STUDENT IN AN ORGANIZED HEALTH CARE EDUCATION/TRAINING PROGRAM

## 2021-10-29 PROCEDURE — 87086 URINE CULTURE/COLONY COUNT: CPT

## 2021-10-29 PROCEDURE — 85025 COMPLETE CBC W/AUTO DIFF WBC: CPT

## 2021-10-29 PROCEDURE — 93005 ELECTROCARDIOGRAM TRACING: CPT | Performed by: STUDENT IN AN ORGANIZED HEALTH CARE EDUCATION/TRAINING PROGRAM

## 2021-10-29 PROCEDURE — 6360000002 HC RX W HCPCS: Performed by: STUDENT IN AN ORGANIZED HEALTH CARE EDUCATION/TRAINING PROGRAM

## 2021-10-29 PROCEDURE — 71045 X-RAY EXAM CHEST 1 VIEW: CPT

## 2021-10-29 PROCEDURE — 87635 SARS-COV-2 COVID-19 AMP PRB: CPT

## 2021-10-29 RX ORDER — ACETAMINOPHEN 325 MG/1
650 TABLET ORAL ONCE
Status: COMPLETED | OUTPATIENT
Start: 2021-10-29 | End: 2021-10-29

## 2021-10-29 RX ORDER — 0.9 % SODIUM CHLORIDE 0.9 %
1000 INTRAVENOUS SOLUTION INTRAVENOUS ONCE
Status: COMPLETED | OUTPATIENT
Start: 2021-10-29 | End: 2021-10-29

## 2021-10-29 RX ORDER — KETOROLAC TROMETHAMINE 30 MG/ML
30 INJECTION, SOLUTION INTRAMUSCULAR; INTRAVENOUS ONCE
Status: COMPLETED | OUTPATIENT
Start: 2021-10-29 | End: 2021-10-29

## 2021-10-29 RX ORDER — IBUPROFEN 400 MG/1
400 TABLET ORAL ONCE
Status: COMPLETED | OUTPATIENT
Start: 2021-10-29 | End: 2021-10-29

## 2021-10-29 RX ORDER — METOCLOPRAMIDE HYDROCHLORIDE 5 MG/ML
10 INJECTION INTRAMUSCULAR; INTRAVENOUS ONCE
Status: COMPLETED | OUTPATIENT
Start: 2021-10-29 | End: 2021-10-29

## 2021-10-29 RX ORDER — DIPHENHYDRAMINE HYDROCHLORIDE 50 MG/ML
25 INJECTION INTRAMUSCULAR; INTRAVENOUS ONCE
Status: COMPLETED | OUTPATIENT
Start: 2021-10-29 | End: 2021-10-29

## 2021-10-29 RX ORDER — CEPHALEXIN 500 MG/1
500 CAPSULE ORAL 3 TIMES DAILY
Qty: 21 CAPSULE | Refills: 0 | Status: SHIPPED | OUTPATIENT
Start: 2021-10-29 | End: 2021-11-05

## 2021-10-29 RX ORDER — PROCHLORPERAZINE EDISYLATE 5 MG/ML
10 INJECTION INTRAMUSCULAR; INTRAVENOUS ONCE
Status: COMPLETED | OUTPATIENT
Start: 2021-10-29 | End: 2021-10-29

## 2021-10-29 RX ADMIN — SODIUM CHLORIDE 1000 ML: 9 INJECTION, SOLUTION INTRAVENOUS at 08:58

## 2021-10-29 RX ADMIN — FAMOTIDINE 20 MG: 10 INJECTION, SOLUTION INTRAVENOUS at 10:35

## 2021-10-29 RX ADMIN — ACETAMINOPHEN 650 MG: 325 TABLET ORAL at 11:24

## 2021-10-29 RX ADMIN — IBUPROFEN 400 MG: 400 TABLET, FILM COATED ORAL at 11:23

## 2021-10-29 RX ADMIN — KETOROLAC TROMETHAMINE 30 MG: 30 INJECTION, SOLUTION INTRAMUSCULAR; INTRAVENOUS at 13:56

## 2021-10-29 RX ADMIN — SODIUM CHLORIDE 1000 ML: 9 INJECTION, SOLUTION INTRAVENOUS at 11:07

## 2021-10-29 RX ADMIN — DIPHENHYDRAMINE HYDROCHLORIDE 25 MG: 50 INJECTION, SOLUTION INTRAMUSCULAR; INTRAVENOUS at 13:56

## 2021-10-29 RX ADMIN — METOCLOPRAMIDE 10 MG: 5 INJECTION, SOLUTION INTRAMUSCULAR; INTRAVENOUS at 08:58

## 2021-10-29 RX ADMIN — PROCHLORPERAZINE EDISYLATE 10 MG: 5 INJECTION INTRAMUSCULAR; INTRAVENOUS at 13:56

## 2021-10-29 ASSESSMENT — ENCOUNTER SYMPTOMS
NAUSEA: 1
SINUS PRESSURE: 1
VOMITING: 1
CHEST TIGHTNESS: 0
ABDOMINAL PAIN: 1
SHORTNESS OF BREATH: 1

## 2021-10-29 ASSESSMENT — PAIN SCALES - GENERAL: PAINLEVEL_OUTOF10: 6

## 2021-10-29 NOTE — ED NOTES
The following labs were labeled with patient stickers & tubed to lab;    []Lavender   []On Ice  []Blue  []Green/ Yellow  []Green/ Black []On Ice  []Pink  []Red  []Yellow    []COVID-19 Swab []Rapid    [x]Urine Sample  []Pelvic Cultures    []Blood Cultures     Melina Ladd RN  10/29/21 0499

## 2021-10-29 NOTE — ED PROVIDER NOTES
[facility]     Emergency Department     Faculty Attestation    I performed a history and physical examination of the patient and discussed management with the resident. I reviewed the residents note and agree with the documented findings including all diagnostic interpretations and plan of care. Any areas of disagreement are noted on the chart. I was personally present for the key portions of any procedures. I have documented in the chart those procedures where I was not present during the key portions. I have reviewed the emergency nurses triage note. I agree with the chief complaint, past medical history, past surgical history, allergies, medications, social and family history as documented unless otherwise noted below. Documentation of the HPI, Physical Exam and Medical Decision Making performed by scribes is based on my personal performance of the HPI, PE and MDM. For Physician Assistant/ Nurse Practitioner cases/documentation I have personally evaluated this patient and have completed at least one if not all key elements of the E/M (history, physical exam, and MDM). Additional findings are as noted. This patient was evaluated in the Emergency Department for symptoms described in the history of present illness. He/she was evaluated in the context of the global COVID-19 pandemic, which necessitated consideration that the patient might be at risk for infection with the SARS-CoV-2 virus that causes COVID-19. Institutional protocols and algorithms that pertain to the evaluation of patients at risk for COVID-19 are in a state of rapid change based on information released by regulatory bodies including the CDC and federal and state organizations. These policies and algorithms were followed during the patient's care in the ED. Primary Care Physician: Diana Churchill MD    History:  This is a 45 y.o. female who presents to the Emergency Department with complaint of abdominal pain and vomiting. Ongoing progressive over 2 weeks. No fevers. History of ectopic pregnancy as well as gallbladder surgery and she was 20 although she is uncertain on the specifics of said surgery only that they \"treated her stones\"    Physical:     height is 5' 4\" (1.626 m) and weight is 120 lb (54.4 kg). Her temperature is 99.9 °F (37.7 °C). Her blood pressure is 127/61 and her pulse is 92. Her respiration is 16 and oxygen saturation is 93%.    45 y.o. female appears uncomfortable, but not acutely distressed, cardiac exam regular rate and rhythm no murmurs rubs gallops, pulmonary clear bilaterally abdomen is soft, tender in the epigastric and right upper quadrant. There is multiple port sites that appears consistent with laparoscopic surgery in the right upper quadrant.     Impression: Abdominal pain    Plan: Abdominal labs, symptomatic treatment, reassess    EKG Interpretation  EKG Interpretation    Interpreted by emergency department physician    Rhythm: normal sinus   Rate: normal  Axis: normal  Ectopy: none  Conduction: normal  ST Segments: normal  T Waves: normal  Q Waves: none    EKG  Impression: non-specific EKG    Ghislaine Hennessy MD      Interpreted by me      Soco Serrano MD, Gilford Quill  Attending Emergency Physician         Ghislaine Hennessy MD  10/29/21 [phone]

## 2021-10-29 NOTE — ED NOTES
The following labs were labeled with patient stickers & tubed to lab;    [x]Lavender   []On Ice  [x]Blue  [x]Green/ Yellow  [x]Green/ Black []On Ice  []Pink  []Red  []Yellow    [x]COVID-19 Swab [x]Rapid    []Urine Sample  []Pelvic Cultures    []Blood Cultures       Christopher Spicer RN  10/29/21 2752

## 2021-10-29 NOTE — ED NOTES
Patient c/o continued HA. Patient ambulates to and from restroom with officer.      Ki Tripp RN  10/29/21 [id number]

## 2021-10-29 NOTE — ED NOTES
Patient given warm blankets, she is resting on the cart with even and unlabored respirations.        Chanel Wright RN  10/29/21 4597

## 2021-10-29 NOTE — ED PROVIDER NOTES
[facility]  ED  Emergency Department Encounter  EmergencyMedicineResident     This patient was seen during the COVID-19 crisis. There were limited resources and those resources we did have had to be conserved for the sickest of patients. Pt Name: Jaylan Barrientos  MRN: [id number]  [place] 1970  Date of evaluation: 10/29/21  PCP: Lina Weiss MD    [address] [place] [address]       Chief Complaint   Patient presents with    Nausea & Vomiting    Generalized Body Aches    Concern For COVID-19    Headache       HISTORY OF PRESENT ILLNESS  (Location/Symptom, Timing/Onset, Context/Setting, Quality, Duration, Modifying Factors, Severity.)      Jaylan Barrientos is a 45 y.o. female who presents for evaluation of abdominal pain. Patient has significant past medical history of diabetes on insulin sliding scale. Patient states that she chronically has abdominal pain secondary to hiatal hernia. She states that the current abdominal pain that she is been experiencing has been present for the last 2 weeks. Progressively worsening with decreased p.o. intake and one episode of nausea and vomiting this morning. No significant surgical past medical history. Patient states that she is postmenopausal.  Not sexually active. Reports decreased urine output and difficulty urinating. Reports bouts of both constipation and diarrhea. Positive for sinus pressure. Denies chest pain or shortness of breath. Not vaccinated for COVID-19. Chart review shows she has history of GERD with esophagitis, and acute on chronic pancreatitis. PAST MEDICAL / SURGICAL /SOCIAL / FAMILY HISTORY      has a past medical history of Depression, Diabetes mellitus ([address] Hyperlipidemia, Pancreatitis, Pneumonia due to infectious organism, and Substance abuse ([address] has a past surgical history that includes Cholecystectomy; Ectopic pregnancy surgery; Carpal tunnel release (2014);  Colonoscopy (N/A, 9/16/2019); and Upper gastrointestinal endoscopy (N/A, 9/16/2019). Social History     Socioeconomic History    Marital status: Single     Spouse name: Not on file    Number of children: Not on file    Years of education: Not on file    Highest education level: Not on file   Occupational History    Not on file   Tobacco Use    Smoking status: Current Every Day Smoker     Packs/day: 0.50     Years: 30.00     Pack years: 15.00     Types: Cigarettes    Smokeless tobacco: Never Used    Tobacco comment: 0-6 cigarettes per day   Substance and Sexual Activity    Alcohol use: Yes     Comment: Rare    Drug use: Yes     Types: Cocaine     Comment: 12/5/16 relapsed    Sexual activity: Yes     Partners: Male   Other Topics Concern    Not on file   Social History Narrative    Not on file     Social Determinants of Health     Financial Resource Strain:     Difficulty of Paying Living Expenses:    Food Insecurity:     Worried About Running Out of Food in the Last Year:     Ran Out of Food in the Last Year:    Transportation Needs:     Lack of Transportation (Medical):      Lack of Transportation (Non-Medical):    Physical Activity:     Days of Exercise per Week:     Minutes of Exercise per Session:    Stress:     Feeling of Stress :    Social Connections:     Frequency of Communication with Friends and Family:     Frequency of Social Gatherings with Friends and Family:     Attends Latter day Services:     Active Member of Clubs or Organizations:     Attends Club or Organization Meetings:     Marital Status:    Intimate Partner Violence:     Fear of Current or Ex-Partner:     Emotionally Abused:     Physically Abused:     Sexually Abused:        Family History   Problem Relation Age of Onset    Diabetes Mother     Coronary Art Dis Mother     Cancer Father         Colon    Diabetes Sister     Diabetes Brother     Bipolar Disorder Brother     Alcohol Abuse Brother     Substance Abuse Brother        Allergies:  Patient has no known allergies. Home Medications:  Prior to Admission medications    Medication Sig Start Date End Date Taking? Authorizing Provider   cephALEXin (KEFLEX) 500 MG capsule Take 1 capsule by mouth 3 times daily for 7 days 10/29/21 11/5/21 Yes Lex Clements MD   atorvastatin (LIPITOR) 40 MG tablet take 1 tablet by mouth once daily 3/18/21   Edith King MD   gabapentin (NEURONTIN) 600 MG tablet Take 1 tablet by mouth 2 times daily for 45 days. 5/22/20 7/6/20  Rose Zamora DPM   BASAGLAR KWIKPEN 100 UNIT/ML injection pen inject 25 units subcutaneously nightly 3/30/20   Edith King MD   glucose monitoring kit (FREESTYLE) monitoring kit 4 times daily 2/18/20   Levon Kaur MD   blood glucose test strips (FREESTYLE LITE) strip 1 each by In Vitro route daily As needed.  2/18/20   Tarnjit Claude Lennert, MD   Insulin Pen Needle (RA PEN NEEDLES) 31G X 5 MM MISC use three times a day 2/18/20   Levon Kaur MD   omeprazole (PRILOSEC) 40 MG delayed release capsule Take 1 capsule by mouth daily 2/18/20   Levon Kaur MD   fenofibrate 160 MG tablet Take 1 tablet by mouth daily 2/18/20   Levon Kaur MD   acetaminophen (TYLENOL) 325 MG tablet Take 2 tablets by mouth every 6 hours as needed for Pain 1/30/20   Marilou GUILLERMO Antonio   ibuprofen (ADVIL;MOTRIN) 800 MG tablet Take 1 tablet by mouth every 8 hours as needed for Pain 1/30/20   Marilou GUILLERMO Antonio   bisacodyl (DULCOLAX) 5 MG EC tablet TAKE 4 TABS AT 8 PM DAY PRIOR TO COLONOSCOPY 8/8/19   Cierra Dale MD   ondansetron (ZOFRAN) 4 MG tablet Take 1 tablet by mouth every 8 hours as needed for Nausea 7/4/19   Dequan Wray MD   dicyclomine (BENTYL) 20 MG tablet Take 1 tablet by mouth 3 times daily as needed (abd pain) 7/4/19   Dequan Wray MD   RA ALCOHOL SWABS 70 % PADS use as directed three times a day 6/2/19   Edith King MD   ADMELOG 100 UNIT/ML injection vial SLIDING SCALE:150-200=2UNITS, 201-300=3UNITS,301-400=4UNITS, GREATER THAN 401=5UNITS TWICE DAILY. 6/2/19   Sherry Boles MD   BD VEO INSULIN SYRINGE U/F 31G X 15/64\" 0.3 ML MISC use twice a day 6/2/19   Sherry Boles MD   metFORMIN (GLUCOPHAGE) 500 MG tablet Take 1 tablet by mouth 2 times daily (with meals) 5/20/19   Sherry Boles MD   FREESTYLE LANCETS MISC 1 each by Does not apply route daily 11/12/18   Pito Antonio MD   Insulin Syringe-Needle U-100 (INSULIN SYRINGE 1CC/31GX5/16\") 31G X 5/16\" 1 ML MISC 1 each by Does not apply route daily 11/12/18   Pito Antonio MD   Diabetic Shoe MISC by Does not apply route 11/12/18   Pito Antonio MD       REVIEW OF SYSTEMS    (2-9 systems for level 4, 10 or more forlevel 5)      Review of Systems   Constitutional: Positive for chills and fever. HENT: Positive for congestion and sinus pressure. Eyes: Negative for visual disturbance. Respiratory: Positive for shortness of breath. Negative for chest tightness. Cardiovascular: Negative for chest pain. Gastrointestinal: Positive for abdominal pain, nausea and vomiting. Genitourinary: Positive for decreased urine volume. Negative for flank pain and vaginal bleeding. Musculoskeletal: Negative for arthralgias and myalgias. Skin: Negative for rash. Neurological: Positive for headaches. Negative for dizziness, tremors, seizures, syncope, facial asymmetry, speech difficulty, weakness, light-headedness and numbness. Psychiatric/Behavioral: Negative for agitation. PHYSICAL EXAM   (up to 7 for level 4, 8 or more forlevel 5)      ED TRIAGE VITALS BP: 103/67, Temp: 99.9 °F (37.7 °C), Pulse: 93, Resp: 17, SpO2: 96 %    Vitals:    10/29/21 0826 10/29/21 0850 10/29/21 0904 10/29/21 1104   BP: 103/67 127/61     Pulse: 93 85 92 83   Resp: 17 13 16 21   Temp: 99.9 °F (37.7 °C)      SpO2: 96% 90% 93% 92%   Weight: 120 lb (54.4 kg)      Height: 5' 4\" (1.626 m)            Physical Exam  Vitals and nursing note reviewed. Constitutional:       General: She is not in acute distress.      Appearance: Normal appearance. She is not ill-appearing, toxic-appearing or diaphoretic. HENT:      Head: Normocephalic and atraumatic. Nose: Nose normal.      Mouth/Throat:      Mouth: Mucous membranes are moist.   Eyes:      Extraocular Movements: Extraocular movements intact. Pupils: Pupils are equal, round, and reactive to light. Cardiovascular:      Rate and Rhythm: Normal rate and regular rhythm. Pulses: Normal pulses. Heart sounds: Normal heart sounds. Pulmonary:      Effort: Pulmonary effort is normal.      Breath sounds: Normal breath sounds. No wheezing, rhonchi or rales. Abdominal:      General: Abdomen is flat. There is no distension. Palpations: Abdomen is soft. There is no mass. Tenderness: There is abdominal tenderness. There is no guarding or rebound. Hernia: No hernia is present. Musculoskeletal:         General: Normal range of motion. Cervical back: Normal range of motion. Skin:     General: Skin is warm and dry. Capillary Refill: Capillary refill takes less than 2 seconds. Neurological:      General: No focal deficit present. Mental Status: She is alert and oriented to person, place, and time. Mental status is at baseline. Cranial Nerves: No cranial nerve deficit. Sensory: No sensory deficit. Motor: No weakness.       Coordination: Coordination normal.      Gait: Gait normal.      Deep Tendon Reflexes: Reflexes normal.   Psychiatric:         Mood and Affect: Mood normal.         Behavior: Behavior normal.           DIFFERENTIAL  DIAGNOSIS     PLAN (LABS / IMAGING / EKG):  Orders Placed This Encounter   Procedures    Culture, Urine    COVID-19, Rapid    XR CHEST PORTABLE    CBC Auto Differential    Comprehensive Metabolic Panel w/ Reflex to MG    Lipase    Troponin    HCG Qualitative, Serum    Urinalysis, reflex to microscopic    Microscopic Urinalysis    EKG 12 Lead       MEDICATIONS ORDERED:  ED Medication Orders (From admission, onward)    Start Ordered     Status Ordering Provider    10/29/21 1400 10/29/21 1348  ketorolac (TORADOL) injection 30 mg  ONCE      Last MAR action: Given - by Josh Vaz on 10/29/21 at [address], [place]     10/29/21 1400 10/29/21 1348  diphenhydrAMINE (BENADRYL) injection 25 mg  ONCE      Last MAR action: Given - by Josh Vaz on 10/29/21 at [address], [place]     10/29/21 1400 10/29/21 1348  prochlorperazine (COMPAZINE) injection 10 mg  ONCE      [facility]: [facility]     10/29/21 1115 10/29/21 1109  ibuprofen (ADVIL;MOTRIN) tablet 400 mg  ONCE      Last MAR action: Given - by Josh Vaz on 10/29/21 at [place] [address], [facility]    10/29/21 1115 10/29/21 1109  acetaminophen (TYLENOL) tablet 650 mg  ONCE      Last MAR action: Given - by Josh Vaz on 10/29/21 at [address], [place]     10/29/21 1030 10/29/21 1019  0.9 % sodium chloride bolus  ONCE      Last MAR action: Stopped - by Josh Vaz on 10/29/21 at [address], [facility]    10/29/21 1030 10/29/21 1019  famotidine (PEPCID) injection 20 mg  ONCE      Last MAR action: Given - by Josh Vaz on 10/29/21 at [facility], [facility]    10/29/21 0845 10/29/21 0842  0.9 % sodium chloride bolus  ONCE      Last MAR action: Stopped - by Josh Vaz on 10/29/21 at [address] [place] [address], [facility]    10/29/21 0845 10/29/21 0842  metoclopramide (REGLAN) injection 10 mg  ONCE      Last MAR action: Given - by Josh Vaz on 10/29/21 at [address]          DDX: COVID-19, gastroparesis, UTI, malingering    DIAGNOSTIC RESULTS / [address] [place] [address] / [facility]     IMPRESSION & INITIAL PLAN:  23-year-old female presenting return today for evaluation of abdomen pain. She also reports she is got uncontrolled neuropathic pain ever since that her gabapentin got discontinued in snf.  Patient has history of diabetes is controlled with sliding scale insulin. Her blood sugars have been in good control per hospital charts during her incarceration. On exam she is nontoxic with a nonsurgical abdomen. Bedside ultrasound was performed and with chart review did confirm the absence of a gallbladder. Patient reports she is having normal bowel movements but did note decreased urine output. I was concerned with her viral syndrome presentation that she may be having a Covid infection. There was a suggestion of viral pneumonia on chest x-ray however her Covid swab did come back negative. Patient treated with antiemetics, IV fluids and migraine cocktail she did persistently complain of headache. She had no neurologic deficits or any concerning stroke symptoms today and exam. I am suspicious the patient is presenting with either viral syndrome related abdominal pain or gastroparesis secondary to her diabetes. I did refer to her PCP and GI for follow-up. LABS:  Results for orders placed or performed during the hospital encounter of 10/29/21   COVID-19, Rapid    Specimen: Nasopharyngeal Swab   Result Value Ref Range    Specimen Description . NASOPHARYNGEAL SWAB     SARS-CoV-2, Rapid Not Detected Not Detected   CBC Auto Differential   Result Value Ref Range    WBC 7.8 3.5 - 11.3 k/uL    RBC 3.13 (L) 3.95 - 5.11 m/uL    Hemoglobin 9.4 (L) 11.9 - 15.1 g/dL    Hematocrit 27.8 (L) 36.3 - 47.1 %    MCV 88.8 82.6 - 102.9 fL    MCH 30.0 25.2 - 33.5 pg    MCHC 33.8 28.4 - 34.8 g/dL    RDW 12.6 11.8 - 14.4 %    Platelets 057 598 - 740 k/uL    MPV 8.9 8.1 - 13.5 fL    NRBC Automated 0.0 0.0 per 100 WBC    Differential Type NOT REPORTED     Seg Neutrophils 69 (H) 36 - 65 %    Lymphocytes 17 (L) 24 - 43 %    Monocytes 9 3 - 12 %    Eosinophils % 4 1 - 4 %    Basophils 1 0 - 2 %    Immature Granulocytes 0 0 %    Segs Absolute 5.34 1.50 - 8.10 k/uL    Absolute Lymph # 1.32 1.10 - 3.70 k/uL    Absolute Mono # 0.72 0.10 - 1.20 k/uL    Absolute Eos # 0.34 0.00 - 0.44 k/uL Basophils Absolute 0.04 0.00 - 0.20 k/uL    Absolute Immature Granulocyte 0.03 0.00 - 0.30 k/uL    WBC Morphology NOT REPORTED     RBC Morphology NOT REPORTED     Platelet Estimate NOT REPORTED    Comprehensive Metabolic Panel w/ Reflex to MG   Result Value Ref Range    Glucose 98 70 - 99 mg/dL    BUN 14 6 - 20 mg/dL    CREATININE 0.57 0.50 - 0.90 mg/dL    Bun/Cre Ratio NOT REPORTED 9 - 20    Calcium 9.1 8.6 - 10.4 mg/dL    Sodium 135 135 - 144 mmol/L    Potassium 4.3 3.7 - 5.3 mmol/L    Chloride 100 98 - 107 mmol/L    CO2 22 20 - 31 mmol/L    Anion Gap 13 9 - 17 mmol/L    Alkaline Phosphatase 215 (H) 35 - 104 U/L    ALT 52 (H) 5 - 33 U/L    AST 34 (H) <32 U/L    Total Bilirubin 0.20 (L) 0.3 - 1.2 mg/dL    Total Protein 7.0 6.4 - 8.3 g/dL    Albumin 3.9 3.5 - 5.2 g/dL    Albumin/Globulin Ratio 1.3 1.0 - 2.5    GFR Non-African American >60 >60 mL/min    GFR African American >60 >60 mL/min    GFR Comment          GFR Staging NOT REPORTED    Lipase   Result Value Ref Range    Lipase 18 13 - 60 U/L   Troponin   Result Value Ref Range    Troponin, High Sensitivity 14 0 - 14 ng/L    Troponin T NOT REPORTED <0.03 ng/mL    Troponin Interp NOT REPORTED    HCG Qualitative, Serum   Result Value Ref Range    hCG Qual NEGATIVE NEGATIVE   Urinalysis, reflex to microscopic   Result Value Ref Range    Color, UA Yellow Yellow    Turbidity UA Cloudy (A) Clear    Glucose, Ur NEGATIVE NEGATIVE    Bilirubin Urine NEGATIVE NEGATIVE    Ketones, Urine NEGATIVE NEGATIVE    Specific Gravity, UA 1.013 1.005 - 1.030    Urine Hgb NEGATIVE NEGATIVE    pH, UA 5.5 5.0 - 8.0    Protein, UA 1+ (A) NEGATIVE    Urobilinogen, Urine Normal Normal    Nitrite, Urine NEGATIVE NEGATIVE    Leukocyte Esterase, Urine LARGE (A) NEGATIVE    Urinalysis Comments NOT REPORTED    Microscopic Urinalysis   Result Value Ref Range    -          WBC, UA 50  0 - 5 /HPF    RBC, UA 0 TO 2 0 - 4 /HPF    Casts UA  0 - 8 /LPF     5 TO 10 HYALINE Reference range defined for non-centrifuged specimen. Crystals, UA NOT REPORTED None /HPF    Epithelial Cells UA 5 TO 10 0 - 5 /HPF    Renal Epithelial, UA NOT REPORTED 0 /HPF    Bacteria, UA MANY (A) None    Mucus, UA NOT REPORTED None    Trichomonas, UA NOT REPORTED None    Amorphous, UA NOT REPORTED None    Other Observations UA NOT REPORTED NOT REQ. Yeast, UA NOT REPORTED None       RADIOLOGY:  XR CHEST PORTABLE   Final Result   Findings concerning for viral/COVID pneumonia; other infectious and   noninfectious processes should also be considered. CONSULTS:  None    CRITICAL CARE:  See attending physician note    FINAL IMPRESSION      1. Acute cystitis without hematuria    2. Pain of upper abdomen          DISPOSITION / PLAN     DISPOSITION Decision To Discharge 10/29/2021 01:31:32 PM      PATIENT REFERRED TO:  Amrit Antony MD  [address]   [address]  [id number]  [phone]    In 2 days      [facility]  [address] [place] [address] [id number] [phone]    Follow-up with GI for gastroparesis and transmanitis    [facility]  [address] [address]  [phone]    If symptoms worsen      DISCHARGE MEDICATIONS:  Discharge Medication List as of 10/29/2021  1:33 PM      START taking these medications    Details   cephALEXin (KEFLEX) 500 MG capsule Take 1 capsule by mouth 3 times daily for 7 days, Disp-21 capsule, R-0Print           Discharge Medication List as of 10/29/2021  1:33 PM           Cindy Henry MD  Emergency Medicine Resident    (Please note that portions of this note were completed with a voice recognition program.  Efforts were made to edit the dictations but occasionally words are mis-transcribed.)        Cindy Henry MD  Resident  10/29/21 1305

## 2021-10-29 NOTE — ED NOTES
Patient arrives in private custody, she is shackled at hands and feet from [facility]. She is reporting abdominal pain, nausea and pain in her hands and feet. She denies CP or SOB. She is alert and oriented x4, placed on full cardiac monitor. NAD at this time.      Andi Morales RN  10/29/21 0900

## 2021-10-30 LAB
CULTURE: ABNORMAL
EKG ATRIAL RATE: 87 BPM
EKG P AXIS: 52 DEGREES
EKG P-R INTERVAL: 142 MS
EKG Q-T INTERVAL: 358 MS
EKG QRS DURATION: 80 MS
EKG QTC CALCULATION (BAZETT): 430 MS
EKG R AXIS: 84 DEGREES
EKG T AXIS: 66 DEGREES
EKG VENTRICULAR RATE: 87 BPM
Lab: ABNORMAL
SPECIMEN DESCRIPTION: ABNORMAL

## 2021-10-30 PROCEDURE — 93010 ELECTROCARDIOGRAM REPORT: CPT | Performed by: INTERNAL MEDICINE

## 2021-12-01 ENCOUNTER — HOSPITAL ENCOUNTER (EMERGENCY)
Age: 51
Discharge: HOME | End: 2021-12-01
Attending: EMERGENCY MEDICINE
Payer: MEDICAID

## 2021-12-01 VITALS
TEMPERATURE: 97.7 F | HEART RATE: 80 BPM | DIASTOLIC BLOOD PRESSURE: 70 MMHG | SYSTOLIC BLOOD PRESSURE: 137 MMHG | RESPIRATION RATE: 16 BRPM | OXYGEN SATURATION: 99 %

## 2021-12-01 DIAGNOSIS — K85.90 ACUTE PANCREATITIS, UNSPECIFIED COMPLICATION STATUS, UNSPECIFIED PANCREATITIS TYPE: Primary | ICD-10-CM

## 2021-12-01 LAB
ALBUMIN SERPL-MCNC: 4.4 G/DL (ref 3.5–5.2)
ALBUMIN/GLOBULIN RATIO: 1.3 (ref 1–2.5)
ALP BLD-CCNC: 181 U/L (ref 35–104)
ALT SERPL-CCNC: 25 U/L (ref 5–33)
ANION GAP SERPL CALCULATED.3IONS-SCNC: 10 MMOL/L (ref 9–17)
AST SERPL-CCNC: 21 U/L
BILIRUB SERPL-MCNC: <0.1 MG/DL (ref 0.3–1.2)
BILIRUBIN DIRECT: <0.08 MG/DL
BILIRUBIN, INDIRECT: ABNORMAL MG/DL (ref 0–1)
BUN BLDV-MCNC: 22 MG/DL (ref 6–20)
BUN/CREAT BLD: ABNORMAL (ref 9–20)
CALCIUM SERPL-MCNC: 9.9 MG/DL (ref 8.6–10.4)
CHLORIDE BLD-SCNC: 98 MMOL/L (ref 98–107)
CO2: 25 MMOL/L (ref 20–31)
CREAT SERPL-MCNC: 0.55 MG/DL (ref 0.5–0.9)
GFR AFRICAN AMERICAN: >60 ML/MIN
GFR NON-AFRICAN AMERICAN: >60 ML/MIN
GFR SERPL CREATININE-BSD FRML MDRD: ABNORMAL ML/MIN/{1.73_M2}
GFR SERPL CREATININE-BSD FRML MDRD: ABNORMAL ML/MIN/{1.73_M2}
GLOBULIN: ABNORMAL G/DL (ref 1.5–3.8)
GLUCOSE BLD-MCNC: 131 MG/DL (ref 70–99)
HCT VFR BLD CALC: 34.3 % (ref 36.3–47.1)
HEMOGLOBIN: 11.6 G/DL (ref 11.9–15.1)
LIPASE: 30 U/L (ref 13–60)
MCH RBC QN AUTO: 29 PG (ref 25.2–33.5)
MCHC RBC AUTO-ENTMCNC: 33.8 G/DL (ref 28.4–34.8)
MCV RBC AUTO: 85.8 FL (ref 82.6–102.9)
NRBC AUTOMATED: 0 PER 100 WBC
PDW BLD-RTO: 12.9 % (ref 11.8–14.4)
PLATELET # BLD: 249 K/UL (ref 138–453)
PMV BLD AUTO: 8.9 FL (ref 8.1–13.5)
POTASSIUM SERPL-SCNC: 4.8 MMOL/L (ref 3.7–5.3)
RBC # BLD: 4 M/UL (ref 3.95–5.11)
SODIUM BLD-SCNC: 133 MMOL/L (ref 135–144)
TOTAL PROTEIN: 7.9 G/DL (ref 6.4–8.3)
WBC # BLD: 7.7 K/UL (ref 3.5–11.3)

## 2021-12-01 PROCEDURE — 80076 HEPATIC FUNCTION PANEL: CPT

## 2021-12-01 PROCEDURE — 2580000003 HC RX 258: Performed by: EMERGENCY MEDICINE

## 2021-12-01 PROCEDURE — 80048 BASIC METABOLIC PNL TOTAL CA: CPT

## 2021-12-01 PROCEDURE — 99285 EMERGENCY DEPT VISIT HI MDM: CPT

## 2021-12-01 PROCEDURE — 96374 THER/PROPH/DIAG INJ IV PUSH: CPT

## 2021-12-01 PROCEDURE — 96375 TX/PRO/DX INJ NEW DRUG ADDON: CPT

## 2021-12-01 PROCEDURE — 83690 ASSAY OF LIPASE: CPT

## 2021-12-01 PROCEDURE — 6360000002 HC RX W HCPCS: Performed by: EMERGENCY MEDICINE

## 2021-12-01 PROCEDURE — 96376 TX/PRO/DX INJ SAME DRUG ADON: CPT

## 2021-12-01 PROCEDURE — 85027 COMPLETE CBC AUTOMATED: CPT

## 2021-12-01 RX ORDER — MORPHINE SULFATE 4 MG/ML
4 INJECTION, SOLUTION INTRAMUSCULAR; INTRAVENOUS ONCE
Status: COMPLETED | OUTPATIENT
Start: 2021-12-01 | End: 2021-12-01

## 2021-12-01 RX ORDER — OXYCODONE HYDROCHLORIDE AND ACETAMINOPHEN 5; 325 MG/1; MG/1
1-2 TABLET ORAL EVERY 6 HOURS PRN
Qty: 10 TABLET | Refills: 0 | Status: SHIPPED | OUTPATIENT
Start: 2021-12-01 | End: 2021-12-04

## 2021-12-01 RX ORDER — 0.9 % SODIUM CHLORIDE 0.9 %
1000 INTRAVENOUS SOLUTION INTRAVENOUS ONCE
Status: COMPLETED | OUTPATIENT
Start: 2021-12-01 | End: 2021-12-01

## 2021-12-01 RX ORDER — ONDANSETRON 4 MG/1
4 TABLET, ORALLY DISINTEGRATING ORAL EVERY 8 HOURS PRN
Qty: 10 TABLET | Refills: 0 | Status: SHIPPED | OUTPATIENT
Start: 2021-12-01 | End: 2021-12-03 | Stop reason: CLARIF

## 2021-12-01 RX ORDER — ONDANSETRON 2 MG/ML
4 INJECTION INTRAMUSCULAR; INTRAVENOUS ONCE
Status: COMPLETED | OUTPATIENT
Start: 2021-12-01 | End: 2021-12-01

## 2021-12-01 RX ADMIN — MORPHINE SULFATE 4 MG: 4 INJECTION INTRAVENOUS at 09:54

## 2021-12-01 RX ADMIN — MORPHINE SULFATE 4 MG: 4 INJECTION INTRAVENOUS at 10:46

## 2021-12-01 RX ADMIN — ONDANSETRON 4 MG: 2 INJECTION INTRAMUSCULAR; INTRAVENOUS at 09:52

## 2021-12-01 RX ADMIN — SODIUM CHLORIDE 1000 ML: 9 INJECTION, SOLUTION INTRAVENOUS at 09:55

## 2021-12-01 ASSESSMENT — ENCOUNTER SYMPTOMS
SHORTNESS OF BREATH: 0
CONSTIPATION: 0
VOMITING: 1
RHINORRHEA: 0
COUGH: 0
DIARRHEA: 0
SORE THROAT: 0
ABDOMINAL PAIN: 1
NAUSEA: 1
BACK PAIN: 1
ABDOMINAL DISTENTION: 0
WHEEZING: 0

## 2021-12-01 ASSESSMENT — PAIN SCALES - GENERAL
PAINLEVEL_OUTOF10: 10

## 2021-12-01 ASSESSMENT — PAIN DESCRIPTION - FREQUENCY: FREQUENCY: CONTINUOUS

## 2021-12-01 ASSESSMENT — PAIN DESCRIPTION - DESCRIPTORS: DESCRIPTORS: PRESSURE;SHARP;STABBING

## 2021-12-01 ASSESSMENT — PAIN DESCRIPTION - LOCATION
LOCATION: ABDOMEN
LOCATION: ABDOMEN

## 2021-12-01 ASSESSMENT — PAIN DESCRIPTION - PAIN TYPE: TYPE: ACUTE PAIN

## 2021-12-01 NOTE — ED PROVIDER NOTES
[facility]  ED  Emergency Department        Pt Name: Dre Morales  MRN: [id number]  [place] 1970  Date of evaluation: 12/1/21    CHIEF COMPLAINT       Chief Complaint   Patient presents with    Abdominal Pain     reports epigastric pain for 2 weeks,  states hx of pancreatitis. also feels constipated with last BM this morning. reports Nausea and vomiting. reports appt with dr Firman Sever on friday for GI but can't take the pain       HISTORY OF PRESENT ILLNESS  (Location/Symptom, Timing/Onset, Context/Setting, Quality, Duration, ModifyingFactors, Severity.)      Dre Morales is a 45 y.o. female who presents with epigastric pain for the past 2 weeks, multiple episodes of vomiting. Has not really been able to eat anything for 2 weeks. Also history of diabetes. History of chronic pancreatitis. States was unable to get into see Dr. Ruth Taylor until Friday. And could not wait. Patient reports pain similar to previous pancreatitis, she has epiagstric pain into her back. PAST MEDICAL / SURGICAL / SOCIAL / FAMILY HISTORY      has a past medical history of Depression, Diabetes mellitus ([facility] [address] Hyperlipidemia, Pancreatitis, Pneumonia due to infectious organism, and Substance abuse ([facility] [address] has a past surgical history that includes Cholecystectomy; Ectopic pregnancy surgery; Carpal tunnel release (2014); Colonoscopy (N/A, 9/16/2019); and Upper gastrointestinal endoscopy (N/A, 9/16/2019).        Social History     Socioeconomic History    Marital status: Single     Spouse name: Not on file    Number of children: Not on file    Years of education: Not on file    Highest education level: Not on file   Occupational History    Not on file   Tobacco Use    Smoking status: Current Every Day Smoker     Packs/day: 0.50     Years: 30.00     Pack years: 15.00     Types: Cigarettes    Smokeless tobacco: Never Used    Tobacco comment: 0-6 cigarettes per day   Substance and Sexual Activity    Alcohol use: Yes     Comment: Rare    Drug use: Yes     Types: Cocaine     Comment: 12/5/16 relapsed    Sexual activity: Yes     Partners: Male   Other Topics Concern    Not on file   Social History Narrative    Not on file     Social Determinants of Health     Financial Resource Strain:     Difficulty of Paying Living Expenses: Not on file   Food Insecurity:     Worried About Running Out of Food in the Last Year: Not on file    Claire of Food in the Last Year: Not on file   Transportation Needs:     Lack of Transportation (Medical): Not on file    Lack of Transportation (Non-Medical): Not on file   Physical Activity:     Days of Exercise per Week: Not on file    Minutes of Exercise per Session: Not on file   Stress:     Feeling of Stress : Not on file   Social Connections:     Frequency of Communication with Friends and Family: Not on file    Frequency of Social Gatherings with Friends and Family: Not on file    Attends Taoism Services: Not on file    Active Member of Clubs or Organizations: Not on file    Attends Club or Organization Meetings: Not on file    Marital Status: Not on file   Intimate Partner Violence:     Fear of Current or Ex-Partner: Not on file    Emotionally Abused: Not on file    Physically Abused: Not on file    Sexually Abused: Not on file   Housing Stability:     Unable to Pay for Housing in the Last Year: Not on file    Number of [place] in the Last Year: Not on file    Unstable Housing in the Last Year: Not on file       Family History   Problem Relation Age of Onset    Diabetes Mother     Coronary Art Dis Mother     Cancer Father         Colon    Diabetes Sister     Diabetes Brother     Bipolar Disorder Brother     Alcohol Abuse Brother     Substance Abuse Brother        Allergies:  Patient has no known allergies. Home Medications:  Prior to Admission medications    Medication Sig Start Date End Date Taking?  Authorizing Provider   ondansetron [facility] [id number]=3UNITS,301-400=4UNITS, GREATER THAN 401=5UNITS TWICE DAILY. 6/2/19   Margot Flores MD   BD VEO INSULIN SYRINGE U/F 31G X 15/64\" 0.3 ML MISC use twice a day 6/2/19   Margot Flores MD   metFORMIN (GLUCOPHAGE) 500 MG tablet Take 1 tablet by mouth 2 times daily (with meals) 5/20/19   Margot Flores MD   FREESTYLE LANCETS MISC 1 each by Does not apply route daily 11/12/18   Monica Vilchis MD   Insulin Syringe-Needle U-100 (INSULIN SYRINGE 1CC/31GX5/16\") 31G X 5/16\" 1 ML MISC 1 each by Does not apply route daily 11/12/18   Monica Vilchis MD   Diabetic Shoe MISC by Does not apply route 11/12/18   Monica Vilchis MD       REVIEW OF SYSTEMS    (2-9 systems for level 4, 10 or more for level 5)      Review of Systems   Constitutional: Negative for activity change, appetite change, fatigue and fever. HENT: Negative for congestion, rhinorrhea and sore throat. Respiratory: Negative for cough, shortness of breath and wheezing. Cardiovascular: Negative for chest pain, palpitations and leg swelling. Gastrointestinal: Positive for abdominal pain, nausea and vomiting. Negative for abdominal distention, constipation and diarrhea. Genitourinary: Negative for decreased urine volume and dysuria. Musculoskeletal: Positive for back pain. Skin: Negative for rash. Neurological: Negative for dizziness, weakness, light-headedness, numbness and headaches. PHYSICAL EXAM   (up to 7 for level 4, 8 or more for level 5)     INITIAL VITALS:   /70   Pulse 80   Temp 97.7 °F (36.5 °C) (Oral)   Resp 16   LMP 05/21/2018 (LMP Unknown)   SpO2 99%     Physical Exam  Constitutional:       General: She is not in acute distress. Appearance: Normal appearance. She is not ill-appearing. HENT:      Head: Normocephalic and atraumatic. Eyes:      General:         Right eye: No discharge. Left eye: No discharge. Extraocular Movements: Extraocular movements intact.       Pupils: Pupils are equal, round, and reactive to light. Cardiovascular:      Rate and Rhythm: Normal rate. Pulmonary:      Effort: Pulmonary effort is normal. No respiratory distress. Abdominal:      Tenderness: There is abdominal tenderness in the epigastric area. There is no right CVA tenderness, left CVA tenderness, guarding or rebound. Negative signs include Means's sign, Rovsing's sign, McBurney's sign, psoas sign and obturator sign. Musculoskeletal:      Right lower leg: No edema. Left lower leg: No edema. Neurological:      General: No focal deficit present. Mental Status: She is alert and oriented to person, place, and time. DIFFERENTIAL  DIAGNOSIS     Patient with history of pancreatitis. Nausea and vomiting and recurrence of pain very similar to her previous history. Radiating into the back. Abdomen is soft but tenderness in the epigastric region. We will plan on labs, pain control. She has follow-up with GI in 2 days but could not wait due to pain. PLAN (LABS / IMAGING / EKG):  Orders Placed This Encounter   Procedures    CBC    BASIC METABOLIC PANEL    LIPASE    HEPATIC FUNCTION PANEL    Insert peripheral IV       MEDICATIONS ORDERED:  Orders Placed This Encounter   Medications    0.9 % sodium chloride bolus    ondansetron (ZOFRAN) injection 4 mg    morphine injection 4 mg    morphine injection 4 mg    ondansetron (ZOFRAN ODT) 4 MG disintegrating tablet     Sig: Take 1 tablet by mouth every 8 hours as needed for Nausea     Dispense:  10 tablet     Refill:  0    oxyCODONE-acetaminophen (PERCOCET) 5-325 MG per tablet     Sig: Take 1-2 tablets by mouth every 6 hours as needed for Pain for up to 3 days. WARNING:  May cause drowsiness. May impair ability to operate vehicles or machinery. Do not use in combination with alcohol.      Dispense:  10 tablet     Refill:  0       DIAGNOSTIC RESULTS / EMERGENCY DEPARTMENT COURSE / MDM     LABS:  Results for orders placed or performed during the hospital [address]  [phone]  Go to   If symptoms worsen    Maicol Aldrich MD  [address]  [address]  [phone]      go to your schedule appointment      DISCHARGE MEDICATIONS:  New Prescriptions    ONDANSETRON (ZOFRAN ODT) 4 MG DISINTEGRATING TABLET    Take 1 tablet by mouth every 8 hours as needed for Nausea    OXYCODONE-ACETAMINOPHEN (PERCOCET) 5-325 MG PER TABLET    Take 1-2 tablets by mouth every 6 hours as needed for Pain for up to 3 days. WARNING:  May cause drowsiness. May impair ability to operate vehicles or machinery. Do not use in combination with alcohol.        Jabier Carmona,   12:03 PM    Attending Emergency Physician  [facility] ED    (Please note that portions of this note were completed with a voice recognition program.  Effortswere made to edit the dictations but occasionally words are mis-transcribed.)              Anurag Carrington DO  12/01/21 [id number]

## 2021-12-01 NOTE — ED TRIAGE NOTES
Pt arrived to ED 22 via triage. Pt co abdominal pain x 1 month but has gotten worse in the last 2 weeks. Pt states that she has also had nausea and vomiting in the past 2 weeks. Pt states that this feels like her previous pancreatitic flare up. Pain is a 10/10 and radiates to her back. Pt is resting on stretcher with call light within reach. Breathing is non labored and no distress noted. Will continue to monitor.

## 2021-12-03 ENCOUNTER — OFFICE VISIT (OUTPATIENT)
Dept: GASTROENTEROLOGY | Age: 51
End: 2021-12-03
Payer: MEDICAID

## 2021-12-03 VITALS
TEMPERATURE: 97.2 F | HEIGHT: 64 IN | DIASTOLIC BLOOD PRESSURE: 77 MMHG | BODY MASS INDEX: 19.68 KG/M2 | WEIGHT: 115.3 LBS | HEART RATE: 94 BPM | OXYGEN SATURATION: 99 % | SYSTOLIC BLOOD PRESSURE: 132 MMHG

## 2021-12-03 DIAGNOSIS — R10.84 ABDOMINAL PAIN, ACUTE, GENERALIZED: ICD-10-CM

## 2021-12-03 DIAGNOSIS — R11.2 NAUSEA AND VOMITING, INTRACTABILITY OF VOMITING NOT SPECIFIED, UNSPECIFIED VOMITING TYPE: Primary | ICD-10-CM

## 2021-12-03 PROCEDURE — G8427 DOCREV CUR MEDS BY ELIG CLIN: HCPCS | Performed by: INTERNAL MEDICINE

## 2021-12-03 PROCEDURE — 3017F COLORECTAL CA SCREEN DOC REV: CPT | Performed by: INTERNAL MEDICINE

## 2021-12-03 PROCEDURE — 99214 OFFICE O/P EST MOD 30 MIN: CPT | Performed by: INTERNAL MEDICINE

## 2021-12-03 PROCEDURE — 4004F PT TOBACCO SCREEN RCVD TLK: CPT | Performed by: INTERNAL MEDICINE

## 2021-12-03 PROCEDURE — G8420 CALC BMI NORM PARAMETERS: HCPCS | Performed by: INTERNAL MEDICINE

## 2021-12-03 PROCEDURE — G8484 FLU IMMUNIZE NO ADMIN: HCPCS | Performed by: INTERNAL MEDICINE

## 2021-12-03 PROCEDURE — 1111F DSCHRG MED/CURRENT MED MERGE: CPT | Performed by: INTERNAL MEDICINE

## 2021-12-03 RX ORDER — TRAZODONE HYDROCHLORIDE 150 MG/1
TABLET ORAL
COMMUNITY
Start: 2021-11-02 | End: 2022-01-19 | Stop reason: SDUPTHER

## 2021-12-03 RX ORDER — CARBAMAZEPINE 200 MG/1
TABLET ORAL
COMMUNITY
Start: 2021-11-02 | End: 2022-02-03

## 2021-12-03 RX ORDER — PANTOPRAZOLE SODIUM 40 MG/1
40 TABLET, DELAYED RELEASE ORAL
Qty: 30 TABLET | Refills: 5 | Status: ON HOLD | OUTPATIENT
Start: 2021-12-03 | End: 2022-10-15 | Stop reason: HOSPADM

## 2021-12-03 RX ORDER — INSULIN HUMAN 100 [IU]/ML
INJECTION, SUSPENSION SUBCUTANEOUS
COMMUNITY
Start: 2021-11-19 | End: 2022-01-11 | Stop reason: SDUPTHER

## 2021-12-03 RX ORDER — HUMAN INSULIN 100 [IU]/ML
INJECTION, SOLUTION SUBCUTANEOUS
COMMUNITY
Start: 2021-08-26 | End: 2022-01-19

## 2021-12-03 RX ORDER — GABAPENTIN 300 MG/1
CAPSULE ORAL
COMMUNITY
Start: 2021-09-07 | End: 2022-10-05 | Stop reason: SDUPTHER

## 2021-12-03 RX ORDER — IBUPROFEN 400 MG/1
TABLET ORAL
COMMUNITY
Start: 2021-11-02 | End: 2022-01-27 | Stop reason: SDUPTHER

## 2021-12-03 RX ORDER — LOVASTATIN 40 MG/1
TABLET ORAL
COMMUNITY
Start: 2021-11-02 | End: 2022-01-27 | Stop reason: SDUPTHER

## 2021-12-03 RX ORDER — PEN NEEDLE, DIABETIC 29 G X1/2"
NEEDLE, DISPOSABLE MISCELLANEOUS
COMMUNITY
Start: 2021-11-19 | End: 2022-01-19

## 2021-12-03 RX ORDER — PRAZOSIN HYDROCHLORIDE 1 MG/1
CAPSULE ORAL
COMMUNITY
Start: 2021-11-02 | End: 2022-02-03

## 2021-12-03 ASSESSMENT — ENCOUNTER SYMPTOMS
TROUBLE SWALLOWING: 0
NAUSEA: 1
BLOOD IN STOOL: 0
ANAL BLEEDING: 0
CONSTIPATION: 1
COUGH: 1
ABDOMINAL PAIN: 1
SORE THROAT: 0
DIARRHEA: 0
SHORTNESS OF BREATH: 1
CHEST TIGHTNESS: 1
RECTAL PAIN: 0
VOICE CHANGE: 0
BACK PAIN: 1
VOMITING: 0
ABDOMINAL DISTENTION: 1
WHEEZING: 1

## 2021-12-03 NOTE — PROGRESS NOTES
GI CLINIC FOLLOW UP    INTERVAL HISTORY:   No referring provider defined for this encounter. Chief Complaint   Patient presents with    Follow-Up from Hospital     gastropersis , transmanititis     Constipation     feels backed up     Abdominal Pain     very painful            HISTORY OF PRESENT ILLNESS: Mortimer Calico is a 45 y.o. female , referred for evaluation of abdominal pain. Mostly upper abdomen. Pressure sensation. Some nausea and vomiting. Constant nausea. Intermittent vomiting. Bowels moving okay. No blood in the stool or melena. She reports history of pancreatitis. She had to go the emergency room couple of times recently. Past Medical,Family, and Social History reviewed and does not contribute to the patient presenting condition. Patient's PMH/PSH,SH,PSYCH Hx, MEDs, ALLERGIES, and ROS were all reviewed and updated in the appropriate sections. PAST MEDICAL HISTORY:  Past Medical History:   Diagnosis Date    Depression     Diabetes mellitus ([facility] [address]     Hyperlipidemia     Pancreatitis     Pneumonia due to infectious organism 1/8/2017    Substance abuse ([facility] [address]        Past Surgical History:   Procedure Laterality Date    CARPAL TUNNEL RELEASE  2014    jerome carpal tunnel    CHOLECYSTECTOMY      COLONOSCOPY N/A 9/16/2019    COLONOSCOPY WITH BIOPSY performed by Viry Lee MD at [address] GASTROINTESTINAL ENDOSCOPY N/A 9/16/2019    EGD ESOPHAGOGASTRODUODENOSCOPY performed by Viry Lee MD at [address]:    Current Outpatient Medications:     oxyCODONE-acetaminophen (PERCOCET) 5-325 MG per tablet, Take 1-2 tablets by mouth every 6 hours as needed for Pain for up to 3 days. WARNING:  May cause drowsiness. May impair ability to operate vehicles or machinery.   Do not use in combination with alcohol., Disp: 10 tablet, Rfl: 0    ibuprofen (ADVIL;MOTRIN) 800 MG tablet, Take 1 tablet by mouth every 8 hours as needed for Pain, Disp: 20 tablet, Rfl: 0    glucose monitoring kit (FREESTYLE) monitoring kit, 4 times daily, Disp: 1 kit, Rfl: 0    blood glucose test strips (FREESTYLE LITE) strip, 1 each by In Vitro route daily As needed. , Disp: 100 each, Rfl: 3    FREESTYLE LANCETS MISC, 1 each by Does not apply route daily, Disp: 100 each, Rfl: 3    ALLERGIES:   No Known Allergies    FAMILY HISTORY:       Problem Relation Age of Onset    Diabetes Mother     Coronary Art Dis Mother     Cancer Father         Colon    Diabetes Sister     Diabetes Brother     Bipolar Disorder Brother     Alcohol Abuse Brother     Substance Abuse Brother          SOCIAL HISTORY:   Social History     Socioeconomic History    Marital status: Single     Spouse name: Not on file    Number of children: Not on file    Years of education: Not on file    Highest education level: Not on file   Occupational History    Not on file   Tobacco Use    Smoking status: Current Every Day Smoker     Packs/day: 0.50     Years: 30.00     Pack years: 15.00     Types: Cigarettes    Smokeless tobacco: Never Used    Tobacco comment: 0-6 cigarettes per day   Substance and Sexual Activity    Alcohol use: Yes     Comment: Rare    Drug use: Yes     Types: Cocaine     Comment: 12/5/16 relapsed    Sexual activity: Yes     Partners: Male   Other Topics Concern    Not on file   Social History Narrative    Not on file     Social Determinants of Health     Financial Resource Strain:     Difficulty of Paying Living Expenses: Not on file   Food Insecurity:     Worried About Running Out of Food in the Last Year: Not on file    Claire of Food in the Last Year: Not on file   Transportation Needs:     Lack of Transportation (Medical): Not on file    Lack of Transportation (Non-Medical):  Not on file   Physical Activity:     Days of Exercise per Week: Not on file    Minutes of Exercise per Session: Not on file   Stress:     Feeling of Stress : Not on file Social Connections:     Frequency of Communication with Friends and Family: Not on file    Frequency of Social Gatherings with Friends and Family: Not on file    Attends Voodoo Services: Not on file    Active Member of Clubs or Organizations: Not on file    Attends Club or Organization Meetings: Not on file    Marital Status: Not on file   Intimate Partner Violence:     Fear of Current or Ex-Partner: Not on file    Emotionally Abused: Not on file    Physically Abused: Not on file    Sexually Abused: Not on file   Housing Stability:     Unable to Pay for Housing in the Last Year: Not on file    Number of [place] in the Last Year: Not on file    Unstable Housing in the Last Year: Not on file       REVIEW OF SYSTEMS: A 12-point review of systemswas obtained and pertinent positives and negatives were enumerated above in the history of present illness. All other reviewed systems / symptoms were negative. Review of Systems   Constitutional: Positive for appetite change, fatigue and unexpected weight change. HENT: Negative for congestion, sore throat, trouble swallowing and voice change. Respiratory: Positive for cough, chest tightness, shortness of breath and wheezing. Cardiovascular: Positive for chest pain. Negative for palpitations and leg swelling. Gastrointestinal: Positive for abdominal distention, abdominal pain, constipation and nausea. Negative for anal bleeding, blood in stool, diarrhea, rectal pain and vomiting. Musculoskeletal: Positive for back pain. Negative for neck pain. Neurological: Positive for weakness and light-headedness. Psychiatric/Behavioral: The patient is nervous/anxious.             LABORATORY DATA: Reviewed  Lab Results   Component Value Date    WBC 7.7 12/01/2021    HGB 11.6 (L) 12/01/2021    HCT 34.3 (L) 12/01/2021    MCV 85.8 12/01/2021     12/01/2021     (L) 12/01/2021    K 4.8 12/01/2021    CL 98 12/01/2021    CO2 25 12/01/2021    BUN utilizing voice recognition software. Unfortunately this leads to occasional typographical errors. Please contact our office if you have any questions.     Boy Avila MD  [facility]  O: #[phone]

## 2021-12-10 ENCOUNTER — VIRTUAL VISIT (OUTPATIENT)
Dept: FAMILY MEDICINE CLINIC | Age: 51
End: 2021-12-10
Payer: MEDICAID

## 2021-12-10 DIAGNOSIS — R30.0 DYSURIA: ICD-10-CM

## 2021-12-10 DIAGNOSIS — E11.42 TYPE 2 DIABETES MELLITUS WITH DIABETIC POLYNEUROPATHY, WITH LONG-TERM CURRENT USE OF INSULIN (HCC): Primary | ICD-10-CM

## 2021-12-10 DIAGNOSIS — Z12.31 SCREENING MAMMOGRAM FOR BREAST CANCER: ICD-10-CM

## 2021-12-10 DIAGNOSIS — Z79.4 TYPE 2 DIABETES MELLITUS WITH DIABETIC POLYNEUROPATHY, WITH LONG-TERM CURRENT USE OF INSULIN (HCC): Primary | ICD-10-CM

## 2021-12-10 DIAGNOSIS — Z00.00 HEALTH MAINTENANCE EXAMINATION: ICD-10-CM

## 2021-12-10 PROCEDURE — 99213 OFFICE O/P EST LOW 20 MIN: CPT | Performed by: STUDENT IN AN ORGANIZED HEALTH CARE EDUCATION/TRAINING PROGRAM

## 2021-12-10 RX ORDER — BLOOD-GLUCOSE METER
KIT MISCELLANEOUS
Qty: 1 KIT | Refills: 0 | Status: SHIPPED | OUTPATIENT
Start: 2021-12-10

## 2021-12-10 RX ORDER — LANCETS 28 GAUGE
1 EACH MISCELLANEOUS DAILY
Qty: 100 EACH | Refills: 11 | Status: SHIPPED | OUTPATIENT
Start: 2021-12-10 | End: 2022-01-19 | Stop reason: SDUPTHER

## 2021-12-10 RX ORDER — BLOOD-GLUCOSE METER
1 KIT MISCELLANEOUS DAILY
Qty: 100 EACH | Refills: 11 | Status: SHIPPED | OUTPATIENT
Start: 2021-12-10 | End: 2022-01-12 | Stop reason: SDUPTHER

## 2021-12-10 RX ORDER — SULFAMETHOXAZOLE AND TRIMETHOPRIM 800; 160 MG/1; MG/1
1 TABLET ORAL 2 TIMES DAILY
Qty: 14 TABLET | Refills: 0 | Status: SHIPPED | OUTPATIENT
Start: 2021-12-10 | End: 2021-12-17

## 2021-12-10 SDOH — ECONOMIC STABILITY: FOOD INSECURITY: WITHIN THE PAST 12 MONTHS, THE FOOD YOU BOUGHT JUST DIDN'T LAST AND YOU DIDN'T HAVE MONEY TO GET MORE.: NEVER TRUE

## 2021-12-10 SDOH — ECONOMIC STABILITY: FOOD INSECURITY: WITHIN THE PAST 12 MONTHS, YOU WORRIED THAT YOUR FOOD WOULD RUN OUT BEFORE YOU GOT MONEY TO BUY MORE.: NEVER TRUE

## 2021-12-10 ASSESSMENT — SOCIAL DETERMINANTS OF HEALTH (SDOH): HOW HARD IS IT FOR YOU TO PAY FOR THE VERY BASICS LIKE FOOD, HOUSING, MEDICAL CARE, AND HEATING?: SOMEWHAT HARD

## 2021-12-10 NOTE — PROGRESS NOTES
Asha Leyva is a 45 y.o. female evaluated via telephone on 12/10/2021. Consent:  She and/or health care decision maker is aware that that she may receive a bill for this telephone service, depending on her insurance coverage, and has provided verbal consent to proceed: Yes      Documentation:  I communicated with the patient and/or health care decision maker about    Details of this discussion including any medical advice provided:     Pt is a 57-year-old female phone visit for follow-up. Patient states that she was recently incarcerated and was released on December 1. Diabetes: States she is currently on Humulin and 20 units twice a day. Patient states her blood sugars are ranging 100-200. Patient's hemoglobin A1c last year was 10. Diabetic eye exam patient will schedule next year  Patient also has been having dysuria, urinary urgency and frequency for the last week. Patient is not currently sexually active. Patient denies any vaginal discharge. Plan  Type 2 diabetes: Due to patient recently being incarcerated has not had any blood work. Will order hemoglobin A1c to reassess. Patient to schedule an office visit in 4 weeks. Educated the importance of diet and exercise  UTI: Treat UTI with Bactrim. Patient educated if symptoms persist or worsen to call the office to get a urine analysis and urine culture. I affirm this is a Patient Initiated Episode with a Patient who has not had a related appointment within my department in the past 7 days or scheduled within the next 24 hours. Patient identification was verified at the start of the visit: Yes    Total Time: minutes: 11-20 minutes    The visit was conducted pursuant to the emergency declaration under the [facility], [address] [place] [address] authority and the [employer] General Act. Patient identification was verified, and a caregiver was present when appropriate.  The patient was located in a state where the provider was credentialed to provide care.     Note: not billable if this call serves to triage the patient into an appointment for the relevant concern      Stacey Thrasher MD

## 2021-12-10 NOTE — PROGRESS NOTES
I have reviewed and discussed key elements of [address] with the resident including plan of care and follow up and agree with the care alethea plan. Phone visit     Diagnosis Orders   1. Screening mammogram for breast cancer  [place] DIGITAL SCREEN W OR WO CAD BILATERAL   2. Type 2 diabetes mellitus with diabetic polyneuropathy, with long-term current use of insulin (HCC)  Hemoglobin A1C    Microalbumin / Creatinine Urine Ratio    glucose monitoring (FREESTYLE) kit    blood glucose test strips (FREESTYLE LITE) strip    FreeStyle Lancets MISC   3.  Health maintenance examination  Hemoglobin A1C    Lipid Panel

## 2021-12-10 NOTE — PATIENT INSTRUCTIONS
Return To Clinic 1/6/2022 @ 2:45 . After Visit Summary  mailed to patient. It is very important for your care that you keep your appointment. If for some reason you are unable to keep your appointment it is equally important that you call our office at [phone] to cancel your appointment and reschedule.  Failure to do so may result in your termination from our practice.     -Bloodwork orders given to patient, they will have them done before their next visit.     -Your doctor has ordered an Mammogram for you, please contact our scheduling department at [phone] to set up an appointment.    -Hebert Vidal

## 2021-12-26 ENCOUNTER — APPOINTMENT (OUTPATIENT)
Dept: GENERAL RADIOLOGY | Age: 51
DRG: 048 | End: 2021-12-26
Payer: MEDICAID

## 2021-12-26 ENCOUNTER — HOSPITAL ENCOUNTER (INPATIENT)
Age: 51
LOS: 1 days | Discharge: HOME | DRG: 048 | End: 2021-12-27
Attending: EMERGENCY MEDICINE | Admitting: STUDENT IN AN ORGANIZED HEALTH CARE EDUCATION/TRAINING PROGRAM
Payer: MEDICAID

## 2021-12-26 ENCOUNTER — APPOINTMENT (OUTPATIENT)
Dept: CT IMAGING | Age: 51
DRG: 048 | End: 2021-12-26
Payer: MEDICAID

## 2021-12-26 DIAGNOSIS — I77.6 VASCULITIS OF MESENTERIC ARTERY (HCC): ICD-10-CM

## 2021-12-26 DIAGNOSIS — R10.13 ABDOMINAL PAIN, EPIGASTRIC: Primary | ICD-10-CM

## 2021-12-26 PROBLEM — R10.9 ABDOMINAL PAIN: Status: ACTIVE | Noted: 2021-12-26

## 2021-12-26 PROBLEM — F14.91 HISTORY OF COCAINE USE: Status: ACTIVE | Noted: 2021-12-26

## 2021-12-26 LAB
-: NORMAL
ABSOLUTE EOS #: 0.34 K/UL (ref 0–0.44)
ABSOLUTE IMMATURE GRANULOCYTE: 0.03 K/UL (ref 0–0.3)
ABSOLUTE LYMPH #: 2.65 K/UL (ref 1.1–3.7)
ABSOLUTE MONO #: 0.68 K/UL (ref 0.1–1.2)
ALBUMIN SERPL-MCNC: 4.1 G/DL (ref 3.5–5.2)
ALBUMIN/GLOBULIN RATIO: 1.2 (ref 1–2.5)
ALP BLD-CCNC: 171 U/L (ref 35–104)
ALT SERPL-CCNC: 17 U/L (ref 5–33)
AMORPHOUS: NORMAL
AMPHETAMINE SCREEN URINE: NEGATIVE
ANION GAP SERPL CALCULATED.3IONS-SCNC: 12 MMOL/L (ref 9–17)
AST SERPL-CCNC: 14 U/L
BACTERIA: NORMAL
BARBITURATE SCREEN URINE: NEGATIVE
BASOPHILS # BLD: 1 % (ref 0–2)
BASOPHILS ABSOLUTE: 0.05 K/UL (ref 0–0.2)
BENZODIAZEPINE SCREEN, URINE: NEGATIVE
BILIRUB SERPL-MCNC: <0.1 MG/DL (ref 0.3–1.2)
BILIRUBIN URINE: NEGATIVE
BUN BLDV-MCNC: 19 MG/DL (ref 6–20)
BUN/CREAT BLD: ABNORMAL (ref 9–20)
BUPRENORPHINE URINE: ABNORMAL
C-REACTIVE PROTEIN: 5.6 MG/L (ref 0–5)
CALCIUM SERPL-MCNC: 9.7 MG/DL (ref 8.6–10.4)
CANNABINOID SCREEN URINE: NEGATIVE
CASTS UA: NORMAL /LPF (ref 0–8)
CHLORIDE BLD-SCNC: 101 MMOL/L (ref 98–107)
CHOLESTEROL/HDL RATIO: 5.4
CHOLESTEROL: 130 MG/DL
CO2: 24 MMOL/L (ref 20–31)
COCAINE METABOLITE, URINE: NEGATIVE
COLOR: YELLOW
COMMENT UA: ABNORMAL
CREAT SERPL-MCNC: 0.65 MG/DL (ref 0.5–0.9)
CRYSTALS, UA: NORMAL /HPF
DIFFERENTIAL TYPE: ABNORMAL
EOSINOPHILS RELATIVE PERCENT: 5 % (ref 1–4)
EPITHELIAL CELLS UA: NORMAL /HPF (ref 0–5)
ETHANOL PERCENT: <0.01 %
ETHANOL: <10 MG/DL
GFR AFRICAN AMERICAN: >60 ML/MIN
GFR NON-AFRICAN AMERICAN: >60 ML/MIN
GFR SERPL CREATININE-BSD FRML MDRD: ABNORMAL ML/MIN/{1.73_M2}
GFR SERPL CREATININE-BSD FRML MDRD: ABNORMAL ML/MIN/{1.73_M2}
GLUCOSE BLD-MCNC: 104 MG/DL (ref 65–105)
GLUCOSE BLD-MCNC: 180 MG/DL (ref 70–99)
GLUCOSE BLD-MCNC: 200 MG/DL (ref 65–105)
GLUCOSE URINE: NEGATIVE
HCT VFR BLD CALC: 33.2 % (ref 36.3–47.1)
HDLC SERPL-MCNC: 24 MG/DL
HEMOGLOBIN: 11.1 G/DL (ref 11.9–15.1)
IMMATURE GRANULOCYTES: 0 %
KETONES, URINE: NEGATIVE
LDL CHOLESTEROL: 80 MG/DL (ref 0–130)
LEUKOCYTE ESTERASE, URINE: NEGATIVE
LIPASE: 57 U/L (ref 13–60)
LYMPHOCYTES # BLD: 36 % (ref 24–43)
MCH RBC QN AUTO: 29.5 PG (ref 25.2–33.5)
MCHC RBC AUTO-ENTMCNC: 33.4 G/DL (ref 28.4–34.8)
MCV RBC AUTO: 88.3 FL (ref 82.6–102.9)
MDMA URINE: ABNORMAL
METHADONE SCREEN, URINE: NEGATIVE
METHAMPHETAMINE, URINE: ABNORMAL
MONOCYTES # BLD: 9 % (ref 3–12)
MUCUS: NORMAL
NITRITE, URINE: NEGATIVE
NRBC AUTOMATED: 0 PER 100 WBC
OPIATES, URINE: POSITIVE
OTHER OBSERVATIONS UA: NORMAL
OXYCODONE SCREEN URINE: NEGATIVE
PDW BLD-RTO: 12.5 % (ref 11.8–14.4)
PH UA: 8 (ref 5–8)
PHENCYCLIDINE, URINE: NEGATIVE
PLATELET # BLD: 271 K/UL (ref 138–453)
PLATELET ESTIMATE: ABNORMAL
PMV BLD AUTO: 9.2 FL (ref 8.1–13.5)
POTASSIUM SERPL-SCNC: 4.5 MMOL/L (ref 3.7–5.3)
PROPOXYPHENE, URINE: ABNORMAL
PROTEIN UA: ABNORMAL
RBC # BLD: 3.76 M/UL (ref 3.95–5.11)
RBC # BLD: ABNORMAL 10*6/UL
RBC UA: NORMAL /HPF (ref 0–4)
RENAL EPITHELIAL, UA: NORMAL /HPF
SARS-COV-2, RAPID: NOT DETECTED
SEDIMENTATION RATE, ERYTHROCYTE: 23 MM (ref 0–30)
SEG NEUTROPHILS: 49 % (ref 36–65)
SEGMENTED NEUTROPHILS ABSOLUTE COUNT: 3.56 K/UL (ref 1.5–8.1)
SODIUM BLD-SCNC: 137 MMOL/L (ref 135–144)
SPECIFIC GRAVITY UA: 1.02 (ref 1–1.03)
SPECIMEN DESCRIPTION: NORMAL
TEST INFORMATION: ABNORMAL
TOTAL PROTEIN: 7.5 G/DL (ref 6.4–8.3)
TRICHOMONAS: NORMAL
TRICYCLIC ANTIDEPRESSANTS, UR: ABNORMAL
TRIGL SERPL-MCNC: 129 MG/DL
TROPONIN INTERP: NORMAL
TROPONIN T: NORMAL NG/ML
TROPONIN, HIGH SENSITIVITY: 7 NG/L (ref 0–14)
TURBIDITY: CLEAR
URINE HGB: NEGATIVE
UROBILINOGEN, URINE: NORMAL
VLDLC SERPL CALC-MCNC: ABNORMAL MG/DL (ref 1–30)
WBC # BLD: 7.3 K/UL (ref 3.5–11.3)
WBC # BLD: ABNORMAL 10*3/UL
WBC UA: NORMAL /HPF (ref 0–5)
YEAST: NORMAL

## 2021-12-26 PROCEDURE — 6360000004 HC RX CONTRAST MEDICATION: Performed by: STUDENT IN AN ORGANIZED HEALTH CARE EDUCATION/TRAINING PROGRAM

## 2021-12-26 PROCEDURE — 6370000000 HC RX 637 (ALT 250 FOR IP): Performed by: STUDENT IN AN ORGANIZED HEALTH CARE EDUCATION/TRAINING PROGRAM

## 2021-12-26 PROCEDURE — 2580000003 HC RX 258: Performed by: STUDENT IN AN ORGANIZED HEALTH CARE EDUCATION/TRAINING PROGRAM

## 2021-12-26 PROCEDURE — 99223 1ST HOSP IP/OBS HIGH 75: CPT | Performed by: STUDENT IN AN ORGANIZED HEALTH CARE EDUCATION/TRAINING PROGRAM

## 2021-12-26 PROCEDURE — 80307 DRUG TEST PRSMV CHEM ANLYZR: CPT

## 2021-12-26 PROCEDURE — 86140 C-REACTIVE PROTEIN: CPT

## 2021-12-26 PROCEDURE — 80053 COMPREHEN METABOLIC PANEL: CPT

## 2021-12-26 PROCEDURE — 71045 X-RAY EXAM CHEST 1 VIEW: CPT

## 2021-12-26 PROCEDURE — 82947 ASSAY GLUCOSE BLOOD QUANT: CPT

## 2021-12-26 PROCEDURE — 99222 1ST HOSP IP/OBS MODERATE 55: CPT | Performed by: SURGERY

## 2021-12-26 PROCEDURE — 93005 ELECTROCARDIOGRAM TRACING: CPT | Performed by: STUDENT IN AN ORGANIZED HEALTH CARE EDUCATION/TRAINING PROGRAM

## 2021-12-26 PROCEDURE — 81001 URINALYSIS AUTO W/SCOPE: CPT

## 2021-12-26 PROCEDURE — 6360000002 HC RX W HCPCS

## 2021-12-26 PROCEDURE — 83690 ASSAY OF LIPASE: CPT

## 2021-12-26 PROCEDURE — 6360000002 HC RX W HCPCS: Performed by: STUDENT IN AN ORGANIZED HEALTH CARE EDUCATION/TRAINING PROGRAM

## 2021-12-26 PROCEDURE — G0378 HOSPITAL OBSERVATION PER HR: HCPCS

## 2021-12-26 PROCEDURE — 96372 THER/PROPH/DIAG INJ SC/IM: CPT

## 2021-12-26 PROCEDURE — 1200000000 HC SEMI PRIVATE

## 2021-12-26 PROCEDURE — G0480 DRUG TEST DEF 1-7 CLASSES: HCPCS

## 2021-12-26 PROCEDURE — 96376 TX/PRO/DX INJ SAME DRUG ADON: CPT

## 2021-12-26 PROCEDURE — 96361 HYDRATE IV INFUSION ADD-ON: CPT

## 2021-12-26 PROCEDURE — 74177 CT ABD & PELVIS W/CONTRAST: CPT

## 2021-12-26 PROCEDURE — 84484 ASSAY OF TROPONIN QUANT: CPT

## 2021-12-26 PROCEDURE — 96375 TX/PRO/DX INJ NEW DRUG ADDON: CPT

## 2021-12-26 PROCEDURE — 85652 RBC SED RATE AUTOMATED: CPT

## 2021-12-26 PROCEDURE — 99283 EMERGENCY DEPT VISIT LOW MDM: CPT

## 2021-12-26 PROCEDURE — 87086 URINE CULTURE/COLONY COUNT: CPT

## 2021-12-26 PROCEDURE — 87635 SARS-COV-2 COVID-19 AMP PRB: CPT

## 2021-12-26 PROCEDURE — 80061 LIPID PANEL: CPT

## 2021-12-26 PROCEDURE — 85025 COMPLETE CBC W/AUTO DIFF WBC: CPT

## 2021-12-26 PROCEDURE — 96374 THER/PROPH/DIAG INJ IV PUSH: CPT

## 2021-12-26 RX ORDER — SODIUM CHLORIDE 0.9 % (FLUSH) 0.9 %
5-40 SYRINGE (ML) INJECTION EVERY 12 HOURS SCHEDULED
Status: DISCONTINUED | OUTPATIENT
Start: 2021-12-26 | End: 2021-12-27 | Stop reason: HOSPADM

## 2021-12-26 RX ORDER — MORPHINE SULFATE 4 MG/ML
4 INJECTION, SOLUTION INTRAMUSCULAR; INTRAVENOUS ONCE
Status: COMPLETED | OUTPATIENT
Start: 2021-12-26 | End: 2021-12-26

## 2021-12-26 RX ORDER — GABAPENTIN 300 MG/1
300 CAPSULE ORAL 2 TIMES DAILY
Status: DISCONTINUED | OUTPATIENT
Start: 2021-12-26 | End: 2021-12-27 | Stop reason: HOSPADM

## 2021-12-26 RX ORDER — ONDANSETRON 2 MG/ML
4 INJECTION INTRAMUSCULAR; INTRAVENOUS EVERY 6 HOURS PRN
Status: DISCONTINUED | OUTPATIENT
Start: 2021-12-26 | End: 2021-12-27 | Stop reason: HOSPADM

## 2021-12-26 RX ORDER — ACETAMINOPHEN 650 MG/1
650 SUPPOSITORY RECTAL EVERY 6 HOURS PRN
Status: DISCONTINUED | OUTPATIENT
Start: 2021-12-26 | End: 2021-12-27 | Stop reason: HOSPADM

## 2021-12-26 RX ORDER — KETOROLAC TROMETHAMINE 15 MG/ML
15 INJECTION, SOLUTION INTRAMUSCULAR; INTRAVENOUS EVERY 6 HOURS PRN
Status: DISCONTINUED | OUTPATIENT
Start: 2021-12-26 | End: 2021-12-27 | Stop reason: HOSPADM

## 2021-12-26 RX ORDER — PANTOPRAZOLE SODIUM 40 MG/1
40 TABLET, DELAYED RELEASE ORAL
Status: DISCONTINUED | OUTPATIENT
Start: 2021-12-27 | End: 2021-12-27 | Stop reason: HOSPADM

## 2021-12-26 RX ORDER — POLYETHYLENE GLYCOL 3350 17 G/17G
17 POWDER, FOR SOLUTION ORAL DAILY PRN
Status: DISCONTINUED | OUTPATIENT
Start: 2021-12-26 | End: 2021-12-27 | Stop reason: HOSPADM

## 2021-12-26 RX ORDER — NICOTINE POLACRILEX 4 MG
15 LOZENGE BUCCAL PRN
Status: DISCONTINUED | OUTPATIENT
Start: 2021-12-26 | End: 2021-12-27 | Stop reason: HOSPADM

## 2021-12-26 RX ORDER — SODIUM CHLORIDE 9 MG/ML
25 INJECTION, SOLUTION INTRAVENOUS PRN
Status: DISCONTINUED | OUTPATIENT
Start: 2021-12-26 | End: 2021-12-27 | Stop reason: HOSPADM

## 2021-12-26 RX ORDER — ONDANSETRON 2 MG/ML
4 INJECTION INTRAMUSCULAR; INTRAVENOUS ONCE
Status: COMPLETED | OUTPATIENT
Start: 2021-12-26 | End: 2021-12-26

## 2021-12-26 RX ORDER — CARBAMAZEPINE 200 MG/1
200 TABLET ORAL 2 TIMES DAILY
Status: DISCONTINUED | OUTPATIENT
Start: 2021-12-26 | End: 2021-12-27 | Stop reason: HOSPADM

## 2021-12-26 RX ORDER — 0.9 % SODIUM CHLORIDE 0.9 %
1000 INTRAVENOUS SOLUTION INTRAVENOUS ONCE
Status: COMPLETED | OUTPATIENT
Start: 2021-12-26 | End: 2021-12-26

## 2021-12-26 RX ORDER — ACETAMINOPHEN 325 MG/1
650 TABLET ORAL EVERY 6 HOURS PRN
Status: DISCONTINUED | OUTPATIENT
Start: 2021-12-26 | End: 2021-12-27 | Stop reason: HOSPADM

## 2021-12-26 RX ORDER — PROMETHAZINE HYDROCHLORIDE 25 MG/ML
25 INJECTION, SOLUTION INTRAMUSCULAR; INTRAVENOUS ONCE
Status: COMPLETED | OUTPATIENT
Start: 2021-12-26 | End: 2021-12-26

## 2021-12-26 RX ORDER — DEXTROSE MONOHYDRATE 25 G/50ML
12.5 INJECTION, SOLUTION INTRAVENOUS PRN
Status: DISCONTINUED | OUTPATIENT
Start: 2021-12-26 | End: 2021-12-27 | Stop reason: HOSPADM

## 2021-12-26 RX ORDER — SODIUM CHLORIDE 0.9 % (FLUSH) 0.9 %
5-40 SYRINGE (ML) INJECTION PRN
Status: DISCONTINUED | OUTPATIENT
Start: 2021-12-26 | End: 2021-12-27 | Stop reason: HOSPADM

## 2021-12-26 RX ORDER — ATORVASTATIN CALCIUM 10 MG/1
10 TABLET, FILM COATED ORAL DAILY
Status: DISCONTINUED | OUTPATIENT
Start: 2021-12-26 | End: 2021-12-27 | Stop reason: HOSPADM

## 2021-12-26 RX ORDER — POTASSIUM CHLORIDE 7.45 MG/ML
10 INJECTION INTRAVENOUS PRN
Status: DISCONTINUED | OUTPATIENT
Start: 2021-12-26 | End: 2021-12-27 | Stop reason: HOSPADM

## 2021-12-26 RX ORDER — MAGNESIUM SULFATE IN WATER 40 MG/ML
2000 INJECTION, SOLUTION INTRAVENOUS PRN
Status: DISCONTINUED | OUTPATIENT
Start: 2021-12-26 | End: 2021-12-27 | Stop reason: HOSPADM

## 2021-12-26 RX ORDER — DEXTROSE MONOHYDRATE 50 MG/ML
100 INJECTION, SOLUTION INTRAVENOUS PRN
Status: DISCONTINUED | OUTPATIENT
Start: 2021-12-26 | End: 2021-12-27 | Stop reason: HOSPADM

## 2021-12-26 RX ORDER — SODIUM CHLORIDE 9 MG/ML
INJECTION, SOLUTION INTRAVENOUS CONTINUOUS
Status: DISCONTINUED | OUTPATIENT
Start: 2021-12-26 | End: 2021-12-27 | Stop reason: HOSPADM

## 2021-12-26 RX ORDER — MORPHINE SULFATE 2 MG/ML
1 INJECTION, SOLUTION INTRAMUSCULAR; INTRAVENOUS EVERY 4 HOURS PRN
Status: DISCONTINUED | OUTPATIENT
Start: 2021-12-26 | End: 2021-12-27 | Stop reason: HOSPADM

## 2021-12-26 RX ORDER — ONDANSETRON 4 MG/1
4 TABLET, ORALLY DISINTEGRATING ORAL EVERY 8 HOURS PRN
Status: DISCONTINUED | OUTPATIENT
Start: 2021-12-26 | End: 2021-12-27 | Stop reason: HOSPADM

## 2021-12-26 RX ADMIN — INSULIN LISPRO 1 UNITS: 100 INJECTION, SOLUTION INTRAVENOUS; SUBCUTANEOUS at 20:22

## 2021-12-26 RX ADMIN — ONDANSETRON 4 MG: 2 INJECTION INTRAMUSCULAR; INTRAVENOUS at 06:05

## 2021-12-26 RX ADMIN — SODIUM CHLORIDE: 9 INJECTION, SOLUTION INTRAVENOUS at 16:12

## 2021-12-26 RX ADMIN — MORPHINE SULFATE 1 MG: 2 INJECTION, SOLUTION INTRAMUSCULAR; INTRAVENOUS at 16:37

## 2021-12-26 RX ADMIN — PROMETHAZINE HYDROCHLORIDE 25 MG: 25 INJECTION INTRAMUSCULAR; INTRAVENOUS at 12:28

## 2021-12-26 RX ADMIN — SERTRALINE 50 MG: 50 TABLET, FILM COATED ORAL at 17:21

## 2021-12-26 RX ADMIN — CARBAMAZEPINE 200 MG: 200 TABLET ORAL at 20:24

## 2021-12-26 RX ADMIN — SODIUM CHLORIDE 1000 ML: 9 INJECTION, SOLUTION INTRAVENOUS at 04:15

## 2021-12-26 RX ADMIN — ENOXAPARIN SODIUM 40 MG: 100 INJECTION SUBCUTANEOUS at 17:21

## 2021-12-26 RX ADMIN — ONDANSETRON 4 MG: 2 INJECTION INTRAMUSCULAR; INTRAVENOUS at 04:15

## 2021-12-26 RX ADMIN — ATORVASTATIN CALCIUM 10 MG: 10 TABLET, FILM COATED ORAL at 20:24

## 2021-12-26 RX ADMIN — TRAZODONE HYDROCHLORIDE 150 MG: 100 TABLET ORAL at 20:25

## 2021-12-26 RX ADMIN — MORPHINE SULFATE 4 MG: 4 INJECTION INTRAVENOUS at 06:07

## 2021-12-26 RX ADMIN — MORPHINE SULFATE 1 MG: 2 INJECTION, SOLUTION INTRAMUSCULAR; INTRAVENOUS at 21:03

## 2021-12-26 RX ADMIN — MORPHINE SULFATE 4 MG: 4 INJECTION INTRAVENOUS at 04:20

## 2021-12-26 RX ADMIN — KETOROLAC TROMETHAMINE 15 MG: 15 INJECTION, SOLUTION INTRAMUSCULAR; INTRAVENOUS at 20:00

## 2021-12-26 RX ADMIN — MORPHINE SULFATE 4 MG: 4 INJECTION INTRAVENOUS at 12:20

## 2021-12-26 RX ADMIN — IOPAMIDOL 75 ML: 755 INJECTION, SOLUTION INTRAVENOUS at 05:39

## 2021-12-26 ASSESSMENT — PAIN SCALES - GENERAL
PAINLEVEL_OUTOF10: 5
PAINLEVEL_OUTOF10: 8
PAINLEVEL_OUTOF10: 10
PAINLEVEL_OUTOF10: 6
PAINLEVEL_OUTOF10: 10
PAINLEVEL_OUTOF10: 8
PAINLEVEL_OUTOF10: 9
PAINLEVEL_OUTOF10: 7

## 2021-12-26 ASSESSMENT — PAIN DESCRIPTION - PAIN TYPE
TYPE: ACUTE PAIN
TYPE: ACUTE PAIN

## 2021-12-26 ASSESSMENT — PAIN DESCRIPTION - LOCATION
LOCATION: CHEST;BACK
LOCATION: CHEST

## 2021-12-26 ASSESSMENT — PAIN DESCRIPTION - DESCRIPTORS
DESCRIPTORS: CONSTANT;SQUEEZING
DESCRIPTORS: CONSTANT;SQUEEZING

## 2021-12-26 ASSESSMENT — ENCOUNTER SYMPTOMS
VOMITING: 1
CONSTIPATION: 1
SHORTNESS OF BREATH: 0
RHINORRHEA: 0
VOICE CHANGE: 0
SORE THROAT: 0
CHEST TIGHTNESS: 1
BACK PAIN: 1
COUGH: 0
ABDOMINAL PAIN: 1
CONSTIPATION: 0
EYE PAIN: 0
BACK PAIN: 0
NAUSEA: 1
COLOR CHANGE: 0
DIARRHEA: 0

## 2021-12-26 ASSESSMENT — PAIN DESCRIPTION - FREQUENCY: FREQUENCY: CONTINUOUS

## 2021-12-26 ASSESSMENT — PAIN DESCRIPTION - ONSET: ONSET: ON-GOING

## 2021-12-26 ASSESSMENT — PAIN DESCRIPTION - ORIENTATION
ORIENTATION: MID
ORIENTATION: MID

## 2021-12-26 ASSESSMENT — PAIN DESCRIPTION - PROGRESSION: CLINICAL_PROGRESSION: NOT CHANGED

## 2021-12-26 NOTE — H&P
[facility]      HISTORY AND PHYSICAL EXAMINATION            Date:   12/26/2021  Patient name:  Irineo Borges  Date of admission:  12/26/2021  2:35 AM  MRN:   [id number]  Account:  [de-identified]  YOB: 1970  PCP:    Jason Raya MD  Room:   46PED/46PED  Code Status:    Prior    Chief Complaint:     Chief Complaint   Patient presents with    Chest Pain    Back Pain    Nausea       History Obtained From:     patient, electronic medical record    History of Present Illness: The patient is a 45 y.o.  /  female who presents withChest Pain, Back Pain, and Nausea  Past medical history of DM, gastroparesis, chronic pancreatitis, familial hypertriglyceridemia, hiatal hernia, drug use, Covid, presented to ED with worsening abdominal pain for the last 2 weeks, became severe 2 days prior to presentation described as distention, epigastric, radiating up to her chest, worsened with movement, improved slightly when leaning forward or crouching while laying down, associated with nausea and vomiting, no hematochezia, was having chills, no fever. States that she is constipated, last bowel movement was 2 days ago, no blood per rectum. Denies any shortness of breath chest pain, no dizziness. Last hemoglobin A1c done in February 2021 was 10, patient is being followed up by Dr. Andres Cochran, she was scheduled for a CT abdomen pelvis, which was done in the ED, results: Inflammatory stranding in the mesentery, stranding along the course of the celiac artery and proper hepatic artery, Vital signs in ED, hypertensive, tachycardia. CRP 5.6  Alk phos 171  CBC; WBC 7.3, Hgb: 11.1, Plt: 271  Consulting vascular surgery. Was given IV fluids, 3 doses of morphine, Zofran, Phenergan and she is admitted to the hospital for the management of abdominal pain.         Past Medical History:     Past Medical History:   Diagnosis Date    Depression     Diabetes mellitus ([address]     Hyperlipidemia     Pancreatitis     Pneumonia due to infectious organism 1/8/2017    Substance abuse [facility])         Past SurgicalHistory:     Past Surgical History:   Procedure Laterality Date    CARPAL TUNNEL RELEASE  2014    jerome carpal tunnel    CHOLECYSTECTOMY      COLONOSCOPY N/A 9/16/2019    COLONOSCOPY WITH BIOPSY performed by Jose Angel Brandt MD at [address]  GASTROINTESTINAL ENDOSCOPY N/A 9/16/2019    EGD ESOPHAGOGASTRODUODENOSCOPY performed by Jose Angel Brandt MD at [address] [facility]        Medications Prior to Admission:        Prior to Admission medications    Medication Sig Start Date End Date Taking? Authorizing Provider   glucose monitoring (FREESTYLE) kit 4 times daily 12/10/21   Mane Farris MD   blood glucose test strips (FREESTYLE LITE) strip 1 each by In Vitro route daily As needed. 12/10/21   Mane Farris MD   FreeStyle Lancets MISC 1 each by Does not apply route daily 12/10/21   Mane Farris MD   carBAMazepine (TEGRETOL) 200 MG tablet  11/2/21   Historical Provider, MD   gabapentin (NEURONTIN) 300 MG capsule take 1 capsule by mouth every morning and 1 capsule every evening 9/7/21   Historical Provider, MD   ibuprofen (ADVIL;MOTRIN) 400 MG tablet  11/2/21   Historical Provider, MD   HUMULIN N 100 UNIT/ML injection vial  11/19/21   Historical Provider, MD Elle Hackett R 100 UNIT/ML injection  8/26/21   Historical Provider, MD   BD INSULIN SYRINGE U/F 31G X 5/16\" 0.3 ML MISC  11/19/21   Historical Provider, MD   lovastatin (MEVACOR) 40 MG tablet  11/2/21   Historical Provider, MD   prazosin (MINIPRESS) 1 MG capsule  11/2/21   Historical Provider, MD   sertraline (ZOLOFT) 50 MG tablet  11/2/21   Historical Provider, MD   traZODone (DESYREL) 150 MG tablet  11/2/21   Historical Provider, MD   pantoprazole (PROTONIX) 40 MG tablet Take 1 tablet by mouth every morning (before breakfast) 12/3/21   Jose Angel Brandt MD        Allergies:     Patient has no known allergies.     Social History:     Tobacco:    reports that she has been smoking cigarettes. She has a 15.00 pack-year smoking history. She has never used smokeless tobacco.  Alcohol:      reports current alcohol use. Drug Use:  reports current drug use. Drug: Cocaine. Family History:     Family History   Problem Relation Age of Onset    Diabetes Mother     Coronary Art Dis Mother     Cancer Father         Colon    Diabetes Sister     Diabetes Brother     Bipolar Disorder Brother     Alcohol Abuse Brother     Substance Abuse Brother        Review of Systems:     Positive and Negative as described in HPI. Review of Systems   Constitutional: Positive for chills. Negative for diaphoresis. Eyes: Negative for visual disturbance. Respiratory: Negative for cough and shortness of breath. Cardiovascular: Positive for chest pain. Negative for palpitations and leg swelling. Gastrointestinal: Positive for abdominal pain, constipation, nausea and vomiting. Negative for diarrhea. Endocrine: Negative for polyuria. Genitourinary: Negative for difficulty urinating and dysuria. Musculoskeletal: Negative for arthralgias and back pain. Skin: Negative for color change. Neurological: Positive for headaches. Negative for weakness, light-headedness and numbness. Psychiatric/Behavioral: Negative for agitation. Physical Exam:   /66   Pulse 96   Temp 96.8 °F (36 °C) (Oral)   Resp 22   Wt 105 lb (47.6 kg)   LMP 2018 (LMP Unknown)   SpO2 91%   BMI 18.02 kg/m²   Temp (24hrs), Av.8 °F (36 °C), Min:96.8 °F (36 °C), Max:96.8 °F (36 °C)    No results for input(s): POCGLU in the last 72 hours. No intake or output data in the 24 hours ending 21 1011    Physical Exam  Constitutional:       General: She is in acute distress. Appearance: She is ill-appearing. HENT:      Head: Normocephalic and atraumatic. Cardiovascular:      Rate and Rhythm: Normal rate and regular rhythm.       Pulses: Normal pulses. Heart sounds: Normal heart sounds. No murmur heard. No friction rub. No gallop. Pulmonary:      Effort: No respiratory distress. Breath sounds: Normal breath sounds. No wheezing, rhonchi or rales. Abdominal:      General: Abdomen is flat. Bowel sounds are normal. There is no distension. Palpations: There is no mass. Tenderness: There is abdominal tenderness (Epigastric). Musculoskeletal:      Right lower leg: No edema. Left lower leg: No edema. Skin:     General: Skin is warm. Capillary Refill: Capillary refill takes less than 2 seconds. Coloration: Skin is not jaundiced or pale. Findings: No lesion. Neurological:      General: No focal deficit present. Mental Status: She is alert and oriented to person, place, and time. Mental status is at baseline. Motor: No weakness.    Psychiatric:         Mood and Affect: Mood normal.         Behavior: Behavior normal.         Investigations:     Laboratory Testing:  Recent Results (from the past 24 hour(s))   CBC Auto Differential    Collection Time: 12/26/21  4:25 AM   Result Value Ref Range    WBC 7.3 3.5 - 11.3 k/uL    RBC 3.76 (L) 3.95 - 5.11 m/uL    Hemoglobin 11.1 (L) 11.9 - 15.1 g/dL    Hematocrit 33.2 (L) 36.3 - 47.1 %    MCV 88.3 82.6 - 102.9 fL    MCH 29.5 25.2 - 33.5 pg    MCHC 33.4 28.4 - 34.8 g/dL    RDW 12.5 11.8 - 14.4 %    Platelets 752 654 - 047 k/uL    MPV 9.2 8.1 - 13.5 fL    NRBC Automated 0.0 0.0 per 100 WBC    Differential Type NOT REPORTED     Seg Neutrophils 49 36 - 65 %    Lymphocytes 36 24 - 43 %    Monocytes 9 3 - 12 %    Eosinophils % 5 (H) 1 - 4 %    Basophils 1 0 - 2 %    Immature Granulocytes 0 0 %    Segs Absolute 3.56 1.50 - 8.10 k/uL    Absolute Lymph # 2.65 1.10 - 3.70 k/uL    Absolute Mono # 0.68 0.10 - 1.20 k/uL    Absolute Eos # 0.34 0.00 - 0.44 k/uL    Basophils Absolute 0.05 0.00 - 0.20 k/uL    Absolute Immature Granulocyte 0.03 0.00 - 0.30 k/uL    WBC Morphology NOT REPORTED     RBC Morphology NOT REPORTED     Platelet Estimate NOT REPORTED    Comprehensive Metabolic Panel w/ Reflex to MG    Collection Time: 12/26/21  4:25 AM   Result Value Ref Range    Glucose 180 (H) 70 - 99 mg/dL    BUN 19 6 - 20 mg/dL    CREATININE 0.65 0.50 - 0.90 mg/dL    Bun/Cre Ratio NOT REPORTED 9 - 20    Calcium 9.7 8.6 - 10.4 mg/dL    Sodium 137 135 - 144 mmol/L    Potassium 4.5 3.7 - 5.3 mmol/L    Chloride 101 98 - 107 mmol/L    CO2 24 20 - 31 mmol/L    Anion Gap 12 9 - 17 mmol/L    Alkaline Phosphatase 171 (H) 35 - 104 U/L    ALT 17 5 - 33 U/L    AST 14 <32 U/L    Total Bilirubin <0.10 (L) 0.3 - 1.2 mg/dL    Total Protein 7.5 6.4 - 8.3 g/dL    Albumin 4.1 3.5 - 5.2 g/dL    Albumin/Globulin Ratio 1.2 1.0 - 2.5    GFR Non-African American >60 >60 mL/min    GFR African American >60 >60 mL/min    GFR Comment          GFR Staging NOT REPORTED    Lipase    Collection Time: 12/26/21  4:25 AM   Result Value Ref Range    Lipase 57 13 - 60 U/L   Troponin    Collection Time: 12/26/21  4:25 AM   Result Value Ref Range    Troponin, High Sensitivity 7 0 - 14 ng/L    Troponin T NOT REPORTED <0.03 ng/mL    Troponin Interp NOT REPORTED    COVID-19, Rapid    Collection Time: 12/26/21  4:25 AM    Specimen: Nasopharyngeal Swab   Result Value Ref Range    Specimen Description . NASOPHARYNGEAL SWAB     SARS-CoV-2, Rapid Not Detected Not Detected   C-Reactive Protein    Collection Time: 12/26/21  4:25 AM   Result Value Ref Range    CRP 5.6 (H) 0.0 - 5.0 mg/L   Urinalysis, reflex to microscopic    Collection Time: 12/26/21  7:08 AM   Result Value Ref Range    Color, UA Yellow Yellow    Turbidity UA Clear Clear    Glucose, Ur NEGATIVE NEGATIVE    Bilirubin Urine NEGATIVE NEGATIVE    Ketones, Urine NEGATIVE NEGATIVE    Specific Gravity, UA 1.024 1.005 - 1.030    Urine Hgb NEGATIVE NEGATIVE    pH, UA 8.0 5.0 - 8.0    Protein, UA NEGATIVE  Verified by sulfosalicylic acid test. (A) NEGATIVE    Urobilinogen, Urine Normal Normal Nitrite, Urine NEGATIVE NEGATIVE    Leukocyte Esterase, Urine NEGATIVE NEGATIVE    Urinalysis Comments NOT REPORTED    Microscopic Urinalysis    Collection Time: 12/26/21  7:08 AM   Result Value Ref Range    -          WBC, UA None 0 - 5 /HPF    RBC, UA 2 TO 5 0 - 4 /HPF    Casts UA NOT REPORTED 0 - 8 /LPF    Crystals, UA NOT REPORTED None /HPF    Epithelial Cells UA 0 TO 2 0 - 5 /HPF    Renal Epithelial, UA NOT REPORTED 0 /HPF    Bacteria, UA NOT REPORTED None    Mucus, UA NOT REPORTED None    Trichomonas, UA NOT REPORTED None    Amorphous, UA NOT REPORTED None    Other Observations UA NOT REPORTED NOT REQ. Yeast, UA NOT REPORTED None       Imaging/Diagnostics:  CT ABDOMEN PELVIS W IV CONTRAST Additional Contrast? None    Result Date: 12/26/2021  EXAMINATION: CT OF THE ABDOMEN AND PELVIS WITH CONTRAST 12/26/2021 5:18 am TECHNIQUE: CT of the abdomen and pelvis was performed with the administration of intravenous contrast. Multiplanar reformatted images are provided for review. Dose modulation, iterative reconstruction, and/or weight based adjustment of the mA/kV was utilized to reduce the radiation dose to as low as reasonably achievable. COMPARISON: January 8, 2017 HISTORY: ORDERING SYSTEM PROVIDED HISTORY: abdominal pain, worsening x's 1 month TECHNOLOGIST PROVIDED HISTORY: abdominal pain, worsening x's 1 month Decision Support Exception - unselect if not a suspected or confirmed emergency medical condition->Emergency Medical Condition (MA) Reason for Exam: Abdominal pain, worsening x's 1 month FINDINGS: Lower Chest: Basilar atelectasis. Organs: Status post cholecystectomy. No intra or extrahepatic biliary dilatation. The liver parenchyma, spleen, pancreas, adrenal glands, and kidneys demonstrate no acute abnormality. Bilateral ureters are normal in course and caliber. No ureteral calculi. GI/Bowel:  The stomach, small bowel, and colon are normal in course and caliber without evidence of wall thickening or obstruction. Pelvis: Gas is identified in the distended bladder of unclear etiology and clinical significance. Normal uterus. No adnexal masses. Peritoneum/Retroperitoneum: No free fluid or free air. Inflammatory stranding is seen in the mesentery. Additionally, there is inflammatory stranding along the course of the celiac artery and proper hepatic artery (series 2, image 52). No pathologic lymphadenopathy. Aorta and its branches are patent and normal in course and caliber. Mild atherosclerosis. Bones/Soft Tissues: No acute or aggressive osseous lesion. 1. Interval appearance of inflammatory stranding surrounding the celiac trunk and proper hepatic artery. Findings are of unclear etiology. Vasculitis remains a differential consideration. 2. 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 Status post cholecystectomy. RECOMMENDATIONS: Unavailable     XR CHEST PORTABLE    Result Date: 12/26/2021  EXAMINATION: ONE XRAY VIEW OF THE CHEST 12/26/2021 4:15 am COMPARISON: 10/29/2021 HISTORY: ORDERING SYSTEM PROVIDED HISTORY: chest pain TECHNOLOGIST PROVIDED HISTORY: chest pain Reason for Exam: upr,chest pain,back pain,nausea FINDINGS: Heart size and pulmonary vasculature are normal.  The lungs are clear and normally expanded. Surrounding osseous and soft tissue structures are unremarkable. Normal examination. Assessment :      Primary Problem  <principal problem not specified>    Active Hospital Problems    Diagnosis Date Noted    Abdominal pain [R10.9] 12/26/2021       Plan:     Abdominal pain -rule out vasculitis  History of diabetes,Gastroparesis  ESR 23, CRP 5.6  CT abdomen, pelvis: Inflammatory stranding of the celiac trunk, proper hepatic artery.    Vascular onboard  We will get lipid panel, hemoglobin A1c, EtOH level  Consult GI  100 mL/H normal saline  Clear liquid diet  Continue atorvastatin  Toradol 50 mg every 6 hours as needed  Morphine 1 mg IV every 4 hours as needed severe pain    Diabetes mellitus  Glucose 180  Last hemoglobin A1c 10 on February  Repeat HgbA1c  20 units NPH  Low dose insulin sliding scale  Hypothermic protocol  Gabapentin for neuropathic pain    History of mood disorder  Carbamazepine  Sertraline  Trazodone for sleeping    DVT prophylaxis Lovenox 40 mg subcu  GI prophylaxis: Protonix 40 mg home dose  Diet clear liquid    PT OT evaluation  SW discharge planning    We will discuss plan with Dr. Ava De Guzman      Consultations:   Lorraine Hamilton    Patient is admitted as inpatient status because of co-morbiditieslisted above, severity of signs and symptoms as outlined, requirement for current medical therapies and most importantly because of direct risk to patient if care not provided in a hospital setting.     Nereida Angeles MD  12/26/2021  10:11 AM    Copy sent to Dr. Idris Suarez MD

## 2021-12-26 NOTE — Clinical Note
Patient Class: Inpatient [101]   REQUIRED: Diagnosis: Abdominal pain [384374]   Estimated Length of Stay: Estimated stay of more than 2 midnights   Telemetry/Cardiac Monitoring Required?: No

## 2021-12-26 NOTE — ED PROVIDER NOTES
[facility]  ED  Emergency Department Encounter  Emergency Medicine Resident     Pt Name: Tawanna Crook  MRN: [id number]  [place] 1970  Date of evaluation: 12/26/21  PCP:  Yash Yuan MD    [address] [place] [address]       Chief Complaint   Patient presents with    Chest Pain    Back Pain    Nausea       HISTORY OFPRESENT ILLNESS  (Location/Symptom, Timing/Onset, Context/Setting, Quality, Duration, Modifying Vennie Mends.)      Tawanna Crook is a 45 y.o. female with past medical history significant for hiatal hernia, gastroparesis, diabetes mellitus with 1 month of worsening midepigastric pain and now chest pain which patient states is radiating into her right flank/back. Patient states that she has been having worsening midepigastric and chest pain for the past 1.5 weeks. Patient denies any shortness of breath. Denies any fevers, chills, headaches, visual changes, ear pain, difficulty swallowing or sore throat. Patient is unvaccinated against Covid, did have Covid previously. Is vaccinated against influenza. No history of PEs or DVTs. Patient does state that pain gets worse when she lays back intermittently although currently is not worse when she lays back. Is not worse with deep inspiration. Has had associated nausea, no vomiting, no hemoptysis. Denies any constipation or diarrhea. Does have abdominal pain. States that she has not tried anything at home for symptomatic relief. Denies any blood per rectum. Patient is a smoker. Denies alcohol use, denies IV drug use. PAST MEDICAL / SURGICAL / SOCIAL / FAMILY HISTORY      has a past medical history of Depression, Diabetes mellitus ([address] Hyperlipidemia, Pancreatitis, Pneumonia due to infectious organism, and Substance abuse ([address] has a past surgical history that includes Cholecystectomy; Ectopic pregnancy surgery; Carpal tunnel release (2014); Colonoscopy (N/A, 9/16/2019); and Upper gastrointestinal endoscopy (N/A, 9/16/2019). Social:  reports that she has been smoking cigarettes. She has a 15.00 pack-year smoking history. She has never used smokeless tobacco. She reports current alcohol use. She reports current drug use. Drug: Cocaine. Family Hx:   Family History   Problem Relation Age of Onset    Diabetes Mother     Coronary Art Dis Mother     Cancer Father         Colon    Diabetes Sister     Diabetes Brother     Bipolar Disorder Brother     Alcohol Abuse Brother     Substance Abuse Brother         Allergies:  Patient has no known allergies. Home Medications:  Prior to Admission medications    Medication Sig Start Date End Date Taking? Authorizing Provider   glucose monitoring (FREESTYLE) kit 4 times daily 12/10/21   Lesli Guzman MD   blood glucose test strips (FREESTYLE LITE) strip 1 each by In Vitro route daily As needed.  12/10/21   Lesli Guzman MD   FreeStyle Lancets MISC 1 each by Does not apply route daily 12/10/21   Lesli Guzman MD   carBAMazepine (TEGRETOL) 200 MG tablet  11/2/21   Historical Provider, MD   gabapentin (NEURONTIN) 300 MG capsule take 1 capsule by mouth every morning and 1 capsule every evening 9/7/21   Historical Provider, MD   ibuprofen (ADVIL;MOTRIN) 400 MG tablet  11/2/21   Historical Provider, MD   HUMULIN N 100 UNIT/ML injection vial  11/19/21   Historical Provider, MD Zandra Mata R 100 UNIT/ML injection  8/26/21   Historical Provider, MD   BD INSULIN SYRINGE U/F 31G X 5/16\" 0.3 ML MISC  11/19/21   Historical Provider, MD   lovastatin (MEVACOR) 40 MG tablet  11/2/21   Historical Provider, MD   prazosin (MINIPRESS) 1 MG capsule  11/2/21   Historical Provider, MD   sertraline (ZOLOFT) 50 MG tablet  11/2/21   Historical Provider, MD   traZODone (DESYREL) 150 MG tablet  11/2/21   Historical Provider, MD   pantoprazole (PROTONIX) 40 MG tablet Take 1 tablet by mouth every morning (before breakfast) 12/3/21   Crista Arboleda MD       REVIEW OFSYSTEMS    (2-9 systems for level 4, 10 or more for level 5)      Review of Systems   Constitutional: Positive for appetite change. Negative for activity change, chills and fever. HENT: Negative for congestion, rhinorrhea and sore throat. Eyes: Negative for pain and visual disturbance. Respiratory: Negative for cough and shortness of breath. Cardiovascular: Positive for chest pain. Negative for palpitations. Gastrointestinal: Positive for abdominal pain, nausea and vomiting. Negative for constipation and diarrhea. Genitourinary: Negative for difficulty urinating and frequency. Musculoskeletal: Positive for back pain. Skin: Negative for rash and wound. Neurological: Negative for dizziness and headaches. PHYSICAL EXAM   (up to 7 for level 4, 8 or more forlevel 5)      INITIAL VITALS:   Vitals:    12/26/21 0502   BP: (!) 170/74   Pulse:    Resp:    Temp:    SpO2:         Physical Exam  Vitals and nursing note reviewed. Constitutional:       General: She is not in acute distress. Appearance: She is not ill-appearing, toxic-appearing or diaphoretic. HENT:      Head: Normocephalic and atraumatic. Nose: Nose normal.      Mouth/Throat:      Mouth: Mucous membranes are moist.      Pharynx: Oropharynx is clear. Eyes:      General:         Right eye: No discharge. Left eye: No discharge. Extraocular Movements: Extraocular movements intact. Pupils: Pupils are equal, round, and reactive to light. Cardiovascular:      Rate and Rhythm: Normal rate and regular rhythm. Pulses: Normal pulses. Heart sounds: Normal heart sounds. Pulmonary:      Effort: Pulmonary effort is normal. No respiratory distress. Breath sounds: Normal breath sounds. Abdominal:      Palpations: Abdomen is soft. Tenderness: There is abdominal tenderness (epigastric ). There is no guarding or rebound. Comments: Non-Peritoneal   Musculoskeletal:      Cervical back: Normal range of motion. No rigidity.       Right lower leg: No edema. Left lower leg: No edema. Skin:     General: Skin is warm and dry. Capillary Refill: Capillary refill takes less than 2 seconds. Neurological:      General: No focal deficit present. Mental Status: She is alert and oriented to person, place, and time. Psychiatric:         Mood and Affect: Mood normal.         Behavior: Behavior normal.         DIFFERENTIAL  DIAGNOSIS       DDX: ACS versus pneumonia versus abnormality versus intra-abdominal infection versus reflux     Initial MDM/Plan: 45 y.o. female who presents with 1 month of midepigastric abdominal pain. Upon my initial examination patient is resting comfortably in the cot, in no acute distress, no respiratory distress, speaking full sentences. Vital signs upon arrival are notable for hypertension otherwise within normal limits including patient being afebrile, nontachycardic, nontachypneic, nontoxic-appearing. Patient has a GCS of 15 is alert, oriented, answering all questions appropriately. Plan for work-up to include CBC, CMP, lipase, troponin, EKG, chest x-ray. Morphine and IV fluids for symptomatic relief. Urinalysis. Urine Culture.       EMERGENCY DEPARTMENT COURSE:  ED Course as of 12/26/21 0700   Sun Dec 26, 2021   0507 Troponin:    Troponin, High Sensitivity 7   Troponin T NOT REPORTED   Troponin Interp NOT REPORTED  No indication to repeat  [MA]   0507 CBC Auto Differential(!):    WBC 7.3   RBC 3.76(!)   Hemoglobin Quant 11.1(!)   Hematocrit 33.2(!)   MCV 88.3   MCH 29.5   MCHC 33.4   RDW 12.5   Platelet Count 086   MPV 9.2   NRBC Automated 0.0   Differential Type NOT REPORTED   Seg Neutrophils 49   Lymphocytes 36   Monocytes 9   Eosinophils % 5(!)   Basophils 1   Immature Granulocytes 0   Segs Absolute 3.56   Absolute Lymph # 2.65   Absolute Mono # 0.68   Absolute Eos # 0.34   Basophils Absolute 0.05   Absolute Immature Granulocyte 0.03   WBC Morphology NOT REPORTED   RBC Morphology NOT REPORTED   Platelet Estimate NOT REPORTED  Hgb flagged as low although this appears to be patient's baseline when compared to previous. [MA]   0600 Continuing to have abdominal pain morphine and Zofran provided. [MA]   [id number] CT ABDOMEN PELVIS W IV CONTRAST Additional Contrast? None  IMPRESSION:  1. Interval appearance of inflammatory stranding surrounding the celiac trunk  and proper hepatic artery. Findings are of unclear etiology. Vasculitis  remains a differential consideration. 2. 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 Status post cholecystectomy. [MA]   0622 CT Abdomen pelvis findings are of unclear etiology, will consult vascular surgery for recommendations. [MA]   0622 Lipase:    Lipase 57  Negative  [MA]   0622 Comprehensive Metabolic Panel w/ Reflex to MG(!):    Glucose 180(!)   BUN 19   Creatinine 0.65   Bun/Cre Ratio NOT REPORTED   CALCIUM, SERUM, 230621 9.7   Sodium 137   Potassium 4.5   Chloride 101   CO2 24   Anion Gap 12   Alk Phos 171(!)   ALT 17   AST 14   Bilirubin <0.10(!)   Total Protein 7.5   Albumin 4.1   Albumin/Globulin Ratio 1.2   GFR Non- >60   GFR  >60   GFR Comment        GFR Staging NOT REPORTED  Within normal limits  [MA]   0622 COVID-19, Rapid:    Specimen Description . NASOPHARYNGEAL SWAB   SARS-CoV-2, Rapid Not Detected  Negative  [MA]   8914 XR CHEST PORTABLE     FINDINGS:  Heart size and pulmonary vasculature are normal.  The lungs are clear and  normally expanded. Surrounding osseous and soft tissue structures are  unremarkable. [MA]   6959 Patient reevaluated. Continues to have abdominal pain. Continues to have tenderness palpation in the midepigastric region. Patient providing urine now. [MA]   0630 Discussed with Dr. Orestes Yee, vascular team who will evaluate patient and provide recommendations  [MA]   [id number] Patient signed out to Dr. Papito Esposito, vascular surgery recommendations and final disposition.  [MA]      ED Course User Index  [MA] Zayra Lucio DO        DIAGNOSTIC RESULTS / EMERGENCYDEPARTMENT COURSE / MDM     LABS:  Labs Reviewed   CBC WITH AUTO DIFFERENTIAL - Abnormal; Notable for the following components:       Result Value    RBC 3.76 (*)     Hemoglobin 11.1 (*)     Hematocrit 33.2 (*)     Eosinophils % 5 (*)     All other components within normal limits   COMPREHENSIVE METABOLIC PANEL W/ REFLEX TO MG FOR LOW K - Abnormal; Notable for the following components:    Glucose 180 (*)     Alkaline Phosphatase 171 (*)     Total Bilirubin <0.10 (*)     All other components within normal limits   COVID-19, RAPID   CULTURE, URINE   LIPASE   TROPONIN   URINALYSIS         RADIOLOGY:  CT ABDOMEN PELVIS W IV CONTRAST Additional Contrast? None    Result Date: 12/26/2021  1. Interval appearance of inflammatory stranding surrounding the celiac trunk and proper hepatic artery. Findings are of unclear etiology. Vasculitis remains a differential consideration. 2. 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 Status post cholecystectomy. RECOMMENDATIONS: Unavailable     XR CHEST PORTABLE    Result Date: 12/26/2021  EXAMINATION: ONE XRAY VIEW OF THE CHEST 12/26/2021 4:15 am COMPARISON: 10/29/2021 HISTORY: ORDERING SYSTEM PROVIDED HISTORY: chest pain TECHNOLOGIST PROVIDED HISTORY: chest pain Reason for Exam: upr,chest pain,back pain,nausea FINDINGS: Heart size and pulmonary vasculature are normal.  The lungs are clear and normally expanded. Surrounding osseous and soft tissue structures are unremarkable. Normal examination. EKG  Normal, normal sinus rhythm, axis within normal limits no deviation noted, intervals within normal limits including NJ, QRS, QT/QTc. ST segment elevation, no ST segment depression, no T wave abnormalities.   Non-Acute EKG    All EKG's are interpreted by the Emergency Department Physicianwho either signs or Co-signs this chart in the absence of a cardiologist.        PROCEDURES:  None    CONSULTS:  IP CONSULT TO VASCULAR SURGERY      FINAL IMPRESSION      1. Abdominal pain, epigastric          DISPOSITION / PLAN     DISPOSITION  Pending final disposition       PATIENT REFERRED TO:  No follow-up provider specified.     DISCHARGE MEDICATIONS:  New Prescriptions    No medications on file       Zayra Lucio DO  Emergency Medicine Resident    (Please note that portions of this note were completed with a voice recognition program.Efforts were made to edit the dictations but occasionally words are mis-transcribed.)       Zayra Lucio DO  Resident  12/26/21 3575

## 2021-12-26 NOTE — ED NOTES
Resting quietly in bed, no acute distress. Mild nausea reported.        Marlena Pak, APRN - CNP  12/26/21 6437

## 2021-12-26 NOTE — ED PROVIDER NOTES
[facility] HANDOFF       Handoff taken on the following patient from prior Attending Physician:  Pt Name: Irineo Borges  PCP:  Katerine Cantu MD    Attestation  I was available and discussed any additional care issues that arose and coordinated the management plans with the resident(s) caring for the patient during my duty period. Any areas of disagreement with resident's documentation of care or procedures are noted on the chart. I was personally present for the key portions of any/all procedures during my duty period. I have documented in the chart those procedures where I was not present during the key portions. CHIEF COMPLAINT       Chief Complaint   Patient presents with    Chest Pain    Back Pain    Nausea         CURRENT MEDICATIONS     Previous Medications  Previous Medications    BD INSULIN SYRINGE U/F 31G X 5/16\" 0.3 ML MISC        BLOOD GLUCOSE TEST STRIPS (FREESTYLE LITE) STRIP    1 each by In Vitro route daily As needed.     CARBAMAZEPINE (TEGRETOL) 200 MG TABLET        FREESTYLE LANCETS MISC    1 each by Does not apply route daily    GABAPENTIN (NEURONTIN) 300 MG CAPSULE    take 1 capsule by mouth every morning and 1 capsule every evening    GLUCOSE MONITORING (FREESTYLE) KIT    4 times daily    HUMULIN N 100 UNIT/ML INJECTION VIAL        IBUPROFEN (ADVIL;MOTRIN) 400 MG TABLET        LOVASTATIN (MEVACOR) 40 MG TABLET        NOVOLIN R 100 UNIT/ML INJECTION        PANTOPRAZOLE (PROTONIX) 40 MG TABLET    Take 1 tablet by mouth every morning (before breakfast)    PRAZOSIN (MINIPRESS) 1 MG CAPSULE        SERTRALINE (ZOLOFT) 50 MG TABLET        TRAZODONE (DESYREL) 150 MG TABLET           Encounter Medications  Orders Placed This Encounter   Medications    0.9 % sodium chloride bolus    morphine injection 4 mg    ondansetron (ZOFRAN) injection 4 mg    iopamidol (ISOVUE-370) 76 % injection 75 mL    morphine injection 4 mg    ondansetron (ZOFRAN) injection 4 mg       ALLERGIES     has No Known Allergies. RECENT VITALS:   Temp: 96.8 °F (36 °C),  Pulse: 96, Resp: 22, BP: (!) 170/74    RADIOLOGY:   CT ABDOMEN PELVIS W IV CONTRAST Additional Contrast? None   Preliminary Result   1. Interval appearance of inflammatory stranding surrounding the celiac trunk   and proper hepatic artery. Findings are of unclear etiology. Vasculitis   remains a differential consideration. 2. 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 Status post cholecystectomy. RECOMMENDATIONS:   Unavailable         XR CHEST PORTABLE   Final Result   Normal examination. LABS:  Labs Reviewed   CBC WITH AUTO DIFFERENTIAL - Abnormal; Notable for the following components:       Result Value    RBC 3.76 (*)     Hemoglobin 11.1 (*)     Hematocrit 33.2 (*)     Eosinophils % 5 (*)     All other components within normal limits   COMPREHENSIVE METABOLIC PANEL W/ REFLEX TO MG FOR LOW K - Abnormal; Notable for the following components:    Glucose 180 (*)     Alkaline Phosphatase 171 (*)     Total Bilirubin <0.10 (*)     All other components within normal limits   COVID-19, RAPID   CULTURE, URINE   LIPASE   TROPONIN   URINALYSIS   SEDIMENTATION RATE   C-REACTIVE PROTEIN           PLAN/ TASKS OUTSTANDING     This patient is a 45 y.o. Female. Abdominal pain for 1 to 2 months, CT showed vascular changes, vascular consulted for their recommendations. Patient is otherwise well-appearing and can be discharged home if vascular surgery does not feel there is a need for urgent intervention.     (Please note that portions of this note were completed with a voice recognition program.  Efforts were made to edit the dictations but occasionally words are mis-transcribed.)    Rodrick Borges MD,, MD  Attending Emergency Physician       Rodrick Borges MD  12/26/21 4420

## 2021-12-26 NOTE — CONSULTS
Division of Vascular Surgery        New Consult      Physician Requesting Consult: Dr. Mirela Whitten    Reason for Consult:   Inflammation around the celiac trunk    Chief Complaint:     Abdominal pain, nausea    History of Present Illness:      Dorothea oChn is a 45 y.o. woman who presents with worsening upper abdominal pain with associated nausea and emesis. Past medical history of diabetes, hyperlipidemia several cases of pancreatitis. Surgical history of cholecystectomy about 20 years ago. She states this pain has been ongoing for about 1 month, was intermittent however has become increasingly more constant and worse. She says the pain is in her upper abdomen she feels like it goes through her. Describes the pain as a squeezing pressure. The pain will intermittently cause her nausea and emesis. She said the pain is worsened by p.o. intake and she has not been eating much recently. She recently has had some constipation. She has never had any blood in her stool or emesis. She had a colonoscopy and EGD in 2019. She has had multiple cases of acute pancreatitis in her past.  She describes chills over the past month however has not checked her temperature to see if she has been febrile.     Medical History:     Past Medical History:   Diagnosis Date    Depression     Diabetes mellitus ([facility] [address]     Hyperlipidemia     Pancreatitis     Pneumonia due to infectious organism 1/8/2017    Substance abuse [facility])        Surgical History:     Past Surgical History:   Procedure Laterality Date    CARPAL TUNNEL RELEASE  2014    jerome carpal tunnel    CHOLECYSTECTOMY      COLONOSCOPY N/A 9/16/2019    COLONOSCOPY WITH [facility]OPSY [facility] by Sandy Trejo MD at [facility] UPPER GASTROINTESTINAL ENDOSCOPY N/A 9/16/2019    EGD ESOPHAGOGASTRODUODENOSCOPY performed by Sandy Trejo MD at [address] [place] [address] History:     Family History   Problem Relation Age of Onset    Diabetes Mother    [facility] Coronary Art Dis Mother     Cancer Father         Colon    Diabetes Sister     Diabetes Brother     Bipolar Disorder Brother     Alcohol Abuse Brother     Substance Abuse Brother        Allergies:       Patient has no known allergies. Medications:      No current facility-administered medications for this encounter. Current Outpatient Medications   Medication Sig Dispense Refill    glucose monitoring (FREESTYLE) kit 4 times daily 1 kit 0    blood glucose test strips (FREESTYLE LITE) strip 1 each by In Vitro route daily As needed. 100 each 11    FreeStyle Lancets MISC 1 each by Does not apply route daily 100 each 11    carBAMazepine (TEGRETOL) 200 MG tablet       gabapentin (NEURONTIN) 300 MG capsule take 1 capsule by mouth every morning and 1 capsule every evening      ibuprofen (ADVIL;MOTRIN) 400 MG tablet       HUMULIN N 100 UNIT/ML injection vial       NOVOLIN R 100 UNIT/ML injection  (Patient not taking: Reported on 12/10/2021)      BD INSULIN SYRINGE U/F 31G X 5/16\" 0.3 ML MISC       lovastatin (MEVACOR) 40 MG tablet       prazosin (MINIPRESS) 1 MG capsule       sertraline (ZOLOFT) 50 MG tablet       traZODone (DESYREL) 150 MG tablet       pantoprazole (PROTONIX) 40 MG tablet Take 1 tablet by mouth every morning (before breakfast) 30 tablet 5     Social History:     Tobacco:    reports that she has been smoking cigarettes. She has a 15.00 pack-year smoking history. She has never used smokeless tobacco.  Alcohol:      reports current alcohol use. Drug Use:  reports current drug use. Drug: Cocaine. Occupation: Stays at home    Review of Systems:     Review of Systems   Constitutional: Positive for appetite change and chills. HENT: Negative for drooling, hearing loss and voice change. Eyes: Negative for pain and visual disturbance. Respiratory: Positive for chest tightness. Negative for shortness of breath. Cardiovascular: Negative for palpitations and leg swelling. Gastrointestinal: Positive for abdominal pain, constipation, nausea and vomiting. Genitourinary: Positive for difficulty urinating. Negative for hematuria. Musculoskeletal: Negative for joint swelling. Skin: Negative for color change. Neurological: Negative for seizures and speech difficulty. Physical Exam:     Vitals:  BP (!) 170/74   Pulse 96   Temp 96.8 °F (36 °C) (Oral)   Resp 22   Wt 105 lb (47.6 kg)   LMP 05/21/2018 (LMP Unknown)   SpO2 (!) 89%   BMI 18.02 kg/m²     Physical Exam  Constitutional:       General: She is not in acute distress. Appearance: She is not toxic-appearing. HENT:      Head: Normocephalic and atraumatic. Nose: Nose normal.      Mouth/Throat:      Pharynx: Oropharynx is clear. No oropharyngeal exudate. Eyes:      Pupils: Pupils are equal, round, and reactive to light. Cardiovascular:      Rate and Rhythm: Normal rate and regular rhythm. Pulses: Normal pulses. Pulmonary:      Effort: Pulmonary effort is normal. No respiratory distress. Abdominal:      General: There is no distension. Palpations: Abdomen is soft. Tenderness: There is abdominal tenderness. There is no guarding or rebound. Musculoskeletal:         General: No swelling. Skin:     General: Skin is dry. Coloration: Skin is not jaundiced. Neurological:      General: No focal deficit present. Mental Status: She is alert and oriented to person, place, and time. Psychiatric:         Mood and Affect: Mood normal.       Imaging/Labs:     CT ABDOMEN PELVIS W IV CONTRAST Additional Contrast? None    Result Date: 12/26/2021  1. Interval appearance of inflammatory stranding surrounding the celiac trunk and proper hepatic artery. Findings are of unclear etiology. Vasculitis remains a differential consideration. 2. 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 Status post cholecystectomy.  RECOMMENDATIONS: Unavailable     XR CHEST PORTABLE    Result Date: 12/26/2021  Normal examination. Assessment and Plan:     Patient is a 13-year-old female presents with 1 month of intermittent worsening abdominal pain with associated nausea and emesis. CT scan shows inflammation around the SMA and celiac trunk of unclear etiology. Would recommend medicine admission  Check CRP and ESR   If elevated may need corticosteroids   If elevated would likely benefit from rheumatology consult  No acute vascular intervention at this time  Okay for clear liquids for 24 hours and advance as tolerated from vascular surgery perspective    Electronically signed by Myrna Randall DO on 12/26/21 at 8:59 AM EST      [address] [place] [address]  Office: [phone]  Cell: [phone]  Email: [email or web address]

## 2021-12-26 NOTE — ED NOTES
Bed: 46PED  Expected date:   Expected time:   Means of arrival:   Comments:     Lm Parry RN  12/26/21 6825

## 2021-12-26 NOTE — ED NOTES
The following labs labeled with pt sticker and tubed to lab:     [] Blue     [x] Lavender   [] on ice  [x] Green/yellow  [] Green/black [] on ice  [] Yellow  [] Red  [] Pink      [x] COVID-19 swab    [x] Rapid  [] PCR  [] Peds Viral Panel     [] Urine Sample  [] Pelvic Cultures  [] Blood Cultures          Leander Hendrickson RN  12/26/21 2141

## 2021-12-26 NOTE — ED TRIAGE NOTES
Pt states being seen here three times in the last two months for this chest pain/back pain. Pt states may be GI related and that she has gastric emptying problems and an abdominal hernia. Pt sees Dr. Gee Hopper for this. Pt states not being able to keep anything down and states this has been going on for over a moth but much worse over the last week. Pt states the pain wraps around to her back and also states a hx pancreatitis, IDDM and high cholesterol.

## 2021-12-26 NOTE — ED NOTES
Spo2 88% while sleeping. Patient awakens easily. Placed on 2 L oxygen nasal cannula.       Jaylan Reyna, APRN - CNP  12/26/21 8280

## 2021-12-26 NOTE — ED PROVIDER NOTES
[facility] ED  Emergency Department  Emergency Medicine Resident Sign-out     Care of Vanessa Velez was assumed from Dr. Issa Bajwa and is being seen for Chest Pain, Back Pain, and Nausea  . The patient's initial evaluation and plan have been discussed with the prior provider who initially evaluated the patient. EMERGENCY DEPARTMENT COURSE / MEDICAL DECISION MAKING:       MEDICATIONS GIVEN:  Orders Placed This Encounter   Medications    0.9 % sodium chloride bolus    morphine injection 4 mg    ondansetron (ZOFRAN) injection 4 mg    iopamidol (ISOVUE-370) 76 % injection 75 mL    morphine injection 4 mg    ondansetron (ZOFRAN) injection 4 mg       LABS / RADIOLOGY:     Labs Reviewed   CBC WITH AUTO DIFFERENTIAL - Abnormal; Notable for the following components:       Result Value    RBC 3.76 (*)     Hemoglobin 11.1 (*)     Hematocrit 33.2 (*)     Eosinophils % 5 (*)     All other components within normal limits   COMPREHENSIVE METABOLIC PANEL W/ REFLEX TO MG FOR LOW K - Abnormal; Notable for the following components:    Glucose 180 (*)     Alkaline Phosphatase 171 (*)     Total Bilirubin <0.10 (*)     All other components within normal limits   COVID-19, RAPID   CULTURE, URINE   LIPASE   TROPONIN   URINALYSIS       CT ABDOMEN PELVIS W IV CONTRAST Additional Contrast? None    Result Date: 12/26/2021  1. Interval appearance of inflammatory stranding surrounding the celiac trunk and proper hepatic artery. Findings are of unclear etiology. Vasculitis remains a differential consideration. 2. 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 Status post cholecystectomy.  RECOMMENDATIONS: Unavailable     XR CHEST PORTABLE    Result Date: 12/26/2021  EXAMINATION: ONE XRAY VIEW OF THE CHEST 12/26/2021 4:15 am COMPARISON: 10/29/2021 HISTORY: ORDERING SYSTEM PROVIDED HISTORY: chest pain TECHNOLOGIST PROVIDED HISTORY: chest pain Reason for Exam: upr,chest pain,back pain,nausea FINDINGS: Heart size and pulmonary vasculature are normal.  The lungs are clear and normally expanded. Surrounding osseous and soft tissue structures are unremarkable. Normal examination. RECENT VITALS:     Temp: 96.8 °F (36 °C),  Pulse: 96, Resp: 22, BP: (!) 170/74, SpO2: (!) 89 %    This patient is a 45 y.o. Female with history of hiatal hernia, gastroparesis, DM, Covid, 1 month of epigastric pain and chest pain, 1-1/2 weeks of worsening pain, inability to eat or tolerate oral intake. Patient states that it is midepigastric in nature 10 out of 10 in severity, radiates into her back. Does have associated chest pain and nausea, vomiting. No bloody emesis. No change in bowel habits. Has not tried anything at home prior to arrival.  Has been seen by  ordered outpatient CT abdomen pelvis. Laboratory work-up unremarkable, EKG nonacute, troponin within normal limits, chest x-ray unremarkable. CT abdomen pelvis ordered due to concerns for continued abdominal pain. Please see CT abdomen pelvis read for final read. Vascular surgery consulted, recommendations pending. Vascular surgery ordered CRP, ESR, concern for something rheumatologic, recommend admission for further management. Discussed case with family medicine will admit patient for further management. OUTSTANDING TASKS / RECOMMENDATIONS:    1. Pending vascular surgery impression  2. Disposition     FINAL IMPRESSION:     1. Abdominal pain, epigastric        DISPOSITION:         DISPOSITION:  []  Discharge   []  Transfer -    [x]  Admission - [facility]   []  Against Medical Advice   []  Eloped   FOLLOW-UP: No follow-up provider specified.    DISCHARGE MEDICATIONS: New Prescriptions    No medications on file           Germania Zamorano MD  Emergency Medicine Resident  [address] Jones MD  Resident  12/27/21 [id number]

## 2021-12-27 ENCOUNTER — ANESTHESIA EVENT (OUTPATIENT)
Dept: ENDOSCOPY | Age: 51
DRG: 048 | End: 2021-12-27
Payer: MEDICAID

## 2021-12-27 ENCOUNTER — ANESTHESIA (OUTPATIENT)
Dept: ENDOSCOPY | Age: 51
DRG: 048 | End: 2021-12-27
Payer: MEDICAID

## 2021-12-27 VITALS
DIASTOLIC BLOOD PRESSURE: 55 MMHG | SYSTOLIC BLOOD PRESSURE: 88 MMHG | RESPIRATION RATE: 12 BRPM | OXYGEN SATURATION: 100 %

## 2021-12-27 VITALS
WEIGHT: 119.27 LBS | HEART RATE: 78 BPM | HEIGHT: 64 IN | TEMPERATURE: 97.5 F | RESPIRATION RATE: 20 BRPM | BODY MASS INDEX: 20.36 KG/M2 | DIASTOLIC BLOOD PRESSURE: 68 MMHG | OXYGEN SATURATION: 100 % | SYSTOLIC BLOOD PRESSURE: 150 MMHG

## 2021-12-27 LAB
-: NORMAL
ABSOLUTE EOS #: 0.36 K/UL (ref 0–0.44)
ABSOLUTE IMMATURE GRANULOCYTE: <0.03 K/UL (ref 0–0.3)
ABSOLUTE LYMPH #: 2.53 K/UL (ref 1.1–3.7)
ABSOLUTE MONO #: 0.58 K/UL (ref 0.1–1.2)
ALBUMIN SERPL-MCNC: 3.3 G/DL (ref 3.5–5.2)
ALBUMIN/GLOBULIN RATIO: 1.1 (ref 1–2.5)
ALP BLD-CCNC: 130 U/L (ref 35–104)
ALT SERPL-CCNC: 24 U/L (ref 5–33)
ANION GAP SERPL CALCULATED.3IONS-SCNC: 11 MMOL/L (ref 9–17)
AST SERPL-CCNC: 29 U/L
BASOPHILS # BLD: 1 % (ref 0–2)
BASOPHILS ABSOLUTE: 0.04 K/UL (ref 0–0.2)
BILIRUB SERPL-MCNC: <0.1 MG/DL (ref 0.3–1.2)
BUN BLDV-MCNC: 18 MG/DL (ref 6–20)
BUN/CREAT BLD: ABNORMAL (ref 9–20)
CALCIUM SERPL-MCNC: 8.7 MG/DL (ref 8.6–10.4)
CHLORIDE BLD-SCNC: 104 MMOL/L (ref 98–107)
CO2: 22 MMOL/L (ref 20–31)
CREAT SERPL-MCNC: 0.53 MG/DL (ref 0.5–0.9)
CULTURE: NO GROWTH
DIFFERENTIAL TYPE: ABNORMAL
EKG ATRIAL RATE: 90 BPM
EKG P AXIS: 62 DEGREES
EKG P-R INTERVAL: 128 MS
EKG Q-T INTERVAL: 334 MS
EKG QRS DURATION: 82 MS
EKG QTC CALCULATION (BAZETT): 408 MS
EKG R AXIS: 80 DEGREES
EKG T AXIS: 79 DEGREES
EKG VENTRICULAR RATE: 90 BPM
EOSINOPHILS RELATIVE PERCENT: 7 % (ref 1–4)
ESTIMATED AVERAGE GLUCOSE: 140 MG/DL
GFR AFRICAN AMERICAN: >60 ML/MIN
GFR NON-AFRICAN AMERICAN: >60 ML/MIN
GFR SERPL CREATININE-BSD FRML MDRD: ABNORMAL ML/MIN/{1.73_M2}
GFR SERPL CREATININE-BSD FRML MDRD: ABNORMAL ML/MIN/{1.73_M2}
GLUCOSE BLD-MCNC: 111 MG/DL (ref 65–105)
GLUCOSE BLD-MCNC: 117 MG/DL (ref 65–105)
GLUCOSE BLD-MCNC: 119 MG/DL (ref 65–105)
GLUCOSE BLD-MCNC: 120 MG/DL (ref 70–99)
HBA1C MFR BLD: 6.5 % (ref 4–6)
HCT VFR BLD CALC: 31.6 % (ref 36.3–47.1)
HEMOGLOBIN: 10.2 G/DL (ref 11.9–15.1)
IMMATURE GRANULOCYTES: 0 %
LYMPHOCYTES # BLD: 45 % (ref 24–43)
Lab: NORMAL
MCH RBC QN AUTO: 28.8 PG (ref 25.2–33.5)
MCHC RBC AUTO-ENTMCNC: 32.3 G/DL (ref 28.4–34.8)
MCV RBC AUTO: 89.3 FL (ref 82.6–102.9)
MONOCYTES # BLD: 11 % (ref 3–12)
NRBC AUTOMATED: 0 PER 100 WBC
PDW BLD-RTO: 12.5 % (ref 11.8–14.4)
PLATELET # BLD: 225 K/UL (ref 138–453)
PLATELET ESTIMATE: ABNORMAL
PMV BLD AUTO: 9.2 FL (ref 8.1–13.5)
POTASSIUM SERPL-SCNC: 4.7 MMOL/L (ref 3.7–5.3)
RBC # BLD: 3.54 M/UL (ref 3.95–5.11)
RBC # BLD: ABNORMAL 10*6/UL
REASON FOR REJECTION: NORMAL
SEG NEUTROPHILS: 36 % (ref 36–65)
SEGMENTED NEUTROPHILS ABSOLUTE COUNT: 1.99 K/UL (ref 1.5–8.1)
SODIUM BLD-SCNC: 137 MMOL/L (ref 135–144)
SPECIMEN DESCRIPTION: NORMAL
TOTAL PROTEIN: 6.2 G/DL (ref 6.4–8.3)
WBC # BLD: 5.5 K/UL (ref 3.5–11.3)
WBC # BLD: ABNORMAL 10*3/UL
ZZ NTE CLEAN UP: ORDERED TEST: NORMAL
ZZ NTE WITH NAME CLEAN UP: SPECIMEN SOURCE: NORMAL

## 2021-12-27 PROCEDURE — 85025 COMPLETE CBC W/AUTO DIFF WBC: CPT

## 2021-12-27 PROCEDURE — 86038 ANTINUCLEAR ANTIBODIES: CPT

## 2021-12-27 PROCEDURE — 3700000000 HC ANESTHESIA ATTENDED CARE: Performed by: INTERNAL MEDICINE

## 2021-12-27 PROCEDURE — 83516 IMMUNOASSAY NONANTIBODY: CPT

## 2021-12-27 PROCEDURE — 43239 EGD BIOPSY SINGLE/MULTIPLE: CPT | Performed by: INTERNAL MEDICINE

## 2021-12-27 PROCEDURE — 6360000002 HC RX W HCPCS: Performed by: NURSE ANESTHETIST, CERTIFIED REGISTERED

## 2021-12-27 PROCEDURE — 84075 ASSAY ALKALINE PHOSPHATASE: CPT

## 2021-12-27 PROCEDURE — 88342 IMHCHEM/IMCYTCHM 1ST ANTB: CPT

## 2021-12-27 PROCEDURE — 2500000003 HC RX 250 WO HCPCS: Performed by: NURSE ANESTHETIST, CERTIFIED REGISTERED

## 2021-12-27 PROCEDURE — 6360000002 HC RX W HCPCS: Performed by: INTERNAL MEDICINE

## 2021-12-27 PROCEDURE — 6370000000 HC RX 637 (ALT 250 FOR IP): Performed by: STUDENT IN AN ORGANIZED HEALTH CARE EDUCATION/TRAINING PROGRAM

## 2021-12-27 PROCEDURE — 96361 HYDRATE IV INFUSION ADD-ON: CPT

## 2021-12-27 PROCEDURE — G0378 HOSPITAL OBSERVATION PER HR: HCPCS

## 2021-12-27 PROCEDURE — 7100000011 HC PHASE II RECOVERY - ADDTL 15 MIN: Performed by: INTERNAL MEDICINE

## 2021-12-27 PROCEDURE — 99239 HOSP IP/OBS DSCHRG MGMT >30: CPT | Performed by: STUDENT IN AN ORGANIZED HEALTH CARE EDUCATION/TRAINING PROGRAM

## 2021-12-27 PROCEDURE — 36415 COLL VENOUS BLD VENIPUNCTURE: CPT

## 2021-12-27 PROCEDURE — 88305 TISSUE EXAM BY PATHOLOGIST: CPT

## 2021-12-27 PROCEDURE — 6360000002 HC RX W HCPCS

## 2021-12-27 PROCEDURE — 86225 DNA ANTIBODY NATIVE: CPT

## 2021-12-27 PROCEDURE — 6360000002 HC RX W HCPCS: Performed by: STUDENT IN AN ORGANIZED HEALTH CARE EDUCATION/TRAINING PROGRAM

## 2021-12-27 PROCEDURE — 84080 ASSAY ALKALINE PHOSPHATASES: CPT

## 2021-12-27 PROCEDURE — 82947 ASSAY GLUCOSE BLOOD QUANT: CPT

## 2021-12-27 PROCEDURE — 0DB78ZX EXCISION OF STOMACH, PYLORUS, VIA NATURAL OR ARTIFICIAL OPENING ENDOSCOPIC, DIAGNOSTIC: ICD-10-PCS | Performed by: INTERNAL MEDICINE

## 2021-12-27 PROCEDURE — 96376 TX/PRO/DX INJ SAME DRUG ADON: CPT

## 2021-12-27 PROCEDURE — 80053 COMPREHEN METABOLIC PANEL: CPT

## 2021-12-27 PROCEDURE — 83036 HEMOGLOBIN GLYCOSYLATED A1C: CPT

## 2021-12-27 PROCEDURE — 2709999900 HC NON-CHARGEABLE SUPPLY: Performed by: INTERNAL MEDICINE

## 2021-12-27 PROCEDURE — 99253 IP/OBS CNSLTJ NEW/EST LOW 45: CPT | Performed by: INTERNAL MEDICINE

## 2021-12-27 PROCEDURE — 96372 THER/PROPH/DIAG INJ SC/IM: CPT

## 2021-12-27 PROCEDURE — 7100000010 HC PHASE II RECOVERY - FIRST 15 MIN: Performed by: INTERNAL MEDICINE

## 2021-12-27 PROCEDURE — 2580000003 HC RX 258: Performed by: NURSE ANESTHETIST, CERTIFIED REGISTERED

## 2021-12-27 PROCEDURE — 93010 ELECTROCARDIOGRAM REPORT: CPT | Performed by: INTERNAL MEDICINE

## 2021-12-27 PROCEDURE — 3609012400 HC EGD TRANSORAL BIOPSY SINGLE/MULTIPLE: Performed by: INTERNAL MEDICINE

## 2021-12-27 RX ORDER — LIDOCAINE HYDROCHLORIDE 10 MG/ML
INJECTION, SOLUTION EPIDURAL; INFILTRATION; INTRACAUDAL; PERINEURAL PRN
Status: DISCONTINUED | OUTPATIENT
Start: 2021-12-27 | End: 2021-12-27 | Stop reason: SDUPTHER

## 2021-12-27 RX ORDER — LISINOPRIL 10 MG/1
10 TABLET ORAL DAILY
Status: DISCONTINUED | OUTPATIENT
Start: 2021-12-27 | End: 2021-12-27 | Stop reason: HOSPADM

## 2021-12-27 RX ORDER — SODIUM CHLORIDE 9 MG/ML
INJECTION, SOLUTION INTRAVENOUS CONTINUOUS PRN
Status: DISCONTINUED | OUTPATIENT
Start: 2021-12-27 | End: 2021-12-27 | Stop reason: SDUPTHER

## 2021-12-27 RX ORDER — ONDANSETRON 4 MG/1
4 TABLET, FILM COATED ORAL DAILY PRN
Qty: 30 TABLET | Refills: 0 | Status: SHIPPED | OUTPATIENT
Start: 2021-12-27 | End: 2022-01-04

## 2021-12-27 RX ORDER — TRAMADOL HYDROCHLORIDE 50 MG/1
50 TABLET ORAL EVERY 6 HOURS PRN
Qty: 12 TABLET | Refills: 0 | Status: CANCELLED | OUTPATIENT
Start: 2021-12-27 | End: 2021-12-30

## 2021-12-27 RX ORDER — PROPOFOL 10 MG/ML
INJECTION, EMULSION INTRAVENOUS PRN
Status: DISCONTINUED | OUTPATIENT
Start: 2021-12-27 | End: 2021-12-27 | Stop reason: SDUPTHER

## 2021-12-27 RX ORDER — OXYCODONE HYDROCHLORIDE AND ACETAMINOPHEN 5; 325 MG/1; MG/1
1 TABLET ORAL EVERY 6 HOURS PRN
Qty: 12 TABLET | Refills: 0 | Status: SHIPPED | OUTPATIENT
Start: 2021-12-27 | End: 2021-12-30

## 2021-12-27 RX ADMIN — ENOXAPARIN SODIUM 40 MG: 100 INJECTION SUBCUTANEOUS at 08:53

## 2021-12-27 RX ADMIN — PANTOPRAZOLE SODIUM 40 MG: 40 TABLET, DELAYED RELEASE ORAL at 06:27

## 2021-12-27 RX ADMIN — KETOROLAC TROMETHAMINE 15 MG: 15 INJECTION, SOLUTION INTRAMUSCULAR; INTRAVENOUS at 08:54

## 2021-12-27 RX ADMIN — SERTRALINE 50 MG: 50 TABLET, FILM COATED ORAL at 08:54

## 2021-12-27 RX ADMIN — ONDANSETRON 4 MG: 4 TABLET, ORALLY DISINTEGRATING ORAL at 08:53

## 2021-12-27 RX ADMIN — MORPHINE SULFATE 1 MG: 2 INJECTION, SOLUTION INTRAMUSCULAR; INTRAVENOUS at 07:19

## 2021-12-27 RX ADMIN — LIDOCAINE HYDROCHLORIDE 50 MG: 10 INJECTION, SOLUTION EPIDURAL; INFILTRATION; INTRACAUDAL; PERINEURAL at 10:29

## 2021-12-27 RX ADMIN — SODIUM CHLORIDE: 9 INJECTION, SOLUTION INTRAVENOUS at 10:29

## 2021-12-27 RX ADMIN — PROPOFOL 40 MG: 10 INJECTION, EMULSION INTRAVENOUS at 10:32

## 2021-12-27 RX ADMIN — KETOROLAC TROMETHAMINE 15 MG: 15 INJECTION, SOLUTION INTRAMUSCULAR; INTRAVENOUS at 14:25

## 2021-12-27 RX ADMIN — CARBAMAZEPINE 200 MG: 200 TABLET ORAL at 08:53

## 2021-12-27 RX ADMIN — PROPOFOL 80 MG: 10 INJECTION, EMULSION INTRAVENOUS at 10:29

## 2021-12-27 RX ADMIN — MORPHINE SULFATE 1 MG: 2 INJECTION, SOLUTION INTRAMUSCULAR; INTRAVENOUS at 12:11

## 2021-12-27 ASSESSMENT — ENCOUNTER SYMPTOMS
VOMITING: 0
DIARRHEA: 0
CONSTIPATION: 0
NAUSEA: 0
SHORTNESS OF BREATH: 0
BACK PAIN: 0
ABDOMINAL PAIN: 0
COLOR CHANGE: 0
COUGH: 0

## 2021-12-27 ASSESSMENT — PAIN SCALES - GENERAL
PAINLEVEL_OUTOF10: 0
PAINLEVEL_OUTOF10: 6
PAINLEVEL_OUTOF10: 5
PAINLEVEL_OUTOF10: 5
PAINLEVEL_OUTOF10: 8
PAINLEVEL_OUTOF10: 5
PAINLEVEL_OUTOF10: 7

## 2021-12-27 ASSESSMENT — PAIN DESCRIPTION - DESCRIPTORS: DESCRIPTORS: ACHING

## 2021-12-27 NOTE — CARE COORDINATION
Addended by: MARINE KAISER on: 8/28/2019 06:13 PM     Modules accepted: Orders     Discharge [facility] Case Management Department  Written by: Ambar Foy RN    Patient Name: Jung Lieberman  Attending Provider: No att. providers found  Admit Date: 2021  2:35 AM  MRN: [id number]  Account: [de-identified]                     : 1970  Discharge Date: 2021      Disposition: home    Ambar Foy RN

## 2021-12-27 NOTE — PLAN OF CARE
Problem: Falls - Risk of:  Goal: Will remain free from falls  Description: Will remain free from falls  12/27/2021 1530 by Cindy Chirinos RN  Outcome: Completed  12/27/2021 0500 by Nura Espitia RN  Outcome: Ongoing  Goal: Absence of physical injury  Description: Absence of physical injury  12/27/2021 1530 by Cindy Chirinos RN  Outcome: Completed  12/27/2021 0500 by Nura Espitia RN  Outcome: Ongoing     Problem: Pain:  Goal: Pain level will decrease  Description: Pain level will decrease  12/27/2021 1530 by Cindy Chirinos RN  Outcome: Completed  12/27/2021 0500 by Nura Espitia RN  Outcome: Ongoing  Goal: Control of acute pain  Description: Control of acute pain  12/27/2021 1530 by Cindy Chirinos RN  Outcome: Completed  12/27/2021 0500 by Nura Espitia RN  Outcome: Ongoing  Goal: Control of chronic pain  Description: Control of chronic pain  12/27/2021 1530 by Cindy Chirinos RN  Outcome: Completed  12/27/2021 0500 by Nura Espitia RN  Outcome: Ongoing

## 2021-12-27 NOTE — PROGRESS NOTES
[email or web address]    PROGRESS NOTE             12/27/2021    9:38 AM    Name:   Silvano Antunez  MRN:     [id number]     Acct:      [de-identified]   Room:   [address] [place] [address] Day:  1  Admit Date:  12/26/2021  2:35 AM    PCP:  Amador Zurita MD  Code Status:  Full Code    Subjective:     C/C:   Chief Complaint   Patient presents with    Chest Pain    Back Pain    Nausea     Interval History Status: improved. Patient was seen and examined at bedside, no acute events overnight,. Generally she is feeling better. She returned from the EGD. Postop note reports mild reflux esophagitis. Blood pressure on the high side, will start lisinopril 10 mg daily. The otherwise vitally stable, labs reviewed, alk phos trending down to 130, WBC 5.5, hemoglobin 10.2. GI on board following up for elevated alk phos, follow-up biopsies from EGD for H. pylori. Okay to be discharged from GI standpoint. Recommends outpatient follow-up with rheumatology for CT findings, outpatient follow-up with GI. Brief History:       The patient is a 45 y.o.  /  female who presents withChest Pain, Back Pain, and Nausea  Past medical history of DM, gastroparesis, chronic pancreatitis, familial hypertriglyceridemia, hiatal hernia, drug use, Covid, presented to ED with worsening abdominal pain for the last 2 weeks, became severe 2 days prior to presentation described as distention, epigastric, radiating up to her chest, worsened with movement, improved slightly when leaning forward or crouching while laying down, associated with nausea and vomiting, no hematochezia, was having chills, no fever. States that she is constipated, last bowel movement was 2 days ago, no blood per rectum. Denies any shortness of breath chest pain, no dizziness.   Last hemoglobin A1c done in February 2021 was 10, patient is being followed up by Dr. Luly Erickson, she was scheduled for a CT abdomen pelvis, which was done in the ED, results: Inflammatory stranding in the mesentery, stranding along the course of the celiac artery and proper hepatic artery, Vital signs in ED, hypertensive, tachycardia. CRP 5.6  Alk phos 171  CBC; WBC 7.3, Hgb: 11.1, Plt: 271  Consulting vascular surgery. Was given IV fluids, 3 doses of morphine, Zofran, Phenergan and she is admitted to the hospital for the management of abdominal pain. EGD was done today 12/27, mild reflux esophagitis, x-rays were taken from stomach for H. Pylori. Okay from GI standpoint to be discharged. And follow-up outpatient with rheumatology for CT findings, and outpatient follow-up with GI. Review of Systems:     Review of Systems   Constitutional: Negative for chills and diaphoresis. Eyes: Negative for visual disturbance. Respiratory: Negative for cough and shortness of breath. Cardiovascular: Negative for chest pain, palpitations and leg swelling. Gastrointestinal: Negative for abdominal pain, constipation, diarrhea, nausea and vomiting. Endocrine: Negative for polyuria. Genitourinary: Negative for difficulty urinating and dysuria. Musculoskeletal: Negative for arthralgias and back pain. Skin: Negative for color change. Neurological: Negative for weakness, light-headedness, numbness and headaches. Psychiatric/Behavioral: Negative for agitation. Medications:      Allergies:  No Known Allergies    Current Meds:   Scheduled Meds:    lisinopril  10 mg Oral Daily    carBAMazepine  200 mg Oral BID    atorvastatin  10 mg Oral Daily    pantoprazole  40 mg Oral QAM AC    sertraline  50 mg Oral Daily    traZODone  150 mg Oral Nightly    sodium chloride flush  5-40 mL IntraVENous 2 times per day    enoxaparin  40 mg SubCUTAneous Daily    insulin NPH  20 Units SubCUTAneous BID AC    gabapentin  300 mg Oral BID    insulin lispro  0-6 Units SubCUTAneous TID WC    insulin lispro  0-3 Units SubCUTAneous Nightly Continuous Infusions:    sodium chloride      sodium chloride 100 mL/hr at 21 1612    dextrose       PRN Meds: sodium chloride flush, sodium chloride, ondansetron **OR** ondansetron, polyethylene glycol, acetaminophen **OR** acetaminophen, potassium chloride, magnesium sulfate, glucose, dextrose, glucagon (rDNA), dextrose, morphine, ketorolac    Data:     Past Medical History:   has a past medical history of Depression, Diabetes mellitus ([facility] [address] Hyperlipidemia, Pancreatitis, Pneumonia due to infectious organism, and Substance abuse ([facility] [address] Social History:   reports that she has been smoking cigarettes. She has a 15.00 pack-year smoking history. She has never used smokeless tobacco. She reports current alcohol use. She reports current drug use. Drug: Cocaine. Family History:   Family History   Problem Relation Age of Onset    Diabetes Mother     Coronary Art Dis Mother     Cancer Father         Colon    Diabetes Sister     Diabetes Brother     Bipolar Disorder Brother     Alcohol Abuse Brother     Substance Abuse Brother        Vitals:  /77   Pulse 77   Temp 97.7 °F (36.5 °C) (Temporal)   Resp 13   Ht 5' 4\" (1.626 m)   Wt 119 lb 4.3 oz (54.1 kg)   LMP 2018 (LMP Unknown)   SpO2 97%   BMI 20.47 kg/m²   Temp (24hrs), Av.1 °F (36.7 °C), Min:97.7 °F (36.5 °C), Max:98.3 °F (36.8 °C)    Recent Labs     21  1553 21  0628 21  0753   POCGLU 104 200* 111* 117*       I/O(24Hr):   No intake or output data in the 24 hours ending 21 0938    Labs:    CBC with Differential:    Lab Results   Component Value Date    WBC 5.5 2021    RBC 3.54 2021    RBC 3.74 10/21/2011    HGB 10.2 2021    HCT 31.6 2021     2021     10/21/2011    MCV 89.3 2021    MCH 28.8 2021    MCHC 32.3 2021    RDW 12.5 2021    LYMPHOPCT 45 2021    MONOPCT 11 2021    BASOPCT 1 2021    MONOSABS 0.58 12/27/2021    LYMPHSABS 2.53 12/27/2021    EOSABS 0.36 12/27/2021    BASOSABS 0.04 12/27/2021    DIFFTYPE NOT REPORTED 12/27/2021     CMP:    Lab Results   Component Value Date     12/27/2021    K 4.7 12/27/2021     12/27/2021    CO2 22 12/27/2021    BUN 18 12/27/2021    CREATININE 0.53 12/27/2021    GFRAA >60 12/27/2021    LABGLOM >60 12/27/2021    GLUCOSE 120 12/27/2021    GLUCOSE 281 10/21/2011    PROT 6.2 12/27/2021    LABALBU 3.3 12/27/2021    LABALBU 4.3 10/18/2011    CALCIUM 8.7 12/27/2021    BILITOT <0.10 12/27/2021    ALKPHOS 130 12/27/2021    AST 29 12/27/2021    ALT 24 12/27/2021       Lab Results   Component Value Date/Time    SPECIAL NOT REPORTED 10/29/2021 11:25 AM     Lab Results   Component Value Date/Time    CULTURE ESCHERICHIA COLI >139804 CFU/ML (A) 10/29/2021 11:25 AM         Radiology:    CT ABDOMEN PELVIS W IV CONTRAST Additional Contrast? None    Result Date: 12/27/2021  EXAMINATION: CT OF THE ABDOMEN AND PELVIS WITH CONTRAST 12/26/2021 5:18 am TECHNIQUE: CT of the abdomen and pelvis was performed with the administration of intravenous contrast. Multiplanar reformatted images are provided for review. Dose modulation, iterative reconstruction, and/or weight based adjustment of the mA/kV was utilized to reduce the radiation dose to as low as reasonably achievable. COMPARISON: January 8, 2017 HISTORY: ORDERING SYSTEM PROVIDED HISTORY: abdominal pain, worsening x's 1 month TECHNOLOGIST PROVIDED HISTORY: abdominal pain, worsening x's 1 month Decision Support Exception - unselect if not a suspected or confirmed emergency medical condition->Emergency Medical Condition (MA) Reason for Exam: Abdominal pain, worsening x's 1 month FINDINGS: Lower Chest: Basilar atelectasis. Organs: Status post cholecystectomy. No intra or extrahepatic biliary dilatation. The liver parenchyma, spleen, pancreas, adrenal glands, and kidneys demonstrate no acute abnormality.   Bilateral ureters are normal in course and caliber. No ureteral calculi. GI/Bowel: The stomach, small bowel, and colon are normal in course and caliber without evidence of wall thickening or obstruction. Pelvis: Gas is identified in the distended bladder of unclear etiology and clinical significance. Normal uterus. No adnexal masses. Peritoneum/Retroperitoneum: No free fluid or free air. Inflammatory stranding is seen in the mesentery. Additionally, there is inflammatory stranding along the course of the celiac artery and proper hepatic artery (series 2, image 52). No pathologic lymphadenopathy. Aorta and its branches are patent and normal in course and caliber. Mild atherosclerosis. Bones/Soft Tissues: No acute or aggressive osseous lesion. 1. Interval appearance of inflammatory stranding surrounding the celiac trunk and proper hepatic artery. Findings are of unclear etiology. Vasculitis remains a differential consideration. 2. 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 Status post cholecystectomy. RECOMMENDATIONS: Unavailable     XR CHEST PORTABLE    Result Date: 12/26/2021  EXAMINATION: ONE XRAY VIEW OF THE CHEST 12/26/2021 4:15 am COMPARISON: 10/29/2021 HISTORY: ORDERING SYSTEM PROVIDED HISTORY: chest pain TECHNOLOGIST PROVIDED HISTORY: chest pain Reason for Exam: upr,chest pain,back pain,nausea FINDINGS: Heart size and pulmonary vasculature are normal.  The lungs are clear and normally expanded. Surrounding osseous and soft tissue structures are unremarkable. Normal examination. Physical Examination:        Physical Exam  Constitutional:       General: She is not in acute distress. Appearance: She is not ill-appearing. HENT:      Head: Normocephalic and atraumatic. Cardiovascular:      Rate and Rhythm: Normal rate and regular rhythm. Pulses: Normal pulses. Heart sounds: Normal heart sounds. No murmur heard. No friction rub. No gallop.     Pulmonary: Effort: No respiratory distress. Breath sounds: Normal breath sounds. No wheezing, rhonchi or rales. Abdominal:      General: Abdomen is flat. Bowel sounds are normal. There is no distension. Palpations: There is no mass. Tenderness: There is no abdominal tenderness (Epigastric). Musculoskeletal:      Right lower leg: No edema. Left lower leg: No edema. Skin:     General: Skin is warm. Capillary Refill: Capillary refill takes less than 2 seconds. Coloration: Skin is not jaundiced or pale. Findings: No lesion. Neurological:      General: No focal deficit present. Mental Status: She is alert and oriented to person, place, and time. Mental status is at baseline. Motor: No weakness. Psychiatric:         Mood and Affect: Mood normal.         Behavior: Behavior normal.           Assessment:        Primary Problem  <principal problem not specified>    Active Hospital Problems    Diagnosis Date Noted    Abdominal pain [R10.9] 12/26/2021    History of cocaine use [Z87.898] 12/26/2021    Abdominal pain, epigastric [R10.13] 08/26/2018    GERD with esophagitis [K21.00] 12/02/2016    Type 2 diabetes mellitus with diabetic polyneuropathy, with long-term current use of insulin ([facility] [address] [E11.42, Z79.4] 11/24/2016       Plan:        Abdominal pain -rule out vasculitis  History of diabetes,Gastroparesis  ESR 23, CRP 5.6  CT abdomen, pelvis: Inflammatory stranding of the celiac trunk, proper hepatic artery. Vascular onboard  We will get lipid panel, hemoglobin A1c, EtOH level  100 mL/H normal saline  Continue atorvastatin  Toradol 50 mg every 6 hours as needed  Morphine 1 mg IV every 4 hours as needed severe pain  EGD today: Mild reflux esophagitis, biopsies were taken.    GI on board and is okay with discharging patient     Diabetes mellitus  Glucose 119  Last hemoglobin A1c 10 on February 2021  Repeat HgbA1c  20 units NPH  Low dose insulin sliding scale  Hypothermic protocol  Gabapentin for neuropathic pain     History of mood disorder  Carbamazepine  Sertraline  Trazodone for sleeping     DVT prophylaxis Lovenox 40 mg subcu  GI prophylaxis: Protonix 40 mg home dose  Diet change from clear liquid to diabetic diet.     PT OT evaluation  SW discharge planning     We will discuss plan with Dr. Betty Waggoner MD  12/27/2021  9:38 AM

## 2021-12-27 NOTE — DISCHARGE SUMMARY
Discharge Summary     Patient ID: Jessica Roth  :  1970   MRN: [id number]     ACCOUNT:  [de-identified]   Patient's PCP: Idris Suarez MD  Admit Date: 2021   Discharge Date: 2021   Length of Stay: 1  Code Status:  Full Code  Admitting Physician: Be Cagle MD  Discharge Physician: Nereida Angeles MD     Active Discharge Diagnoses:       Primary Problem  Abdominal pain, epigastric      [place] Problems    Diagnosis Date Noted    Abdominal pain [R10.9] 2021    History of cocaine use [Z87.898] 2021    Abdominal pain, epigastric [R10.13] 2018    GERD with esophagitis [K21.00] 2016    Type 2 diabetes mellitus with diabetic polyneuropathy, with long-term current use of insulin ([facility] [address] [E11.42, Z79.4] 2016       Admission Condition:  fair     Discharged Condition: good    Hospital Stay:       Hospital Course:  Jessica Roth is a 45 y.o. female who was admitted for the management of   Abdominal pain, epigastric , presented to ER with Chest Pain, Back Pain, and Nausea,   Past medical history significant for DM, gastroparesis, chronic pancreatitis, familial hypertriglyceridemia, hiatal hernia, drug use, Covid, presented to ED with abdominal pain epigastric, radiating up to her chest, worsens with movement, improves when leaning forward, associated with nausea and vomiting, denies any hematochezia. Her last hemoglobin A1c done in 2021 was 10, was following with Dr. Denise Goff where she was scheduled for CT abdomen and pelvis, she had that test done in ED and showed inflammatory stranding in the celiac trunk and proper hepatic artery. Patient was tachycardic hypertensive, alk phos 171. Vascular surgery were consulted and recommend rheumatology follow-up for possible vasculitis. Patient was given IV fluids morphine, Zofran, and Phenergan and admitted for the management of abdominal pain.   GI were consulted and had an EGD on  which only revealed mild reflux esophagitis, biopsies were taken to rule out H. pylori, they will also need to follow-up on the elevated ALP for possible PBC. Rheumatology follow-up outpatient for the abnormal CT findings of mesenteric stranding. And patient is discharged on Zofran, Percocet for 3 days, Protonix daily. Significant therapeutic interventions: EGD    Significant Diagnostic Studies:   Labs / Micro:  CBC:   Lab Results   Component Value Date    WBC 5.5 12/27/2021    RBC 3.54 12/27/2021    RBC 3.74 10/21/2011    HGB 10.2 12/27/2021    HCT 31.6 12/27/2021    MCV 89.3 12/27/2021    MCH 28.8 12/27/2021    MCHC 32.3 12/27/2021    RDW 12.5 12/27/2021     12/27/2021     10/21/2011     BMP:    Lab Results   Component Value Date    GLUCOSE 120 12/27/2021    GLUCOSE 281 10/21/2011     12/27/2021    K 4.7 12/27/2021     12/27/2021    CO2 22 12/27/2021    ANIONGAP 11 12/27/2021    BUN 18 12/27/2021    CREATININE 0.53 12/27/2021    BUNCRER NOT REPORTED 12/27/2021    CALCIUM 8.7 12/27/2021    LABGLOM >60 12/27/2021    GFRAA >60 12/27/2021    GFR      12/27/2021    GFR NOT REPORTED 12/27/2021       Radiology:    CT ABDOMEN PELVIS W IV CONTRAST Additional Contrast? None    Result Date: 12/27/2021  EXAMINATION: CT OF THE ABDOMEN AND PELVIS WITH CONTRAST 12/26/2021 5:18 am TECHNIQUE: CT of the abdomen and pelvis was performed with the administration of intravenous contrast. Multiplanar reformatted images are provided for review. Dose modulation, iterative reconstruction, and/or weight based adjustment of the mA/kV was utilized to reduce the radiation dose to as low as reasonably achievable.  COMPARISON: January 8, 2017 HISTORY: ORDERING SYSTEM PROVIDED HISTORY: abdominal pain, worsening x's 1 month TECHNOLOGIST PROVIDED HISTORY: abdominal pain, worsening x's 1 month Decision Support Exception - unselect if not a suspected or confirmed emergency medical condition->Emergency Medical Condition (MA) Reason for Exam: Abdominal pain, worsening x's 1 month FINDINGS: Lower Chest: Basilar atelectasis. Organs: Status post cholecystectomy. No intra or extrahepatic biliary dilatation. The liver parenchyma, spleen, pancreas, adrenal glands, and kidneys demonstrate no acute abnormality. Bilateral ureters are normal in course and caliber. No ureteral calculi. GI/Bowel: The stomach, small bowel, and colon are normal in course and caliber without evidence of wall thickening or obstruction. Pelvis: Gas is identified in the distended bladder of unclear etiology and clinical significance. Normal uterus. No adnexal masses. Peritoneum/Retroperitoneum: No free fluid or free air. Inflammatory stranding is seen in the mesentery. Additionally, there is inflammatory stranding along the course of the celiac artery and proper hepatic artery (series 2, image 52). No pathologic lymphadenopathy. Aorta and its branches are patent and normal in course and caliber. Mild atherosclerosis. Bones/Soft Tissues: No acute or aggressive osseous lesion. 1. Interval appearance of inflammatory stranding surrounding the celiac trunk and proper hepatic artery. Findings are of unclear etiology. Vasculitis remains a differential consideration. 2. 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 Status post cholecystectomy. RECOMMENDATIONS: Unavailable     XR CHEST PORTABLE    Result Date: 12/26/2021  EXAMINATION: ONE XRAY VIEW OF THE CHEST 12/26/2021 4:15 am COMPARISON: 10/29/2021 HISTORY: ORDERING SYSTEM PROVIDED HISTORY: chest pain TECHNOLOGIST PROVIDED HISTORY: chest pain Reason for Exam: upr,chest pain,back pain,nausea FINDINGS: Heart size and pulmonary vasculature are normal.  The lungs are clear and normally expanded. Surrounding osseous and soft tissue structures are unremarkable. Normal examination.          Consultations:    Consults:     Final Specialist Recommendations/Findings:   SU CONSULT TO VASCULAR SURGERY  IP CONSULT TO FAMILY MEDICINE  IP CONSULT TO SOCIAL WORK  IP CONSULT TO PEDIATRIC GASTROENTEROLOGY      The patient was seen and examined on day of discharge and this discharge summary is in conjunction with any daily progress note from day of discharge. Discharge plan:       Disposition: Home    Physician Follow Up:     Cesar Jade MD  [address] [facility]  [phone]    Schedule an appointment as soon as possible for a visit in 1 week  Please call an make an appointment for follow up on labs and biopsy from EGD     Kingsley Pandey MD  [address]  [facility] [address] [facility]  [phone]    Schedule an appointment as soon as possible for a visit in 2 weeks  Please make an appointment with Rheumatology to follow up on abdominal CT scan     Mitchel Rivas MD  [address]  [id number]  [phone]    Schedule an appointment as soon as possible for a visit in 3 weeks         Requiring Further Evaluation/Follow Up POST HOSPITALIZATION/Incidental Findings: None    Diet: diabetic diet    Activity: As tolerated    Instructions to Patient: Please follow-up with GI and make an appointment in 1 week for further work-up, will need a gastric emptying study done outpatient. Follow-up with rheumatology for abnormal CT findings, follow-up with PCP for management of diabetes, turn to ED if symptoms worsen, unable to tolerate oral intake. Discharge Medications:      Medication List      START taking these medications    ondansetron 4 MG tablet  Commonly known as: ZOFRAN  Take 1 tablet by mouth daily as needed for Nausea or Vomiting     oxyCODONE-acetaminophen 5-325 MG per tablet  Commonly known as: Percocet  Take 1 tablet by mouth every 6 hours as needed for Pain for up to 3 days. Intended supply: 3 days.  Take lowest dose possible to manage pain        CONTINUE taking these medications    BD Insulin Syringe U/F 31G X 5/16\" 0.3 ML Misc  Generic drug: Insulin Syringe-Needle U-100     carBAMazepine 200 MG tablet  Commonly known as: TEGRETOL     FreeStyle Lancets Misc  1 each by Does not apply route daily     FREESTYLE LITE strip  Generic drug: blood glucose test strips  1 each by In Vitro route daily As needed. gabapentin 300 MG capsule  Commonly known as: NEURONTIN     glucose monitoring kit  4 times daily     HumuLIN N 100 UNIT/ML injection vial  Generic drug: insulin NPH     ibuprofen 400 MG tablet  Commonly known as: ADVIL;MOTRIN     lovastatin 40 MG tablet  Commonly known as: MEVACOR     NovoLIN R 100 UNIT/ML injection  Generic drug: insulin regular     pantoprazole 40 MG tablet  Commonly known as: PROTONIX  Take 1 tablet by mouth every morning (before breakfast)     prazosin 1 MG capsule  Commonly known as: MINIPRESS     sertraline 50 MG tablet  Commonly known as: ZOLOFT     traZODone 150 MG tablet  Commonly known as: DESYREL           Where to Get Your Medications      These medications were sent to [facility] [address] [facility], [address] [place] [address], [address] [facility] [address]    Phone: [phone]   · ondansetron 4 MG tablet  · oxyCODONE-acetaminophen 5-325 MG per tablet         Electronically signed by   Gilles Flores MD  12/27/2021  3:34 PM      Thank you Dr. Rishi Cabrera MD for the opportunity to be involved in this patient's care.

## 2021-12-27 NOTE — OP NOTE
EGD      Patient:   Silvano Antunez    :    1970    Facility:   [facility]   Referring/PCP: Amador Zurita MD    Procedure:   Esophagogastroduodenoscopy with biopsy  Date:     2021   Endoscopist:  Dawit Pham MD, [facility]    Indication: Nausea with vomiting, abdominal pain    Postprocedure diagnosis:   Grade a reflux esophagitis    Anesthesia:  MAC    Complications: None    EBL: None from the procedure    Specimen: Sent to the lab    Description of Procedure:  Informed consent was obtained from the patient after explanation of the procedure including indications, description of the procedure,  benefits and possible risks and complications of the procedure, and alternatives. Questions were answered. The patient's history was reviewed and a directed physical examination was performed prior to the procedure. Patient was monitored throughout the procedure with pulse oximetry and periodic assessment of vital signs. Patient was sedated as noted above. With the patient in the left lateral decubitus position, the Olympus videoendoscope was placed in the patient's mouth and under direct visualization passed into the esophagus. Visualization of the esophagus, stomach, and duodenum was performed during both introduction and withdrawal of the endoscope and retroflexed view of the proximal stomach was obtained. The scope was passed to the 2nd portion of the duodenum. The patient tolerated the procedure well and was taken to the recovery area in good condition. Findings[de-identified]   Esophagus: Grade a mild reflux esophagitis in the lower esophagus. The esophagus was otherwise normal.  Stomach: Stomach had small amount of fluid which was suctioned. Stomach otherwise appeared normal.  The antrum of the stomach was biopsied with cold biopsy forceps to rule out H. pylori infection. Duodenum: Normal.  The ampulla was normal.  There was no evidence of celiac disease.     Recommendations: Resume diabetic diet  Antireflux diet and lifestyle measures. Gastric emptying scan as an outpatient. PPI daily. Okay to discharge from GI standpoint with outpatient follow-up with . Consider outpatient rheumatology evaluation for abnormal CT findings.      Electronically signed by Roddy Grossman MD, FACG  on 12/27/2021 at 10:49 AM

## 2021-12-27 NOTE — CONSULTS
[address]    GASTROENTEROLOGY CONSULT    Patient:   Kevin Cueva   :    1970   Facility:   [facility]   Date:    2021  Admission Dx:  Abdominal pain, epigastric [R10.13]  Abdominal pain [R10.9]  Requesting physician: Ara Cruz MD  Reason for consult:  Abnormal CT findings, epigastric pain and discomfort. CC : Epigastric discomfort retrosternal burning    SUBJECTIVE     HISTORY OF PRESENT ILLNESS  This is a 45 y.o.   female who was admitted 2021 with Abdominal pain, epigastric [R10.13]  Abdominal pain [R10.9]. We have been asked to see the patient in consultation by Ara Cruz MD for evaluation of abnormal CT findings. Patient has a past medical history of uncontrolled diabetes mellitus, gastroparesis, history of hiatal hernia presented to the ED with worsening epigastric pain that radiates to the back and up to her chest.  Pain worsened with movement and and remained constant. She also has 1 episode of nonbloody vomiting and feeling nauseous. She denies shortness of breath orthopnea PND melena or fresh blood in the stool. Patient also admits to abdominal distention, discomfort, sense of epigastric fullness and constipation. Last hemoglobin A1c done in 2021 was 10, patient is being followed up by Dr. Darvin Harry, she was scheduled for a CT abdomen pelvis and gastric emptying studies,  CT was done in the ED, results: Inflammatory stranding in the mesentery, stranding along the course of the celiac artery and proper hepatic artery. As per patient, she had her EGD and colonoscopy 2 years ago. She was found to have hiatal hernia. Summary of imaging completed at this time:    CT abdomen and pelvis  1. Interval appearance of inflammatory stranding surrounding the celiac trunk and proper hepatic artery. Findings are of unclear etiology. Vasculitis remains a differential consideration.  2. 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 Status post cholecystectomy. Summary of labs completed at this time:  WBC 5.5. Hemoglobin 10.2 platelet count 679. Sodium 137 potassium 4.7 chloride 104. BUN 18 creatinine 0.53. Triglyceride 129. Albumin 3.3. Alk phosphatase 130. ALT 24 AST 29. Bilirubin 0.10. Total protein 6.2. Previous GI history:   EGD and colonoscopy in 2019. OBJECTIVE:     PAST MEDICAL/SURGICAL HISTORY  Past Medical History:   Diagnosis Date    Depression     [facility] ([facility] [address]     Hyperlipidemia     Pancreatitis     Pneumonia due to infectious organism 1/8/2017    Substance abuse ([facility] [address]      Past Surgical History:   Procedure Laterality Date    CARPAL TUNNEL RELEASE  2014    jerome carpal tunnel    CHOLECYSTECTOMY      COLONOSCOPY N/A 9/16/2019    COLONOSCOPY WITH BIOPSY performed by Ronak Angel MD at [address]  GASTROINTESTINAL ENDOSCOPY N/A 9/16/2019    EGD ESOPHAGOGASTRODUODENOSCOPY performed by Ronak Angel MD at [address]:  No Known Allergies    HOME MEDICATIONS:  Prior to Admission medications    Medication Sig Start Date End Date Taking? Authorizing Provider   glucose monitoring (FREESTYLE) kit 4 times daily 12/10/21   Lovely Rascon MD   blood glucose test strips (FREESTYLE LITE) strip 1 each by In Vitro route daily As needed.  12/10/21   Lovely Rascon MD   FreeStyle Lancets MISC 1 each by Does not apply route daily 12/10/21   Lovely Rascon MD   carBAMazepine (TEGRETOL) 200 MG tablet  11/2/21   Historical Provider, MD   gabapentin (NEURONTIN) 300 MG capsule take 1 capsule by mouth every morning and 1 capsule every evening 9/7/21   Historical Provider, MD   ibuprofen (ADVIL;MOTRIN) 400 MG tablet  11/2/21   Historical Provider, MD   HUMULIN N 100 UNIT/ML injection vial  11/19/21   Historical Provider, MD   NOVOLIN R 100 UNIT/ML injection  8/26/21   Historical Provider, MD   BD INSULIN SYRINGE U/F 31G X 5/16\" 0.3 ML MISC  11/19/21   Historical Provider, MD   lovastatin (MEVACOR) 40 MG tablet  11/2/21   Historical Provider, MD   prazosin (MINIPRESS) 1 MG capsule  11/2/21   Historical Provider, MD   sertraline (ZOLOFT) 50 MG tablet  11/2/21   Historical Provider, MD   traZODone (DESYREL) 150 MG tablet  11/2/21   Historical Provider, MD   pantoprazole (PROTONIX) 40 MG tablet Take 1 tablet by mouth every morning (before breakfast) 12/3/21   Mey Thrasher MD       CURRENT MEDICATIONS:  Scheduled Meds:   [MAR Hold] lisinopril  10 mg Oral Daily    [MAR Hold] carBAMazepine  200 mg Oral BID    [MAR Hold] atorvastatin  10 mg Oral Daily    [MAR Hold] pantoprazole  40 mg Oral QAM AC    [MAR Hold] sertraline  50 mg Oral Daily    [MAR Hold] traZODone  150 mg Oral Nightly    [MAR Hold] sodium chloride flush  5-40 mL IntraVENous 2 times per day    [MAR Hold] enoxaparin  40 mg SubCUTAneous Daily    [MAR Hold] insulin NPH  20 Units SubCUTAneous BID AC    [MAR Hold] gabapentin  300 mg Oral BID    [MAR Hold] insulin lispro  0-6 Units SubCUTAneous TID WC    [MAR Hold] insulin lispro  0-3 Units SubCUTAneous Nightly     Continuous Infusions:   [MAR Hold] sodium chloride      [MAR Hold] sodium chloride 100 mL/hr at 12/26/21 1612    [MAR Hold] dextrose       PRN Meds:[MAR Hold] sodium chloride flush, [MAR Hold] sodium chloride, [MAR Hold] ondansetron **OR** [MAR Hold] ondansetron, [MAR Hold] polyethylene glycol, [MAR Hold] acetaminophen **OR** [MAR Hold] acetaminophen, [MAR Hold] potassium chloride, [MAR Hold] magnesium sulfate, [MAR Hold] glucose, [MAR Hold] dextrose, [MAR Hold] glucagon (rDNA), [MAR Hold] dextrose, [MAR Hold] morphine, [MAR Hold] ketorolac    SOCIAL HISTORY:     Tobacco:   reports that she has been smoking cigarettes. She has a 15.00 pack-year smoking history. She has never used smokeless tobacco.  Alcohol:   reports current alcohol use. Illicit drugs:  reports current drug use.  Drug: Cocaine. FAMILY HISTORY:     Family History   Problem Relation Age of Onset    Diabetes Mother     Coronary Art Dis Mother     Cancer Father         Colon    Diabetes Sister     Diabetes Brother     Bipolar Disorder Brother     Alcohol Abuse Brother     Substance Abuse Brother        REVIEW OF SYSTEMS:    Constitutional: No fever, no chills, no lethargy, no weakness. HEENT:  No headache, otalgia, itchy eyes, nasal discharge or sore throat. Cardiac:  chest pain, denies dyspnea, orthopnea or PND. Chest:   No cough, phlegm or wheezing. Abdomen:  Epigastric fullness and discomfort. Feeling nauseous. Denies melena or hematochezia. Neuro:  No focal weakness, abnormal movements or seizure like activity. Skin:   No rashes, no itching. :   No hematuria, no pyuria, no dysuria, no flank pain. Extremities:  No swelling or joint pains. ROS was otherwise negative except as mentioned in the [address] PHYSICAL EXAM:    BP (!) 84/51   Pulse 77   Temp 97.7 °F (36.5 °C) (Temporal)   Resp 13   Ht 5' 4\" (1.626 m)   Wt 119 lb 4.3 oz (54.1 kg)   LMP 05/21/2018 (LMP Unknown)   SpO2 100%   BMI 20.47 kg/m²     GENERAL:   Well developed, Well nourished, No apparent distress  HEAD:  Normocephalic, Atraumatic  EENT:  EOMI, Sclera not icteric, Oropharynx moist   NECK:   Supple, Trachea midline  LUNGS: CTA Bilaterally  HEART: RRR, No murmur  ABDOMEN:  Soft, Nontender, Nondistended, BS WNL  EXT:  No clubbing. No cyanosis. No edema. SKIN:  No rashes. No jaundice. No stigmata of liver disease.     MUSC/SKEL:   Adequate muscle bulk for patient's age, No significant synovitis, No deformities  NEURO:  A&O x Three, CN II- XII grossly intact      LABS AND IMAGING:     CBC  Recent Labs     12/26/21 0425 12/27/21  0632   WBC 7.3 5.5   HGB 11.1* 10.2*   HCT 33.2* 31.6*   MCV 88.3 89.3   MCH 29.5 28.8   MCHC 33.4 32.3    225       IMMATURE PLTs  No results found for: PLTFLUORE    BMP  Recent Labs     12/26/21 0425 12/27/21  0632    137   K 4.5 4.7    104   CO2 24 22   BUN 19 18   CREATININE 0.65 0.53   GLUCOSE 180* 120*   CALCIUM 9.7 8.7       LFTS  Recent Labs     12/26/21  0425 12/27/21  0632   ALKPHOS 171* 130*   ALT 17 24   AST 14 29   PROT 7.5 6.2*   BILITOT <0.10* <0.10*   LABALBU 4.1 3.3*       AMYLASE/LIPASE/AMMONIA  Recent Labs     12/26/21  0425   LIPASE 57       PT/INR  No results for input(s): PROTIME, INR in the last 72 hours. ANEMIA STUDIES  No results for input(s): IRON, LABIRON, TIBC, UIBC, FERRITIN, [id number], FOLATE, OCCULTBLD in the last 72 hours. IMAGING  CT ABDOMEN PELVIS W IV CONTRAST Additional Contrast? None    Result Date: 12/27/2021  EXAMINATION: CT OF THE ABDOMEN AND PELVIS WITH CONTRAST 12/26/2021 5:18 am TECHNIQUE: CT of the abdomen and pelvis was performed with the administration of intravenous contrast. Multiplanar reformatted images are provided for review. Dose modulation, iterative reconstruction, and/or weight based adjustment of the mA/kV was utilized to reduce the radiation dose to as low as reasonably achievable. COMPARISON: January 8, 2017 HISTORY: ORDERING SYSTEM PROVIDED HISTORY: abdominal pain, worsening x's 1 month TECHNOLOGIST PROVIDED HISTORY: abdominal pain, worsening x's 1 month Decision Support Exception - unselect if not a suspected or confirmed emergency medical condition->Emergency Medical Condition (MA) Reason for Exam: Abdominal pain, worsening x's 1 month FINDINGS: Lower Chest: Basilar atelectasis. Organs: Status post cholecystectomy. No intra or extrahepatic biliary dilatation. The liver parenchyma, spleen, pancreas, adrenal glands, and kidneys demonstrate no acute abnormality. Bilateral ureters are normal in course and caliber. No ureteral calculi. GI/Bowel: The stomach, small bowel, and colon are normal in course and caliber without evidence of wall thickening or obstruction.  Pelvis: Gas is identified in the distended bladder of unclear etiology and clinical significance. Normal uterus. No adnexal masses. Peritoneum/Retroperitoneum: No free fluid or free air. Inflammatory stranding is seen in the mesentery. Additionally, there is inflammatory stranding along the course of the celiac artery and proper hepatic artery (series 2, image 52). No pathologic lymphadenopathy. Aorta and its branches are patent and normal in course and caliber. Mild atherosclerosis. Bones/Soft Tissues: No acute or aggressive osseous lesion. 1. Interval appearance of inflammatory stranding surrounding the celiac trunk and proper hepatic artery. Findings are of unclear etiology. Vasculitis remains a differential consideration. 2. 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 Status post cholecystectomy. RECOMMENDATIONS: Unavailable     XR CHEST PORTABLE    Result Date: 12/26/2021  EXAMINATION: ONE XRAY VIEW OF THE CHEST 12/26/2021 4:15 am COMPARISON: 10/29/2021 HISTORY: ORDERING SYSTEM PROVIDED HISTORY: chest pain TECHNOLOGIST PROVIDED HISTORY: chest pain Reason for Exam: upr,chest pain,back pain,nausea FINDINGS: Heart size and pulmonary vasculature are normal.  The lungs are clear and normally expanded. Surrounding osseous and soft tissue structures are unremarkable. Normal examination. IMPRESSION:     1. Epigastric discomfort and fullness secondary to diabetic gastroparesis  2. Elevated alkaline phosphatase  3. Inflammatory stranding along the course of the celiac artery and proper hepatic artery without pathological lymphadenopathy  4. Uncontrolled diabetes mellitus with complications. EGD today:  Findings[de-identified]   Esophagus: Grade a mild reflux esophagitis in the lower esophagus. The esophagus was otherwise normal.  Stomach: Stomach had small amount of fluid which was suctioned.   Stomach otherwise appeared normal.  The antrum of the stomach was biopsied with cold biopsy forceps to rule out H. pylori infection. Duodenum: Normal.  The ampulla was normal.  There was no evidence of celiac disease.     Recommendations:     PLAN   1. Recommend good glycemic control. Resume diabetic diet. Antireflux diet and lifestyle measures will be helpful. PPI once daily. If positive for H. pylori infection, she will need 2 weeks of antibiotic/triple therapy. Gastric emptying scan as an outpatient. But patient will have to be off the opioid before going for emptying scan. 2. Order isoenzyme alk phosphatase, RUBIN and AMA for elevated alkaline phosphatase to rule out PBC. 2. Okay to discharge from GI standpoint with outpatient follow-up with  on further management. 3. Consider outpatient rheumatology evaluation for abnormal CT findings concerning for vasculitis. Old records, labs and imaging reviewed. This plan will be discussed with Dr. Marleni Montes. Thank you for allowing us to participate in the care of your patient. Aislinn Lees MD  Internal Medicine Resident, PGY-1  [address]  12/27/2021,11:11 AM      Please note that this note was generated using a voice recognition dictation software. Although every effort was made to ensure the accuracy of this automated transcription, some errors in transcription may have occurred.

## 2021-12-27 NOTE — PROGRESS NOTES
CLINICAL PHARMACY NOTE: MEDS TO BEDS    Total # of Prescriptions Filled: 2   The following medications were delivered to the patient:  · Percocet 5-325mg  · Ondansetron 4mg    Additional Documentation: delivered to patient in room 444 12/27 at 4:34pm. No co-pay.

## 2021-12-27 NOTE — CARE COORDINATION
Case Management Initial Discharge [address],         8:45 am    Met with:patient to discuss discharge plans. Information verified: address, contacts, phone number, , insurance Yes  Insurance Provider: [facility]    Emergency Contact/Next of Kin name & number: liyah [address] [phone]  Who are involved in patient's support system? Son and daughter in law    PCP: Jason Raya MD  Date of last visit: 1 week      Discharge Planning    Living Arrangements:  [address]     Home has 1 stories  2 stairs to climb to get into front door, 0 stairs to climb to reach second floor  Location of bedroom/bathroom in home 1st floor    Patient able to perform ADL's:Independent    Current Services (outpatient & in home) n/a  DME equipment: n/a  DME provider: n/a    Is patient receiving oral anticoagulation therapy? No    If indicated:   Physician managing anticoagulation treatment:   Where does patient obtain lab work for ATC treatment? Potential Assistance Needed:  N/A       Evaluation: n/a    Expected Discharge date:  21    Patient expects to be discharged to:   Home    If home: is the family and/or caregiver wiling & able to provide support at home? yes  Who will be providing this support? Son and his wife    Follow Up Appointment: Best Day/ Time: Wednesday PM    Transportation provider: baby's father  Transportation arrangements needed for discharge: No    Readmission Risk              Risk of Unplanned Readmission:  15             Does patient have a readmission risk score greater than 14?: Yes  If yes, follow-up appointment must be made within 7 days of discharge. Goals of Care: safety      Educated patient on transitional options, provided freedom of choice and are agreeable with plan      Discharge Plan: home independent.           Electronically signed by Nereida Solis RN on 21 at 6:25 PM EST

## 2021-12-27 NOTE — PROGRESS NOTES
Physical Therapy        Physical Therapy Cancel Note      DATE: 2021    NAME: Brandon Borja  MRN: [id number]   : 1970      Patient not seen this date for Physical Therapy due to:    Surgery/Procedure: EGD      Electronically signed by Karl Ramos PT on 2021 at 11:17 AM

## 2021-12-27 NOTE — PROGRESS NOTES
Discussed with the patient and all questioned fully answered. She will call me if any problems arise. IV removed. Patient ambulated off unit to [facility] and son to transport home.

## 2021-12-27 NOTE — ANESTHESIA PRE PROCEDURE
Department of Anesthesiology  Preprocedure Note       Name:  Jeaneth Ny   Age:  45 y.o.  :  1970                                          MRN:  [id number]         Date:  2021      Surgeon: Maribel Mcneil):  Noy Steven MD    Procedure: EGD ESOPHAGOGASTRODUODENOSCOPY (N/A )    Medications prior to admission:   Prior to Admission medications    Medication Sig Start Date End Date Taking? Authorizing Provider   glucose monitoring (FREESTYLE) kit 4 times daily 12/10/21   Mansi Marquez MD   blood glucose test strips (FREESTYLE LITE) strip 1 each by In Vitro route daily As needed. 12/10/21   Mansi Marquez MD   FreeStyle Lancets MISC 1 each by Does not apply route daily 12/10/21   Mansi Marquez MD   carBAMazepine (TEGRETOL) 200 MG tablet  21   Historical Provider, MD   gabapentin (NEURONTIN) 300 MG capsule take 1 capsule by mouth every morning and 1 capsule every evening 21   Historical Provider, MD   ibuprofen (ADVIL;MOTRIN) 400 MG tablet  21   Historical Provider, MD   HUMULIN N 100 UNIT/ML injection vial  21   Historical Provider, MD   Reesa Hunting R 100 UNIT/ML injection  21   Historical Provider, MD   BD INSULIN SYRINGE U/F 31G X 16\" 0.3 ML MISC  21   Historical Provider, MD   lovastatin (MEVACOR) 40 MG tablet  21   Historical Provider, MD   prazosin (MINIPRESS) 1 MG capsule  21   Historical Provider, MD   sertraline (ZOLOFT) 50 MG tablet  21   Historical Provider, MD   traZODone (DESYREL) 150 MG tablet  21   Historical Provider, MD   pantoprazole (PROTONIX) 40 MG tablet Take 1 tablet by mouth every morning (before breakfast) 12/3/21   Sofia Lee MD       Current medications:    No current facility-administered medications for this visit. No current outpatient medications on file.      Facility-Administered Medications Ordered in Other Visits   Medication Dose Route Frequency Provider Last Rate Last Admin    lisinopril (PRINIVIL;ZESTRIL) tablet 10 mg  10 mg Oral Daily Edwina Sung MD        carBAMazepine (TEGRETOL) tablet 200 mg  200 mg Oral BID Harvey Carlson MD   200 mg at 12/27/21 0853    atorvastatin (LIPITOR) tablet 10 mg  10 mg Oral Daily Harvey Carlson MD   10 mg at 12/26/21 2024    pantoprazole (PROTONIX) tablet 40 mg  40 mg Oral QAM AC Harvey Carlson MD   40 mg at 12/27/21 9356    sertraline (ZOLOFT) tablet 50 mg  50 mg Oral Daily Harvey Carlson MD   50 mg at 12/27/21 0854    traZODone (DESYREL) tablet 150 mg  150 mg Oral Nightly Harvey Carlson MD   150 mg at 12/26/21 2025    sodium chloride flush 0.9 % injection 5-40 mL  5-40 mL IntraVENous 2 times per day Harvey Carlson MD        sodium chloride flush 0.9 % injection 5-40 mL  5-40 mL IntraVENous PRN Harvey Carlson MD        0.9 % sodium chloride infusion  25 mL IntraVENous PRN Harvey Carlson MD        enoxaparin (LOVENOX) injection 40 mg  40 mg SubCUTAneous Daily Harvey Carlson MD   40 mg at 12/27/21 0853    ondansetron (ZOFRAN-ODT) disintegrating tablet 4 mg  4 mg Oral Q8H PRN Harvey Carlson MD   4 mg at 12/27/21 0519    Or    ondansetron (ZOFRAN) injection 4 mg  4 mg IntraVENous Q6H PRN Harvey Carlson MD        polyethylene glycol (GLYCOLAX) packet 17 g  17 g Oral Daily PRN Harvey Carlson MD        acetaminophen (TYLENOL) tablet 650 mg  650 mg Oral Q6H PRN Harvey Carlson MD        Or    acetaminophen (TYLENOL) suppository 650 mg  650 mg Rectal Q6H PRN Harvey Carlson MD        potassium chloride 10 mEq/100 mL IVPB (Peripheral Line)  10 mEq IntraVENous PRN Harvey Carlson MD        magnesium sulfate 2000 mg in 50 mL IVPB premix  2,000 mg IntraVENous PRN Harvey Carlson MD        0.9 % sodium chloride infusion   IntraVENous Continuous Harvey Carlson  mL/hr at 12/26/21 1612 New Bag at 12/26/21 1612    insulin NPH (HUMULIN N;NOVOLIN N) injection vial 20 Units  20 Units SubCUTAneous BID AC Harvey Carlson MD        gabapentin (NEURONTIN) capsule 300 mg  300 mg Oral BID Shari Martinez MD        insulin lispro (HUMALOG) injection vial 0-6 Units  0-6 Units SubCUTAneous TID WC Shari Martinez MD        insulin lispro (HUMALOG) injection vial 0-3 Units  0-3 Units SubCUTAneous Nightly Shari Martniez MD   1 Units at 12/26/21 2022    glucose (GLUTOSE) 40 % oral gel 15 g  15 g Oral PRN Shari Martinez MD        dextrose 50 % IV solution  12.5 g IntraVENous PRN Shari Martinez MD        glucagon (rDNA) injection 1 mg  1 mg IntraMUSCular PRN Shari Martinez MD        dextrose 5 % solution  100 mL/hr IntraVENous PRN Shari Martinez MD        morphine (PF) injection 1 mg  1 mg IntraVENous Q4H PRN Jefferson Hung MD   1 mg at 12/27/21 0719    ketorolac (TORADOL) injection 15 mg  15 mg IntraVENous Q6H PRN Jefferson Hung MD   15 mg at 12/27/21 9184       Allergies:  No Known Allergies    Problem List:    Patient Active Problem List   Diagnosis Code    Type 2 diabetes mellitus with diabetic polyneuropathy, with long-term current use of insulin ([facility] E11.42, Z79.4    Depression F32. A    GERD with esophagitis K21.00    Type 2 diabetes mellitus with hyperglycemia, with long-term current use of insulin ([facility]) E11.65, Z79.4    Bipolar disorder, mixed ([facility]) F31.60    Cocaine abuse ([facility] F14.10    Pneumonia due to infectious organism J18.9    Acute cystitis without hematuria N30.00    Tobacco use Z72.0    Sepsis due to other etiology ([facility] A41.89    High anion gap metabolic acidosis H24.4    Hyponatremia E87.1    Diabetic ketoacidosis without coma ([facility]) E11.10    Respiratory alkalosis E87.3    Abdominal pain, epigastric R10.13    Acute on chronic pancreatitis ([facility]) K85.90, K86.1    Abdominal pain R10.9    Non-intractable vomiting R11.10    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    History of cocaine use Z87.898       Past Medical History:        Diagnosis Date    Depression     Diabetes mellitus ([facility]     Hyperlipidemia     Pancreatitis     Pneumonia due to infectious organism 1/8/2017    Substance abuse ([facility]        Past Surgical History:        Procedure Laterality Date    CARPAL TUNNEL RELEASE  2014    jerome carpal tunnel    CHOLECYSTECTOMY      COLONOSCOPY N/A 9/16/2019    COLONOSCOPY WITH BIOPSY performed by Franco Anaya MD at [address]  GASTROINTESTINAL ENDOSCOPY N/A 9/16/2019    EGD ESOPHAGOGASTRODUODENOSCOPY performed by Franco Anaya MD at [facility] History:    Social History     Tobacco Use    Smoking status: Current Every Day Smoker     Packs/day: 0.50     Years: 30.00     Pack years: 15.00     Types: Cigarettes    Smokeless tobacco: Never Used    Tobacco comment: 0-6 cigarettes per day   Substance Use Topics    Alcohol use: Yes     Comment: Rare                                Ready to quit: Not Answered  Counseling given: Not Answered  Comment: 0-6 cigarettes per day      Vital Signs (Current): There were no vitals filed for this visit.                                            BP Readings from Last 3 Encounters:   12/27/21 (!) 168/71   12/03/21 132/77   12/01/21 137/70       NPO Status:                                                                                 BMI:   Wt Readings from Last 3 Encounters:   12/26/21 119 lb 4.3 oz (54.1 kg)   12/03/21 115 lb 4.8 oz (52.3 kg)   10/29/21 120 lb (54.4 kg)     There is no height or weight on file to calculate BMI.    CBC:   Lab Results   Component Value Date    WBC 5.5 12/27/2021    RBC 3.54 12/27/2021    RBC 3.74 10/21/2011    HGB 10.2 12/27/2021    HCT 31.6 12/27/2021    MCV 89.3 12/27/2021    RDW 12.5 12/27/2021     12/27/2021     10/21/2011       CMP:   Lab Results   Component Value Date     12/27/2021    K 4.7 12/27/2021     12/27/2021    CO2 22 12/27/2021    BUN 18 12/27/2021    CREATININE 0.53 12/27/2021    GFRAA >60 12/27/2021    LABGLOM >60 12/27/2021    GLUCOSE 120 12/27/2021    GLUCOSE 281 10/21/2011    PROT 6.2 12/27/2021 CALCIUM 8.7 12/27/2021    BILITOT <0.10 12/27/2021    ALKPHOS 130 12/27/2021    AST 29 12/27/2021    ALT 24 12/27/2021       POC Tests:   Recent Labs     12/27/21  0753   POCGLU 117*       Coags:   Lab Results   Component Value Date    PROTIME 10.8 11/16/2012    INR 1.0 11/16/2012    APTT 33.1 11/16/2012       HCG (If Applicable):   Lab Results   Component Value Date    PREGTESTUR NEGATIVE 11/25/2016    HCG NEGATIVE 04/11/2019    HCGQUANT <1 07/03/2019        ABGs: No results found for: PHART, PO2ART, PQE4BZS, PKZ7TDY, BEART, S7OFHPQF     Type & Screen (If Applicable):  No results found for: LABABO, [address]    Anesthesia Evaluation  Patient summary reviewed and Nursing notes reviewed no history of anesthetic complications:   Airway: Mallampati: II  TM distance: >3 FB   Neck ROM: full  Mouth opening: > = 3 FB Dental: normal exam         Pulmonary:normal exam  breath sounds clear to auscultation                             Cardiovascular:  Exercise tolerance: good (>4 METS),   (+) angina: no interval change,       ECG reviewed  Rhythm: regular  Rate: normal  Echocardiogram reviewed               ROS comment: Angina for last 2 months     Neuro/Psych:   (+) psychiatric history:            GI/Hepatic/Renal:   (+) GERD:,          ROS comment: Abdominal hernia. Endo/Other:    (+) DiabetesType II DM, , .                 Abdominal:       Abdomen: soft. Vascular:           ROS comment: Mesenteric vasculitis. Other Findings:             Anesthesia Plan      general and MAC     ASA 3       Induction: intravenous. MIPS: Postoperative opioids intended and Prophylactic antiemetics administered. Anesthetic plan and risks discussed with patient. Use of blood products discussed with patient whom consented to blood products. Plan discussed with attending.     Attending anesthesiologist reviewed and agrees with Pre Eval content              SHAI Rangel - CRNA   12/27/2021

## 2021-12-27 NOTE — ANESTHESIA POSTPROCEDURE EVALUATION
Department of Anesthesiology  Postprocedure Note    Patient: Jaylan Barrientos  MRN: [id number]  YOB: 1970  Date of evaluation: 12/27/2021  Time:  1:51 PM     Procedure Summary     Date: 12/27/21 Room / Location: [address] [place] [address]    Anesthesia Start: 1025 Anesthesia Stop: 1740    Procedure: EGD BIOPSY (N/A ) Diagnosis: (EPIGASTRIC PAIN)    Surgeons: Guille Schultz MD Responsible Provider: Bailee Montanez MD    Anesthesia Type: general, MAC ASA Status: 3          Anesthesia Type: general, MAC    Alyssa Phase I: Alyssa Score: 10    Alyssa Phase II: Alyssa Score: 10    Last vitals: Reviewed and per EMR flowsheets.        Anesthesia Post Evaluation    Patient location during evaluation: bedside  Patient participation: complete - patient participated  Level of consciousness: awake and alert  Pain score: 0  Nausea & Vomiting: no nausea  Cardiovascular status: hemodynamically stable  Respiratory status: room air

## 2021-12-28 LAB
ANTI DNA DOUBLE STRANDED: 0.7 IU/ML
ANTI-NUCLEAR ANTIBODY (ANA): NEGATIVE
ENA ANTIBODIES SCREEN: 0.2 U/ML
MITOCHONDRIAL ANTIBODY: 0.9 U/ML (ref 0–4)
SURGICAL PATHOLOGY REPORT: NORMAL

## 2021-12-30 ENCOUNTER — TELEPHONE (OUTPATIENT)
Dept: FAMILY MEDICINE CLINIC | Age: 51
End: 2021-12-30

## 2021-12-30 LAB
ALK PHOS BONE SPECIFIC: 48 U/L (ref 0–55)
ALK PHOS OTHER CALC: 0 U/L
ALK PHOSPHATASE: 140 U/L (ref 40–120)
ALKALINE PHOSPHATASE LIVER FRACTION: 92 U/L (ref 0–94)

## 2021-12-30 NOTE — TELEPHONE ENCOUNTER
[facility] Transitions Initial Follow Up Call    Outreach made within 2 business days of discharge: No--writer out of office -    Patient: Malika Cazares   Patient : 1970   MRN: [id number]    Reason for Admission: abdominal pain--epigastric  Discharge Date: 2021       Spoke with: Domenic Preston    Discharge department/facility: [address] Onc/MedSurg    TCM Interactive Patient Contact:  Was patient able to fill all prescriptions: Yes  Was patient instructed to bring all medications to the follow-up visit: Yes  Is patient taking all medications as directed in the discharge summary? Yes  Does patient understand their discharge instructions: Yes  Does patient have questions or concerns that need addressed prior to 7-14 day follow up office visit: no    Scheduled appointment with PCP within 7-14 days    Spoke with pt, she had an appt scheduled with PCP on 2022. Let pt know that appt type was changed to hospital f/u so that encounter can be reviewed with her. Pt has already scheduled her gastric emptying study as well as a f/u visit with GI. Pt denies any additional needs at this time.     Follow Up  Future Appointments   Date Time Provider Alice [place]   2022  2:45 PM MD Aylin Carter [facility]   1/10/2022  9:00 AM STA NM ROOM 1 [facility]   2022 10:30 AM Marianela Parry MD [facility] Russel Black, RN

## 2022-01-03 VITALS
WEIGHT: 110 LBS | HEIGHT: 64 IN | DIASTOLIC BLOOD PRESSURE: 54 MMHG | SYSTOLIC BLOOD PRESSURE: 98 MMHG | HEART RATE: 91 BPM | RESPIRATION RATE: 18 BRPM | BODY MASS INDEX: 18.78 KG/M2 | TEMPERATURE: 98.4 F | OXYGEN SATURATION: 100 %

## 2022-01-03 PROCEDURE — 96374 THER/PROPH/DIAG INJ IV PUSH: CPT

## 2022-01-03 PROCEDURE — 99283 EMERGENCY DEPT VISIT LOW MDM: CPT

## 2022-01-03 PROCEDURE — 93005 ELECTROCARDIOGRAM TRACING: CPT | Performed by: EMERGENCY MEDICINE

## 2022-01-03 PROCEDURE — 96375 TX/PRO/DX INJ NEW DRUG ADDON: CPT

## 2022-01-03 ASSESSMENT — PAIN SCALES - GENERAL: PAINLEVEL_OUTOF10: 10

## 2022-01-04 ENCOUNTER — APPOINTMENT (OUTPATIENT)
Dept: GENERAL RADIOLOGY | Age: 52
End: 2022-01-04
Payer: MEDICAID

## 2022-01-04 ENCOUNTER — HOSPITAL ENCOUNTER (EMERGENCY)
Age: 52
Discharge: HOME | End: 2022-01-04
Attending: EMERGENCY MEDICINE
Payer: MEDICAID

## 2022-01-04 DIAGNOSIS — R10.13 ABDOMINAL PAIN, EPIGASTRIC: Primary | ICD-10-CM

## 2022-01-04 LAB
-: ABNORMAL
ALBUMIN SERPL-MCNC: 3.9 G/DL (ref 3.5–5.2)
ALBUMIN/GLOBULIN RATIO: ABNORMAL (ref 1–2.5)
ALP BLD-CCNC: 139 U/L (ref 35–104)
ALT SERPL-CCNC: 26 U/L (ref 5–33)
AMORPHOUS: ABNORMAL
ANION GAP SERPL CALCULATED.3IONS-SCNC: 9 MMOL/L (ref 9–17)
AST SERPL-CCNC: 23 U/L
BACTERIA: ABNORMAL
BILIRUB SERPL-MCNC: 0.15 MG/DL (ref 0.3–1.2)
BILIRUBIN URINE: NEGATIVE
BUN BLDV-MCNC: 19 MG/DL (ref 6–20)
BUN/CREAT BLD: 33 (ref 9–20)
CALCIUM SERPL-MCNC: 9.4 MG/DL (ref 8.6–10.4)
CASTS UA: ABNORMAL /LPF
CASTS UA: ABNORMAL /LPF
CHLORIDE BLD-SCNC: 102 MMOL/L (ref 98–107)
CO2: 25 MMOL/L (ref 20–31)
COLOR: YELLOW
COMMENT UA: ABNORMAL
CREAT SERPL-MCNC: 0.58 MG/DL (ref 0.5–0.9)
CRYSTALS, UA: ABNORMAL /HPF
EKG ATRIAL RATE: 86 BPM
EKG P AXIS: 66 DEGREES
EKG P-R INTERVAL: 124 MS
EKG Q-T INTERVAL: 368 MS
EKG QRS DURATION: 78 MS
EKG QTC CALCULATION (BAZETT): 440 MS
EKG R AXIS: 86 DEGREES
EKG T AXIS: 83 DEGREES
EKG VENTRICULAR RATE: 86 BPM
EPITHELIAL CELLS UA: ABNORMAL /HPF (ref 0–5)
GFR AFRICAN AMERICAN: >60 ML/MIN
GFR NON-AFRICAN AMERICAN: >60 ML/MIN
GFR SERPL CREATININE-BSD FRML MDRD: ABNORMAL ML/MIN/{1.73_M2}
GFR SERPL CREATININE-BSD FRML MDRD: ABNORMAL ML/MIN/{1.73_M2}
GLUCOSE BLD-MCNC: 146 MG/DL (ref 70–99)
GLUCOSE URINE: NEGATIVE
HCG(URINE) PREGNANCY TEST: NEGATIVE
KETONES, URINE: NEGATIVE
LEUKOCYTE ESTERASE, URINE: NEGATIVE
LIPASE: 28 U/L (ref 13–60)
MUCUS: ABNORMAL
NITRITE, URINE: NEGATIVE
OTHER OBSERVATIONS UA: ABNORMAL
PH UA: 6 (ref 5–8)
POTASSIUM SERPL-SCNC: 3.9 MMOL/L (ref 3.7–5.3)
PROTEIN UA: ABNORMAL
RBC UA: ABNORMAL /HPF (ref 0–2)
RENAL EPITHELIAL, UA: ABNORMAL /HPF
SODIUM BLD-SCNC: 136 MMOL/L (ref 135–144)
SPECIFIC GRAVITY UA: 1.02 (ref 1–1.03)
TOTAL PROTEIN: 7.1 G/DL (ref 6.4–8.3)
TRICHOMONAS: ABNORMAL
TROPONIN INTERP: NORMAL
TROPONIN T: NORMAL NG/ML
TROPONIN, HIGH SENSITIVITY: 11 NG/L (ref 0–14)
TURBIDITY: CLEAR
URINE HGB: ABNORMAL
UROBILINOGEN, URINE: NORMAL
WBC UA: ABNORMAL /HPF (ref 0–5)
YEAST: ABNORMAL

## 2022-01-04 PROCEDURE — 84484 ASSAY OF TROPONIN QUANT: CPT

## 2022-01-04 PROCEDURE — 2580000003 HC RX 258: Performed by: EMERGENCY MEDICINE

## 2022-01-04 PROCEDURE — 96361 HYDRATE IV INFUSION ADD-ON: CPT

## 2022-01-04 PROCEDURE — 6370000000 HC RX 637 (ALT 250 FOR IP): Performed by: EMERGENCY MEDICINE

## 2022-01-04 PROCEDURE — 87086 URINE CULTURE/COLONY COUNT: CPT

## 2022-01-04 PROCEDURE — 83690 ASSAY OF LIPASE: CPT

## 2022-01-04 PROCEDURE — 71045 X-RAY EXAM CHEST 1 VIEW: CPT

## 2022-01-04 PROCEDURE — 96375 TX/PRO/DX INJ NEW DRUG ADDON: CPT

## 2022-01-04 PROCEDURE — 81025 URINE PREGNANCY TEST: CPT

## 2022-01-04 PROCEDURE — 6360000002 HC RX W HCPCS: Performed by: EMERGENCY MEDICINE

## 2022-01-04 PROCEDURE — 96374 THER/PROPH/DIAG INJ IV PUSH: CPT

## 2022-01-04 PROCEDURE — 81001 URINALYSIS AUTO W/SCOPE: CPT

## 2022-01-04 PROCEDURE — 93010 ELECTROCARDIOGRAM REPORT: CPT | Performed by: INTERNAL MEDICINE

## 2022-01-04 PROCEDURE — 80053 COMPREHEN METABOLIC PANEL: CPT

## 2022-01-04 PROCEDURE — 2500000003 HC RX 250 WO HCPCS: Performed by: EMERGENCY MEDICINE

## 2022-01-04 RX ORDER — FAMOTIDINE 20 MG/1
20 TABLET, FILM COATED ORAL 2 TIMES DAILY
Qty: 60 TABLET | Refills: 0 | Status: ON HOLD | OUTPATIENT
Start: 2022-01-04 | End: 2022-10-15 | Stop reason: HOSPADM

## 2022-01-04 RX ORDER — METOCLOPRAMIDE HYDROCHLORIDE 5 MG/ML
10 INJECTION INTRAMUSCULAR; INTRAVENOUS ONCE
Status: COMPLETED | OUTPATIENT
Start: 2022-01-04 | End: 2022-01-04

## 2022-01-04 RX ORDER — MAGNESIUM HYDROXIDE/ALUMINUM HYDROXICE/SIMETHICONE 120; 1200; 1200 MG/30ML; MG/30ML; MG/30ML
30 SUSPENSION ORAL ONCE
Status: COMPLETED | OUTPATIENT
Start: 2022-01-04 | End: 2022-01-04

## 2022-01-04 RX ORDER — SODIUM CHLORIDE, SODIUM LACTATE, POTASSIUM CHLORIDE, AND CALCIUM CHLORIDE .6; .31; .03; .02 G/100ML; G/100ML; G/100ML; G/100ML
1000 INJECTION, SOLUTION INTRAVENOUS ONCE
Status: COMPLETED | OUTPATIENT
Start: 2022-01-04 | End: 2022-01-04

## 2022-01-04 RX ORDER — ONDANSETRON 4 MG/1
4 TABLET, ORALLY DISINTEGRATING ORAL EVERY 8 HOURS PRN
Qty: 20 TABLET | Refills: 0 | Status: SHIPPED | OUTPATIENT
Start: 2022-01-04

## 2022-01-04 RX ADMIN — FAMOTIDINE 20 MG: 10 INJECTION, SOLUTION INTRAVENOUS at 01:47

## 2022-01-04 RX ADMIN — SODIUM CHLORIDE, POTASSIUM CHLORIDE, SODIUM LACTATE AND CALCIUM CHLORIDE 1000 ML: 600; 310; 30; 20 INJECTION, SOLUTION INTRAVENOUS at 02:09

## 2022-01-04 RX ADMIN — MAGNESIUM HYDROXIDE/ALUMINUM HYDROXICE/SIMETHICONE 30 ML: 120; 1200; 1200 SUSPENSION ORAL at 01:48

## 2022-01-04 RX ADMIN — METOCLOPRAMIDE 10 MG: 5 INJECTION, SOLUTION INTRAMUSCULAR; INTRAVENOUS at 01:47

## 2022-01-04 ASSESSMENT — ENCOUNTER SYMPTOMS
COUGH: 0
CHEST TIGHTNESS: 1
VOMITING: 0
WHEEZING: 0
DIARRHEA: 0
ABDOMINAL PAIN: 1
VOICE CHANGE: 0
SHORTNESS OF BREATH: 0
NAUSEA: 0
FACIAL SWELLING: 0

## 2022-01-04 NOTE — ED NOTES
Pt states she is still having a lot of pain. Dr. Jaky Granado notified.       Jennifer Bergeron RN  01/04/22 [id number]

## 2022-01-04 NOTE — ED PROVIDER NOTES
[address] [facility]  Emergency Department  Faculty Attestation       I performed a history and physical examination of the patient and discussed management with the resident. I reviewed the residents note and agree with the documented findings including all diagnostic interpretations and plan of care. Any areas of disagreement are noted on the chart. I was personally present for the key portions of any procedures. I have documented in the chart those procedures where I was not present during the key portions. I have reviewed the emergency nurses triage note. I agree with the chief complaint, past medical history, past surgical history, allergies, medications, social and family history as documented unless otherwise noted below. Documentation of the HPI, Physical Exam and Medical Decision Making performed by scribankit is based on my personal performance of the HPI, PE and MDM. For Physician Assistant/ Nurse Practitioner cases/documentation I have personally evaluated this patient and have completed at least one if not all key elements of the E/M (history, physical exam, and MDM). Additional findings are as noted. Pertinent Comments     Primary Care Physician: Melina Morrison MD    History: This is a 45 y.o. female who presents to the Emergency Department with complaint of epigastric abdominal pain that has been present for a month, but has been worsening over the last 1.5 weeks. Pain is currently described as a sharp 10 out of 10 sensation radiating towards her back. Associated with decreased oral intake, nausea, nonbloody nonbilious emesis, and chest discomfort. No constipation or diarrhea. No vaginal discharge or bleeding. Does have a history of hiatal hernia, gastroparesis, and diabetes.   Has seen GI for this and has an outpatient CT scan ordered at this time      Physical:    ED Triage Vitals   BP Temp Temp Source Pulse Resp SpO2 Height Weight   12/26/21 0150 12/26/21 0150 12/26/21 0150 12/26/21 [id number] 12/26/21 0150 12/26/21 0150 12/26/21 1539 12/26/21 0310   (!) 193/75 96.8 °F (36 °C) Oral 96 22 100 % 5' 4\" (1.626 m) 105 lb (47.6 kg)        General: alert, resting in bed. Does appear to be slightly uncomfortable, but nontoxic-appearing and not in extremis  HENT: normocephalic, Patient is currently wearing a facial mask. Given that patient is not complaining of sore throat or difficulty swallowing, mask was left in place to decrease risk of aersolization of particles in the setting of current CoVID-19 pandemic   Eyes: pupils equal and reactive, extraocular eye movements intact. No scleral icterus. Neck: normal ROM, trachea midline   Heart:  normal rate, regular rhythm, no murmurs, rubs, clicks or gallops. Chest: clear to auscultation, no wheezes, rales or rhonchi, symmetric air entry. Abdomen: Abdomen is soft nondistended does have tenderness in the epigastric region as well as left and right upper quadrant. However no rebound or guarding. No pulsatile mass. No CVA tenderness  Extremities: no obvious deformities, normal ROM of all 4 extremities, no edema of bilateral lower extremities  Neurological: alert, oriented, normal speech, no focal findings or movement disorder noted   Skin: normal coloration and turgor, no rashes or jaundice on exposed skin     MDM/Plan:   History of gastroparesis and hiatal hernia with epigastric pain is been going on for over a month, but is been acutely worsening. Is following with GI  On exam does have tenderness in epigastric region, but no peritoneal signs. Likely a peptic ulcer versus gastroparesis versus pancreatitis versus gastritis  Will give symptomatic treatment, basic labs, and given that she already has an outpatient CT scan ordered we will go ahead and proceed with getting a CT abdomen pelvis to make sure is no other intra-abdominal pathology  Also having chest pain, this is more likely due to GERD or esophageal irritation from vomiting.   However will get

## 2022-01-04 NOTE — ED PROVIDER NOTES
[address] [place] [address] ED  EMERGENCY DEPARTMENT ENCOUNTER      Pt Name: Star Palacios  MRN: [id number]  [place] 1970  Date of evaluation: 1/3/2022  Provider: Brandin Viera MD    CHIEF COMPLAINT     Chief Complaint   Patient presents with    Chest Pain     x several months; pt has hx of pancreatitis and has been seen multiple times for it but reports the pain has been radiating to chest    Abdominal Pain         HISTORY OF PRESENT ILLNESS   (Location/Symptom, Timing/Onset, Context/Setting,Quality, Duration, Modifying Factors, Severity)  Note limiting factors. Star Palacios is a54 y.o. female who presents to the emergency department      HPI    48-year-old female with history of chronic abdominal pain presents with the same. She states she has had this for a week or more, she was recently admitted to [facility] for abdominal pain and a finding of inflammation of the celiac trunk concerning for vasculitis. She was evaluated by vascular surgery, evaluated by GI and had a scope at that time. Lipase was within normal limits    Does appear to be history of polysubstance abuse, she is denying any alcohol or recent drug use. She denied any dysuria or vaginal bleeding or discharge    No flank pain, no shortness of breath or viral symptoms    Nursing Notes werereviewed. REVIEW OF SYSTEMS    (2-9 systems for level 4, 10 or more for level 5)     Review of Systems   Constitutional: Negative for appetite change, chills, fatigue and fever. HENT: Negative for drooling, facial swelling, mouth sores and voice change. Eyes: Negative for visual disturbance. Respiratory: Positive for chest tightness. Negative for cough, shortness of breath and wheezing. Cardiovascular: Positive for chest pain. Gastrointestinal: Positive for abdominal pain. Negative for diarrhea, nausea and vomiting. Genitourinary: Negative for difficulty urinating and dysuria. Musculoskeletal: Negative for neck stiffness.    Skin: Negative for rash. Neurological: Negative for weakness and numbness. Psychiatric/Behavioral: Negative for agitation. All other systems reviewed and are negative. Except as noted above the remainder of the review of systems was reviewed and negative. PAST MEDICAL HISTORY     Past Medical History:   Diagnosis Date    Depression     Diabetes mellitus ([facility]     Hyperlipidemia     Pancreatitis     Pneumonia due to infectious organism 1/8/2017    Substance abuse ([facility]          SURGICALHISTORY       Past Surgical History:   Procedure Laterality Date    CARPAL TUNNEL RELEASE  2014    jerome carpal tunnel    CHOLECYSTECTOMY      COLONOSCOPY N/A 9/16/2019    COLONOSCOPY WITH BIOPSY performed by Desirae Hernandez MD at [facility] ENDOSCOPY N/A 9/16/2019    EGD ESOPHAGOGASTRODUODENOSCOPY performed by [facility]irae [facility] MD [facility] 12/27/2021    EGD BIOPSY performed by Heide Cunha MD at [facility] Medication List as of 1/4/2022  6:28 AM      CONTINUE these medications which have NOT CHANGED    Details   glucose monitoring (FREESTYLE) kit Disp-1 kit, R-0, Normal4 times daily      blood glucose test strips (FREESTYLE LITE) strip 1 each by In Vitro route daily As needed. , Disp-100 each, R-11Normal      FreeStyle Lancets MISC DAILY Starting Fri 12/10/2021, Disp-100 each, R-11, Normal      carBAMazepine (TEGRETOL) 200 MG tablet Historical Med      gabapentin (NEURONTIN) 300 MG capsule take 1 capsule by mouth every morning and 1 capsule every eveningHistorical Med      ibuprofen (ADVIL;MOTRIN) 400 MG tablet Historical Med      HUMULIN N 100 UNIT/ML injection vial DAWHistorical Med      NOVOLIN R 100 UNIT/ML injection DAWHistorical Med      BD INSULIN SYRINGE U/F 31G X 5/16\" 0.3 ML MISC Starting Fri 11/19/2021, ZACK, Historical Med      lovastatin (MEVACOR) 40 MG tablet Historical Med      prazosin (MINIPRESS) 1 MG capsule Historical Med      sertraline (ZOLOFT) 50 MG tablet Historical Med      traZODone (DESYREL) 150 MG tablet Historical Med      pantoprazole (PROTONIX) 40 MG tablet Take 1 tablet by mouth every morning (before breakfast), Disp-30 tablet, R-5Normal               ALLERGIES   Patient has no known allergies. FAMILY HISTORY       Family History   Problem Relation Age of Onset    Diabetes Mother     Coronary Art Dis Mother     Cancer Father         Colon    Diabetes Sister     Diabetes Brother     Bipolar Disorder Brother     Alcohol Abuse Brother     Substance Abuse Brother           SOCIAL HISTORY       Social History     Socioeconomic History    Marital status: Single     Spouse name: None    Number of children: None    Years of education: None    Highest education level: None   Occupational History    None   Tobacco Use    Smoking status: Current Every Day Smoker     Packs/day: 0.50     Years: 30.00     Pack years: 15.00     Types: Cigarettes    Smokeless tobacco: Never Used    Tobacco comment: 0-6 cigarettes per day   Substance and Sexual Activity    Alcohol use: Yes     Comment: Rare    Drug use: Yes     Types: Cocaine     Comment: 12/5/16 relapsed    Sexual activity: Yes     Partners: Male   Other Topics Concern    None   Social History Narrative    None     Social Determinants of Health     Financial Resource Strain: Medium Risk    Difficulty of Paying Living Expenses: Somewhat hard   Food Insecurity: No Food Insecurity    Worried About Running Out of Food in the Last Year: Never true    Claire of Food in the Last Year: Never true   Transportation Needs:     Lack of Transportation (Medical): Not on file    Lack of Transportation (Non-Medical):  Not on file   Physical Activity:     Days of Exercise per Week: Not on file    Minutes of Exercise per Session: Not on file   Stress:     Feeling of Stress : Not on file   Social Connections:     Frequency of Communication with Friends and Family: Not on file    Frequency of Social Gatherings with Friends and Family: Not on file    Attends Sabianist Services: Not on file    Active Member of Clubs or Organizations: Not on file    Attends Club or Organization Meetings: Not on file    Marital Status: Not on file   Intimate Partner Violence:     Fear of Current or Ex-Partner: Not on file    Emotionally Abused: Not on file    Physically Abused: Not on file    Sexually Abused: Not on file   Housing Stability:     Unable to Pay for Housing in the Last Year: Not on file    Number of [place] in the Last Year: Not on file    Unstable Housing in the Last Year: Not on file       SCREENINGS             PHYSICAL EXAM    (up to 7 for level 4, 8 or more for level 5)     ED Triage Vitals [01/03/22 1656]   BP Temp Temp src Pulse Resp SpO2 Height Weight   (!) 98/54 98.4 °F (36.9 °C) -- 91 18 100 % 5' 4\" (1.626 m) 110 lb (49.9 kg)       Physical Exam  Constitutional:       General: She is not in acute distress. Appearance: She is well-developed. HENT:      Head: Normocephalic and atraumatic. Eyes:      Conjunctiva/sclera: Conjunctivae normal.   Neck:      Vascular: No JVD. Cardiovascular:      Rate and Rhythm: Normal rate and regular rhythm. Pulmonary:      Effort: Pulmonary effort is normal. No respiratory distress. Breath sounds: No stridor. Comments: Lung sounds are clear  Abdominal:      General: Abdomen is flat. Bowel sounds are normal. There is no distension. Palpations: Abdomen is soft. Tenderness: There is no right CVA tenderness, left CVA tenderness, guarding or rebound. Negative signs include Means's sign. Comments: Abdomen is very soft, no tenderness, epigastric tenderness, right upper quadrant tenderness, negative Means's   Musculoskeletal:         General: Normal range of motion. Cervical back: Normal range of motion and neck supple. Skin:     General: Skin is warm and dry. Neurological:      Mental Status: She is alert and oriented to person, place, and time. DIAGNOSTIC RESULTS     EKG: All EKG's are interpreted by the Emergency Department Physician who either signs orCo-signs this chart in the absence of a cardiologist.      RADIOLOGY:   Non-plainfilm images such as CT, Ultrasound and MRI are read by the radiologist. Plain radiographic images are visualized and preliminarily interpreted by the emergency physician with the below findings:      Interpretationper the Radiologist below, if available at the time of this note:    XR CHEST PORTABLE   Final Result   No acute cardiopulmonary process               ED BEDSIDE ULTRASOUND:   Performed by ED Physician - none    LABS:  Labs Reviewed   COMPREHENSIVE METABOLIC PANEL W/ REFLEX TO MG FOR LOW K - Abnormal; Notable for the following components:       Result Value    Glucose 146 (*)     Bun/Cre Ratio 33 (*)     Alkaline Phosphatase 139 (*)     Total Bilirubin 0.15 (*)     All other components within normal limits   URINALYSIS - Abnormal; Notable for the following components:    Urine Hgb TRACE (*)     Protein, UA 2+ (*)     All other components within normal limits   MICROSCOPIC URINALYSIS - Abnormal; Notable for the following components:    Bacteria, UA FEW (*)     Mucus, UA 3+ (*)     All other components within normal limits   CULTURE, URINE   LIPASE   TROPONIN   PREGNANCY, URINE       All other labs were within normal range or not returned as of this dictation.     EMERGENCY DEPARTMENT COURSE and DIFFERENTIAL DIAGNOSIS/MDM:   Vitals:    Vitals:    01/03/22 1656   BP: (!) 98/54   Pulse: 91   Resp: 18   Temp: 98.4 °F (36.9 °C)   SpO2: 100%   Weight: 110 lb (49.9 kg)   Height: 5' 4\" (1.626 m)         MDM  Number of Diagnoses or Management Options  Diagnosis management comments: Patient has chronic and recurrent abdominal pain, states she is being evaluated for rheumatologic cause of her vasculitis. Today she is stating she has the same pain as previously follow-up with her pancreatitis. Abdomen is quite soft, will get a troponin, abdominal labs, treat her with nonnarcotic symptomatic treatment and reassess. Lab work was reassuring, patient felt improved with symptomatic treatment. She has rheumatology appointment to evaluate for presumed cheilitis. I recommend clear liquid diet until symptoms resolve and then advancing to full liquids then soft diet. Recommend she takes her medications as prescribed and follow-up with her GI doctor as well as her rheumatologist as scheduled        Procedures    FINAL IMPRESSION      1. Abdominal pain, epigastric        DISPOSITION/PLAN   DISPOSITION Decision To Discharge 01/04/2022 06:24:45 AM      PATIENT REFERRED TO:  Scar Prather MD  [address]   [address]  [address]  [phone]    Schedule an appointment as soon as possible for a visit in 2 days  As needed, If symptoms worsen      DISCHARGE MEDICATIONS:  Discharge Medication List as of 1/4/2022  6:28 AM      START taking these medications    Details   famotidine (PEPCID) 20 MG tablet Take 1 tablet by mouth 2 times daily, Disp-60 tablet, R-0Normal      ondansetron (ZOFRAN ODT) 4 MG disintegrating tablet Take 1 tablet by mouth every 8 hours as needed for Nausea or Vomiting, Disp-20 tablet, R-0Normal      aluminum-magnesium hydroxide 200-200 MG/5ML suspension Take 15 mLs by mouth every 6 hours as needed for Indigestion, Disp-500 mL, R-0Normal                    Summation      Patient Course:      ED Medications administered this visit:    Medications   lactated ringers bolus (0 mLs IntraVENous Stopped 1/4/22 0442)   aluminum & magnesium hydroxide-simethicone (MAALOX) 200-200-20 MG/5ML suspension 30 mL (30 mLs Oral Given 1/4/22 0148)   famotidine (PEPCID) injection 20 mg (20 mg IntraVENous Given 1/4/22 0147)   metoclopramide (REGLAN) injection 10 mg (10 mg IntraVENous Given 1/4/22 0147)       New Prescriptions from this visit:    Discharge Medication List as of 1/4/2022  6:28 AM      START taking these medications    Details   famotidine (PEPCID) 20 MG tablet Take 1 tablet by mouth 2 times daily, Disp-60 tablet, R-0Normal      ondansetron (ZOFRAN ODT) 4 MG disintegrating tablet Take 1 tablet by mouth every 8 hours as needed for Nausea or Vomiting, Disp-20 tablet, R-0Normal      aluminum-magnesium hydroxide 200-200 MG/5ML suspension Take 15 mLs by mouth every 6 hours as needed for Indigestion, Disp-500 mL, R-0Normal             Follow-up:  Lesli Guzman MD  [address]  [address]  [phone]    Schedule an appointment as soon as possible for a visit in 2 days  As needed, If symptoms worsen        Final Impression:   1.  Abdominal pain, epigastric               (Please note that portions of this note were completed with a voice recognition program.  Efforts were made to edit the dictations but occasionally words are mis-transcribed.)           Kat Cohn MD  01/04/22 1967

## 2022-01-04 NOTE — ED NOTES
Pt states she has been having chest pain x2 months. Pt states she has follow up appointments made for the same chest pain but when it gets bad there is nothing that can stop it. Pt's mucous membranes are pink and moist, pt's skin is warm and dry. Pt's respirations are even and non-labored, no distress noted at this time.      Abhishek Chan RN  01/04/22 9541

## 2022-01-05 LAB
CULTURE: NORMAL
Lab: NORMAL
SPECIMEN DESCRIPTION: NORMAL

## 2022-01-10 ENCOUNTER — HOSPITAL ENCOUNTER (OUTPATIENT)
Dept: NUCLEAR MEDICINE | Age: 52
Discharge: HOME | End: 2022-01-12
Payer: MEDICAID

## 2022-01-10 DIAGNOSIS — R11.2 NAUSEA AND VOMITING, INTRACTABILITY OF VOMITING NOT SPECIFIED, UNSPECIFIED VOMITING TYPE: ICD-10-CM

## 2022-01-10 PROCEDURE — A9541 TC99M SULFUR COLLOID: HCPCS | Performed by: INTERNAL MEDICINE

## 2022-01-10 PROCEDURE — 3430000000 HC RX DIAGNOSTIC RADIOPHARMACEUTICAL: Performed by: INTERNAL MEDICINE

## 2022-01-10 PROCEDURE — 78264 GASTRIC EMPTYING IMG STUDY: CPT

## 2022-01-10 PROCEDURE — A9540 TC99M MAA: HCPCS | Performed by: INTERNAL MEDICINE

## 2022-01-10 RX ADMIN — Medication 3 MILLICURIE: at 09:10

## 2022-01-11 DIAGNOSIS — Z79.4 TYPE 2 DIABETES MELLITUS WITH DIABETIC POLYNEUROPATHY, WITH LONG-TERM CURRENT USE OF INSULIN (HCC): ICD-10-CM

## 2022-01-11 DIAGNOSIS — E11.42 TYPE 2 DIABETES MELLITUS WITH DIABETIC POLYNEUROPATHY, WITH LONG-TERM CURRENT USE OF INSULIN (HCC): ICD-10-CM

## 2022-01-11 NOTE — TELEPHONE ENCOUNTER
E-scribe request for med refill. Please review and e-scribe if applicable. Last Visit Date:  12/10/2021  Next Visit Date:  1/18/2022    Hemoglobin A1C (%)   Date Value   12/27/2021 6.5 (H)   02/18/2020 10.0   05/20/2019 11.1             ( goal A1C is < 7)   Microalb/Crt.  Ratio (mcg/mg creat)   Date Value   02/21/2019 147 (H)     LDL Cholesterol (mg/dL)   Date Value   12/26/2021 80       (goal LDL is <100)   AST (U/L)   Date Value   01/04/2022 23     ALT (U/L)   Date Value   01/04/2022 26     BUN (mg/dL)   Date Value   01/04/2022 19     BP Readings from Last 3 Encounters:   01/03/22 (!) 98/54   12/27/21 (!) 150/68   12/27/21 (!) 88/55          (goal 120/80)        Patient Active Problem List:     Type 2 diabetes mellitus with diabetic polyneuropathy, with long-term current use of insulin (HCC)     Depression     GERD with esophagitis     Type 2 diabetes mellitus with hyperglycemia, with long-term current use of insulin (HCC)     Bipolar disorder, mixed (HCC)     Cocaine abuse ([address]     Pneumonia due to infectious organism     Acute cystitis without hematuria     Tobacco use     Sepsis due to other etiology ([address]     High anion gap metabolic acidosis     Hyponatremia     Diabetic ketoacidosis without coma (HCC)     Respiratory alkalosis     Abdominal pain, epigastric     Acute on chronic pancreatitis (HCC)     Abdominal pain     Non-intractable vomiting     Vasculitis of mesenteric artery ([address]     History of cocaine use      ----Car Crystal

## 2022-01-12 RX ORDER — BLOOD-GLUCOSE METER
1 KIT MISCELLANEOUS DAILY
Qty: 100 EACH | Refills: 0 | Status: SHIPPED | OUTPATIENT
Start: 2022-01-12 | End: 2022-01-19 | Stop reason: SDUPTHER

## 2022-01-12 RX ORDER — INSULIN HUMAN 100 [IU]/ML
20 INJECTION, SUSPENSION SUBCUTANEOUS
Qty: 10 ML | Refills: 0 | Status: SHIPPED | OUTPATIENT
Start: 2022-01-12 | End: 2022-01-19

## 2022-01-12 RX ORDER — HUMAN INSULIN 100 [IU]/ML
INJECTION, SOLUTION SUBCUTANEOUS
Qty: 10 ML | Refills: 0 | OUTPATIENT
Start: 2022-01-12

## 2022-01-19 ENCOUNTER — OFFICE VISIT (OUTPATIENT)
Dept: FAMILY MEDICINE CLINIC | Age: 52
End: 2022-01-19
Payer: MEDICAID

## 2022-01-19 ENCOUNTER — HOSPITAL ENCOUNTER (OUTPATIENT)
Age: 52
Setting detail: SPECIMEN
Discharge: HOME | End: 2022-01-19

## 2022-01-19 VITALS
BODY MASS INDEX: 20.53 KG/M2 | SYSTOLIC BLOOD PRESSURE: 128 MMHG | HEART RATE: 91 BPM | WEIGHT: 119.6 LBS | DIASTOLIC BLOOD PRESSURE: 83 MMHG

## 2022-01-19 DIAGNOSIS — I77.6: ICD-10-CM

## 2022-01-19 DIAGNOSIS — F51.01 PRIMARY INSOMNIA: ICD-10-CM

## 2022-01-19 DIAGNOSIS — Z12.31 ENCOUNTER FOR SCREENING MAMMOGRAM FOR MALIGNANT NEOPLASM OF BREAST: ICD-10-CM

## 2022-01-19 DIAGNOSIS — R74.8 ELEVATED ALKALINE PHOSPHATASE LEVEL: ICD-10-CM

## 2022-01-19 DIAGNOSIS — Z79.4 TYPE 2 DIABETES MELLITUS WITH DIABETIC POLYNEUROPATHY, WITH LONG-TERM CURRENT USE OF INSULIN (HCC): Primary | ICD-10-CM

## 2022-01-19 DIAGNOSIS — E11.42 TYPE 2 DIABETES MELLITUS WITH DIABETIC POLYNEUROPATHY, WITH LONG-TERM CURRENT USE OF INSULIN (HCC): Primary | ICD-10-CM

## 2022-01-19 LAB
ABSOLUTE EOS #: 0.23 K/UL (ref 0–0.44)
ABSOLUTE IMMATURE GRANULOCYTE: <0.03 K/UL (ref 0–0.3)
ABSOLUTE LYMPH #: 2.5 K/UL (ref 1.1–3.7)
ABSOLUTE MONO #: 0.48 K/UL (ref 0.1–1.2)
ALBUMIN SERPL-MCNC: 4.3 G/DL (ref 3.5–5.2)
ALBUMIN/GLOBULIN RATIO: 1.5 (ref 1–2.5)
ALP BLD-CCNC: 153 U/L (ref 35–104)
ALT SERPL-CCNC: 20 U/L (ref 5–33)
ANION GAP SERPL CALCULATED.3IONS-SCNC: 13 MMOL/L (ref 9–17)
AST SERPL-CCNC: 18 U/L
BASOPHILS # BLD: 1 % (ref 0–2)
BASOPHILS ABSOLUTE: 0.04 K/UL (ref 0–0.2)
BILIRUB SERPL-MCNC: 0.18 MG/DL (ref 0.3–1.2)
BUN BLDV-MCNC: 17 MG/DL (ref 6–20)
BUN/CREAT BLD: ABNORMAL (ref 9–20)
CALCIUM SERPL-MCNC: 9.7 MG/DL (ref 8.6–10.4)
CHLORIDE BLD-SCNC: 99 MMOL/L (ref 98–107)
CO2: 26 MMOL/L (ref 20–31)
COMPLEMENT C4: 50 MG/DL (ref 10–40)
CREAT SERPL-MCNC: 0.61 MG/DL (ref 0.5–0.9)
DIFFERENTIAL TYPE: NORMAL
EOSINOPHILS RELATIVE PERCENT: 4 % (ref 1–4)
GFR AFRICAN AMERICAN: >60 ML/MIN
GFR NON-AFRICAN AMERICAN: >60 ML/MIN
GFR SERPL CREATININE-BSD FRML MDRD: ABNORMAL ML/MIN/{1.73_M2}
GFR SERPL CREATININE-BSD FRML MDRD: ABNORMAL ML/MIN/{1.73_M2}
GLUCOSE BLD-MCNC: 135 MG/DL (ref 70–99)
HCT VFR BLD CALC: 38.7 % (ref 36.3–47.1)
HEMOGLOBIN: 12.1 G/DL (ref 11.9–15.1)
IMMATURE GRANULOCYTES: 0 %
LYMPHOCYTES # BLD: 40 % (ref 24–43)
MCH RBC QN AUTO: 28.1 PG (ref 25.2–33.5)
MCHC RBC AUTO-ENTMCNC: 31.3 G/DL (ref 28.4–34.8)
MCV RBC AUTO: 90 FL (ref 82.6–102.9)
MONOCYTES # BLD: 8 % (ref 3–12)
NRBC AUTOMATED: 0 PER 100 WBC
PDW BLD-RTO: 13 % (ref 11.8–14.4)
PLATELET # BLD: 302 K/UL (ref 138–453)
PLATELET ESTIMATE: NORMAL
PMV BLD AUTO: 9.9 FL (ref 8.1–13.5)
POTASSIUM SERPL-SCNC: 4.5 MMOL/L (ref 3.7–5.3)
RBC # BLD: 4.3 M/UL (ref 3.95–5.11)
RBC # BLD: NORMAL 10*6/UL
SEG NEUTROPHILS: 47 % (ref 36–65)
SEGMENTED NEUTROPHILS ABSOLUTE COUNT: 2.95 K/UL (ref 1.5–8.1)
SODIUM BLD-SCNC: 138 MMOL/L (ref 135–144)
TOTAL PROTEIN: 7.2 G/DL (ref 6.4–8.3)
WBC # BLD: 6.2 K/UL (ref 3.5–11.3)
WBC # BLD: NORMAL 10*3/UL

## 2022-01-19 PROCEDURE — 3046F HEMOGLOBIN A1C LEVEL >9.0%: CPT | Performed by: STUDENT IN AN ORGANIZED HEALTH CARE EDUCATION/TRAINING PROGRAM

## 2022-01-19 PROCEDURE — G8427 DOCREV CUR MEDS BY ELIG CLIN: HCPCS | Performed by: STUDENT IN AN ORGANIZED HEALTH CARE EDUCATION/TRAINING PROGRAM

## 2022-01-19 PROCEDURE — 2022F DILAT RTA XM EVC RTNOPTHY: CPT | Performed by: STUDENT IN AN ORGANIZED HEALTH CARE EDUCATION/TRAINING PROGRAM

## 2022-01-19 PROCEDURE — 3017F COLORECTAL CA SCREEN DOC REV: CPT | Performed by: STUDENT IN AN ORGANIZED HEALTH CARE EDUCATION/TRAINING PROGRAM

## 2022-01-19 PROCEDURE — 99211 OFF/OP EST MAY X REQ PHY/QHP: CPT | Performed by: STUDENT IN AN ORGANIZED HEALTH CARE EDUCATION/TRAINING PROGRAM

## 2022-01-19 PROCEDURE — 99213 OFFICE O/P EST LOW 20 MIN: CPT | Performed by: STUDENT IN AN ORGANIZED HEALTH CARE EDUCATION/TRAINING PROGRAM

## 2022-01-19 PROCEDURE — 1111F DSCHRG MED/CURRENT MED MERGE: CPT | Performed by: STUDENT IN AN ORGANIZED HEALTH CARE EDUCATION/TRAINING PROGRAM

## 2022-01-19 PROCEDURE — G8420 CALC BMI NORM PARAMETERS: HCPCS | Performed by: STUDENT IN AN ORGANIZED HEALTH CARE EDUCATION/TRAINING PROGRAM

## 2022-01-19 PROCEDURE — 4004F PT TOBACCO SCREEN RCVD TLK: CPT | Performed by: STUDENT IN AN ORGANIZED HEALTH CARE EDUCATION/TRAINING PROGRAM

## 2022-01-19 PROCEDURE — G8484 FLU IMMUNIZE NO ADMIN: HCPCS | Performed by: STUDENT IN AN ORGANIZED HEALTH CARE EDUCATION/TRAINING PROGRAM

## 2022-01-19 RX ORDER — TRAZODONE HYDROCHLORIDE 150 MG/1
150 TABLET ORAL NIGHTLY
Qty: 30 TABLET | Refills: 2 | Status: SHIPPED | OUTPATIENT
Start: 2022-01-19 | End: 2022-10-05 | Stop reason: SDUPTHER

## 2022-01-19 RX ORDER — BLOOD-GLUCOSE METER
1 KIT MISCELLANEOUS DAILY
Qty: 100 EACH | Refills: 0 | Status: SHIPPED | OUTPATIENT
Start: 2022-01-19 | End: 2022-01-24

## 2022-01-19 RX ORDER — MULTIVIT-MIN/IRON FUM/FOLIC AC 7.5 MG-4
1 TABLET ORAL DAILY
Qty: 30 TABLET | Refills: 3 | Status: SHIPPED | OUTPATIENT
Start: 2022-01-19

## 2022-01-19 RX ORDER — INSULIN GLARGINE 100 [IU]/ML
10 INJECTION, SOLUTION SUBCUTANEOUS NIGHTLY
Qty: 5 PEN | Refills: 0 | Status: SHIPPED | OUTPATIENT
Start: 2022-01-19 | End: 2022-02-03 | Stop reason: DRUGHIGH

## 2022-01-19 RX ORDER — IBUPROFEN 400 MG/1
TABLET ORAL
Qty: 120 TABLET | Status: CANCELLED | OUTPATIENT
Start: 2022-01-19

## 2022-01-19 RX ORDER — LANCETS 28 GAUGE
1 EACH MISCELLANEOUS DAILY
Qty: 100 EACH | Refills: 11 | Status: SHIPPED | OUTPATIENT
Start: 2022-01-19

## 2022-01-19 ASSESSMENT — PATIENT HEALTH QUESTIONNAIRE - PHQ9
SUM OF ALL RESPONSES TO PHQ QUESTIONS 1-9: 0
SUM OF ALL RESPONSES TO PHQ QUESTIONS 1-9: 0
3. TROUBLE FALLING OR STAYING ASLEEP: 0
SUM OF ALL RESPONSES TO PHQ9 QUESTIONS 1 & 2: 0
9. THOUGHTS THAT YOU WOULD BE BETTER OFF DEAD, OR OF HURTING YOURSELF: 0
SUM OF ALL RESPONSES TO PHQ QUESTIONS 1-9: 0
2. FEELING DOWN, DEPRESSED OR HOPELESS: 0
4. FEELING TIRED OR HAVING LITTLE ENERGY: 0
SUM OF ALL RESPONSES TO PHQ QUESTIONS 1-9: 0
1. LITTLE INTEREST OR PLEASURE IN DOING THINGS: 0
5. POOR APPETITE OR OVEREATING: 0
8. MOVING OR SPEAKING SO SLOWLY THAT OTHER PEOPLE COULD HAVE NOTICED. OR THE OPPOSITE, BEING SO FIGETY OR RESTLESS THAT YOU HAVE BEEN MOVING AROUND A LOT MORE THAN USUAL: 0
7. TROUBLE CONCENTRATING ON THINGS, SUCH AS READING THE NEWSPAPER OR WATCHING TELEVISION: 0
6. FEELING BAD ABOUT YOURSELF - OR THAT YOU ARE A FAILURE OR HAVE LET YOURSELF OR YOUR FAMILY DOWN: 0
10. IF YOU CHECKED OFF ANY PROBLEMS, HOW DIFFICULT HAVE THESE PROBLEMS MADE IT FOR YOU TO DO YOUR WORK, TAKE CARE OF THINGS AT HOME, OR GET ALONG WITH OTHER PEOPLE: 0

## 2022-01-19 NOTE — PATIENT INSTRUCTIONS
Thank you for letting us take care of you today. We hope all your questions were addressed. If a question was overlooked or something else comes to mind after you return home, please contact a member of your Care Team listed below. Your Care Team at [place] [address] is Team #2  Nevaeh Barclay DO (Faculty)  Victor Hugo Hinton (Faculty)  Afshin Gar MD (Resident)  Leslie Leggett MD (Resident)  Mao Melvin MD (Resident)  Bk Knutson MD (Resident)  Britni Antonio., EDDIE Mcintyre.,  VANGIE Gonzales., [facility] office)  Jeovanny Valdez, [address] (Clinical Practice Manager)  Karie English, [facility] (Clinical Pharmacist)     Office phone number: [phone]    If you need to get in right away due to illness, please be advised we have \"Same Day\" appointments available Monday-Friday. Please call us at [phone] option #3 to schedule your \"Same Day\" appointment.

## 2022-01-19 NOTE — PROGRESS NOTES
Subjective:    Aman Quispe is a 45 y.o. female with  has a past medical history of Depression, Diabetes mellitus ([address] Hyperlipidemia, Pancreatitis, Pneumonia due to infectious organism, and Substance abuse ([facility] Presented to the office today for:  Chief Complaint   Patient presents with    Check-Up     diabetes management, anxiety    Insomnia     sleep meds, also wanting vitamins, discuss medications    Constipation     issues using the bathroom       HPI     Patient is a 69-year-old female here today for follow-up. DM  Hba1c last 12/27/2021 6.5  Diabetic eye exam: schedule this year  Patient has been taking Humulin 10 units twice daily and NovoLog intermittently  Has been checking her blood sugars ranging from 1   Diabetic eye exam this year  Diabetic foot exam at next visit    Patient was discharged recently from [facility] due to epigastric pain  GI were consulted and had an EGD on 12/27 which only revealed mild reflux esophagitis, biopsies were taken to rule out H. pylori, they will also need to follow-up on the elevated ALP for possible PBC. Patient has a follow-up with GI next month  Impression   1. Interval appearance of inflammatory stranding surrounding the celiac trunk   and proper hepatic artery.  Findings are of unclear etiology.  Vasculitis   remains a differential consideration. 2. 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  Status post cholecystectomy. Consider outpatient rheumatology evaluation for abnormal CT findings concerning for vasculitis. Chronic insomnia: Patient states she has a very rough past.  Patient will be scheduling an appointment with [facility] center.     Insomnia:   zepf  hallaron  melatonin    Review of Systems              The patient has a   Family History   Problem Relation Age of Onset    Diabetes Mother     Coronary Art Dis Mother     Cancer Father         Colon    Diabetes Sister    Francisca Lehman Diabetes Brother     Bipolar Disorder Brother     Alcohol Abuse Brother     Substance Abuse Brother        Objective:    /83 (Site: Right Upper Arm, Position: Sitting, Cuff Size: Medium Adult)   Pulse 91   Wt 119 lb 9.6 oz (54.3 kg)   LMP 05/21/2018 (LMP Unknown)   BMI 20.53 kg/m²    BP Readings from Last 3 Encounters:   01/19/22 128/83   01/03/22 (!) 98/54   12/27/21 (!) 150/68       Physical Exam    Lab Results   Component Value Date    WBC 5.5 12/27/2021    HGB 10.2 (L) 12/27/2021    HCT 31.6 (L) 12/27/2021     12/27/2021    CHOL 130 12/26/2021    TRIG 129 12/26/2021    HDL 24 (L) 12/26/2021    LDLDIRECT 91 02/16/2019    ALT 26 01/04/2022    AST 23 01/04/2022     01/04/2022    K 3.9 01/04/2022     01/04/2022    CREATININE 0.58 01/04/2022    BUN 19 01/04/2022    CO2 25 01/04/2022    INR 1.0 11/16/2012    LABA1C 6.5 (H) 12/27/2021    LABMICR 147 (H) 02/21/2019     Lab Results   Component Value Date    CALCIUM 9.4 01/04/2022    PHOS 2.4 (L) 02/15/2017     Lab Results   Component Value Date    LDLCHOLESTEROL 80 12/26/2021    LDLDIRECT 91 02/16/2019       Assessment and Plan:    1. Encounter for screening mammogram for malignant neoplasm of breast  - [facility] DIGITAL SCREEN W OR WO CAD BILATERAL; Future    2. Type 2 diabetes mellitus with diabetic polyneuropathy, with long-term current use of insulin (HCC)  Patient's diabetes has been uncontrolled in the past however recent hemoglobin A1c was 6.5  Patient would like to simplify regimen we will stop the Humulin and NovoLog changed to Lantus. Patient has not tolerated metformin in the past  We will start with Lantus 10 units. Patient educated if fasting blood glucose greater than 140 to increase by 2 units and wait 3 days. Patient to follow-up in 1 month  And educated on diet and exercise  - Microalbumin, Ur; Future  - blood glucose test strips (FREESTYLE LITE) strip; 1 each by In Vitro route daily As needed. Dispense: 100 each;  Refill: 0  - FreeStyle Lancets MISC; 1 each by Does not apply route daily  Dispense: 100 each; Refill: 11  - Referral - Diabetes ([facility])    3. Inflammation of blood vessels ([address]  Patient had recent admission CT abdomen showed   1. Interval appearance of inflammatory stranding surrounding the celiac trunk   and proper hepatic artery.  Findings are of unclear etiology.  Vasculitis   remains a differential consideration. We will do autoimmune work-up and send referral for rheumatology  - RUBIN Screen With Reflex; Future  - C4 Complement; Future  - Anti-Neutrophilic Cytoplasmic Antibody; Future  - Rock Sher MD, Rheumatology, [facility]  - CBC With Auto Differential; Future    4. Elevated alkaline phosphatase level  Patient is following up with GI next month. Will order CMP to trend alkaline phosphatase  - Comprehensive Metabolic Panel; Future    5. Primary insomnia  Patient is currently on trazodone 150 mg which has not been helping next appointment consider tapering off and considering other options. Patient does have history of depression and anxiety patient will be following up with [facility] center    - Melatonin 3 MG CAPS; Take 3 mg by mouth at bedtime  Dispense: 30 capsule; Refill: 0          Requested Prescriptions     Signed Prescriptions Disp Refills    traZODone (DESYREL) 150 MG tablet 30 tablet 2     Sig: Take 1 tablet by mouth nightly    insulin glargine (LANTUS SOLOSTAR) 100 UNIT/ML injection pen 5 pen 0     Sig: Inject 10 Units into the skin nightly    Insulin Pen Needle 30G X 8 MM MISC 100 each 3     Si each by Does not apply route daily    blood glucose test strips (FREESTYLE LITE) strip 100 each 0     Si each by In Vitro route daily As needed.     FreeStyle Lancets MISC 100 each 11     Si each by Does not apply route daily    Melatonin 3 MG CAPS 30 capsule 0     Sig: Take 3 mg by mouth at bedtime    Multiple Vitamins-Minerals (MULTIVITAMIN WITH MINERALS) tablet 30 tablet 3     Sig: Take 1 tablet by mouth daily       Medications Discontinued During This Encounter   Medication Reason    HUMULIN N 100 UNIT/ML injection vial LIST CLEANUP    NOVOLIN R 100 UNIT/ML injection LIST CLEANUP    BD INSULIN SYRINGE U/F 31G X 5/16\" 0.3 ML MISC LIST CLEANUP    traZODone (DESYREL) 150 MG tablet REORDER    FreeStyle Lancets MISC REORDER    blood glucose test strips (FREESTYLE LITE) strip REORDER       Fang received counseling on the following healthy behaviors: nutrition, exercise and medication adherence    Discussed use,benefit, and side effects of prescribed medications. Barriers to medication compliance addressed. All patient questions answered. Pt voiced understanding. Return in about 3 weeks (around 2/9/2022), or follow up. Disclaimer: Some orall of this note was transcribed using voice-recognition software. This may cause typographical errors occasionally. Although all effort is made to fix these errors, please do not hesitate to contact our office if there [employer] concern with the understanding of this note.

## 2022-01-19 NOTE — PROGRESS NOTES
Visit Information    Have you changed or started any medications since your last visit including any over-the-counter medicines, vitamins, or herbal medicines? no   Are you having any side effects from any of your medications? -  no  Have you stopped taking any of your medications? Is so, why? -  no    Have you seen any other physician or provider since your last visit? No  Have you had any other diagnostic tests since your last visit? yes  Have you been seen in the emergency room and/or had an admission to a hospital since we last saw you? yes  Have you had your routine dental cleaning in the past 6 months? no    Have you activated your [employer] account? If not, what are your barriers?  No:      Patient Care Team:  Parvez Dhaliwal MD as PCP - General (Family Medicine)    Medical History Review  Past Medical, Family, and Social History reviewed and does not contribute to the patient presenting condition    Health Maintenance   Topic Date Due    COVID-19 Vaccine (1) Never done    Depression Monitoring  Never done    Diabetic retinal exam  Never done    Hepatitis B vaccine (1 of 3 - Risk 3-dose series) Never done    Cervical cancer screen  Never done    Breast cancer screen  Never done    Shingles Vaccine (1 of 2) Never done    Diabetic microalbuminuria test  02/21/2020    Diabetic foot exam  05/20/2020    Flu vaccine (1) 09/01/2021    Lipid screen  12/26/2022    A1C test (Diabetic or Prediabetic)  12/27/2022    DTaP/Tdap/Td vaccine (2 - Td or Tdap) 09/21/2028    Colon cancer screen colonoscopy  09/16/2029    Pneumococcal 0-64 years Vaccine (2 of 2 - PPSV23) 08/13/2034    Hepatitis C screen  Completed    HIV screen  Completed    Hepatitis A vaccine  Aged Out    Hib vaccine  Aged Out    Meningococcal (ACWY) vaccine  Aged Out

## 2022-01-19 NOTE — PROGRESS NOTES
Attending Physician Statement  I have discussed the care of Gay Naqvi 45 y.o. female, including pertinent history and exam findings, with the resident Dr. Ricardo Coffman MD.    History and Exam:   Chief Complaint   Patient presents with    Check-Up     diabetes management, anxiety    Insomnia     sleep meds, also wanting vitamins, discuss medications    Constipation     issues using the bathroom       Past Medical History:   Diagnosis Date    Depression     Diabetes mellitus ([facility]     Hyperlipidemia     Pancreatitis     Pneumonia due to infectious organism 1/8/2017    Substance abuse ([facility]      No Known Allergies   I have seen and examined the patient and the key elements of the encounter have been performed by me. BP Readings from Last 3 Encounters:   01/19/22 128/83   01/03/22 (!) 98/54   12/27/21 (!) 150/68     /83 (Site: Right Upper Arm, Position: Sitting, Cuff Size: Medium Adult)   Pulse 91   Wt 119 lb 9.6 oz (54.3 kg)   LMP 05/21/2018 (LMP Unknown)   BMI 20.53 kg/m²   Lab Results   Component Value Date    WBC 5.5 12/27/2021    HGB 10.2 (L) 12/27/2021    HCT 31.6 (L) 12/27/2021     12/27/2021    CHOL 130 12/26/2021    TRIG 129 12/26/2021    HDL 24 (L) 12/26/2021    LDLDIRECT 91 02/16/2019    ALT 26 01/04/2022    AST 23 01/04/2022     01/04/2022    K 3.9 01/04/2022     01/04/2022    CREATININE 0.58 01/04/2022    BUN 19 01/04/2022    CO2 25 01/04/2022    INR 1.0 11/16/2012    LABA1C 6.5 (H) 12/27/2021    LABMICR 147 (H) 02/21/2019     Lab Results   Component Value Date    LABPROT 6.1 (L) 11/18/2012    LABALBU 3.9 01/04/2022     Lab Results   Component Value Date    FERRITIN 337 (H) 11/16/2012     Lab Results   Component Value Date    LDLCHOLESTEROL 80 12/26/2021    LDLDIRECT 91 02/16/2019     I agree with the assessment, plan and the diagnosis of    Diagnosis Orders   1.  Type 2 diabetes mellitus with diabetic polyneuropathy, with long-term current use of insulin ([facility] Microalbumin, Ur    blood glucose test strips (FREESTYLE LITE) strip    FreeStyle Lancets MISC    Referral - Diabetes ([facility] - Clinical Pharmacy)   2. Encounter for screening mammogram for malignant neoplasm of breast  [facility] DIGITAL SCREEN W OR WO CAD BILATERAL   3. Inflammation of blood vessels (HCC)  RUBIN Screen With Reflex    C4 Complement    Anti-Neutrophilic Cytoplasmic Antibody    [facility] - Mady Ruiz MD, Rheumatology, [place]    CBC With Auto Differential   4. Elevated alkaline phosphatase level  Comprehensive Metabolic Panel   5. Primary insomnia  Melatonin 3 MG CAPS    . I agree with orders as documented by the resident. More than 25 minutes spent  in face to face encounter with the patient and more than half in counseling. Patient's questions were answered. Patient Voiced understanding to the counseling. Return in about 3 weeks (around 2/9/2022), or follow up.    (GC Modifier)-Dr. Karan Bradford MD

## 2022-01-20 ENCOUNTER — TELEPHONE (OUTPATIENT)
Dept: PHARMACY | Facility: CLINIC | Age: 52
End: 2022-01-20

## 2022-01-20 LAB
ANCA MYELOPEROXIDASE: <0.3 AU/ML (ref 0–3.5)
ANCA PROTEINASE 3: <0.7 AU/ML (ref 0–2)
ANTI DNA DOUBLE STRANDED: <0.5 IU/ML
ANTI-NUCLEAR ANTIBODY (ANA): NEGATIVE
ENA ANTIBODIES SCREEN: 0.2 U/ML

## 2022-01-20 NOTE — TELEPHONE ENCOUNTER
Received a referral:from Provider to review patients medications. Called patient to schedule a time to speak with a pharmacist over the telephone. Spoke to patient and advised them of the above message. Patient verified understanding and scheduled their appointment: 1/24/22 at 2:30    For Sumanth Feliciano in place: Yes   Recommendation Provided To: Patient/Caregiver: 1 via Telephone   Intervention Detail: Scheduled Appointment   Gap Closed?: Yes    Intervention Accepted By: Patient/Caregiver: 1   Time Spent (min): 10      [address] [facility], [place],[address]    [address] [facility] free: [phone]

## 2022-01-21 ENCOUNTER — TELEPHONE (OUTPATIENT)
Dept: FAMILY MEDICINE CLINIC | Age: 52
End: 2022-01-21

## 2022-01-21 NOTE — TELEPHONE ENCOUNTER
----- Message from Kit Su sent at 1/21/2022 12:19 PM EST -----  Subject: Message to Provider    QUESTIONS  Information for Provider? patient was given the wrong prescription for her   glucose monitor. she no longer uses the freestyle, she uses the One Touch   Ultra. she needs test strips and lancets. Please call patient and advise,   she only has enough for tomorrow  ---------------------------------------------------------------------------  --------------  Rod MORRIS  What is the best way for the office to contact you? OK to leave message on   voicemail  Preferred Call Back Phone Number? [phone]  ---------------------------------------------------------------------------  --------------  SCRIPT ANSWERS  Relationship to Patient?  Self

## 2022-01-24 ENCOUNTER — TELEPHONE (OUTPATIENT)
Dept: FAMILY MEDICINE CLINIC | Age: 52
End: 2022-01-24

## 2022-01-24 ENCOUNTER — SCHEDULED TELEPHONE ENCOUNTER (OUTPATIENT)
Dept: PHARMACY | Facility: CLINIC | Age: 52
End: 2022-01-24

## 2022-01-24 RX ORDER — LANCETS 33 GAUGE
EACH MISCELLANEOUS
Status: CANCELLED | OUTPATIENT
Start: 2022-01-24

## 2022-01-24 RX ORDER — BLOOD-GLUCOSE METER
1 EACH MISCELLANEOUS DAILY
Qty: 1 KIT | Refills: 0 | Status: SHIPPED | OUTPATIENT
Start: 2022-01-24

## 2022-01-24 NOTE — TELEPHONE ENCOUNTER
Patient called office stating she received the wrong test strips and lancets for her diabetic testing supplies. Writer spoke with pharmacy, the wrong supplies were ordered. Patient will need a script for One Touch Verio Flex test strips, and One Touch Delica Lancets. Please review pended script to ensure correct and address.

## 2022-01-24 NOTE — TELEPHONE ENCOUNTER
[facility] CLINICAL PHARMACY NOTE: REFERRAL  Patient outreach to review medications - Spoke with patient. SUBJECTIVE/OBJECTIVE:   Marla Lozada is a 45 y.o. female referred to a clinical pharmacy specialist by provider for medication review. Medications:  Current Outpatient Medications   Medication Sig Dispense Refill    aluminum hydroxide-magnesium carbonate (GENATON)  MG/15ML suspension Take 15 mLs by mouth 4 times daily as needed for Indigestion      gabapentin (NEURONTIN) 600 MG tablet Take by mouth three times daily (900mg in AM and midday, 1200mg at night; patient has 300mg and 600mg capsules on hand)      insulin glargine (LANTUS SOLOSTAR) 100 UNIT/ML injection pen Inject 15 Units into the skin daily      ibuprofen (ADVIL;MOTRIN) 400 MG tablet Take 2 tablets by mouth every 8 hours as needed for Pain 30 tablet 0    lovastatin (MEVACOR) 40 MG tablet Take 1 tablet by mouth nightly 30 tablet 2    traZODone (DESYREL) 150 MG tablet Take 1 tablet by mouth nightly 30 tablet 2    Insulin Pen Needle 30G X 8 MM MISC 1 each by Does not apply route daily 100 each 3    Melatonin 3 MG CAPS Take 3 mg by mouth at bedtime 30 capsule 0    Multiple Vitamins-Minerals (MULTIVITAMIN WITH MINERALS) tablet Take 1 tablet by mouth daily 30 tablet 3    famotidine (PEPCID) 20 MG tablet Take 1 tablet by mouth 2 times daily 60 tablet 0    ondansetron (ZOFRAN ODT) 4 MG disintegrating tablet Take 1 tablet by mouth every 8 hours as needed for Nausea or Vomiting 20 tablet 0    gabapentin (NEURONTIN) 300 MG capsule Take by mouth three times daily (900mg in AM and midday, 1200mg at night; patient has 300mg and 600mg capsules on hand)      pantoprazole (PROTONIX) 40 MG tablet Take 1 tablet by mouth every morning (before breakfast) 30 tablet 5    blood glucose test strips (ASCENSIA AUTODISC VI;ONE TOUCH ULTRA TEST VI) strip 1 each by In Vitro route daily As needed.  (Patient not taking: Reported on 2/3/2022) 100 each 3    Blood Glucose Monitoring Suppl (ONE TOUCH ULTRA 2) w/Device KIT 1 kit by Does not apply route daily (Patient not taking: Reported on 2/3/2022) 1 kit 0    Lancets 30G MISC 1 each by Does not apply route daily (Patient not taking: Reported on 2/3/2022) 100 each 3    FreeStyle Lancets MISC 1 each by Does not apply route daily (Patient not taking: Reported on 2/3/2022) 100 each 11    glucose monitoring (FREESTYLE) kit 4 times daily (Patient not taking: Reported on 1/19/2022) 1 kit 0     No current facility-administered medications for this visit. No Known Allergies    Pertinent Labs/Vitals:  BP Readings from Last 3 Encounters:   01/19/22 128/83   01/03/22 (!) 98/54   12/27/21 (!) 150/68     Microalbumin/Creatinine Ratio   Component Value Date    LABMICR 147 (H) 02/21/2019     Hemoglobin A1c   Component Value Date    LABA1C 6.5 (H) 12/27/2021    LABA1C 10.0 02/18/2020    LABA1C 11.1 05/20/2019     Lipid Panel   Component Value Date    CHOL 130 12/26/2021    TRIG 129 12/26/2021    HDL 24 (L) 12/26/2021    LDLCHOLESTEROL 80 12/26/2021     ALT   Date Value Ref Range Status   01/19/2022 20 5 - 33 U/L Final     AST   Date Value Ref Range Status   01/19/2022 18 <32 U/L Final     The ASCVD Risk score (Parris Finnegan, et al., 2013) failed to calculate for the following reasons:    Unable to determine if patient is Non-      Renal Function   Component Value Date    GFR >60 01/19/2022   Estimated Creatinine Clearance: 92 mL/min (based on SCr of 0.61 mg/dL).      Social History:   Tobacco Use    Smoking status: Current Every Day Smoker     Packs/day: 0.50     Years: 30.00     Pack years: 15.00     Types: Cigarettes    Smokeless tobacco: Never Used    Tobacco comment: 0-6 cigarettes per day   Substance Use Topics    Alcohol use: Yes     Comment: Rare     Immunizations:   Administered Date(s) Administered    Influenza, Quadv, IM, (6 mo and older Fluzone, Flulaval, Fluarix and 3 yrs and older Afluria) 09/21/2018    Influenza, Quadv, IM, PF (6 mo and older Fluzone, Flulaval, Fluarix, and 3 yrs and older Afluria) 02/18/2020    Pneumococcal Polysaccharide ([id number]) 09/21/2018    Tdap (Boostrix, Adacel) 09/21/2018     ASSESSMENT/PLAN:   - General Assessment:   · Adherence: reports no concerns  · Cost: no concerns as ins covers meds well  · Drug interactions: No clinically significant interactions identified via [employer] Interaction Analysis as category D or higher. · Renal dosing: No renal adjustments necessary. · Consider checking MCR again and start ACEi/ARB if needed.    · Other: needs ibuprofen refill - advised to request at pharmacy    - Diabetes:    Glycemic goal: <7.0%, is not at blood glucose goal   Current symptoms/problems: fibromyalgia/pain (she states not related to DM)   Home blood sugar records: 219 today, previously 300s   Any episodes of hypoglycemia: no, discussed treatment   Current testing supplies/frequency: 2-3x/day - reports needed One Touch Verio Flex test strips to match her meter, advised to call pharmacy to see if they could swap or request specific test strips   Appropriateness of insulin therapy: adjusting basal insulin dose to meet goals    - Other medication-related opportunities:   If prescribed statin, adherence: appears adherent at end of 2021   If DM or ASCVD hx, taking moderate-intensity statin: Yes    - Upcoming appointments:   Date Time Provider Alice Covington   1/26/2022  1:30 PM STA SCR MAMMO RM 2 STAZ MAMMO STA Radiolog   2/10/2022  2:15 PM MD Aylin Gutierrez  [facility]   2/11/2022 10:30 AM Abigail Larson MD [facility] Gina, PharmD, BCACP, [facility]  Department, toll free: [phone], option 1    =======================================================    For Pharmacy Admin Tracking Only   Recommendation Provided To: Patient/Caregiver: 1 via Telephone   Intervention Detail: Refill(s) Provided   Gap Closed?: Yes    Intervention Accepted By: Patient/Caregiver: 1   Time Spent (min): 60

## 2022-01-26 ENCOUNTER — HOSPITAL ENCOUNTER (OUTPATIENT)
Dept: MAMMOGRAPHY | Age: 52
Discharge: HOME | End: 2022-01-28
Payer: MEDICAID

## 2022-01-26 DIAGNOSIS — Z12.31 SCREENING MAMMOGRAM FOR BREAST CANCER: ICD-10-CM

## 2022-01-26 PROCEDURE — 77063 BREAST TOMOSYNTHESIS BI: CPT

## 2022-01-27 RX ORDER — LOVASTATIN 40 MG/1
40 TABLET ORAL NIGHTLY
Qty: 30 TABLET | Refills: 2 | Status: SHIPPED | OUTPATIENT
Start: 2022-01-27 | End: 2022-10-05 | Stop reason: SDUPTHER

## 2022-01-27 RX ORDER — IBUPROFEN 400 MG/1
800 TABLET ORAL EVERY 8 HOURS PRN
Qty: 30 TABLET | Refills: 0 | Status: ON HOLD | OUTPATIENT
Start: 2022-01-27 | End: 2022-10-15 | Stop reason: HOSPADM

## 2022-01-27 NOTE — TELEPHONE ENCOUNTER
Patient said cant use the ultra test strips would need to be Velrio test strips, please call into pharmacy. Please address the medication refill and close the encounter. If I can be of assistance, please route to the applicable pool. Thank you. Last visit: 1-19-22  Last Med refill: 11/2/21  Does patient have enough medication for 72 hours: No:     Next Visit Date:  Future Appointments   Date Time Provider Alice Covington   2/10/2022  2:15 PM MD Aylin Torres  [facility]   2/11/2022 10:30 AM Mely Sims MD [facility] Maintenance   Topic Date Due    COVID-19 Vaccine (1) Never done    Diabetic retinal exam  Never done    Hepatitis B vaccine (1 of 3 - Risk 3-dose series) Never done    Cervical cancer screen  Never done    Shingles Vaccine (1 of 2) Never done    Diabetic microalbuminuria test  02/21/2020    Diabetic foot exam  05/20/2020    Flu vaccine (1) 09/01/2021    Lipid screen  12/26/2022    A1C test (Diabetic or Prediabetic)  12/27/2022    Depression Monitoring  01/19/2023    Breast cancer screen  01/26/2024    DTaP/Tdap/Td vaccine (2 - Td or Tdap) 09/21/2028    Colon cancer screen colonoscopy  09/16/2029    Pneumococcal 0-64 years Vaccine (2 of 2 - PPSV23) 08/13/2034    Hepatitis C screen  Completed    HIV screen  Completed    Hepatitis A vaccine  Aged Out    Hib vaccine  Aged Out    Meningococcal (ACWY) vaccine  Aged Out       Hemoglobin A1C (%)   Date Value   12/27/2021 6.5 (H)   02/18/2020 10.0   05/20/2019 11.1             ( goal A1C is < 7)   Microalb/Crt.  Ratio (mcg/mg creat)   Date Value   02/21/2019 147 (H)     LDL Cholesterol (mg/dL)   Date Value   12/26/2021 80   02/16/2019            (goal LDL is <100)   AST (U/L)   Date Value   01/19/2022 18     ALT (U/L)   Date Value   01/19/2022 20     BUN (mg/dL)   Date Value   01/19/2022 17     BP Readings from Last 3 Encounters:   01/19/22 128/83   01/03/22 (!) 98/54   12/27/21 (!) 150/68 (goal 120/80)    All Future Testing planned in [facility]  Lab Frequency Next Occurrence   CT ABDOMEN PELVIS W IV CONTRAST Additional Contrast? Radiologist Recommendation Once 12/03/2021   Hemoglobin A1C Once 12/10/2022   Lipid Panel Once 06/10/2022   Microalbumin / Creatinine Urine Ratio Once 06/10/2022   [facility] DIGITAL SCREEN W OR WO CAD BILATERAL Once 01/19/2022   Microalbumin, Ur Once 01/19/2023               Patient Active Problem List:     Type 2 diabetes mellitus with diabetic polyneuropathy, with long-term current use of insulin (HCC)     Depression     GERD with esophagitis     Type 2 diabetes mellitus with hyperglycemia, with long-term current use of insulin (HCC)     Bipolar disorder, mixed (HCC)     Cocaine abuse ([address]     Pneumonia due to infectious organism     Acute cystitis without hematuria     Tobacco use     Sepsis due to other etiology ([address]     High anion gap metabolic acidosis     Hyponatremia     Diabetic ketoacidosis without coma (HCC)     Respiratory alkalosis     Abdominal pain, epigastric     Acute on chronic pancreatitis (HCC)     Abdominal pain     Non-intractable vomiting     Vasculitis of mesenteric artery ([address]     History of cocaine use

## 2022-01-31 NOTE — TELEPHONE ENCOUNTER
Patient called and said again the wrong test strips were sent to the pharmacy. PLEASE send One Touch Verio Flex Test Strips and One Touch Delica Lancets.

## 2022-02-03 RX ORDER — INSULIN GLARGINE 100 [IU]/ML
15 INJECTION, SOLUTION SUBCUTANEOUS DAILY
COMMUNITY
End: 2022-02-10 | Stop reason: SDUPTHER

## 2022-02-03 RX ORDER — GABAPENTIN 600 MG/1
TABLET ORAL
Status: ON HOLD | COMMUNITY
End: 2022-10-15 | Stop reason: HOSPADM

## 2022-02-03 NOTE — TELEPHONE ENCOUNTER
Dr. Mitchel Rivas MD,     Medication reconciliation completed with patient. Patient requesting correct test strips and lancets for her current glucometer. Orders pended for your convenience. Last visit: 01/19/2022, Next visit: 02/10/2022    See encounter note(s) below for complete details. Please let me know if you have any questions. Thank you,  Jose Antonio Brewer PharmD, BCACP, [address], toll free: [phone], option 1    =======================================================    [facility] CLINICAL PH[facility]AC[facility] NOTE: Anjana Mondragon has been following to see if pharmacy able to get correct test strips. Called [facility] today and they still need correct prescriptions. Will route request to provider.      Jose Antonio Brewer PharmD, NANI, [address], toll free: [phone], option 1

## 2022-02-04 RX ORDER — LANCETS 30 GAUGE
EACH MISCELLANEOUS
Qty: 300 EACH | Refills: 3 | Status: SHIPPED | OUTPATIENT
Start: 2022-02-04 | End: 2022-10-05

## 2022-02-04 NOTE — TELEPHONE ENCOUNTER
[facility] CLINICAL PHARMACY NOTE: REFERRAL     Outreach to pharmacy - verified they received Rx, initially entered in system incorrectly but Esa Martinez states she will update and the Rx will be in about an hour for patient. Outreach to patient - explained the above to Edith Hay and apologized for delay in getting Rx. Patient appreciative getting filled now.  Provided team phone number in case she has issues or has questions in future.      Maricarmen Jones, PharmD, [facility]  Department, toll free: [phone], option 1    =======================================================    For Pharmacy Admin Tracking Only   Recommendation Provided To: Provider: 1 via Note to Provider   Intervention Detail: Refill(s) Provided   Gap Closed?: Yes    Intervention Accepted By: Provider: 1   Time Spent (min): 30

## 2022-02-04 NOTE — TELEPHONE ENCOUNTER
Dr. Shu Sandoval MD,     I saw you co-signed Dr. Tico Tapia note from this patient's last appointment on 01/19/2022. Patient has been trying to correct the correct test strips and lancets. Would you be able to send the prescriptions to their pharmacy? Orders pended for your convenience. Thank you!   Gee Castillo, PharmD, [facility], [facility]  Department, toll free: [phone], option 1

## 2022-02-10 DIAGNOSIS — E11.42 TYPE 2 DIABETES MELLITUS WITH DIABETIC POLYNEUROPATHY, WITH LONG-TERM CURRENT USE OF INSULIN (HCC): Primary | ICD-10-CM

## 2022-02-10 DIAGNOSIS — Z79.4 TYPE 2 DIABETES MELLITUS WITH DIABETIC POLYNEUROPATHY, WITH LONG-TERM CURRENT USE OF INSULIN (HCC): Primary | ICD-10-CM

## 2022-02-10 RX ORDER — INSULIN GLARGINE 100 [IU]/ML
10 INJECTION, SOLUTION SUBCUTANEOUS DAILY
Qty: 5 PEN | Refills: 1 | Status: SHIPPED | OUTPATIENT
Start: 2022-02-10 | End: 2022-10-05 | Stop reason: SDUPTHER

## 2022-02-10 RX ORDER — INSULIN GLARGINE 100 [IU]/ML
15 INJECTION, SOLUTION SUBCUTANEOUS DAILY
Qty: 5 PEN | OUTPATIENT
Start: 2022-02-10

## 2022-02-10 NOTE — TELEPHONE ENCOUNTER
Dr Audrey Jarquin never sent medication to the pharmacy when patient was seen on 1/19/2022 and according to her notes the patient should be taking 10 units of Lantus. Please send to the pharmacy.

## 2022-02-10 NOTE — TELEPHONE ENCOUNTER
Last visit: 1/19/2022  Last Med refill:   Does patient have enough medication for 72 hours: No:     Next Visit Date:  Future Appointments   Date Time Provider Alice Covington   2/17/2022  3:00 PM MD Aylin Sandoval [facility]   2/21/2022  1:45 PM [facility]   Topic Date Due    COVID-19 Vaccine (1) Never done    Diabetic retinal exam  Never done    Hepatitis B vaccine (1 of 3 - Risk 3-dose series) Never done    Cervical cancer screen  Never done    Shingles Vaccine (1 of 2) Never done    Diabetic microalbuminuria test  02/21/2020    Diabetic foot exam  05/20/2020    Flu vaccine (1) 09/01/2021    Lipid screen  12/26/2022    A1C test (Diabetic or Prediabetic)  12/27/2022    Depression Monitoring  01/19/2023    Breast cancer screen  01/26/2024    DTaP/Tdap/Td vaccine (2 - Td or Tdap) 09/21/2028    Colon cancer screen colonoscopy  09/16/2029    Pneumococcal 0-64 years Vaccine (2 of 2 - PPSV23) 08/13/2034    Hepatitis C screen  Completed    HIV screen  Completed    Hepatitis A vaccine  Aged Out    Hib vaccine  Aged Out    Meningococcal (ACWY) vaccine  Aged Out       Hemoglobin A1C (%)   Date Value   12/27/2021 6.5 (H)   02/18/2020 10.0   05/20/2019 11.1             ( goal A1C is < 7)   Microalb/Crt.  Ratio (mcg/mg creat)   Date Value   02/21/2019 147 (H)     LDL Cholesterol (mg/dL)   Date Value   12/26/2021 80   02/16/2019            (goal LDL is <100)   AST (U/L)   Date Value   01/19/2022 18     ALT (U/L)   Date Value   01/19/2022 20     BUN (mg/dL)   Date Value   01/19/2022 17     BP Readings from Last 3 Encounters:   01/19/22 128/83   01/03/22 (!) 98/54   12/27/21 (!) 150/68          (goal 120/80)    All Future Testing planned in [facility]  Lab Frequency Next Occurrence   CT ABDOMEN PELVIS W IV CONTRAST Additional Contrast? Radiologist Recommendation Once 12/03/2021   Hemoglobin A1C Once 12/10/2022   Lipid Panel Once 06/10/2022 Microalbumin / Creatinine Urine Ratio Once 06/10/2022   NAT[facility]AD DIGITAL SCREEN W OR WO CAD BILATERAL Once 03/19/2023   Microalbumin, Ur Once 01/19/2023               Patient Active Problem List:     Type 2 diabetes mellitus with diabetic polyneuropathy, with long-term current use of insulin (HCC)     Depression     GERD with esophagitis     Type 2 diabetes mellitus with hyperglycemia, with long-term current use of insulin (HCC)     Bipolar disorder, mixed (HCC)     Cocaine abuse ([address]     Pneumonia due to infectious organism     Acute cystitis without hematuria     Tobacco use     Sepsis due to other etiology ([address]     High anion gap metabolic acidosis     Hyponatremia     Diabetic ketoacidosis without coma (HCC)     Respiratory alkalosis     Abdominal pain, epigastric     Acute on chronic pancreatitis (HCC)     Abdominal pain     Non-intractable vomiting     Vasculitis of mesenteric artery ([address]     History of cocaine use

## 2022-02-17 RX ORDER — LANCETS 33 GAUGE
EACH MISCELLANEOUS
OUTPATIENT
Start: 2022-02-17

## 2022-09-08 ENCOUNTER — TELEPHONE (OUTPATIENT)
Dept: FAMILY MEDICINE CLINIC | Age: 52
End: 2022-09-08

## 2022-09-08 NOTE — TELEPHONE ENCOUNTER
Left a message for patient to call office patient has not been seen since January, 2022 and is a candidate for CGM, please route call to me when patient calls office.

## 2022-10-05 ENCOUNTER — OFFICE VISIT (OUTPATIENT)
Dept: FAMILY MEDICINE CLINIC | Age: 52
End: 2022-10-05
Payer: MEDICAID

## 2022-10-05 VITALS
DIASTOLIC BLOOD PRESSURE: 74 MMHG | WEIGHT: 109 LBS | SYSTOLIC BLOOD PRESSURE: 129 MMHG | HEART RATE: 104 BPM | BODY MASS INDEX: 18.71 KG/M2

## 2022-10-05 DIAGNOSIS — Z79.4 TYPE 2 DIABETES MELLITUS WITH DIABETIC POLYNEUROPATHY, WITH LONG-TERM CURRENT USE OF INSULIN (HCC): Primary | ICD-10-CM

## 2022-10-05 DIAGNOSIS — E11.42 TYPE 2 DIABETES MELLITUS WITH DIABETIC POLYNEUROPATHY, WITH LONG-TERM CURRENT USE OF INSULIN (HCC): Primary | ICD-10-CM

## 2022-10-05 DIAGNOSIS — R53.83 OTHER FATIGUE: ICD-10-CM

## 2022-10-05 DIAGNOSIS — F51.01 PRIMARY INSOMNIA: ICD-10-CM

## 2022-10-05 LAB — HBA1C MFR BLD: 9.8 %

## 2022-10-05 PROCEDURE — 83036 HEMOGLOBIN GLYCOSYLATED A1C: CPT

## 2022-10-05 PROCEDURE — 99213 OFFICE O/P EST LOW 20 MIN: CPT

## 2022-10-05 PROCEDURE — G8484 FLU IMMUNIZE NO ADMIN: HCPCS

## 2022-10-05 PROCEDURE — 3046F HEMOGLOBIN A1C LEVEL >9.0%: CPT

## 2022-10-05 PROCEDURE — G8427 DOCREV CUR MEDS BY ELIG CLIN: HCPCS

## 2022-10-05 PROCEDURE — 3017F COLORECTAL CA SCREEN DOC REV: CPT

## 2022-10-05 PROCEDURE — 4004F PT TOBACCO SCREEN RCVD TLK: CPT

## 2022-10-05 PROCEDURE — G8420 CALC BMI NORM PARAMETERS: HCPCS

## 2022-10-05 PROCEDURE — 2022F DILAT RTA XM EVC RTNOPTHY: CPT

## 2022-10-05 RX ORDER — GABAPENTIN 300 MG/1
CAPSULE ORAL
Qty: 90 CAPSULE | Refills: 0 | Status: SHIPPED | OUTPATIENT
Start: 2022-10-05 | End: 2022-10-05

## 2022-10-05 RX ORDER — LANCETS 30 GAUGE
1 EACH MISCELLANEOUS DAILY
Qty: 100 EACH | Refills: 3 | Status: SHIPPED | OUTPATIENT
Start: 2022-10-05

## 2022-10-05 RX ORDER — TRAZODONE HYDROCHLORIDE 150 MG/1
150 TABLET ORAL NIGHTLY
Qty: 30 TABLET | Refills: 0 | Status: SHIPPED | OUTPATIENT
Start: 2022-10-05

## 2022-10-05 RX ORDER — INSULIN GLARGINE 100 [IU]/ML
10 INJECTION, SOLUTION SUBCUTANEOUS NIGHTLY
Qty: 15 ML | Refills: 2 | Status: ON HOLD | OUTPATIENT
Start: 2022-10-05 | End: 2022-10-15 | Stop reason: HOSPADM

## 2022-10-05 RX ORDER — LOVASTATIN 40 MG/1
40 TABLET ORAL NIGHTLY
Qty: 30 TABLET | Refills: 2 | Status: SHIPPED | OUTPATIENT
Start: 2022-10-05

## 2022-10-05 RX ORDER — GLUCOSAMINE HCL/CHONDROITIN SU 500-400 MG
CAPSULE ORAL
Qty: 200 STRIP | Refills: 3 | Status: SHIPPED | OUTPATIENT
Start: 2022-10-05

## 2022-10-05 ASSESSMENT — ENCOUNTER SYMPTOMS
DIARRHEA: 0
VOMITING: 0
SHORTNESS OF BREATH: 0
ABDOMINAL PAIN: 0
CONSTIPATION: 0
NAUSEA: 0

## 2022-10-05 NOTE — PROGRESS NOTES
Subjective:    Latanya Graham is a 47 y.o. female with  has a past medical history of Depression, Diabetes mellitus ([facility] [address] Hyperlipidemia, Pancreatitis, Pneumonia due to infectious organism, and Substance abuse ([facility] [address] Presented to the office today for:  Chief Complaint   Patient presents with    Letter     Needs a certified letter to start Social Security back up      Diabetes       HPI    Patient is a 25-year-old female with history of type 2 diabetes with diabetic neuropathy, depression, bipolar disorder, history of substance abuse presented to the clinic today for Social Security letter and for routine follow-up. Patient has a history of polyneuropathy secondary to type 2 diabetes, follows up with podiatry. Patient has been going through a lot at home, son has recently passed away. Patient's last hemoglobin A1c was 6.5, was well controlled on insulin Lantus, today A1c is 9.8. Patient has run out of her medications. Patient has a past medical history significant of pancreatitis secondary to hypertriglyceridemia. Patient is still complaining of neuropathy in both feet. Has no other complaints. Review of Systems   Constitutional:  Negative for chills and fever. Respiratory:  Negative for shortness of breath. Cardiovascular:  Negative for chest pain. Gastrointestinal:  Negative for abdominal pain, constipation, diarrhea, nausea and vomiting. Neurological:  Positive for numbness (Bilateral numbness and tingling secondary to diabetic neuropathy). Negative for dizziness, light-headedness and headaches.                The patient has a   Family History   Problem Relation Age of Onset    Diabetes Mother     Coronary Art Dis Mother     Cancer Father         Colon    Diabetes Sister     Diabetes Brother     Bipolar Disorder Brother     Alcohol Abuse Brother     Substance Abuse Brother        Objective:    /74   Pulse (!) 104   Wt 109 lb (49.4 kg)   LMP 05/21/2018 (LMP Unknown)   BMI 18.71 kg/m²    BP Readings from Last 3 Encounters:   10/05/22 129/74   01/19/22 128/83   01/03/22 (!) 98/54       Physical Exam  Constitutional:       Appearance: Normal appearance. HENT:      Head: Normocephalic and atraumatic. Cardiovascular:      Rate and Rhythm: Normal rate and regular rhythm. Pulses: Normal pulses. Heart sounds: Normal heart sounds. No murmur heard. No friction rub. No gallop. Pulmonary:      Effort: Pulmonary effort is normal. No respiratory distress. Breath sounds: Normal breath sounds. No stridor. No wheezing, rhonchi or rales. Abdominal:      General: Abdomen is flat. There is no distension. Palpations: Abdomen is soft. There is no mass. Tenderness: There is no abdominal tenderness. Hernia: No hernia is present. Neurological:      Mental Status: She is alert. Sensory: Sensory deficit (Diabetic foot exam showed bilateral sensory loss in both feet) present. Lab Results   Component Value Date    WBC 6.2 01/19/2022    HGB 12.1 01/19/2022    HCT 38.7 01/19/2022     01/19/2022    CHOL 130 12/26/2021    TRIG 129 12/26/2021    HDL 24 (L) 12/26/2021    LDLDIRECT 91 02/16/2019    ALT 20 01/19/2022    AST 18 01/19/2022     01/19/2022    K 4.5 01/19/2022    CL 99 01/19/2022    CREATININE 0.61 01/19/2022    BUN 17 01/19/2022    CO2 26 01/19/2022    INR 1.0 11/16/2012    LABA1C 9.8 10/05/2022    LABMICR 147 (H) 02/21/2019     Lab Results   Component Value Date    CALCIUM 9.7 01/19/2022    PHOS 2.4 (L) 02/15/2017     Lab Results   Component Value Date    LDLCHOLESTEROL 80 12/26/2021    LDLDIRECT 91 02/16/2019       Assessment and Plan:    1. Type 2 diabetes mellitus with diabetic polyneuropathy, with long-term current use of insulin (HCC)    - POCT glycosylated hemoglobin (Hb A1C) 10.8, up from 6.5  - Lipid Panel; Future  - lovastatin (MEVACOR) 40 MG tablet; Take 1 tablet by mouth nightly  Dispense: 30 tablet;  Refill: 2  - [facility] - Melissa Polo, MD, Ophthalmology, [address]  - gabapentin (NEURONTIN) 300 MG capsule; Take by mouth three times daily (900mg in AM and midday, 1200mg at night; patient has 300mg and 600mg capsules on hand)  Dispense: 90 capsule; Refill: 0  - blood glucose monitor kit and supplies; Dispense sufficient amount for indicated testing frequency plus additional to accommodate PRN testing needs. Dispense all needed supplies to include: monitor, strips, lancing device, lancets, control solutions, alcohol swabs. Dispense: 1 kit; Refill: 0  - Alcohol prep pad  - Lancets MISC; 1 each by Does not apply route daily  Dispense: 100 each; Refill: 3  - blood glucose monitor strips; Test 4 times a day & as needed for symptoms of irregular blood glucose. Dispense sufficient amount for indicated testing frequency plus additional to accommodate PRN testing needs. Dispense: 200 strip; Refill: 3  - empagliflozin (JARDIANCE) 10 MG tablet; Take 1 tablet by mouth daily  Dispense: 90 tablet; Refill: 1  - insulin glargine (LANTUS SOLOSTAR) 100 UNIT/ML injection pen; Inject 10 Units into the skin nightly  Dispense: 15 mL; Refill: 2  -Due to patient's weight, will avoid metformin because she is very skinny and frail  -We will avoid GLP and DPP 4 given patient history of pancreatitis  -Follow-up in 2 weeks, at that time we will review patient's glucose log and will titrate insulin levels based on the log      2. Primary insomnia    - Melatonin 3 MG CAPS; Take 3 mg by mouth at bedtime  Dispense: 30 capsule; Refill: 0  - traZODone (DESYREL) 150 MG tablet; Take 1 tablet by mouth nightly  Dispense: 30 tablet; Refill: 0    3. Other fatigue    - TSH; Future  - Vitamin D 25 Hydroxy;  Future          Requested Prescriptions     Signed Prescriptions Disp Refills    lovastatin (MEVACOR) 40 MG tablet 30 tablet 2     Sig: Take 1 tablet by mouth nightly    Melatonin 3 MG CAPS 30 capsule 0     Sig: Take 3 mg by mouth at bedtime    traZODone (DESYREL) 150 MG tablet 30 tablet 0     Sig: Take 1 tablet by mouth nightly    gabapentin (NEURONTIN) 300 MG capsule 90 capsule 0     Sig: Take by mouth three times daily (900mg in AM and midday, 1200mg at night; patient has 300mg and 600mg capsules on hand)    blood glucose monitor kit and supplies 1 kit 0     Sig: Dispense sufficient amount for indicated testing frequency plus additional to accommodate PRN testing needs. Dispense all needed supplies to include: monitor, strips, lancing device, lancets, control solutions, alcohol swabs. Lancets MISC 100 each 3     Si each by Does not apply route daily    blood glucose monitor strips 200 strip 3     Sig: Test 4 times a day & as needed for symptoms of irregular blood glucose. Dispense sufficient amount for indicated testing frequency plus additional to accommodate PRN testing needs. empagliflozin (JARDIANCE) 10 MG tablet 90 tablet 1     Sig: Take 1 tablet by mouth daily    insulin glargine (LANTUS SOLOSTAR) 100 UNIT/ML injection pen 15 mL 2     Sig: Inject 10 Units into the skin nightly       Medications Discontinued During This Encounter   Medication Reason    Lancets MISC LIST CLEANUP    blood glucose test strips (ASCENSIA AUTODISC VI;ONE TOUCH ULTRA TEST VI) strip LIST CLEANUP    blood glucose test strips (ASCENSIA AUTODISC VI;ONE TOUCH ULTRA TEST VI) strip LIST CLEANUP    gabapentin (NEURONTIN) 300 MG capsule REORDER    traZODone (DESYREL) 150 MG tablet REORDER    Melatonin 3 MG CAPS REORDER    lovastatin (MEVACOR) 40 MG tablet REORDER    insulin glargine (LANTUS SOLOSTAR) 100 UNIT/ML injection pen REORDER       Fang received counseling on the following healthy behaviors: nutrition, exercise and medication adherence    Discussed use,benefit, and side effects of prescribed medications. Barriers to medication compliance addressed. All patient questions answered. Pt voiced understanding.      Return in about 2 weeks (around 10/19/2022) for pap smear, lab follow up. Disclaimer: Some orall of this note was transcribed using voice-recognition software. This may cause typographical errors occasionally. Although all effort is made to fix these errors, please do not hesitate to contact our office if there Brian Martinez concern with the understanding of this note.

## 2022-10-05 NOTE — LETTER
[id number]  [address]  [place] [facility] [address]  Phone: [phone]  Fax: [phone]    Pino Guerra MD        October 5, 2022     Patient: Clara Brantley   YOB: 1970   Date of Visit: 10/5/2022       To Whom It May Concern:     Jose Palacios suffers from neuropapthy of her legs and feet secondary to her diabetes, which limits her ability to work and stand on her feet for long periods of time. If you have any questions or concerns, please don't hesitate to call.     Sincerely,        Pino Guerra MD

## 2022-10-09 ENCOUNTER — APPOINTMENT (OUTPATIENT)
Dept: GENERAL RADIOLOGY | Age: 52
DRG: 420 | End: 2022-10-09
Payer: MEDICAID

## 2022-10-09 ENCOUNTER — HOSPITAL ENCOUNTER (INPATIENT)
Age: 52
LOS: 6 days | Discharge: HOME | DRG: 420 | End: 2022-10-15
Attending: EMERGENCY MEDICINE | Admitting: FAMILY MEDICINE
Payer: MEDICAID

## 2022-10-09 DIAGNOSIS — N30.00 ACUTE CYSTITIS WITHOUT HEMATURIA: Primary | ICD-10-CM

## 2022-10-09 PROBLEM — E78.49 OTHER HYPERLIPIDEMIA: Status: ACTIVE | Noted: 2022-10-09

## 2022-10-09 PROBLEM — R73.9 HYPERGLYCEMIA: Status: ACTIVE | Noted: 2022-10-09

## 2022-10-09 PROBLEM — E11.40 NEUROPATHY DUE TO TYPE 2 DIABETES MELLITUS (HCC): Status: ACTIVE | Noted: 2022-10-09

## 2022-10-09 LAB
ALBUMIN SERPL-MCNC: 3.9 G/DL (ref 3.5–5.2)
ALBUMIN/GLOBULIN RATIO: 1.4 (ref 1–2.5)
ALP BLD-CCNC: 153 U/L (ref 35–104)
ALT SERPL-CCNC: 9 U/L (ref 5–33)
ANION GAP SERPL CALCULATED.3IONS-SCNC: 11 MMOL/L (ref 9–17)
AST SERPL-CCNC: 10 U/L
BACTERIA: ABNORMAL
BETA-HYDROXYBUTYRATE: 0.1 MMOL/L (ref 0.02–0.27)
BILIRUB SERPL-MCNC: <0.1 MG/DL (ref 0.3–1.2)
BILIRUBIN URINE: NEGATIVE
BUN BLDV-MCNC: 30 MG/DL (ref 6–20)
CALCIUM SERPL-MCNC: 9.2 MG/DL (ref 8.6–10.4)
CHLORIDE BLD-SCNC: 100 MMOL/L (ref 98–107)
CHOLESTEROL/HDL RATIO: 6.3
CHOLESTEROL: 120 MG/DL
CHP ED QC CHECK: YES
CO2: 23 MMOL/L (ref 20–31)
COLOR: YELLOW
CREAT SERPL-MCNC: 0.66 MG/DL (ref 0.5–0.9)
EPITHELIAL CELLS UA: ABNORMAL /HPF (ref 0–5)
GFR SERPL CREATININE-BSD FRML MDRD: >60 ML/MIN/1.73M2
GLUCOSE BLD-MCNC: 321 MG/DL (ref 65–105)
GLUCOSE BLD-MCNC: 347 MG/DL
GLUCOSE BLD-MCNC: 347 MG/DL (ref 65–105)
GLUCOSE BLD-MCNC: 409 MG/DL (ref 70–99)
GLUCOSE BLD-MCNC: 430 MG/DL (ref 65–105)
GLUCOSE BLD-MCNC: 431 MG/DL (ref 65–105)
GLUCOSE URINE: ABNORMAL
HCT VFR BLD CALC: 33.1 % (ref 36.3–47.1)
HDLC SERPL-MCNC: 19 MG/DL
HEMOGLOBIN: 11.5 G/DL (ref 11.9–15.1)
KETONES, URINE: NEGATIVE
LDL CHOLESTEROL: 29 MG/DL (ref 0–130)
LEUKOCYTE ESTERASE, URINE: NEGATIVE
LIPASE: 70 U/L (ref 13–60)
MAGNESIUM: 1.8 MG/DL (ref 1.6–2.6)
MCH RBC QN AUTO: 29.6 PG (ref 25.2–33.5)
MCHC RBC AUTO-ENTMCNC: 34.7 G/DL (ref 28.4–34.8)
MCV RBC AUTO: 85.1 FL (ref 82.6–102.9)
NITRITE, URINE: POSITIVE
NRBC AUTOMATED: 0 PER 100 WBC
PDW BLD-RTO: 12.7 % (ref 11.8–14.4)
PH UA: 5.5 (ref 5–8)
PLATELET # BLD: 216 K/UL (ref 138–453)
PMV BLD AUTO: 9.6 FL (ref 8.1–13.5)
POTASSIUM SERPL-SCNC: 4.3 MMOL/L (ref 3.7–5.3)
PROTEIN UA: ABNORMAL
RBC # BLD: 3.89 M/UL (ref 3.95–5.11)
RBC UA: ABNORMAL /HPF (ref 0–4)
SERUM OSMOLALITY: 315 MOSM/KG (ref 275–295)
SODIUM BLD-SCNC: 134 MMOL/L (ref 135–144)
SPECIFIC GRAVITY UA: 1.02 (ref 1–1.03)
TOTAL PROTEIN: 6.7 G/DL (ref 6.4–8.3)
TRIGL SERPL-MCNC: 360 MG/DL
TROPONIN, HIGH SENSITIVITY: 9 NG/L (ref 0–14)
TROPONIN, HIGH SENSITIVITY: 9 NG/L (ref 0–14)
TURBIDITY: CLEAR
URINE HGB: NEGATIVE
UROBILINOGEN, URINE: NORMAL
WBC # BLD: 8 K/UL (ref 3.5–11.3)
WBC UA: ABNORMAL /HPF (ref 0–5)

## 2022-10-09 PROCEDURE — 99285 EMERGENCY DEPT VISIT HI MDM: CPT

## 2022-10-09 PROCEDURE — 1200000000 HC SEMI PRIVATE

## 2022-10-09 PROCEDURE — 80061 LIPID PANEL: CPT

## 2022-10-09 PROCEDURE — 82010 KETONE BODYS QUAN: CPT

## 2022-10-09 PROCEDURE — 81001 URINALYSIS AUTO W/SCOPE: CPT

## 2022-10-09 PROCEDURE — 96365 THER/PROPH/DIAG IV INF INIT: CPT

## 2022-10-09 PROCEDURE — 80307 DRUG TEST PRSMV CHEM ANLYZR: CPT

## 2022-10-09 PROCEDURE — 83735 ASSAY OF MAGNESIUM: CPT

## 2022-10-09 PROCEDURE — 6360000002 HC RX W HCPCS

## 2022-10-09 PROCEDURE — 96361 HYDRATE IV INFUSION ADD-ON: CPT

## 2022-10-09 PROCEDURE — 87086 URINE CULTURE/COLONY COUNT: CPT

## 2022-10-09 PROCEDURE — 2580000003 HC RX 258: Performed by: EMERGENCY MEDICINE

## 2022-10-09 PROCEDURE — 87077 CULTURE AEROBIC IDENTIFY: CPT

## 2022-10-09 PROCEDURE — 71045 X-RAY EXAM CHEST 1 VIEW: CPT

## 2022-10-09 PROCEDURE — 87186 SC STD MICRODIL/AGAR DIL: CPT

## 2022-10-09 PROCEDURE — 83690 ASSAY OF LIPASE: CPT

## 2022-10-09 PROCEDURE — 96375 TX/PRO/DX INJ NEW DRUG ADDON: CPT

## 2022-10-09 PROCEDURE — 99222 1ST HOSP IP/OBS MODERATE 55: CPT | Performed by: FAMILY MEDICINE

## 2022-10-09 PROCEDURE — 82947 ASSAY GLUCOSE BLOOD QUANT: CPT

## 2022-10-09 PROCEDURE — 83930 ASSAY OF BLOOD OSMOLALITY: CPT

## 2022-10-09 PROCEDURE — 96376 TX/PRO/DX INJ SAME DRUG ADON: CPT

## 2022-10-09 PROCEDURE — 85027 COMPLETE CBC AUTOMATED: CPT

## 2022-10-09 PROCEDURE — 6370000000 HC RX 637 (ALT 250 FOR IP)

## 2022-10-09 PROCEDURE — 6360000002 HC RX W HCPCS: Performed by: EMERGENCY MEDICINE

## 2022-10-09 PROCEDURE — 84484 ASSAY OF TROPONIN QUANT: CPT

## 2022-10-09 PROCEDURE — 80053 COMPREHEN METABOLIC PANEL: CPT

## 2022-10-09 PROCEDURE — 36415 COLL VENOUS BLD VENIPUNCTURE: CPT

## 2022-10-09 PROCEDURE — 2580000003 HC RX 258

## 2022-10-09 PROCEDURE — 93005 ELECTROCARDIOGRAM TRACING: CPT | Performed by: EMERGENCY MEDICINE

## 2022-10-09 PROCEDURE — 6370000000 HC RX 637 (ALT 250 FOR IP): Performed by: STUDENT IN AN ORGANIZED HEALTH CARE EDUCATION/TRAINING PROGRAM

## 2022-10-09 RX ORDER — SODIUM CHLORIDE 0.9 % (FLUSH) 0.9 %
5-40 SYRINGE (ML) INJECTION EVERY 12 HOURS SCHEDULED
Status: DISCONTINUED | OUTPATIENT
Start: 2022-10-09 | End: 2022-10-15 | Stop reason: HOSPADM

## 2022-10-09 RX ORDER — INSULIN LISPRO 100 [IU]/ML
0-4 INJECTION, SOLUTION INTRAVENOUS; SUBCUTANEOUS NIGHTLY
Status: DISCONTINUED | OUTPATIENT
Start: 2022-10-09 | End: 2022-10-09

## 2022-10-09 RX ORDER — 0.9 % SODIUM CHLORIDE 0.9 %
500 INTRAVENOUS SOLUTION INTRAVENOUS ONCE
Status: COMPLETED | OUTPATIENT
Start: 2022-10-09 | End: 2022-10-09

## 2022-10-09 RX ORDER — ONDANSETRON 4 MG/1
4 TABLET, ORALLY DISINTEGRATING ORAL EVERY 8 HOURS PRN
Status: DISCONTINUED | OUTPATIENT
Start: 2022-10-09 | End: 2022-10-15 | Stop reason: HOSPADM

## 2022-10-09 RX ORDER — ATORVASTATIN CALCIUM 20 MG/1
20 TABLET, FILM COATED ORAL NIGHTLY
Status: DISCONTINUED | OUTPATIENT
Start: 2022-10-09 | End: 2022-10-14

## 2022-10-09 RX ORDER — ACETAMINOPHEN 650 MG/1
650 SUPPOSITORY RECTAL EVERY 6 HOURS PRN
Status: DISCONTINUED | OUTPATIENT
Start: 2022-10-09 | End: 2022-10-15 | Stop reason: HOSPADM

## 2022-10-09 RX ORDER — ONDANSETRON 2 MG/ML
4 INJECTION INTRAMUSCULAR; INTRAVENOUS EVERY 6 HOURS PRN
Status: DISCONTINUED | OUTPATIENT
Start: 2022-10-09 | End: 2022-10-15 | Stop reason: HOSPADM

## 2022-10-09 RX ORDER — ACETAMINOPHEN 325 MG/1
650 TABLET ORAL EVERY 6 HOURS PRN
Status: DISCONTINUED | OUTPATIENT
Start: 2022-10-09 | End: 2022-10-15 | Stop reason: HOSPADM

## 2022-10-09 RX ORDER — ENOXAPARIN SODIUM 100 MG/ML
40 INJECTION SUBCUTANEOUS DAILY
Status: DISCONTINUED | OUTPATIENT
Start: 2022-10-09 | End: 2022-10-11

## 2022-10-09 RX ORDER — GABAPENTIN 300 MG/1
900 CAPSULE ORAL 3 TIMES DAILY
Status: DISCONTINUED | OUTPATIENT
Start: 2022-10-09 | End: 2022-10-15 | Stop reason: HOSPADM

## 2022-10-09 RX ORDER — SODIUM CHLORIDE 0.9 % (FLUSH) 0.9 %
5-40 SYRINGE (ML) INJECTION PRN
Status: DISCONTINUED | OUTPATIENT
Start: 2022-10-09 | End: 2022-10-15 | Stop reason: HOSPADM

## 2022-10-09 RX ORDER — GABAPENTIN 300 MG/1
300 CAPSULE ORAL 3 TIMES DAILY
Status: DISCONTINUED | OUTPATIENT
Start: 2022-10-09 | End: 2022-10-09

## 2022-10-09 RX ORDER — ONDANSETRON 2 MG/ML
4 INJECTION INTRAMUSCULAR; INTRAVENOUS ONCE
Status: COMPLETED | OUTPATIENT
Start: 2022-10-09 | End: 2022-10-09

## 2022-10-09 RX ORDER — SODIUM CHLORIDE 9 MG/ML
INJECTION, SOLUTION INTRAVENOUS CONTINUOUS
Status: DISCONTINUED | OUTPATIENT
Start: 2022-10-09 | End: 2022-10-12

## 2022-10-09 RX ORDER — DEXTROSE MONOHYDRATE 100 MG/ML
INJECTION, SOLUTION INTRAVENOUS CONTINUOUS PRN
Status: DISCONTINUED | OUTPATIENT
Start: 2022-10-09 | End: 2022-10-15 | Stop reason: HOSPADM

## 2022-10-09 RX ORDER — SODIUM CHLORIDE 9 MG/ML
INJECTION, SOLUTION INTRAVENOUS PRN
Status: DISCONTINUED | OUTPATIENT
Start: 2022-10-09 | End: 2022-10-15 | Stop reason: HOSPADM

## 2022-10-09 RX ORDER — INSULIN GLARGINE 100 [IU]/ML
10 INJECTION, SOLUTION SUBCUTANEOUS NIGHTLY
Status: DISCONTINUED | OUTPATIENT
Start: 2022-10-09 | End: 2022-10-10

## 2022-10-09 RX ORDER — GABAPENTIN 300 MG/1
900 CAPSULE ORAL 3 TIMES DAILY
Status: CANCELLED | OUTPATIENT
Start: 2022-10-10

## 2022-10-09 RX ORDER — INSULIN LISPRO 100 [IU]/ML
8 INJECTION, SOLUTION INTRAVENOUS; SUBCUTANEOUS ONCE
Status: COMPLETED | OUTPATIENT
Start: 2022-10-10 | End: 2022-10-09

## 2022-10-09 RX ORDER — INSULIN LISPRO 100 [IU]/ML
0-4 INJECTION, SOLUTION INTRAVENOUS; SUBCUTANEOUS
Status: DISCONTINUED | OUTPATIENT
Start: 2022-10-09 | End: 2022-10-09

## 2022-10-09 RX ORDER — INSULIN LISPRO 100 [IU]/ML
0-8 INJECTION, SOLUTION INTRAVENOUS; SUBCUTANEOUS
Status: DISCONTINUED | OUTPATIENT
Start: 2022-10-10 | End: 2022-10-10

## 2022-10-09 RX ORDER — LANOLIN ALCOHOL/MO/W.PET/CERES
3 CREAM (GRAM) TOPICAL NIGHTLY
Status: DISCONTINUED | OUTPATIENT
Start: 2022-10-09 | End: 2022-10-10

## 2022-10-09 RX ORDER — POLYETHYLENE GLYCOL 3350 17 G/17G
17 POWDER, FOR SOLUTION ORAL DAILY PRN
Status: DISCONTINUED | OUTPATIENT
Start: 2022-10-09 | End: 2022-10-13

## 2022-10-09 RX ORDER — INSULIN LISPRO 100 [IU]/ML
0-4 INJECTION, SOLUTION INTRAVENOUS; SUBCUTANEOUS NIGHTLY
Status: DISCONTINUED | OUTPATIENT
Start: 2022-10-09 | End: 2022-10-10

## 2022-10-09 RX ORDER — GABAPENTIN 300 MG/1
900 CAPSULE ORAL 3 TIMES DAILY
Status: DISCONTINUED | OUTPATIENT
Start: 2022-10-10 | End: 2022-10-09

## 2022-10-09 RX ADMIN — ONDANSETRON 4 MG: 2 INJECTION INTRAMUSCULAR; INTRAVENOUS at 16:02

## 2022-10-09 RX ADMIN — TRAZODONE HYDROCHLORIDE 150 MG: 50 TABLET ORAL at 21:00

## 2022-10-09 RX ADMIN — SODIUM CHLORIDE: 9 INJECTION, SOLUTION INTRAVENOUS at 18:54

## 2022-10-09 RX ADMIN — INSULIN LISPRO 4 UNITS: 100 INJECTION, SOLUTION INTRAVENOUS; SUBCUTANEOUS at 21:37

## 2022-10-09 RX ADMIN — ENOXAPARIN SODIUM 40 MG: 100 INJECTION SUBCUTANEOUS at 21:01

## 2022-10-09 RX ADMIN — GABAPENTIN 900 MG: 300 CAPSULE ORAL at 22:36

## 2022-10-09 RX ADMIN — ONDANSETRON 4 MG: 2 INJECTION INTRAMUSCULAR; INTRAVENOUS at 13:50

## 2022-10-09 RX ADMIN — Medication 3 MG: at 21:00

## 2022-10-09 RX ADMIN — SODIUM CHLORIDE 500 ML: 9 INJECTION, SOLUTION INTRAVENOUS at 13:52

## 2022-10-09 RX ADMIN — INSULIN LISPRO 8 UNITS: 100 INJECTION, SOLUTION INTRAVENOUS; SUBCUTANEOUS at 23:41

## 2022-10-09 RX ADMIN — INSULIN LISPRO 4 UNITS: 100 INJECTION, SOLUTION INTRAVENOUS; SUBCUTANEOUS at 18:54

## 2022-10-09 RX ADMIN — SODIUM CHLORIDE 500 ML: 9 INJECTION, SOLUTION INTRAVENOUS at 16:02

## 2022-10-09 RX ADMIN — INSULIN GLARGINE 10 UNITS: 100 INJECTION, SOLUTION SUBCUTANEOUS at 21:37

## 2022-10-09 RX ADMIN — ATORVASTATIN CALCIUM 20 MG: 20 TABLET, FILM COATED ORAL at 21:00

## 2022-10-09 RX ADMIN — CEFTRIAXONE SODIUM 1000 MG: 1 INJECTION, POWDER, FOR SOLUTION INTRAMUSCULAR; INTRAVENOUS at 16:20

## 2022-10-09 ASSESSMENT — ENCOUNTER SYMPTOMS
DIARRHEA: 0
BLOOD IN STOOL: 0
SHORTNESS OF BREATH: 0
VOMITING: 0
NAUSEA: 1
ABDOMINAL PAIN: 1

## 2022-10-09 ASSESSMENT — PAIN SCALES - GENERAL: PAINLEVEL_OUTOF10: 0

## 2022-10-09 ASSESSMENT — HEART SCORE: ECG: 0

## 2022-10-09 NOTE — ED PROVIDER NOTES
[facility]  Emergency Department Encounter  Emergency Medicine Resident     Pt Name:Fang [address]  MRN: [id number]  [place] 1970  Date of evaluation: 10/9/22  PCP:  Chela Wilhelm MD      [address] [place] [address]       Chief Complaint   Patient presents with    Fatigue    Nausea    Chest Pain       HISTORY OF PRESENT ILLNESS  (Location/Symptom, Timing/Onset, Context/Setting, Quality, Duration, Modifying Factors, Severity.)      Shelly Victor is a 47 y.o. female who presents with chest pain, abd pain, nausea and dizziness that started this Am when she woke up. She states the chest pain is pressure and radiates to her back. Also states this feels like past episodes of pancreatitis. Says she is on a new diabetes medication that she started this week, has been using as directed but is unsure what the medication is. She says she has had some chills, no fever. No urinary symptoms. Has been very fatigued since this AM. States she does smoke ~1/2 ppd, \"causal\" EtOH use, no recreational drugs. PAST MEDICAL / SURGICAL / SOCIAL / FAMILY HISTORY      has a past medical history of Depression, Diabetes mellitus ([facility] [address] Hyperlipidemia, Pancreatitis, Pneumonia due to infectious organism, and Substance abuse ([facility] [address] has a past surgical history that includes Cholecystectomy; Ectopic pregnancy surgery; Carpal tunnel release (2014); Colonoscopy (N/A, 9/16/2019); Upper gastrointestinal endoscopy (N/A, 9/16/2019); and Upper gastrointestinal endoscopy (N/A, 12/27/2021).       Social History     Socioeconomic History    Marital status: Single     Spouse name: Not on file    Number of children: Not on file    Years of education: Not on file    Highest education level: Not on file   Occupational History    Not on file   Tobacco Use    Smoking status: Every Day     Packs/day: 0.50     Years: 30.00     Pack years: 15.00     Types: Cigarettes    Smokeless tobacco: Never    Tobacco comments:     0-6 cigarettes per day   Substance and Sexual Activity    Alcohol use: Yes     Comment: Rare    Drug use: Yes     Types: Cocaine     Comment: 12/5/16 relapsed    Sexual activity: Yes     Partners: Male   Other Topics Concern    Not on file   Social History Narrative    Not on file     Social Determinants of Health     Financial Resource Strain: Medium Risk    Difficulty of Paying Living Expenses: Somewhat hard   Food Insecurity: No Food Insecurity    Worried About Running Out of Food in the Last Year: Never true    Ran Out of Food in the Last Year: Never true   Transportation Needs: Not on file   Physical Activity: Not on file   Stress: Not on file   Social Connections: Not on file   Intimate Partner Violence: Not on file   Housing Stability: Not on file       Family History   Problem Relation Age of Onset    Diabetes Mother     Coronary Art Dis Mother     Cancer Father         Colon    Diabetes Sister     Diabetes Brother     Bipolar Disorder Brother     Alcohol Abuse Brother     Substance Abuse Brother        Allergies:  Patient has no known allergies. Home Medications:  Prior to Admission medications    Medication Sig Start Date End Date Taking? Authorizing Provider   lovastatin (MEVACOR) 40 MG tablet Take 1 tablet by mouth nightly 10/5/22   Alana Pina MD   Melatonin 3 MG CAPS Take 3 mg by mouth at bedtime 10/5/22 11/4/22  Alana Pina MD   traZODone (DESYREL) 150 MG tablet Take 1 tablet by mouth nightly 10/5/22   Alana Pina MD   gabapentin (NEURONTIN) 300 MG capsule Take by mouth three times daily (900mg in AM and midday, 1200mg at night; patient has 300mg and 600mg capsules on hand) 10/5/22 10/5/22  Alana Pina MD   blood glucose monitor kit and supplies Dispense sufficient amount for indicated testing frequency plus additional to accommodate PRN testing needs. Dispense all needed supplies to include: monitor, strips, lancing device, lancets, control solutions, alcohol swabs.  10/5/22   Alana Pina MD   Lancets MISC 1 each by Does not apply route daily 10/5/22   Polo Gallego MD   blood glucose monitor strips Test 4 times a day & as needed for symptoms of irregular blood glucose. Dispense sufficient amount for indicated testing frequency plus additional to accommodate PRN testing needs.  10/5/22   Polo Gallego MD   empagliflozin (JARDIANCE) 10 MG tablet Take 1 tablet by mouth daily 10/5/22   Polo Gallego MD   insulin glargine (LANTUS SOLOSTAR) 100 UNIT/ML injection pen Inject 10 Units into the skin nightly 10/5/22   Polo Gallego MD   aluminum hydroxide-magnesium carbonate (GENATON)  MG/15ML suspension Take 15 mLs by mouth 4 times daily as needed for Indigestion  Patient not taking: No sig reported    Historical Provider, MD   gabapentin (NEURONTIN) 600 MG tablet Take by mouth three times daily (900mg in AM and midday, 1200mg at night; patient has 300mg and 600mg capsules on hand)  Patient not taking: No sig reported    Historical Provider, MD   ibuprofen (ADVIL;MOTRIN) 400 MG tablet Take 2 tablets by mouth every 8 hours as needed for Pain  Patient not taking: No sig reported 1/27/22   Shanda Evans MD   Blood Glucose Monitoring Suppl (ONE TOUCH ULTRA 2) w/Device KIT 1 kit by Does not apply route daily  Patient not taking: No sig reported 1/24/22   Mansoor Ruano MD   Lancets 30G MISC 1 each by Does not apply route daily  Patient not taking: No sig reported 1/24/22   Mansoor Ruano MD   Insulin Pen Needle 30G X 8 MM MISC 1 each by Does not apply route daily  Patient not taking: Reported on 10/5/2022 1/19/22   Mansoor Ruano MD   FreeStyle Lancets MISC 1 each by Does not apply route daily  Patient not taking: No sig reported 1/19/22   Mansoor Ruano MD   Multiple Vitamins-Minerals (MULTIVITAMIN WITH MINERALS) tablet Take 1 tablet by mouth daily  Patient not taking: No sig reported 1/19/22   Mansoor Ruano MD   famotidine (PEPCID) 20 MG tablet Take 1 tablet by mouth 2 times daily  Patient not taking: No sig reported 1/4/22 Zuleyma Abel MD   ondansetron (ZOFRAN ODT) 4 MG disintegrating tablet Take 1 tablet by mouth every 8 hours as needed for Nausea or Vomiting  Patient not taking: No sig reported 1/4/22   Zuleyma Abel MD   glucose monitoring (FREESTYLE) kit 4 times daily  Patient not taking: No sig reported 12/10/21   Jakob Tineo MD   pantoprazole (PROTONIX) 40 MG tablet Take 1 tablet by mouth every morning (before breakfast)  Patient not taking: No sig reported 12/3/21   Sedrick Soto MD       REVIEW OF SYSTEMS    (2-9 systems for level 4, 10 or more for level 5)      Review of Systems   Constitutional:  Positive for chills. Negative for fever. HENT:  Negative for congestion. Eyes:  Negative for visual disturbance. Respiratory:  Negative for shortness of breath. Cardiovascular:  Positive for chest pain. Negative for palpitations and leg swelling. Gastrointestinal:  Positive for abdominal pain and nausea. Negative for blood in stool, diarrhea and vomiting. Endocrine: Positive for polydipsia. Genitourinary:  Negative for dysuria and hematuria. Skin:  Positive for wound. Allergic/Immunologic: Positive for immunocompromised state. Neurological:  Positive for dizziness and light-headedness. Negative for weakness. Hematological:  Does not bruise/bleed easily. PHYSICAL EXAM   (up to 7 for level 4, 8 or more for level 5)      INITIAL VITALS:   BP (!) 132/59   Pulse 89   Temp 98.2 °F (36.8 °C) (Oral)   Resp 18   Ht 5' 4.5\" (1.638 m)   LMP 05/21/2018 (LMP Unknown)   SpO2 94%   BMI 18.42 kg/m²     Physical Exam  Constitutional:       General: She is not in acute distress. HENT:      Head: Normocephalic and atraumatic. Mouth/Throat:      Mouth: Mucous membranes are moist.      Pharynx: Oropharynx is clear. Eyes:      Extraocular Movements: Extraocular movements intact. Conjunctiva/sclera: Conjunctivae normal.      Pupils: Pupils are equal, round, and reactive to light. Cardiovascular:      Rate and Rhythm: Normal rate and regular rhythm. Heart sounds: Normal heart sounds. Pulmonary:      Effort: Pulmonary effort is normal.      Breath sounds: Normal breath sounds. Abdominal:      General: There is no distension. Palpations: Abdomen is soft. Tenderness: There is no guarding. Comments: Epigastric tenderness    Musculoskeletal:         General: Normal range of motion. Cervical back: Normal range of motion. No tenderness. Right lower leg: No edema. Left lower leg: No edema. Skin:     General: Skin is warm. Neurological:      General: No focal deficit present. Mental Status: She is alert. Psychiatric:         Mood and Affect: Mood normal.       DIFFERENTIAL  DIAGNOSIS     PLAN (LABS / IMAGING / EKG):  Orders Placed This Encounter   Procedures    Culture, Urine    XR CHEST PORTABLE    US LIVER    CT ABDOMEN PELVIS W IV CONTRAST Additional Contrast? None    VL DUP LOWER EXTREMITY VENOUS BILATERAL    CBC    Comprehensive Metabolic Panel    Troponin    Magnesium    Lipase    Urinalysis with Microscopic    Beta-Hydroxybutyrate    Osmolality    Comprehensive Metabolic Panel w/ Reflex to MG    CBC with Auto Differential    Lipid Panel    DRUG SCREEN MULTI URINE    RUBIN SCREEN WITH REFLEX    C-Reactive Protein    Rheumatoid Factor    Sedimentation Rate    TSH with Reflex    Uric Acid    C-Peptide    IMMUNOGLOBULIN G SUBCLASSES    CBC with Auto Differential    Comprehensive Metabolic Panel w/ Reflex to MG    ADULT DIET;  Dysphagia - Soft and Bite Sized; 3 carb choices (45 gm/meal); 1800 ml    Cardiac Monitor    Pulse Oximetry    Vital signs per unit routine    Admission/Observation order previously placed    Notify physician    Up as tolerated    Up with assistance    HYPOGLYCEMIA TREATMENT: blood glucose LESS THAN 70 mg/dL and patient ALERT and TOLERATING PO    HYPOGLYCEMIA TREATMENT: blood glucose LESS THAN 70 mg/dL and patient NOT ALERT or NPO    Strict intake and output    Misc nursing order (specify)    Full Code    Inpatient consult to Hospitalist    Inpatient consult to [facility]    Inpatient consult to Social Work    Inpatient consult to GI    Inpatient consult to Rheumatology    OT eval and treat    PT evaluation and treat    Initiate Oxygen Therapy Protocol    Pulse oximetry, continuous    Incentive spirometry RT    POCT Glucose    POCT Glucose    POC Glucose Fingerstick    POCT Glucose    POCT glucose    POC Glucose Fingerstick    POC Glucose Fingerstick    POC Glucose Fingerstick    POC Glucose Fingerstick    POC Glucose Fingerstick    POC Glucose Fingerstick    POC Glucose Fingerstick    POC Glucose Fingerstick    POC Glucose Fingerstick    POC Glucose Fingerstick    POC Glucose Fingerstick    POC Glucose Fingerstick    POC Glucose Fingerstick    POC Glucose Fingerstick    POC Glucose Fingerstick    POC Glucose Fingerstick    POC Glucose Fingerstick    POC Glucose Fingerstick    POC Glucose Fingerstick    POC Glucose Fingerstick    POC Glucose Fingerstick    POC Glucose Fingerstick    POC Glucose Fingerstick    EKG 12 Lead    ECHO Complete 2D W Doppler W Color    Saline lock IV    ADMIT TO INPATIENT       MEDICATIONS ORDERED:  Orders Placed This Encounter   Medications    ondansetron (ZOFRAN) injection 4 mg    0.9 % sodium chloride bolus    0.9 % sodium chloride bolus    ondansetron (ZOFRAN) injection 4 mg    cefTRIAXone (ROCEPHIN) 1,000 mg in sodium chloride 0.9 % 50 mL IVPB mini-bag     Order Specific Question:   Antimicrobial Indications     Answer:   Urinary Tract Infection     Order Specific Question:   UTI duration of therapy     Answer:   3 days    DISCONTD: gabapentin (NEURONTIN) capsule 300 mg    DISCONTD: insulin glargine (LANTUS) injection vial 10 Units    atorvastatin (LIPITOR) tablet 20 mg    DISCONTD: melatonin tablet 3 mg    traZODone (DESYREL) tablet 150 mg    sodium chloride flush 0.9 % injection 5-40 mL    sodium chloride flush 0.9 % injection 5-40 mL    0.9 % sodium chloride infusion    DISCONTD: enoxaparin (LOVENOX) injection 40 mg     Order Specific Question:   Indication of Use     Answer:   Prophylaxis-DVT/PE    OR Linked Order Group     ondansetron (ZOFRAN-ODT) disintegrating tablet 4 mg     ondansetron (ZOFRAN) injection 4 mg    DISCONTD: polyethylene glycol (GLYCOLAX) packet 17 g    OR Linked Order Group     acetaminophen (TYLENOL) tablet 650 mg     acetaminophen (TYLENOL) suppository 650 mg    glucose chewable tablet 16 g    OR Linked Order Group     dextrose bolus 10% 125 mL     dextrose bolus 10% 250 mL    glucagon (rDNA) injection 1 mg    dextrose 10 % infusion    DISCONTD: insulin lispro (HUMALOG) injection vial 0-4 Units    DISCONTD: insulin lispro (HUMALOG) injection vial 0-4 Units    DISCONTD: cefTRIAXone (ROCEPHIN) 1,000 mg in sterile water 10 mL IV syringe     Order Specific Question:   Antimicrobial Indications     Answer:   Urinary Tract Infection     Order Specific Question:   UTI duration of therapy     Answer:   3 days    DISCONTD: 0.9 % sodium chloride infusion    DISCONTD: gabapentin (NEURONTIN) capsule 900 mg    gabapentin (NEURONTIN) capsule 900 mg    DISCONTD: insulin lispro (HUMALOG) injection vial 0-8 Units    DISCONTD: insulin lispro (HUMALOG) injection vial 0-4 Units    insulin lispro (HUMALOG) injection vial 8 Units    insulin lispro (HUMALOG) injection vial 0-16 Units    insulin lispro (HUMALOG) injection vial 0-4 Units    DISCONTD: insulin glargine (LANTUS) injection vial 14 Units    DISCONTD: insulin glargine (LANTUS) injection vial 18 Units    DISCONTD: insulin glargine (LANTUS) injection vial 22 Units    levoFLOXacin (LEVAQUIN) tablet 750 mg     Order Specific Question:   Antimicrobial Indications     Answer:   Urinary Tract Infection     Order Specific Question:   UTI duration of therapy     Answer:   5 days     Order Specific Question:   Suspected Organism(s)     Answer:   Enterococcus faecalis    enoxaparin Sodium (LOVENOX) injection 30 mg     Order Specific Question:   Indication of Use     Answer:   Prophylaxis-DVT/PE    pantoprazole (PROTONIX) tablet 40 mg    ketorolac (TORADOL) injection 15 mg    lidocaine 4 % external patch 1 patch    metFORMIN (GLUCOPHAGE) tablet 500 mg    DISCONTD: albumin human 5 % IV solution 25 g    simethicone (MYLICON) chewable tablet 80 mg    DISCONTD: morphine (PF) injection 1 mg    iopamidol (ISOVUE-370) 76 % injection 75 mL    DISCONTD: insulin glargine (LANTUS) injection vial 15 Units    morphine (PF) injection 1 mg    metoclopramide (REGLAN) injection 10 mg    polyethylene glycol (GLYCOLAX) packet 17 g    insulin glargine (LANTUS) injection vial 22 Units    polyethylene glycol (GoLYTELY) solution 4,000 mL       MDM: Patient with elevated blood glucose, no anion gap, not in DKA. Troponin and EKG normal, noncardiac in nature. Lipase slightly elevated, concerning in setting of chronic pancreatitis. She was found to have UTI which could be cause of fatigue and abd pain as well. Patient having difficulty ambulating to restroom during encounter. Family medicine consulted for admission. Patient started on IV abx in the department, received fluids, and antiemetics. DIAGNOSTIC RESULTS / EMERGENCY DEPARTMENT COURSE / MDM   LAB RESULTS:  Results for orders placed or performed during the hospital encounter of 10/09/22   Culture, Urine    Specimen: Urine   Result Value Ref Range    Specimen Description . URINE     Culture ENTEROCOCCUS FAECALIS >[id number] CFU/ML (A)        Susceptibility    Enterococcus faecalis - BACTERIAL SUSCEPTIBILITY PANEL GOLDY     ampicillin <=2 Sensitive      ciprofloxacin 1 Sensitive      levofloxacin 2 Sensitive      nitrofurantoin <=16 Sensitive      tetracycline <=1 Sensitive      vancomycin 2 Sensitive    CBC   Result Value Ref Range    WBC 8.0 3.5 - 11.3 k/uL    RBC 3.89 (L) 3.95 - 5.11 m/uL    Hemoglobin 11.5 (L) 11.9 - 15.1 g/dL    Hematocrit 33.1 (L) 36.3 - 47.1 %    MCV 85.1 82.6 - 102.9 fL    MCH 29.6 25.2 - 33.5 pg    MCHC 34.7 28.4 - 34.8 g/dL    RDW 12.7 11.8 - 14.4 %    Platelets 202 779 - 987 k/uL    MPV 9.6 8.1 - 13.5 fL    NRBC Automated 0.0 0.0 per 100 WBC   Comprehensive Metabolic Panel   Result Value Ref Range    Glucose 409 (HH) 70 - 99 mg/dL    BUN 30 (H) 6 - 20 mg/dL    Creatinine 0.66 0.50 - 0.90 mg/dL    Est, Glom Filt Rate >60 >60 mL/min/1.73m2    Calcium 9.2 8.6 - 10.4 mg/dL    Sodium 134 (L) 135 - 144 mmol/L    Potassium 4.3 3.7 - 5.3 mmol/L    Chloride 100 98 - 107 mmol/L    CO2 23 20 - 31 mmol/L    Anion Gap 11 9 - 17 mmol/L    Alkaline Phosphatase 153 (H) 35 - 104 U/L    ALT 9 5 - 33 U/L    AST 10 <32 U/L    Total Bilirubin <0.1 (L) 0.3 - 1.2 mg/dL    Total Protein 6.7 6.4 - 8.3 g/dL    Albumin 3.9 3.5 - 5.2 g/dL    Albumin/Globulin Ratio 1.4 1.0 - 2.5   Troponin   Result Value Ref Range    Troponin, High Sensitivity 9 0 - 14 ng/L   Troponin   Result Value Ref Range    Troponin, High Sensitivity 9 0 - 14 ng/L   Magnesium   Result Value Ref Range    Magnesium 1.8 1.6 - 2.6 mg/dL   Lipase   Result Value Ref Range    Lipase 70 (H) 13 - 60 U/L   Urinalysis with Microscopic   Result Value Ref Range    Color, UA Yellow Yellow    Turbidity UA Clear Clear    Glucose, Ur 3+ (A) NEGATIVE    Bilirubin Urine NEGATIVE NEGATIVE    Ketones, Urine NEGATIVE NEGATIVE    Specific Gravity, UA 1.025 1.005 - 1.030    Urine Hgb NEGATIVE NEGATIVE    pH, UA 5.5 5.0 - 8.0    Protein, UA TRACE (A) NEGATIVE    Urobilinogen, Urine Normal Normal    Nitrite, Urine POSITIVE (A) NEGATIVE    Leukocyte Esterase, Urine NEGATIVE NEGATIVE    WBC, UA 10 TO 20 0 - 5 /HPF    RBC, UA 0 TO 2 0 - 4 /HPF    Epithelial Cells UA 2 TO 5 0 - 5 /HPF    Bacteria, UA MANY (A) None   Beta-Hydroxybutyrate   Result Value Ref Range    Beta-Hydroxybutyrate 0.10 0.02 - 0.27 mmol/L   Osmolality   Result Value Ref Range    Serum Osmolality 315 (H) 275 - 295 mOsm/kg   Comprehensive Metabolic Panel w/ Reflex to MG   Result Value Ref Range    Glucose 304 (H) 70 - 99 mg/dL    BUN 24 (H) 6 - 20 mg/dL    Creatinine 0.62 0.50 - 0.90 mg/dL    Est, Glom Filt Rate >60 >60 mL/min/1.73m2    Calcium 8.2 (L) 8.6 - 10.4 mg/dL    Sodium 138 135 - 144 mmol/L    Potassium 4.1 3.7 - 5.3 mmol/L    Chloride 107 98 - 107 mmol/L    CO2 21 20 - 31 mmol/L    Anion Gap 10 9 - 17 mmol/L    Alkaline Phosphatase 104 35 - 104 U/L    ALT 10 5 - 33 U/L    AST 13 <32 U/L    Total Bilirubin <0.1 (L) 0.3 - 1.2 mg/dL    Total Protein 5.4 (L) 6.4 - 8.3 g/dL    Albumin 3.0 (L) 3.5 - 5.2 g/dL    Albumin/Globulin Ratio 1.3 1.0 - 2.5   CBC with Auto Differential   Result Value Ref Range    WBC 7.2 3.5 - 11.3 k/uL    RBC 3.09 (L) 3.95 - 5.11 m/uL    Hemoglobin 9.2 (L) 11.9 - 15.1 g/dL    Hematocrit 26.9 (L) 36.3 - 47.1 %    MCV 87.1 82.6 - 102.9 fL    MCH 29.8 25.2 - 33.5 pg    MCHC 34.2 28.4 - 34.8 g/dL    RDW 12.9 11.8 - 14.4 %    Platelets 588 985 - 932 k/uL    MPV 10.1 8.1 - 13.5 fL    NRBC Automated 0.0 0.0 per 100 WBC    Seg Neutrophils 40 36 - 65 %    Lymphocytes 48 (H) 24 - 43 %    Monocytes 6 3 - 12 %    Eosinophils % 5 (H) 1 - 4 %    Basophils 1 0 - 2 %    Immature Granulocytes 0 0 %    Segs Absolute 2.90 1.50 - 8.10 k/uL    Absolute Lymph # 3.48 1.10 - 3.70 k/uL    Absolute Mono # 0.41 0.10 - 1.20 k/uL    Absolute Eos # 0.34 0.00 - 0.44 k/uL    Basophils Absolute 0.04 0.00 - 0.20 k/uL    Absolute Immature Granulocyte <0.03 0.00 - 0.30 k/uL   Lipid Panel   Result Value Ref Range    Cholesterol 120 <200 mg/dL    HDL 19 (L) >40 mg/dL    LDL Cholesterol 29 0 - 130 mg/dL    Chol/HDL Ratio 6.3 (H) <5    Triglycerides 360 (H) <150 mg/dL   DRUG SCREEN MULTI URINE   Result Value Ref Range    Amphetamine Screen, Ur NEGATIVE NEGATIVE    Barbiturate Screen, Ur NEGATIVE NEGATIVE    Benzodiazepine Screen, Urine NEGATIVE NEGATIVE    Cocaine Metabolite, Urine POSITIVE (A) NEGATIVE    Methadone Screen, Urine NEGATIVE NEGATIVE    Opiates, Urine NEGATIVE NEGATIVE    Phencyclidine, Urine NEGATIVE NEGATIVE    Cannabinoid Scrn, Ur NEGATIVE NEGATIVE    Oxycodone Screen, Ur NEGATIVE NEGATIVE    Fentanyl, Ur NEGATIVE NEGATIVE    Test Information       Assay provides medical screening only. The absence of expected drug(s) and/or metabolite(s) may indicate diluted or adulterated urine, limitations of testing or timing of collection.    RUBIN SCREEN WITH REFLEX   Result Value Ref Range    RUBIN NEGATIVE NEGATIVE    DORINA Antibodies Screen 0.2 <0.7 U/mL    Anti ds DNA <0.5 <10.0 IU/mL   C-Reactive Protein   Result Value Ref Range    CRP 3.9 0.0 - 5.0 mg/L   Rheumatoid Factor   Result Value Ref Range    Rheumatoid Factor <10 <14 IU/mL   Sedimentation Rate   Result Value Ref Range    Sed Rate 8 0 - 30 mm/Hr   TSH with Reflex   Result Value Ref Range    TSH 0.54 0.30 - 5.00 uIU/mL   Comprehensive Metabolic Panel w/ Reflex to MG   Result Value Ref Range    Glucose 302 (H) 70 - 99 mg/dL    BUN 22 (H) 6 - 20 mg/dL    Creatinine 0.64 0.50 - 0.90 mg/dL    Est, Glom Filt Rate >60 >60 mL/min/1.73m2    Calcium 8.3 (L) 8.6 - 10.4 mg/dL    Sodium 136 135 - 144 mmol/L    Potassium 4.1 3.7 - 5.3 mmol/L    Chloride 106 98 - 107 mmol/L    CO2 22 20 - 31 mmol/L    Anion Gap 8 (L) 9 - 17 mmol/L    Alkaline Phosphatase 147 (H) 35 - 104 U/L    ALT 41 (H) 5 - 33 U/L    AST 50 (H) <32 U/L    Total Bilirubin <0.1 (L) 0.3 - 1.2 mg/dL    Total Protein 5.9 (L) 6.4 - 8.3 g/dL    Albumin 3.0 (L) 3.5 - 5.2 g/dL    Albumin/Globulin Ratio 1.0 1.0 - 2.5   CBC with Auto Differential   Result Value Ref Range    WBC 7.2 3.5 - 11.3 k/uL    RBC 3.45 (L) 3.95 - 5.11 m/uL    Hemoglobin 10.5 (L) 11.9 - 15.1 g/dL    Hematocrit 30.4 (L) 36.3 - 47.1 %    MCV 88.1 82.6 - 102.9 fL    MCH 30.4 25.2 - 33.5 pg    MCHC 34.5 28.4 - 34.8 g/dL    RDW 13.1 11.8 - 14.4 %    Platelets 502 063 - 628 k/uL    MPV 9.8 8.1 - 13.5 fL    NRBC Automated 0.0 0.0 per 100 WBC    Seg Neutrophils 53 36 - 65 %    Lymphocytes 36 24 - 43 %    Monocytes 6 3 - 12 %    Eosinophils % 4 1 - 4 %    Basophils 0 0 - 2 %    Immature Granulocytes 1 (H) 0 %    Segs Absolute 3.81 1.50 - 8.10 k/uL    Absolute Lymph # 2.58 1.10 - 3.70 k/uL    Absolute Mono # 0.44 0.10 - 1.20 k/uL    Absolute Eos # 0.28 0.00 - 0.44 k/uL    Basophils Absolute <0.03 0.00 - 0.20 k/uL    Absolute Immature Granulocyte 0.08 0.00 - 0.30 k/uL   Uric Acid   Result Value Ref Range    Uric Acid 4.4 2.4 - 5.7 mg/dL   Comprehensive Metabolic Panel w/ Reflex to MG   Result Value Ref Range    Glucose 337 (H) 70 - 99 mg/dL    BUN 20 6 - 20 mg/dL    Creatinine 0.58 0.50 - 0.90 mg/dL    Est, Glom Filt Rate >60 >60 mL/min/1.73m2    Calcium 8.6 8.6 - 10.4 mg/dL    Sodium 134 (L) 135 - 144 mmol/L    Potassium 4.3 3.7 - 5.3 mmol/L    Chloride 103 98 - 107 mmol/L    CO2 23 20 - 31 mmol/L    Anion Gap 8 (L) 9 - 17 mmol/L    Alkaline Phosphatase 131 (H) 35 - 104 U/L    ALT 64 (H) 5 - 33 U/L    AST 64 (H) <32 U/L    Total Bilirubin <0.1 (L) 0.3 - 1.2 mg/dL    Total Protein 5.4 (L) 6.4 - 8.3 g/dL    Albumin 2.7 (L) 3.5 - 5.2 g/dL    Albumin/Globulin Ratio 1.0 1.0 - 2.5   CBC with Auto Differential   Result Value Ref Range    WBC 6.0 3.5 - 11.3 k/uL    RBC 3.05 (L) 3.95 - 5.11 m/uL    Hemoglobin 9.3 (L) 11.9 - 15.1 g/dL    Hematocrit 26.9 (L) 36.3 - 47.1 %    MCV 88.2 82.6 - 102.9 fL    MCH 30.5 25.2 - 33.5 pg    MCHC 34.6 28.4 - 34.8 g/dL    RDW 13.2 11.8 - 14.4 %    Platelets 252 716 - 835 k/uL    MPV 10.4 8.1 - 13.5 fL    NRBC Automated 0.0 0.0 per 100 WBC    Seg Neutrophils 50 36 - 65 %    Lymphocytes 36 24 - 43 %    Monocytes 7 3 - 12 %    Eosinophils % 5 (H) 1 - 4 %    Basophils 1 0 - 2 %    Immature Granulocytes 1 (H) 0 %    Segs Absolute 3.04 1.50 - 8.10 k/uL    Absolute Lymph # 2.14 1.10 - 3.70 k/uL    Absolute Mono # 0.40 0.10 - 1.20 k/uL    Absolute Eos # 0.32 0.00 - 0.44 k/uL    Basophils Absolute 0.03 0.00 - 0.20 k/uL    Absolute Immature Granulocyte 0.07 0.00 - 0.30 k/uL   C-Peptide   Result Value Ref Range    C-Peptide 4.7 (H) 1.1 - 4.4 ng/mL   Comprehensive Metabolic Panel w/ Reflex to MG   Result Value Ref Range    Glucose 366 (H) 70 - 99 mg/dL    BUN 21 (H) 6 - 20 mg/dL    Creatinine 0.75 0.50 - 0.90 mg/dL    Est, Glom Filt Rate >60 >60 mL/min/1.73m2    Calcium 8.6 8.6 - 10.4 mg/dL    Sodium 136 135 - 144 mmol/L    Potassium 4.0 3.7 - 5.3 mmol/L    Chloride 99 98 - 107 mmol/L    CO2 25 20 - 31 mmol/L    Anion Gap 12 9 - 17 mmol/L    Alkaline Phosphatase 171 (H) 35 - 104 U/L    ALT 56 (H) 5 - 33 U/L    AST 40 (H) <32 U/L    Total Bilirubin <0.1 (L) 0.3 - 1.2 mg/dL    Total Protein 5.8 (L) 6.4 - 8.3 g/dL    Albumin 3.1 (L) 3.5 - 5.2 g/dL    Albumin/Globulin Ratio 1.1 1.0 - 2.5   POCT Glucose   Result Value Ref Range    Glucose 347 mg/dL    QC OK?  Yes    POC Glucose Fingerstick   Result Value Ref Range    POC Glucose 347 (H) 65 - 105 mg/dL   POC Glucose Fingerstick   Result Value Ref Range    POC Glucose 431 (HH) 65 - 105 mg/dL   POC Glucose Fingerstick   Result Value Ref Range    POC Glucose 430 (HH) 65 - 105 mg/dL   POC Glucose Fingerstick   Result Value Ref Range    POC Glucose 321 (H) 65 - 105 mg/dL   POC Glucose Fingerstick   Result Value Ref Range    POC Glucose 274 (H) 65 - 105 mg/dL   POC Glucose Fingerstick   Result Value Ref Range    POC Glucose 210 (H) 65 - 105 mg/dL   POC Glucose Fingerstick   Result Value Ref Range    POC Glucose 287 (H) 65 - 105 mg/dL   POC Glucose Fingerstick   Result Value Ref Range    POC Glucose 225 (H) 65 - 105 mg/dL   POC Glucose Fingerstick   Result Value Ref Range    POC Glucose 349 (H) 65 - 105 mg/dL   POC Glucose Fingerstick   Result Value Ref Range    POC Glucose 275 (H) 65 - 105 mg/dL   POC Glucose Fingerstick   Result Value Ref Range    POC Glucose 258 (H) 65 - 105 mg/dL   POC Glucose Fingerstick   Result Value Ref Range    POC Glucose 290 (H) 65 - 105 mg/dL   POC Glucose Fingerstick   Result Value Ref Range    POC Glucose 325 (H) 65 - 105 mg/dL   POC Glucose Fingerstick   Result Value Ref Range    POC Glucose 277 (H) 65 - 105 mg/dL   POC Glucose Fingerstick   Result Value Ref Range    POC Glucose 151 (H) 65 - 105 mg/dL   POC Glucose Fingerstick   Result Value Ref Range    POC Glucose 186 (H) 65 - 105 mg/dL   POC Glucose Fingerstick   Result Value Ref Range    POC Glucose 220 (H) 65 - 105 mg/dL   POC Glucose Fingerstick   Result Value Ref Range    POC Glucose 210 (H) 65 - 105 mg/dL   POC Glucose Fingerstick   Result Value Ref Range    POC Glucose 147 (H) 65 - 105 mg/dL   POC Glucose Fingerstick   Result Value Ref Range    POC Glucose 185 (H) 65 - 105 mg/dL   POC Glucose Fingerstick   Result Value Ref Range    POC Glucose 237 (H) 65 - 105 mg/dL   POC Glucose Fingerstick   Result Value Ref Range    POC Glucose 290 (H) 65 - 105 mg/dL   POC Glucose Fingerstick   Result Value Ref Range    POC Glucose 310 (H) 65 - 105 mg/dL   POC Glucose Fingerstick   Result Value Ref Range    POC Glucose 275 (H) 65 - 105 mg/dL   EKG 12 Lead   Result Value Ref Range    Ventricular Rate 79 BPM    Atrial Rate 79 BPM    P-R Interval 132 ms    QRS Duration 90 ms    Q-T Interval 394 ms    QTc Calculation (Bazett) 451 ms    P Axis 58 degrees    R Axis 77 degrees    T Axis 85 degrees       IMPRESSION: 51yo F with concern for DKA vs pancreatitis vs cardiac chest pain. Normal EKG. Will obtain cardiac and abd labs. Will give zofran for nausea now. IVF and further treatment pending workup. RADIOLOGY:  VL DUP LOWER EXTREMITY VENOUS BILATERAL   Final Result      CT ABDOMEN PELVIS W IV CONTRAST Additional Contrast? None   Final Result   Small bilateral pleural effusions with bibasilar airspace disease, possibly   representing atelectasis, as well as diffuse retroperitoneal edema and a   small amount of lower abdominal and pelvic free fluid. Moderate colonic stool. No bowel obstruction. No acute intestinal   abnormality.       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 US LIVER   Final Result   Mildly increased liver echogenicity likely suggestive of fatty liver. Status post cholecystectomy with moderate dilatation of common bile duct. There is echogenic area within the superior pole of right kidney measuring   1.2 x 1 x 0.8 cm. The findings appears to be infiltration of adjacent   perinephric fat. This is confirmed after evaluating prior CT of 12/26/2021. RECOMMENDATIONS:   Unavailable         XR CHEST PORTABLE   Final Result   No acute process. EKG  EKG Interpretation    Interpreted by emergency department physician    Rhythm: normal sinus   Rate: normal  Axis: normal  Ectopy: none  Conduction: normal  ST Segments: normal  T Waves: normal  Q Waves: none    Clinical Impression: normal EKG    Blaine Reece DO      All EKG's are interpreted by the Emergency Department Physician who either signs or Co-signs this chart in the absence of a cardiologist.    EMERGENCY DEPARTMENT COURSE:      ED Course as of 10/14/22 1542   Sun Oct 09, 2022   1306 CBC(!):    WBC 8.0   RBC 3.89(!)   Hemoglobin Quant 11.5(!)   Hematocrit 33.1(!)   MCV 85.1   MCH 29.6   MCHC 34.7   RDW 12.7   Platelet Count 072   MPV 9.6   NRBC Automated 0.0 [SK]   1324 Troponin:    Troponin, High Sensitivity 9 [SK]   1331 POCT Glucose  Blood sugar is 382 mg/dl [SK]   1334 Lipase(!):    Lipase 70(!) [SK]   1341 Magnesium:    Magnesium 1.8 [SK]   1343 Comprehensive Metabolic Panel(!!):    Glucose, Random 409(!!)   BUN,BUNPL 30(!)   Creatinine 0.66   Est, Glom Filt Rate >60   CALCIUM, SERUM, 382327 9.2   Sodium 134(!)   Potassium 4.3   Chloride 100   CO2 23   Anion Gap 11   Alk Phos 153(!)   ALT 9   AST 10   Bilirubin <0.1(!)   Total Protein 6.7   Albumin 3.9   ALBUMIN/GLOBULIN RATIO 1.4 [SK]   1410 XR CHEST PORTABLE  No acute process.  [SK]   32 61 16 Patient has been resting comfortably, sleeping.  [SK]   1540 Urinalysis with Microscopic(!): Color, UA Yellow   Turbidity UA Clear   Glucose, UA 3+(!)   Bilirubin, Urine NEGATIVE   Ketones, Urine NEGATIVE   Specific [place], UA 1.025   Urine Hgb NEGATIVE   pH, UA 5.5   Protein, UA TRACE(!)   Urobilinogen, Urine Normal   Nitrite, Urine POSITIVE(!)   Leukocyte Esterase, Urine NEGATIVE   WBC, UA 10 TO 20   RBC, UA 0 TO 2   Epithelial Cells, UA 2 TO 5   Bacteria, UA MANY(!) [SK]   1613 POC Glucose(!): 347 [SK]      ED Course User Index  [SK] Kati Black DO       No notes of EC Admission Criteria type on file. PROCEDURES:      CONSULTS:  IP CONSULT TO HOSPITALIST  IP CONSULT TO FAMILY MEDICINE  IP CONSULT TO SOCIAL WORK  IP CONSULT TO GI  IP CONSULT TO RHEUMATOLOGY    CRITICAL CARE:      FINAL IMPRESSION      1.  Acute cystitis without hematuria          DISPOSITION / PLAN     DISPOSITION Admitted 10/09/2022 04:50:14 PM      PATIENT REFERRED TO:  Karyna Salas MD  [address]    Schedule an appointment as soon as possible for a visit      DISCHARGE MEDICATIONS:  Current Discharge Medication List          Kati Black DO  Emergency Medicine Resident    (Please note that portions of thisnote were completed with a voice recognition program.  Efforts were made to edit the dictations but occasionally words are mis-transcribed.)        Kati Black DO  Resident  10/14/22 7073

## 2022-10-09 NOTE — ED NOTES
Pt ambulatory to restroom with unsteady gait, writer at side for standby assistance.   Provided with labeled urine cup for specimen collection     Ibarra CONSTANCE Bang  10/09/22 4996

## 2022-10-09 NOTE — ED PROVIDER NOTES
[facility]     Emergency Department     Faculty Attestation    I performed a history and physical examination of the patient and discussed management with the resident. I reviewed the residents note and agree with the documented findings and plan of care. Any areas of disagreement are noted on the chart. I was personally present for the key portions of any procedures. I have documented in the chart those procedures where I was not present during the key portions. I have reviewed the emergency nurses triage note. I agree with the chief complaint, past medical history, past surgical history, allergies, medications, social and family history as documented unless otherwise noted below. For Physician Assistant/ Nurse Practitioner cases/documentation I have personally evaluated this patient and have completed at least one if not all key elements of the E/M (history, physical exam, and MDM). Additional findings are as noted. Patient presents with generalized weakness and fatigue as well as chest pain. Patient says the weakness and fatigue has been ongoing for the past several days but the chest pain just started today. Patient was camping with her sister this weekend and this morning patient was so weak she was having difficulty standing up and walking on her own. Patient says she has not been eating well over the past few days because of lack of appetite. She denies fever or congestion but says she has had a little cough. He denies any vomiting or diarrhea. On exam, patient is resting comfortably in the bed. She is alert and oriented and answers questions appropriately. Lungs clear to auscultation bilaterally heart sounds are normal.  Abdomen is soft and nontender. There are no focal neurologic deficits. Will get EKG, chest x-ray, labs and reassess.     EKG Interpretation    Interpreted by me    Rhythm: normal sinus   Rate: normal  Axis: normal  Ectopy: none  Conduction: normal  ST Segments: no acute change  T Waves: no acute change  Q Waves: none    Clinical Impression: no acute changes and normal EKG      Guille Garrido MD  Attending Emergency  Physician            Aleksandr Pablo MD  10/09/22 4682

## 2022-10-09 NOTE — ED NOTES
Labeled urine specimen sent to lab via tube system.     [x] Urine Sample   [x]  Clean catch   [] Straight cath   [] Urine voided   []  Indwelling catheter   []  Suprapubic catheter       Ace Demarco RN  10/09/22 4971

## 2022-10-09 NOTE — ED NOTES
Report called to CONSTANCE Garcia on 4C, all questions answered.      Milton De La Vega RN  10/09/22 5724

## 2022-10-09 NOTE — H&P
[facility]  History & Physical Examination Note              Date:   10/9/2022  Patient name:  Dexter Records  Date of admission:  10/9/2022 12:36 PM  MRN:   [id number]  YOB: 1970    CHIEF COMPLAINT:       Chief Complaint   Patient presents with    Fatigue    Nausea    Chest Pain       History Obtained From:  Patient and chart review. HPI:     The patient is a 47 y.o.  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 Patient stated that she has been having lightheadedness intermittently for the last week. She currently takes 10 units of Lantus for type 2 diabetes however has been without it for the last 7 days and has not taken it. Patient also endorsed nausea, intermittent chest pains that has since resolved. She stated that she becomes really unstable when walking and becomes lightheaded and feels that she will pass out. Stated that she has been eating and drinking okay. Last week she also was also positive for a UTI however has not taken any antibiotics for it. Patient also complains of some right-sided congestion. At ER, patient's vitals were all within normal limits, EKG was normal.  Chest x-ray showed no acute abnormality. Her glucose was elevated at 409. Urinalysis was positive for nitrite and bacteria. Patient had an elevated lipase at 70 her out choline phosphatase was also elevated at 153. she is admitted to the hospital for the management of hyperglycemia    PAST MEDICAL HISTORY:        has a past medical history of Depression, Diabetes mellitus ([facility] [address] Hyperlipidemia, Pancreatitis, Pneumonia due to infectious organism, and Substance abuse ([facility] [address]     PAST SURGICAL HISTORY:      has a past surgical history that includes Cholecystectomy; Ectopic pregnancy surgery; Carpal tunnel release (2014); Colonoscopy (N/A, 9/16/2019); Upper gastrointestinal endoscopy (N/A, 9/16/2019); and Upper gastrointestinal endoscopy (N/A, 12/27/2021). FAMILY HISTORY:     family history includes Alcohol Abuse in her brother; Bipolar Disorder in her brother; Cancer in her father; Coronary Art Dis in her mother; Diabetes in her brother, mother, and sister; Substance Abuse in her brother. HOME MEDICATIONS:     Prior to Admission medications    Medication Sig Start Date End Date Taking? Authorizing Provider   lovastatin (MEVACOR) 40 MG tablet Take 1 tablet by mouth nightly 10/5/22   Risa Rubio MD   Melatonin 3 MG CAPS Take 3 mg by mouth at bedtime 10/5/22 11/4/22  Risa Rubio MD   traZODone (DESYREL) 150 MG tablet Take 1 tablet by mouth nightly 10/5/22   Risa Rubio MD   gabapentin (NEURONTIN) 300 MG capsule Take by mouth three times daily (900mg in AM and midday, 1200mg at night; patient has 300mg and 600mg capsules on hand) 10/5/22 10/5/22  Risa Rubio MD   blood glucose monitor kit and supplies Dispense sufficient amount for indicated testing frequency plus additional to accommodate PRN testing needs. Dispense all needed supplies to include: monitor, strips, lancing device, lancets, control solutions, alcohol swabs. 10/5/22   Risa Rubio MD   Lancets MISC 1 each by Does not apply route daily 10/5/22   Risa Rubio MD   blood glucose monitor strips Test 4 times a day & as needed for symptoms of irregular blood glucose. Dispense sufficient amount for indicated testing frequency plus additional to accommodate PRN testing needs.  10/5/22   Risa Rubio MD   empagliflozin (JARDIANCE) 10 MG tablet Take 1 tablet by mouth daily 10/5/22   Risa Rubio MD   insulin glargine (LANTUS SOLOSTAR) 100 UNIT/ML injection pen Inject 10 Units into the skin nightly 10/5/22   Risa Rubio MD   aluminum hydroxide-magnesium carbonate (GENATON)  MG/15ML suspension Take 15 mLs by mouth 4 times daily as needed for Indigestion  Patient not taking: Reported on 10/5/2022    Historical Provider, MD   gabapentin (NEURONTIN) 600 MG tablet Take by mouth three times daily (900mg in AM and midday, 1200mg at night; patient has 300mg and 600mg capsules on hand)  Patient not taking: Reported on 10/5/2022    Historical Provider, MD   ibuprofen (ADVIL;MOTRIN) 400 MG tablet Take 2 tablets by mouth every 8 hours as needed for Pain  Patient not taking: Reported on 10/5/2022 1/27/22   Gabino Alvarez MD   Blood Glucose Monitoring Suppl (ONE TOUCH ULTRA 2) w/Device KIT 1 kit by Does not apply route daily  Patient not taking: No sig reported 1/24/22   Laura Pollock MD   Lancets 30G MISC 1 each by Does not apply route daily  Patient not taking: No sig reported 1/24/22   Laura Pollock MD   Insulin Pen Needle 30G X 8 MM MISC 1 each by Does not apply route daily  Patient not taking: Reported on 10/5/2022 1/19/22   Laura Pollock MD   FreeStyle Lancets MISC 1 each by Does not apply route daily  Patient not taking: No sig reported 1/19/22   Laura Pollock MD   Multiple Vitamins-Minerals (MULTIVITAMIN WITH MINERALS) tablet Take 1 tablet by mouth daily  Patient not taking: Reported on 10/5/2022 1/19/22   Laura Pollock MD   famotidine (PEPCID) 20 MG tablet Take 1 tablet by mouth 2 times daily  Patient not taking: Reported on 10/5/2022 1/4/22   Gabino Alvarez MD   ondansetron (ZOFRAN ODT) 4 MG disintegrating tablet Take 1 tablet by mouth every 8 hours as needed for Nausea or Vomiting  Patient not taking: Reported on 10/5/2022 1/4/22   Gabino Alvarez MD   glucose monitoring (FREESTYLE) kit 4 times daily  Patient not taking: No sig reported 12/10/21   Laura Pollock MD   pantoprazole (PROTONIX) 40 MG tablet Take 1 tablet by mouth every morning (before breakfast)  Patient not taking: Reported on 10/5/2022 12/3/21   Jyoti Villafuerte MD       ALLERGIES:      Patient has no known allergies.     SOCIAL HISTORY:      reports that she has been smoking cigarettes. She has a 15.00 pack-year smoking history. She has never used smokeless tobacco. She reports current alcohol use. She reports current drug use. Drug: Cocaine. REVIEW OF SYSTEMS:     Review of Systems   Constitutional:  Negative for chills and fever. HENT:  Positive for congestion. Respiratory:  Negative for shortness of breath. Cardiovascular:  Negative for chest pain. Gastrointestinal:  Positive for nausea. Genitourinary:  Negative for difficulty urinating. Neurological:  Positive for light-headedness. Psychiatric/Behavioral:  Negative for agitation. PHYSICAL EXAM:     Physical Exam  Vitals reviewed. Constitutional:       General: She is not in acute distress. Appearance: Normal appearance. Cardiovascular:      Rate and Rhythm: Normal rate and regular rhythm. Pulses: Normal pulses. Heart sounds: Normal heart sounds. No murmur heard. Pulmonary:      Effort: Pulmonary effort is normal.      Breath sounds: Normal breath sounds. Abdominal:      General: Bowel sounds are normal.      Palpations: Abdomen is soft. Tenderness: There is abdominal tenderness. Musculoskeletal:      Cervical back: Normal range of motion. Right lower leg: No edema. Left lower leg: No edema. Neurological:      Mental Status: She is alert.    Psychiatric:         Mood and Affect: Mood normal.       DIAGNOSTICS:      Laboratory Testing:    Recent Results (from the past 24 hour(s))   CBC    Collection Time: 10/09/22 12:55 PM   Result Value Ref Range    WBC 8.0 3.5 - 11.3 k/uL    RBC 3.89 (L) 3.95 - 5.11 m/uL    Hemoglobin 11.5 (L) 11.9 - 15.1 g/dL    Hematocrit 33.1 (L) 36.3 - 47.1 %    MCV 85.1 82.6 - 102.9 fL    MCH 29.6 25.2 - 33.5 pg    MCHC 34.7 28.4 - 34.8 g/dL    RDW 12.7 11.8 - 14.4 %    Platelets 028 435 - 154 k/uL    MPV 9.6 8.1 - 13.5 fL    NRBC Automated 0.0 0.0 per 100 WBC   Comprehensive Metabolic Panel    Collection Time: 10/09/22 12:55 PM   Result Value Ref Range    Glucose 409 (HH) 70 - 99 mg/dL    BUN 30 (H) 6 - 20 mg/dL    Creatinine 0.66 0.50 - 0.90 mg/dL    Est, Glom Filt Rate >60 >60 mL/min/1.73m2    Calcium 9.2 8.6 - 10.4 mg/dL    Sodium 134 (L) 135 - 144 mmol/L    Potassium 4.3 3.7 - 5.3 mmol/L    Chloride 100 98 - 107 mmol/L    CO2 23 20 - 31 mmol/L    Anion Gap 11 9 - 17 mmol/L    Alkaline Phosphatase 153 (H) 35 - 104 U/L    ALT 9 5 - 33 U/L    AST 10 <32 U/L    Total Bilirubin <0.1 (L) 0.3 - 1.2 mg/dL    Total Protein 6.7 6.4 - 8.3 g/dL    Albumin 3.9 3.5 - 5.2 g/dL    Albumin/Globulin Ratio 1.4 1.0 - 2.5   Troponin    Collection Time: 10/09/22 12:55 PM   Result Value Ref Range    Troponin, High Sensitivity 9 0 - 14 ng/L   Magnesium    Collection Time: 10/09/22 12:55 PM   Result Value Ref Range    Magnesium 1.8 1.6 - 2.6 mg/dL   Lipase    Collection Time: 10/09/22 12:55 PM   Result Value Ref Range    Lipase 70 (H) 13 - 60 U/L   Osmolality    Collection Time: 10/09/22 12:55 PM   Result Value Ref Range    Serum Osmolality 315 (H) 275 - 295 mOsm/kg   Troponin    Collection Time: 10/09/22  2:40 PM   Result Value Ref Range    Troponin, High Sensitivity 9 0 - 14 ng/L   Beta-Hydroxybutyrate    Collection Time: 10/09/22  2:40 PM   Result Value Ref Range    Beta-Hydroxybutyrate 0.10 0.02 - 0.27 mmol/L   Urinalysis with Microscopic    Collection Time: 10/09/22  3:25 PM   Result Value Ref Range    Color, UA Yellow Yellow    Turbidity UA Clear Clear    Glucose, Ur 3+ (A) NEGATIVE    Bilirubin Urine NEGATIVE NEGATIVE    Ketones, Urine NEGATIVE NEGATIVE    Specific Gravity, UA 1.025 1.005 - 1.030    Urine Hgb NEGATIVE NEGATIVE    pH, UA 5.5 5.0 - 8.0    Protein, UA TRACE (A) NEGATIVE    Urobilinogen, Urine Normal Normal    Nitrite, Urine POSITIVE (A) NEGATIVE    Leukocyte Esterase, Urine NEGATIVE NEGATIVE    WBC, UA 10 TO 20 0 - 5 /HPF    RBC, UA 0 TO 2 0 - 4 /HPF    Epithelial Cells UA 2 TO 5 0 - 5 /HPF    Bacteria, UA MANY (A) None   POC Glucose Fingerstick    Collection Time: 10/09/22  4:04 PM   Result Value Ref Range    POC Glucose 347 (H) 65 - 105 mg/dL         Imaging/Diagonstics:  XR CHEST PORTABLE    Result Date: 10/9/2022  EXAMINATION: ONE XRAY VIEW OF THE CHEST 10/9/2022 10:25 am COMPARISON: 01/04/2022 HISTORY: ORDERING SYSTEM PROVIDED HISTORY: Chest Pain TECHNOLOGIST PROVIDED HISTORY: Chest Pain FINDINGS: The lungs are without acute focal process. There is no effusion or pneumothorax. The cardiomediastinal silhouette is stable. The osseous structures are stable. No acute process. ASSESSMENT:       Principal Problem:    Hyperglycemia  Active Problems:    Neuropathy due to type 2 diabetes mellitus ([facility] [address]    Other hyperlipidemia    Acute cystitis without hematuria  Resolved Problems:    * No resolved hospital problems. *      PLAN:     Patient status: Admit the patient as Inpatient in Med/Surge    Hyperglycemia secondary to medication non-compliance -  -POC glucose on admission: 409; no anion gap , no acidosis , no ketones in urine   -Mildly increased osmolality at 315  ; ruled out HHS   -Most recent a1c 10/05: 9.8   -Hypoglycemia protocol   -Low dose sliding scale - Lantus 10 units   -Glucose checks every 6 hours   -IV fluids NS 100ml/hr       2. Urinary Tract Infection   -Rocephin 1000mg for 3 days   -UA positive for nitrites and bacteria  -Urine culture pending     3. Neuropathy secondary to type 2 diabetes  -Restarted Gabapentin 300mg tid     4. Hyperlipidemia   -Continue home lipitor 20mg     5.  History of recurrent pancreatitis 2/2 to hypertriglyceridemia   -Lipase mildly elevated 10/9 : 70  -Lipid panel pending         DVT prophylaxis: Lovenox 40 mg SC  GI prophylaxis: Protonix 40 mg daily      Consultations:   Consults: IP CONSULT TO HOSPITALIST  IP CONSULT TO FAMILY MEDICINE  IP CONSULT TO SOCIAL WORK          Plan will be discussed with the attending, Dr. Khris Flores, DO  Family Medicine Resident  10/9/2022 5:31 PM

## 2022-10-09 NOTE — ED NOTES
Pt have difficulty of sleeping, had nausea and body pain as well for a couple of days. She also have chest pain. She is known Diabetic and recently have changed her medication. Pt is weak looking but oriented and conscious.       Cassandra Epps, RN  10/09/22 [address] [facility] [address], RN  10/09/22 [id number]

## 2022-10-10 LAB
ABSOLUTE EOS #: 0.34 K/UL (ref 0–0.44)
ABSOLUTE IMMATURE GRANULOCYTE: <0.03 K/UL (ref 0–0.3)
ABSOLUTE LYMPH #: 3.48 K/UL (ref 1.1–3.7)
ABSOLUTE MONO #: 0.41 K/UL (ref 0.1–1.2)
ALBUMIN SERPL-MCNC: 3 G/DL (ref 3.5–5.2)
ALBUMIN/GLOBULIN RATIO: 1.3 (ref 1–2.5)
ALP BLD-CCNC: 104 U/L (ref 35–104)
ALT SERPL-CCNC: 10 U/L (ref 5–33)
AMPHETAMINE SCREEN URINE: NEGATIVE
ANION GAP SERPL CALCULATED.3IONS-SCNC: 10 MMOL/L (ref 9–17)
AST SERPL-CCNC: 13 U/L
BARBITURATE SCREEN URINE: NEGATIVE
BASOPHILS # BLD: 1 % (ref 0–2)
BASOPHILS ABSOLUTE: 0.04 K/UL (ref 0–0.2)
BENZODIAZEPINE SCREEN, URINE: NEGATIVE
BILIRUB SERPL-MCNC: <0.1 MG/DL (ref 0.3–1.2)
BUN BLDV-MCNC: 24 MG/DL (ref 6–20)
C-REACTIVE PROTEIN: 3.9 MG/L (ref 0–5)
CALCIUM SERPL-MCNC: 8.2 MG/DL (ref 8.6–10.4)
CANNABINOID SCREEN URINE: NEGATIVE
CHLORIDE BLD-SCNC: 107 MMOL/L (ref 98–107)
CO2: 21 MMOL/L (ref 20–31)
COCAINE METABOLITE, URINE: POSITIVE
CREAT SERPL-MCNC: 0.62 MG/DL (ref 0.5–0.9)
EKG ATRIAL RATE: 79 BPM
EKG P AXIS: 58 DEGREES
EKG P-R INTERVAL: 132 MS
EKG Q-T INTERVAL: 394 MS
EKG QRS DURATION: 90 MS
EKG QTC CALCULATION (BAZETT): 451 MS
EKG R AXIS: 77 DEGREES
EKG T AXIS: 85 DEGREES
EKG VENTRICULAR RATE: 79 BPM
EOSINOPHILS RELATIVE PERCENT: 5 % (ref 1–4)
FENTANYL URINE: NEGATIVE
GFR SERPL CREATININE-BSD FRML MDRD: >60 ML/MIN/1.73M2
GLUCOSE BLD-MCNC: 210 MG/DL (ref 65–105)
GLUCOSE BLD-MCNC: 225 MG/DL (ref 65–105)
GLUCOSE BLD-MCNC: 274 MG/DL (ref 65–105)
GLUCOSE BLD-MCNC: 287 MG/DL (ref 65–105)
GLUCOSE BLD-MCNC: 304 MG/DL (ref 70–99)
HCT VFR BLD CALC: 26.9 % (ref 36.3–47.1)
HEMOGLOBIN: 9.2 G/DL (ref 11.9–15.1)
IMMATURE GRANULOCYTES: 0 %
LYMPHOCYTES # BLD: 48 % (ref 24–43)
MCH RBC QN AUTO: 29.8 PG (ref 25.2–33.5)
MCHC RBC AUTO-ENTMCNC: 34.2 G/DL (ref 28.4–34.8)
MCV RBC AUTO: 87.1 FL (ref 82.6–102.9)
METHADONE SCREEN, URINE: NEGATIVE
MONOCYTES # BLD: 6 % (ref 3–12)
NRBC AUTOMATED: 0 PER 100 WBC
OPIATES, URINE: NEGATIVE
OXYCODONE SCREEN URINE: NEGATIVE
PDW BLD-RTO: 12.9 % (ref 11.8–14.4)
PHENCYCLIDINE, URINE: NEGATIVE
PLATELET # BLD: 176 K/UL (ref 138–453)
PMV BLD AUTO: 10.1 FL (ref 8.1–13.5)
POTASSIUM SERPL-SCNC: 4.1 MMOL/L (ref 3.7–5.3)
RBC # BLD: 3.09 M/UL (ref 3.95–5.11)
RHEUMATOID FACTOR: <10 IU/ML
SEDIMENTATION RATE, ERYTHROCYTE: 8 MM/HR (ref 0–30)
SEG NEUTROPHILS: 40 % (ref 36–65)
SEGMENTED NEUTROPHILS ABSOLUTE COUNT: 2.9 K/UL (ref 1.5–8.1)
SODIUM BLD-SCNC: 138 MMOL/L (ref 135–144)
TEST INFORMATION: ABNORMAL
TOTAL PROTEIN: 5.4 G/DL (ref 6.4–8.3)
TSH SERPL DL<=0.05 MIU/L-ACNC: 0.54 UIU/ML (ref 0.3–5)
WBC # BLD: 7.2 K/UL (ref 3.5–11.3)

## 2022-10-10 PROCEDURE — 6370000000 HC RX 637 (ALT 250 FOR IP): Performed by: STUDENT IN AN ORGANIZED HEALTH CARE EDUCATION/TRAINING PROGRAM

## 2022-10-10 PROCEDURE — 85652 RBC SED RATE AUTOMATED: CPT

## 2022-10-10 PROCEDURE — 36415 COLL VENOUS BLD VENIPUNCTURE: CPT

## 2022-10-10 PROCEDURE — 2500000003 HC RX 250 WO HCPCS

## 2022-10-10 PROCEDURE — 85025 COMPLETE CBC W/AUTO DIFF WBC: CPT

## 2022-10-10 PROCEDURE — 2580000003 HC RX 258

## 2022-10-10 PROCEDURE — 94761 N-INVAS EAR/PLS OXIMETRY MLT: CPT

## 2022-10-10 PROCEDURE — 93010 ELECTROCARDIOGRAM REPORT: CPT | Performed by: INTERNAL MEDICINE

## 2022-10-10 PROCEDURE — 6370000000 HC RX 637 (ALT 250 FOR IP)

## 2022-10-10 PROCEDURE — 80053 COMPREHEN METABOLIC PANEL: CPT

## 2022-10-10 PROCEDURE — 86140 C-REACTIVE PROTEIN: CPT

## 2022-10-10 PROCEDURE — 1200000000 HC SEMI PRIVATE

## 2022-10-10 PROCEDURE — 97530 THERAPEUTIC ACTIVITIES: CPT

## 2022-10-10 PROCEDURE — 86431 RHEUMATOID FACTOR QUANT: CPT

## 2022-10-10 PROCEDURE — 97162 PT EVAL MOD COMPLEX 30 MIN: CPT

## 2022-10-10 PROCEDURE — 99232 SBSQ HOSP IP/OBS MODERATE 35: CPT | Performed by: FAMILY MEDICINE

## 2022-10-10 PROCEDURE — 84443 ASSAY THYROID STIM HORMONE: CPT

## 2022-10-10 PROCEDURE — 86038 ANTINUCLEAR ANTIBODIES: CPT

## 2022-10-10 PROCEDURE — 86225 DNA ANTIBODY NATIVE: CPT

## 2022-10-10 PROCEDURE — 6360000002 HC RX W HCPCS

## 2022-10-10 PROCEDURE — 82947 ASSAY GLUCOSE BLOOD QUANT: CPT

## 2022-10-10 RX ORDER — INSULIN LISPRO 100 [IU]/ML
0-16 INJECTION, SOLUTION INTRAVENOUS; SUBCUTANEOUS
Status: DISCONTINUED | OUTPATIENT
Start: 2022-10-10 | End: 2022-10-15 | Stop reason: HOSPADM

## 2022-10-10 RX ORDER — INSULIN GLARGINE 100 [IU]/ML
14 INJECTION, SOLUTION SUBCUTANEOUS NIGHTLY
Status: DISCONTINUED | OUTPATIENT
Start: 2022-10-10 | End: 2022-10-10

## 2022-10-10 RX ORDER — INSULIN LISPRO 100 [IU]/ML
0-4 INJECTION, SOLUTION INTRAVENOUS; SUBCUTANEOUS NIGHTLY
Status: DISCONTINUED | OUTPATIENT
Start: 2022-10-10 | End: 2022-10-15 | Stop reason: HOSPADM

## 2022-10-10 RX ORDER — INSULIN GLARGINE 100 [IU]/ML
18 INJECTION, SOLUTION SUBCUTANEOUS NIGHTLY
Status: DISCONTINUED | OUTPATIENT
Start: 2022-10-10 | End: 2022-10-11

## 2022-10-10 RX ADMIN — INSULIN GLARGINE 18 UNITS: 100 INJECTION, SOLUTION SUBCUTANEOUS at 20:49

## 2022-10-10 RX ADMIN — ATORVASTATIN CALCIUM 20 MG: 20 TABLET, FILM COATED ORAL at 20:49

## 2022-10-10 RX ADMIN — SODIUM CHLORIDE, PRESERVATIVE FREE 10 ML: 5 INJECTION INTRAVENOUS at 09:24

## 2022-10-10 RX ADMIN — SODIUM CHLORIDE: 9 INJECTION, SOLUTION INTRAVENOUS at 06:08

## 2022-10-10 RX ADMIN — INSULIN LISPRO 4 UNITS: 100 INJECTION, SOLUTION INTRAVENOUS; SUBCUTANEOUS at 12:08

## 2022-10-10 RX ADMIN — ENOXAPARIN SODIUM 40 MG: 100 INJECTION SUBCUTANEOUS at 09:23

## 2022-10-10 RX ADMIN — GABAPENTIN 900 MG: 300 CAPSULE ORAL at 20:49

## 2022-10-10 RX ADMIN — CEFTRIAXONE SODIUM 1000 MG: 1 INJECTION, POWDER, FOR SOLUTION INTRAMUSCULAR; INTRAVENOUS at 17:36

## 2022-10-10 RX ADMIN — TRAZODONE HYDROCHLORIDE 150 MG: 50 TABLET ORAL at 20:50

## 2022-10-10 RX ADMIN — GABAPENTIN 900 MG: 300 CAPSULE ORAL at 15:00

## 2022-10-10 RX ADMIN — INSULIN LISPRO 8 UNITS: 100 INJECTION, SOLUTION INTRAVENOUS; SUBCUTANEOUS at 17:52

## 2022-10-10 RX ADMIN — GABAPENTIN 900 MG: 300 CAPSULE ORAL at 09:21

## 2022-10-10 RX ADMIN — SODIUM CHLORIDE: 9 INJECTION, SOLUTION INTRAVENOUS at 17:00

## 2022-10-10 RX ADMIN — INSULIN LISPRO 8 UNITS: 100 INJECTION, SOLUTION INTRAVENOUS; SUBCUTANEOUS at 09:19

## 2022-10-10 ASSESSMENT — ENCOUNTER SYMPTOMS
ABDOMINAL PAIN: 0
SHORTNESS OF BREATH: 0

## 2022-10-10 ASSESSMENT — PAIN SCALES - GENERAL: PAINLEVEL_OUTOF10: 0

## 2022-10-10 NOTE — PROGRESS NOTES
Patient's UDS positive for cocaine. We will avoid all beta-blockers at this time.     Electronically signed by Omar Conley MD on 10/10/2022 at 7:59 PM

## 2022-10-10 NOTE — PLAN OF CARE
Problem: Skin/Tissue Integrity  Goal: Absence of new skin breakdown  Description: 1. Monitor for areas of redness and/or skin breakdown  2. Assess vascular access sites hourly  3. Every 4-6 hours minimum:  Change oxygen saturation probe site  4. Every 4-6 hours:  If on nasal continuous positive airway pressure, respiratory therapy assess nares and determine need for appliance change or resting period.   Outcome: Progressing     Problem: Safety - Adult  Goal: Free from fall injury  Outcome: Progressing     Problem: Pain  Goal: Verbalizes/displays adequate comfort level or baseline comfort level  Outcome: Progressing     Problem: ABCDS Injury Assessment  Goal: Absence of physical injury  Outcome: Progressing     Problem: Chronic Conditions and Co-morbidities  Goal: Patient's chronic conditions and co-morbidity symptoms are monitored and maintained or improved  Outcome: Progressing

## 2022-10-10 NOTE — PROGRESS NOTES
[facility]  Progress Note    Date:   10/10/2022  Patient name:  Mariia Scott  Date of admission:  10/9/2022 12:36 PM  MRN:   [id number]  YOB: 1970    SUBJECTIVE/Last 24 hours update:       Patient seen and examined at bedside this morning  All vitals are stable overnight  No acute events overnight  Patient stated that she still feels very fatigued, still feels unstable when getting out of bed and still has the dizziness that she presented with on admission. She stated that she is also been having some swelling of her hands and feet which started yesterday  POC glucose 409-->347-->304  Her triglycerides came back elevated at 360, patient will be started on a fenofibrate at [place]   Evaluation patient stated that she has daytime sleepiness and stated there have been times when she is woken up out of breath from sleeping. Patient will be placed on pulse ox at night to assess for sleep apnea. Increased Lantus to 14 from 10 units  On Rocephin for UTI     HPI:   The patient is a 47 y.o.  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 Patient stated that she has been having lightheadedness intermittently for the last week. She currently takes 10 units of Lantus for type 2 diabetes however has been without it for the last 7 days and has not taken it. Patient also endorsed nausea, intermittent chest pains that has since resolved. She stated that she becomes really unstable when walking and becomes lightheaded and feels that she will pass out. Stated that she has been eating and drinking okay. Last week she also was also positive for a UTI however has not taken any antibiotics for it. Patient also complains of some right-sided congestion.         At ER, patient's vitals were all within normal limits, EKG was normal.  Chest x-ray showed no acute abnormality. Her glucose was elevated at 409. Urinalysis was positive for nitrite and bacteria. Patient had an elevated lipase at 70 her out choline phosphatase was also elevated at 153. she is admitted to the hospital for the management of hyperglycemia    REVIEW OF SYSTEMS:   Review of Systems   Constitutional:  Positive for fatigue. HENT:  Negative for congestion. Respiratory:  Negative for shortness of breath. Cardiovascular:  Negative for chest pain. Gastrointestinal:  Negative for abdominal pain. PAST MEDICAL HISTORY:      has a past medical history of Depression, Diabetes mellitus ([address] Hyperlipidemia, Pancreatitis, Pneumonia due to infectious organism, and Substance abuse ([address] PAST SURGICAL HISTORY:      has a past surgical history that includes Cholecystectomy; Ectopic pregnancy surgery; Carpal tunnel release (2014); Colonoscopy (N/A, 9/16/2019); Upper gastrointestinal endoscopy (N/A, 9/16/2019); and Upper gastrointestinal endoscopy (N/A, 12/27/2021). SOCIAL HISTORY:      reports that she has been smoking cigarettes. She has a 15.00 pack-year smoking history. She has never used smokeless tobacco. She reports current alcohol use. She reports current drug use. Drug: Cocaine. FAMILY HISTORY:     family history includes Alcohol Abuse in her brother; Bipolar Disorder in her brother; Cancer in her father; Coronary Art Dis in her mother; Diabetes in her brother, mother, and sister; Substance Abuse in her brother. HOME MEDICATIONS:      Prior to Admission medications    Medication Sig Start Date End Date Taking?  Authorizing Provider   lovastatin (MEVACOR) 40 MG tablet Take 1 tablet by mouth nightly 10/5/22   Chevy Holly MD   Melatonin 3 MG CAPS Take 3 mg by mouth at bedtime 10/5/22 11/4/22  Chevy Holly MD   traZODone (DESYREL) 150 MG tablet Take 1 tablet by mouth nightly 10/5/22   Chevy Holly MD   gabapentin (NEURONTIN) 300 MG capsule Take by mouth three times daily (900mg in AM and midday, 1200mg at night; patient has 300mg and 600mg capsules on hand) 10/5/22 10/5/22  Risa Rubio MD   blood glucose monitor kit and supplies Dispense sufficient amount for indicated testing frequency plus additional to accommodate PRN testing needs. Dispense all needed supplies to include: monitor, strips, lancing device, lancets, control solutions, alcohol swabs. 10/5/22   Risa Rubio MD   Lancets MISC 1 each by Does not apply route daily 10/5/22   Risa Rubio MD   blood glucose monitor strips Test 4 times a day & as needed for symptoms of irregular blood glucose. Dispense sufficient amount for indicated testing frequency plus additional to accommodate PRN testing needs.  10/5/22   Risa Rubio MD   empagliflozin (JARDIANCE) 10 MG tablet Take 1 tablet by mouth daily 10/5/22   Risa Rubio MD   insulin glargine (LANTUS SOLOSTAR) 100 UNIT/ML injection pen Inject 10 Units into the skin nightly 10/5/22   Risa Rubio MD   aluminum hydroxide-magnesium carbonate (GENATON)  MG/15ML suspension Take 15 mLs by mouth 4 times daily as needed for Indigestion  Patient not taking: No sig reported    Historical Provider, MD   gabapentin (NEURONTIN) 600 MG tablet Take by mouth three times daily (900mg in AM and midday, 1200mg at night; patient has 300mg and 600mg capsules on hand)  Patient not taking: No sig reported    Historical Provider, MD   ibuprofen (ADVIL;MOTRIN) 400 MG tablet Take 2 tablets by mouth every 8 hours as needed for Pain  Patient not taking: No sig reported 1/27/22   Reyes Singh MD   Blood Glucose Monitoring Suppl (ONE TOUCH ULTRA 2) w/Device KIT 1 kit by Does not apply route daily  Patient not taking: No sig reported 1/24/22   Franklin Brady MD   Lancets 30G MISC 1 each by Does not apply route daily  Patient not taking: No sig reported 1/24/22   Franklin Brady MD   Insulin Pen Needle 30G X 8 MM MISC 1 each by Does not apply route daily  Patient not taking: Reported on 10/5/2022 1/19/22   Mercedez Long MD   FreeStyle Lancets MISC 1 each by Does not apply route daily  Patient not taking: No sig reported 1/19/22   Mercedez Long MD   Multiple Vitamins-Minerals (MULTIVITAMIN WITH MINERALS) tablet Take 1 tablet by mouth daily  Patient not taking: No sig reported 1/19/22   Mercedez Long MD   famotidine (PEPCID) 20 MG tablet Take 1 tablet by mouth 2 times daily  Patient not taking: No sig reported 1/4/22   Dani Valentine MD   ondansetron (ZOFRAN ODT) 4 MG disintegrating tablet Take 1 tablet by mouth every 8 hours as needed for Nausea or Vomiting  Patient not taking: No sig reported 1/4/22   Dani Valentine MD   glucose monitoring (FREESTYLE) kit 4 times daily  Patient not taking: No sig reported 12/10/21   Mercedez Long MD   pantoprazole (PROTONIX) 40 MG tablet Take 1 tablet by mouth every morning (before breakfast)  Patient not taking: No sig reported 12/3/21   Deven Barrera MD       ALLERGIES:     Patient has no known allergies. OBJECTIVE:       Vitals:    10/09/22 1701 10/09/22 1732 10/09/22 1820 10/09/22 2016   BP: (!) 127/56 (!) 117/56 129/65 124/64   Pulse: 87 85 85 92   Resp: 16 16 16 16   Temp:   97.7 °F (36.5 °C) 97.7 °F (36.5 °C)   TempSrc:   Oral Oral   SpO2: 100% 99% 98% 99%   Height:    5' 4.5\" (1.638 m)         Intake/Output Summary (Last 24 hours) at 10/10/2022 0805  Last data filed at 10/9/2022 1732  Gross per 24 hour   Intake 1049.93 ml   Output --   Net 1049.93 ml       PHYSICAL EXAM:  Physical Exam  Vitals reviewed. Constitutional:       General: She is not in acute distress. Appearance: Normal appearance. Cardiovascular:      Rate and Rhythm: Normal rate and regular rhythm. Pulses: Normal pulses. Heart sounds: Normal heart sounds. No murmur heard. Pulmonary:      Effort: Pulmonary effort is normal.      Breath sounds: Normal breath sounds.    Abdominal:      General: Bowel sounds are normal.      Palpations: Abdomen is soft. Tenderness: There is no abdominal tenderness. Musculoskeletal:      Cervical back: Normal range of motion. Right lower leg: No edema. Left lower leg: No edema. Neurological:      Mental Status: She is alert.    Psychiatric:         Mood and Affect: Mood normal.       DIAGNOSTICS:     Laboratory Testing:    Recent Results (from the past 24 hour(s))   CBC    Collection Time: 10/09/22 12:55 PM   Result Value Ref Range    WBC 8.0 3.5 - 11.3 k/uL    RBC 3.89 (L) 3.95 - 5.11 m/uL    Hemoglobin 11.5 (L) 11.9 - 15.1 g/dL    Hematocrit 33.1 (L) 36.3 - 47.1 %    MCV 85.1 82.6 - 102.9 fL    MCH 29.6 25.2 - 33.5 pg    MCHC 34.7 28.4 - 34.8 g/dL    RDW 12.7 11.8 - 14.4 %    Platelets 831 083 - 590 k/uL    MPV 9.6 8.1 - 13.5 fL    NRBC Automated 0.0 0.0 per 100 WBC   Comprehensive Metabolic Panel    Collection Time: 10/09/22 12:55 PM   Result Value Ref Range    Glucose 409 (HH) 70 - 99 mg/dL    BUN 30 (H) 6 - 20 mg/dL    Creatinine 0.66 0.50 - 0.90 mg/dL    Est, Glom Filt Rate >60 >60 mL/min/1.73m2    Calcium 9.2 8.6 - 10.4 mg/dL    Sodium 134 (L) 135 - 144 mmol/L    Potassium 4.3 3.7 - 5.3 mmol/L    Chloride 100 98 - 107 mmol/L    CO2 23 20 - 31 mmol/L    Anion Gap 11 9 - 17 mmol/L    Alkaline Phosphatase 153 (H) 35 - 104 U/L    ALT 9 5 - 33 U/L    AST 10 <32 U/L    Total Bilirubin <0.1 (L) 0.3 - 1.2 mg/dL    Total Protein 6.7 6.4 - 8.3 g/dL    Albumin 3.9 3.5 - 5.2 g/dL    Albumin/Globulin Ratio 1.4 1.0 - 2.5   Troponin    Collection Time: 10/09/22 12:55 PM   Result Value Ref Range    Troponin, High Sensitivity 9 0 - 14 ng/L   Magnesium    Collection Time: 10/09/22 12:55 PM   Result Value Ref Range    Magnesium 1.8 1.6 - 2.6 mg/dL   Lipase    Collection Time: 10/09/22 12:55 PM   Result Value Ref Range    Lipase 70 (H) 13 - 60 U/L   Osmolality    Collection Time: 10/09/22 12:55 PM   Result Value Ref Range    Serum Osmolality 315 (H) 275 - 295 mOsm/kg   Troponin Collection Time: 10/09/22  2:40 PM   Result Value Ref Range    Troponin, High Sensitivity 9 0 - 14 ng/L   Beta-Hydroxybutyrate    Collection Time: 10/09/22  2:40 PM   Result Value Ref Range    Beta-Hydroxybutyrate 0.10 0.02 - 0.27 mmol/L   Urinalysis with Microscopic    Collection Time: 10/09/22  3:25 PM   Result Value Ref Range    Color, UA Yellow Yellow    Turbidity UA Clear Clear    Glucose, Ur 3+ (A) NEGATIVE    Bilirubin Urine NEGATIVE NEGATIVE    Ketones, Urine NEGATIVE NEGATIVE    Specific Gravity, UA 1.025 1.005 - 1.030    Urine Hgb NEGATIVE NEGATIVE    pH, UA 5.5 5.0 - 8.0    Protein, UA TRACE (A) NEGATIVE    Urobilinogen, Urine Normal Normal    Nitrite, Urine POSITIVE (A) NEGATIVE    Leukocyte Esterase, Urine NEGATIVE NEGATIVE    WBC, UA 10 TO 20 0 - 5 /HPF    RBC, UA 0 TO 2 0 - 4 /HPF    Epithelial Cells UA 2 TO 5 0 - 5 /HPF    Bacteria, UA MANY (A) None   POCT Glucose    Collection Time: 10/09/22  4:04 PM   Result Value Ref Range    Glucose 347 mg/dL    QC OK?  Yes    POC Glucose Fingerstick    Collection Time: 10/09/22  4:04 PM   Result Value Ref Range    POC Glucose 347 (H) 65 - 105 mg/dL   POC Glucose Fingerstick    Collection Time: 10/09/22  6:38 PM   Result Value Ref Range    POC Glucose 431 (HH) 65 - 105 mg/dL   Lipid Panel    Collection Time: 10/09/22  7:46 PM   Result Value Ref Range    Cholesterol 120 <200 mg/dL    HDL 19 (L) >40 mg/dL    LDL Cholesterol 29 0 - 130 mg/dL    Chol/HDL Ratio 6.3 (H) <5    Triglycerides 360 (H) <150 mg/dL   POC Glucose Fingerstick    Collection Time: 10/09/22  9:27 PM   Result Value Ref Range    POC Glucose 430 (HH) 65 - 105 mg/dL   POC Glucose Fingerstick    Collection Time: 10/09/22 11:34 PM   Result Value Ref Range    POC Glucose 321 (H) 65 - 105 mg/dL   Comprehensive Metabolic Panel w/ Reflex to MG    Collection Time: 10/10/22  5:13 AM   Result Value Ref Range    Glucose 304 (H) 70 - 99 mg/dL    BUN 24 (H) 6 - 20 mg/dL    Creatinine 0.62 0.50 - 0.90 mg/dL    Est, Glom Filt Rate >60 >60 mL/min/1.73m2    Calcium 8.2 (L) 8.6 - 10.4 mg/dL    Sodium 138 135 - 144 mmol/L    Potassium 4.1 3.7 - 5.3 mmol/L    Chloride 107 98 - 107 mmol/L    CO2 21 20 - 31 mmol/L    Anion Gap 10 9 - 17 mmol/L    Alkaline Phosphatase 104 35 - 104 U/L    ALT 10 5 - 33 U/L    AST 13 <32 U/L    Total Bilirubin <0.1 (L) 0.3 - 1.2 mg/dL    Total Protein 5.4 (L) 6.4 - 8.3 g/dL    Albumin 3.0 (L) 3.5 - 5.2 g/dL    Albumin/Globulin Ratio 1.3 1.0 - 2.5   CBC with Auto Differential    Collection Time: 10/10/22  5:13 AM   Result Value Ref Range    WBC 7.2 3.5 - 11.3 k/uL    RBC 3.09 (L) 3.95 - 5.11 m/uL    Hemoglobin 9.2 (L) 11.9 - 15.1 g/dL    Hematocrit 26.9 (L) 36.3 - 47.1 %    MCV 87.1 82.6 - 102.9 fL    MCH 29.8 25.2 - 33.5 pg    MCHC 34.2 28.4 - 34.8 g/dL    RDW 12.9 11.8 - 14.4 %    Platelets 749 694 - 806 k/uL    MPV 10.1 8.1 - 13.5 fL    NRBC Automated 0.0 0.0 per 100 WBC    Seg Neutrophils 40 36 - 65 %    Lymphocytes 48 (H) 24 - 43 %    Monocytes 6 3 - 12 %    Eosinophils % 5 (H) 1 - 4 %    Basophils 1 0 - 2 %    Immature Granulocytes 0 0 %    Segs Absolute 2.90 1.50 - 8.10 k/uL    Absolute Lymph # 3.48 1.10 - 3.70 k/uL    Absolute Mono # 0.41 0.10 - 1.20 k/uL    Absolute Eos # 0.34 0.00 - 0.44 k/uL    Basophils Absolute 0.04 0.00 - 0.20 k/uL    Absolute Immature Granulocyte <0.03 0.00 - 0.30 k/uL         Imaging/Diagonstics:  EKG: normal EKG, normal sinus rhythm. XR CHEST PORTABLE    Result Date: 10/9/2022  EXAMINATION: ONE XRAY VIEW OF THE CHEST 10/9/2022 10:25 am COMPARISON: 01/04/2022 HISTORY: ORDERING SYSTEM PROVIDED HISTORY: Chest Pain TECHNOLOGIST PROVIDED HISTORY: Chest Pain FINDINGS: The lungs are without acute focal process. There is no effusion or pneumothorax. The cardiomediastinal silhouette is stable. The osseous structures are stable. No acute process.        Current Facility-Administered Medications   Medication Dose Route Frequency Provider Last Rate Last Admin    insulin glargine (LANTUS) injection vial 10 Units  10 Units SubCUTAneous Nightly Quratulain Julian, DO   10 Units at 10/09/22 2137    atorvastatin (LIPITOR) tablet 20 mg  20 mg Oral Nightly Quratulain Julian, DO   20 mg at 10/09/22 2100    melatonin tablet 3 mg  3 mg Oral Nightly Quratulain Julian, DO   3 mg at 10/09/22 2100    traZODone (DESYREL) tablet 150 mg  150 mg Oral Nightly Quratulain Julian, DO   150 mg at 10/09/22 2100    sodium chloride flush 0.9 % injection 5-40 mL  5-40 mL IntraVENous 2 times per day Quratulain Julian, DO        sodium chloride flush 0.9 % injection 5-40 mL  5-40 mL IntraVENous PRN Quratulain Julian, DO        0.9 % sodium chloride infusion   IntraVENous PRN Quratulain Julian, DO        enoxaparin (LOVENOX) injection 40 mg  40 mg SubCUTAneous Daily Quratulain Julian, DO   40 mg at 10/09/22 2101    ondansetron (ZOFRAN-ODT) disintegrating tablet 4 mg  4 mg Oral Q8H PRN Quratulain Julian, DO        Or    ondansetron (ZOFRAN) injection 4 mg  4 mg IntraVENous Q6H PRN Quratulain Julian, DO        polyethylene glycol (GLYCOLAX) packet 17 g  17 g Oral Daily PRN Quratulain Julian, DO        acetaminophen (TYLENOL) tablet 650 mg  650 mg Oral Q6H PRN Quratulain Julian, DO        Or    acetaminophen (TYLENOL) suppository 650 mg  650 mg Rectal Q6H PRN Quratulain Julian, DO        glucose chewable tablet 16 g  4 tablet Oral PRN Quratulain Julian, DO        dextrose bolus 10% 125 mL  125 mL IntraVENous PRN Quratulain Julian, DO        Or    dextrose bolus 10% 250 mL  250 mL IntraVENous PRN Quratulain Julian, DO        glucagon (rDNA) injection 1 mg  1 mg SubCUTAneous PRN Quratulain Julian, DO        dextrose 10 % infusion   IntraVENous Continuous PRN Quratulain Julian, DO        cefTRIAXone (ROCEPHIN) 1,000 mg in sterile water 10 mL IV syringe  1,000 mg IntraVENous Q24H Quratulain Julian, DO        0.9 % sodium chloride infusion   IntraVENous Continuous Quratulain Julian,  mL/hr at 10/10/22 0608 [facility] at 10/10/22 4299    gabapentin (NEURONTIN) capsule 900 mg  900 mg Oral TID Tammie Morton MD   900 mg at 10/09/22 2236    insulin lispro (HUMALOG) injection vial 0-8 Units  0-8 Units SubCUTAneous TID WC Tammie Morton MD        insulin lispro (HUMALOG) injection vial 0-4 Units  0-4 Units SubCUTAneous Nightly Tammie Morton MD           ASSESSMENT:     Principal Problem:    Hyperglycemia  Active Problems:    Neuropathy due to type 2 diabetes mellitus ([address]    Other hyperlipidemia    Acute cystitis without hematuria  Resolved Problems:    * No resolved hospital problems. *      PLAN:       Patient status: Admit the patient as Inpatient in Med/Surge     Hyperglycemia secondary to medication non-compliance -  -POC glucose on admission: 409 -->321; no anion gap , no acidosis , no ketones in urine   -Mildly increased osmolality at 315  ; ruled out HHS   -Most recent a1c 10/05: 9.8   -Hypoglycemia protocol   -High  dose sliding scale - Lantus 10 units ; increased to 14 units of Lantus starting tonight 10/10  -Glucose checks every 6 hours   -IV fluids NS 100ml/hr         2. Urinary Tract Infection   -Rocephin 1000mg for 3 days   -UA positive for nitrites and bacteria  -Urine culture pending      3. Neuropathy secondary to type 2 diabetes  -Restarted Gabapentin 300mg tid      4. Hyperlipidemia   -Continue home lipitor 20mg      5. History of recurrent pancreatitis 2/2 to hypertriglyceridemia   -Lipase mildly elevated 10/9 : 70  -Lipid panel    -Cholesterol 120   -HDL low at 19   -LDL cholesterol normal     -Triglycerides elevated at 360    -We will be placing her on fenofibrate for the elevated triglycerides possibly at discharge      6.  Possible central sleep apnea   -Stop Bang score was 4   -Will be getting continuous pulse ox readings to rule out sleep apnea      DVT prophylaxis: Lovenox 40 mg SC  GI prophylaxis: Protonix 40 mg daily        Above plan discussed with the patient and family in room, who agreed to the above plan   Plan will be discussed with the attending, Dr. Fatimah Conti, DO  Family Medicine Resident  10/10/2022 8:05 AM

## 2022-10-10 NOTE — CARE COORDINATION
10/10/22 0902   Service Assessment   Patient Orientation Alert and Oriented   Cognition Alert   History Provided By Patient   Primary [address]  Family Members   Patient's Healthcare Decision Maker is: Legal Next of Bhavya Cordova   PCP Verified by CM Yes   Prior Functional Level Independent in ADLs/IADLs   Current Functional Level Independent in ADLs/IADLs   Can patient return to prior living arrangement Yes   Ability to make needs known: Fair   Family able to assist with home care needs: Yes   Financial Resources Medicaid   Social/Functional History   Lives With Family   Type of [address] One level   ADL Assistance Independent   Homemaking Assistance Independent   Ambulation Assistance Independent   Transfer Assistance Independent   Active  No   Mode of Transportation Family   Discharge Planning   Living Arrangements Family Members   One/[address] Provided? No   Mode of Transport at Discharge   (family)   Pt is independent. Will discharge to sister's or nieces. Will call family for transport.

## 2022-10-10 NOTE — PROGRESS NOTES
Physical Therapy  Facility/Department: [facility] ONC/MED SURG  Physical Therapy Initial Assessment    Name: Shermna Will  : 1970  MRN: [id number]  Date of Service: 10/10/2022  Chief Complaint   Patient presents with    Fatigue    Nausea    Chest Pain      Discharge Recommendations:  Patient would benefit from continued therapy after discharge   PT Equipment Recommendations  Equipment Needed: Yes  Mobility Devices: Omega Slim: Rolling      Patient Diagnosis(es): There were no encounter diagnoses. Past Medical History:  has a past medical history of Depression, Diabetes mellitus ([facility] [address] Hyperlipidemia, Pancreatitis, Pneumonia due to infectious organism, and Substance abuse ([facility] [address] Past Surgical History:  has a past surgical history that includes Cholecystectomy; Ectopic pregnancy surgery; Carpal tunnel release (); Colonoscopy (N/A, 2019); Upper gastrointestinal endoscopy (N/A, 2019); and Upper gastrointestinal endoscopy (N/A, 2021). Assessment   Body Structures, Functions, Activity Limitations Requiring Skilled Therapeutic Intervention: Decreased functional mobility ; Decreased strength;Decreased endurance  Assessment: The pt ambulated 75 ft with a RW x CGA . She had a c/o of unsteadiness with mobilization and a c/o increased pain in her feet . She could benefit from a continuation of PT for gait and strengthening  Therapy Prognosis: Good  Decision Making: Medium Complexity  Requires PT Follow-Up: Yes  Activity Tolerance  Activity Tolerance: Patient limited by fatigue;Patient limited by endurance     Plan   Physcial Therapy Plan  General Plan:  (5-6x wk)  Current Treatment Recommendations: Strengthening, Functional mobility training, Endurance training, Stair training, Gait training, Safety education & training  Safety Devices  Type of Devices: Left in bed, Call light within reach     Restrictions  Restrictions/Precautions  Restrictions/Precautions: Up as Tolerated  Position Activity Restriction  Other position/activity restrictions:  Up with assist     Subjective   General  Patient assessed for rehabilitation services?: Yes  Response To Previous Treatment: Not applicable  Family / Caregiver Present: No  Follows Commands: Within Functional Limits  Subjective  Subjective: Pt reports pain of 4/10 in both of her feet         Social/Functional History  Social/Functional History  Lives With: Family (sister)  Type of Home: House  Home Layout: Two level  Home Access: Stairs to enter without rails  Entrance Stairs - Number of Steps: 7  Bathroom Shower/Tub: Tub/Shower unit  Bathroom Toilet: Standard  Home Equipment:  (No DME at baseline)  ADL Assistance: [address]: Independent  Ambulation Assistance: Independent  Transfer Assistance: Independent  Active : No  Mode of Transportation: Family  Occupation: On disability  Leisure & Hobbies: Outdoor activities  [address]: Impaired  Vision Exceptions: Wears glasses at all times  Hearing  Hearing: Exceptions to [facility] (L ear hearing loss)    Cognition   Orientation  Overall Orientation Status: Within Functional Limits  Cognition  Overall Cognitive Status: WFL     Objective   Heart Rate: 77  Heart Rate Source: Monitor  BP: 111/61  BP Location: Left upper arm  BP Method: Automatic  Patient Position: Semi fowlers  MAP (Calculated): 77.67  Resp: 17  SpO2: 94 %  O2 Device: None (Room air)     AROM RLE (degrees)  RLE AROM: WNL  AROM LLE (degrees)  LLE AROM : WNL  AROM RUE (degrees)  RUE AROM : WNL  AROM LUE (degrees)  LUE AROM : WNL  Strength RLE  Strength RLE: WFL  Strength LLE  Strength LLE: WFL  Strength RUE  Strength RUE: WFL  Strength LUE  Strength LUE: WFL        Bed mobility  Supine to Sit: Stand by assistance  Sit to Supine: Stand by assistance  Scooting: Stand by assistance  Transfers  Sit to Stand: Contact guard assistance  Stand to Sit: Contact guard assistance  Ambulation  Surface: Level tile  Device: Rolling Walker  Assistance: Contact guard assistance  Gait Deviations: Decreased step length;Decreased step height  Distance: amb 75 ft with a RW x CGA     Balance  Posture: Good  Sitting - Static: Good  Sitting - Dynamic: Fair  Standing - Static: Good  Standing - Dynamic: Fair    OutComes Score                AM-PAC Score  AM-PAC Inpatient Mobility Raw Score : 21 (10/10/22 1412)  AM-PAC Inpatient T-Scale Score : 50.25 (10/10/22 1412)  Mobility Inpatient CMS 0-100% Score: 28.97 (10/10/22 1412)  Mobility Inpatient CMS G-Code Modifier : CJ (10/10/22 1412)        Tinneti Score    Goals  Short Term Goals  Time Frame for Short Term Goals: 10 visits  Short Term Goal 1: transfers with SBA  Short Term Goal 2: amb 150 ft with a RW x SBA  Short Term Goal 3: ascend/descend 4 steps with SBA  Short Term Goal 4: 20 min strengthening exercise program x SBA  Patient Goals   Patient Goals : Return home     Education  Patient Education  Education Given To: Patient  Education Provided: Role of Therapy;Plan of Care  Education Method: Verbal  Barriers to Learning: None  Education Outcome: Verbalized understanding      Therapy Time   Individual Concurrent Group Co-treatment   Time In 1330         Time Out 1353         Minutes 23             1 of [facility], PT

## 2022-10-11 ENCOUNTER — APPOINTMENT (OUTPATIENT)
Dept: ULTRASOUND IMAGING | Age: 52
DRG: 420 | End: 2022-10-11
Payer: MEDICAID

## 2022-10-11 LAB
ABSOLUTE EOS #: 0.28 K/UL (ref 0–0.44)
ABSOLUTE IMMATURE GRANULOCYTE: 0.08 K/UL (ref 0–0.3)
ABSOLUTE LYMPH #: 2.58 K/UL (ref 1.1–3.7)
ABSOLUTE MONO #: 0.44 K/UL (ref 0.1–1.2)
ALBUMIN SERPL-MCNC: 3 G/DL (ref 3.5–5.2)
ALBUMIN/GLOBULIN RATIO: 1 (ref 1–2.5)
ALP BLD-CCNC: 147 U/L (ref 35–104)
ALT SERPL-CCNC: 41 U/L (ref 5–33)
ANION GAP SERPL CALCULATED.3IONS-SCNC: 8 MMOL/L (ref 9–17)
ANTI DNA DOUBLE STRANDED: <0.5 IU/ML
ANTI-NUCLEAR ANTIBODY (ANA): NEGATIVE
AST SERPL-CCNC: 50 U/L
BASOPHILS # BLD: 0 % (ref 0–2)
BASOPHILS ABSOLUTE: <0.03 K/UL (ref 0–0.2)
BILIRUB SERPL-MCNC: <0.1 MG/DL (ref 0.3–1.2)
BUN BLDV-MCNC: 22 MG/DL (ref 6–20)
CALCIUM SERPL-MCNC: 8.3 MG/DL (ref 8.6–10.4)
CHLORIDE BLD-SCNC: 106 MMOL/L (ref 98–107)
CO2: 22 MMOL/L (ref 20–31)
CREAT SERPL-MCNC: 0.64 MG/DL (ref 0.5–0.9)
CULTURE: ABNORMAL
ENA ANTIBODIES SCREEN: 0.2 U/ML
EOSINOPHILS RELATIVE PERCENT: 4 % (ref 1–4)
GFR SERPL CREATININE-BSD FRML MDRD: >60 ML/MIN/1.73M2
GLUCOSE BLD-MCNC: 258 MG/DL (ref 65–105)
GLUCOSE BLD-MCNC: 275 MG/DL (ref 65–105)
GLUCOSE BLD-MCNC: 290 MG/DL (ref 65–105)
GLUCOSE BLD-MCNC: 302 MG/DL (ref 70–99)
GLUCOSE BLD-MCNC: 349 MG/DL (ref 65–105)
HCT VFR BLD CALC: 30.4 % (ref 36.3–47.1)
HEMOGLOBIN: 10.5 G/DL (ref 11.9–15.1)
IMMATURE GRANULOCYTES: 1 %
LYMPHOCYTES # BLD: 36 % (ref 24–43)
MCH RBC QN AUTO: 30.4 PG (ref 25.2–33.5)
MCHC RBC AUTO-ENTMCNC: 34.5 G/DL (ref 28.4–34.8)
MCV RBC AUTO: 88.1 FL (ref 82.6–102.9)
MONOCYTES # BLD: 6 % (ref 3–12)
NRBC AUTOMATED: 0 PER 100 WBC
PDW BLD-RTO: 13.1 % (ref 11.8–14.4)
PLATELET # BLD: 192 K/UL (ref 138–453)
PMV BLD AUTO: 9.8 FL (ref 8.1–13.5)
POTASSIUM SERPL-SCNC: 4.1 MMOL/L (ref 3.7–5.3)
RBC # BLD: 3.45 M/UL (ref 3.95–5.11)
SEG NEUTROPHILS: 53 % (ref 36–65)
SEGMENTED NEUTROPHILS ABSOLUTE COUNT: 3.81 K/UL (ref 1.5–8.1)
SODIUM BLD-SCNC: 136 MMOL/L (ref 135–144)
SPECIMEN DESCRIPTION: ABNORMAL
TOTAL PROTEIN: 5.9 G/DL (ref 6.4–8.3)
WBC # BLD: 7.2 K/UL (ref 3.5–11.3)

## 2022-10-11 PROCEDURE — 80053 COMPREHEN METABOLIC PANEL: CPT

## 2022-10-11 PROCEDURE — 94761 N-INVAS EAR/PLS OXIMETRY MLT: CPT

## 2022-10-11 PROCEDURE — 6370000000 HC RX 637 (ALT 250 FOR IP): Performed by: STUDENT IN AN ORGANIZED HEALTH CARE EDUCATION/TRAINING PROGRAM

## 2022-10-11 PROCEDURE — 6370000000 HC RX 637 (ALT 250 FOR IP)

## 2022-10-11 PROCEDURE — 36415 COLL VENOUS BLD VENIPUNCTURE: CPT

## 2022-10-11 PROCEDURE — 97535 SELF CARE MNGMENT TRAINING: CPT

## 2022-10-11 PROCEDURE — 97165 OT EVAL LOW COMPLEX 30 MIN: CPT

## 2022-10-11 PROCEDURE — 6360000002 HC RX W HCPCS: Performed by: STUDENT IN AN ORGANIZED HEALTH CARE EDUCATION/TRAINING PROGRAM

## 2022-10-11 PROCEDURE — 99232 SBSQ HOSP IP/OBS MODERATE 35: CPT | Performed by: FAMILY MEDICINE

## 2022-10-11 PROCEDURE — 82947 ASSAY GLUCOSE BLOOD QUANT: CPT

## 2022-10-11 PROCEDURE — 2580000003 HC RX 258

## 2022-10-11 PROCEDURE — 1200000000 HC SEMI PRIVATE

## 2022-10-11 PROCEDURE — 85025 COMPLETE CBC W/AUTO DIFF WBC: CPT

## 2022-10-11 PROCEDURE — 6360000002 HC RX W HCPCS

## 2022-10-11 PROCEDURE — 76705 ECHO EXAM OF ABDOMEN: CPT

## 2022-10-11 PROCEDURE — 84550 ASSAY OF BLOOD/URIC ACID: CPT

## 2022-10-11 RX ORDER — LEVOFLOXACIN 750 MG/1
750 TABLET ORAL EVERY 24 HOURS
Status: DISCONTINUED | OUTPATIENT
Start: 2022-10-11 | End: 2022-10-15 | Stop reason: HOSPADM

## 2022-10-11 RX ORDER — INSULIN GLARGINE 100 [IU]/ML
22 INJECTION, SOLUTION SUBCUTANEOUS NIGHTLY
Status: DISCONTINUED | OUTPATIENT
Start: 2022-10-11 | End: 2022-10-12

## 2022-10-11 RX ORDER — KETOROLAC TROMETHAMINE 15 MG/ML
15 INJECTION, SOLUTION INTRAMUSCULAR; INTRAVENOUS ONCE
Status: COMPLETED | OUTPATIENT
Start: 2022-10-11 | End: 2022-10-11

## 2022-10-11 RX ORDER — PANTOPRAZOLE SODIUM 40 MG/1
40 TABLET, DELAYED RELEASE ORAL
Status: DISCONTINUED | OUTPATIENT
Start: 2022-10-11 | End: 2022-10-15 | Stop reason: HOSPADM

## 2022-10-11 RX ORDER — ENOXAPARIN SODIUM 100 MG/ML
30 INJECTION SUBCUTANEOUS DAILY
Status: DISCONTINUED | OUTPATIENT
Start: 2022-10-12 | End: 2022-10-15 | Stop reason: HOSPADM

## 2022-10-11 RX ORDER — LIDOCAINE 4 G/G
1 PATCH TOPICAL DAILY
Status: DISCONTINUED | OUTPATIENT
Start: 2022-10-11 | End: 2022-10-15 | Stop reason: HOSPADM

## 2022-10-11 RX ADMIN — LEVOFLOXACIN 750 MG: 750 TABLET, FILM COATED ORAL at 15:56

## 2022-10-11 RX ADMIN — TRAZODONE HYDROCHLORIDE 150 MG: 50 TABLET ORAL at 20:58

## 2022-10-11 RX ADMIN — INSULIN GLARGINE 22 UNITS: 100 INJECTION, SOLUTION SUBCUTANEOUS at 20:56

## 2022-10-11 RX ADMIN — METFORMIN HYDROCHLORIDE 500 MG: 500 TABLET ORAL at 19:21

## 2022-10-11 RX ADMIN — INSULIN LISPRO 8 UNITS: 100 INJECTION, SOLUTION INTRAVENOUS; SUBCUTANEOUS at 12:07

## 2022-10-11 RX ADMIN — GABAPENTIN 900 MG: 300 CAPSULE ORAL at 09:51

## 2022-10-11 RX ADMIN — GABAPENTIN 900 MG: 300 CAPSULE ORAL at 20:57

## 2022-10-11 RX ADMIN — ENOXAPARIN SODIUM 40 MG: 100 INJECTION SUBCUTANEOUS at 09:52

## 2022-10-11 RX ADMIN — PANTOPRAZOLE SODIUM 40 MG: 40 TABLET, DELAYED RELEASE ORAL at 17:38

## 2022-10-11 RX ADMIN — GABAPENTIN 900 MG: 300 CAPSULE ORAL at 14:24

## 2022-10-11 RX ADMIN — ATORVASTATIN CALCIUM 20 MG: 20 TABLET, FILM COATED ORAL at 20:57

## 2022-10-11 RX ADMIN — ACETAMINOPHEN 650 MG: 325 TABLET ORAL at 14:28

## 2022-10-11 RX ADMIN — INSULIN LISPRO 8 UNITS: 100 INJECTION, SOLUTION INTRAVENOUS; SUBCUTANEOUS at 17:40

## 2022-10-11 RX ADMIN — KETOROLAC TROMETHAMINE 15 MG: 15 INJECTION, SOLUTION INTRAMUSCULAR; INTRAVENOUS at 17:36

## 2022-10-11 RX ADMIN — SODIUM CHLORIDE: 9 INJECTION, SOLUTION INTRAVENOUS at 17:40

## 2022-10-11 RX ADMIN — INSULIN LISPRO 12 UNITS: 100 INJECTION, SOLUTION INTRAVENOUS; SUBCUTANEOUS at 09:49

## 2022-10-11 ASSESSMENT — ENCOUNTER SYMPTOMS
ABDOMINAL PAIN: 0
SHORTNESS OF BREATH: 0

## 2022-10-11 NOTE — PLAN OF CARE
Problem: Skin/Tissue Integrity  Goal: Absence of new skin breakdown  Description: 1. Monitor for areas of redness and/or skin breakdown  2. Assess vascular access sites hourly  3. Every 4-6 hours minimum:  Change oxygen saturation probe site  4. Every 4-6 hours:  If on nasal continuous positive airway pressure, respiratory therapy assess nares and determine need for appliance change or resting period.   10/11/2022 0520 by Anh Canales RN  Outcome: Progressing  10/10/2022 1900 by Dandre Chan RN  Outcome: Progressing     Problem: Safety - Adult  Goal: Free from fall injury  10/11/2022 0520 by Anh Canales RN  Outcome: Progressing  10/10/2022 1900 by Dandre Chan RN  Outcome: Progressing     Problem: Pain  Goal: Verbalizes/displays adequate comfort level or baseline comfort level  10/11/2022 0520 by Anh Canales RN  Outcome: Progressing  10/10/2022 1900 by Dandre Chan RN  Outcome: Progressing     Problem: ABCDS Injury Assessment  Goal: Absence of physical injury  10/11/2022 0520 by Anh Canales RN  Outcome: Progressing  Flowsheets (Taken 10/10/2022 2000)  Absence of Physical Injury: Implement safety measures based on patient assessment  10/10/2022 1900 by Dandre Chan RN  Outcome: Progressing     Problem: Chronic Conditions and Co-morbidities  Goal: Patient's chronic conditions and co-morbidity symptoms are monitored and maintained or improved  10/11/2022 0520 by Anh Canales RN  Outcome: Progressing  10/10/2022 1900 by Dandre Chan RN  Outcome: Progressing

## 2022-10-11 NOTE — PROGRESS NOTES
Occupational Therapy  Facility/Department: [facility]  Occupational Therapy Initial Assessment    Name: Lety Devi  : 1970  MRN: [id number]  Date of Service: 10/11/2022    Discharge Recommendations:  Patient would benefit from continued therapy after discharge  OT Equipment Recommendations  Equipment Needed: No       Patient Diagnosis(es): The encounter diagnosis was Acute cystitis without hematuria. Past Medical History:  has a past medical history of Depression, Diabetes mellitus ([facility] [address] Hyperlipidemia, Pancreatitis, Pneumonia due to infectious organism, and Substance abuse ([facility] [address] Past Surgical History:  has a past surgical history that includes Cholecystectomy; Ectopic pregnancy surgery; Carpal tunnel release (); Colonoscopy (N/A, 2019); Upper gastrointestinal endoscopy (N/A, 2019); and Upper gastrointestinal endoscopy (N/A, 2021). Per family med note: The patient is a 47 y.o.  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 Patient stated that she has been having lightheadedness intermittently for the last week. She currently takes 10 units of Lantus for type 2 diabetes however has been without it for the last 7 days and has not taken it. Patient also endorsed nausea, intermittent chest pains that has since resolved. She stated that she becomes really unstable when walking and becomes lightheaded and feels that she will pass out. Stated that she has been eating and drinking okay. Last week she also was also positive for a UTI however has not taken any antibiotics for it. Patient also complains of some right-sided congestion. Assessment   Performance deficits / Impairments: Decreased functional mobility ; Decreased ADL status; Decreased strength;Decreased safe awareness;Decreased balance;Decreased high-level IADLs  Assessment: Impaired self care status  Prognosis: Good  Decision Making: Low Complexity  REQUIRES OT FOLLOW-UP: Yes  Activity Tolerance  Activity Tolerance: Patient Tolerated treatment well        Plan   Occupational Therapy Plan  Times Per Week: 2-3x/week  Current Treatment Recommendations: Strengthening, Functional mobility training, Endurance training, Balance training, Pain management, Safety education & training, Patient/Caregiver education & training, Equipment evaluation, education, & procurement, Self-Care / ADL, Home management training     Restrictions  Restrictions/Precautions  Restrictions/Precautions: Up as Tolerated, General Precautions  Required Braces or Orthoses?: No  Position Activity Restriction  Other position/activity restrictions: Up with assist    Subjective   General  Patient assessed for rehabilitation services?: Yes  Family / Caregiver Present: No  Subjective  Subjective: \"It's just hard to stand on swollen feet\" pt reports regarding decreased functional mobility  General Comment  Comments: RN okayed for therapy. pt agreeable to eval. Pt reports 7/10 pain in B feet/R shoulder. Pt able to progress with treatment     Social/Functional History  Social/Functional History  Lives With: Family (neice)  Type of Home: House  Home Layout: Two level  Home Access: Stairs to enter without rails  Entrance Stairs - Number of Steps: 7  Bathroom Shower/Tub: Tub/Shower unit  Bathroom Toilet: Standard  Home Equipment:  (No DME at baseline)  Has the patient had two or more falls in the past year or any fall with injury in the past year?: No  ADL Assistance: Independent  Homemaking Assistance: Independent  Homemaking Responsibilities: Yes  Ambulation Assistance: Independent  Transfer Assistance: Independent  Active : No  Mode of Transportation: Family  Occupation: On disability  Leisure & Hobbies: Outdoor activities  Additional Comments: Pt reports sister or neice can assist PRN.  Edi Becker is a home health aid       Objective                Safety Devices  Type of Devices: Left in bed;Call light within reach;Gait belt;Patient at risk for falls  Restraints  Restraints Initially in Place: No  Bed Mobility Training  Bed Mobility Training: Yes  Supine to Sit:  (pt sitting EOB at start of eval)  Sit to Supine: Stand-by assistance  Scooting: Stand-by assistance  Balance  Sitting: Without support (~10 mins EOB SBA)  Standing: Without support (~2 mins CGA; no LOB noted)  Transfer Training  Transfer Training: Yes  Overall Level of Assistance: Stand-by assistance  Sit to Stand: Stand-by assistance  Stand to Sit: Stand-by assistance  Gait  Overall Level of Assistance: Contact-guard assistance (functional mobility in room; No LOB noted)     AROM: Within functional limits  Strength: Generally decreased, functional (LUE 3+/5 grossly; RUE 4-/5 grossly)  Coordination: Within functional limits  Tone: Normal  Sensation: Intact  ADL  Feeding: Independent  Grooming: Independent  UE Bathing: Independent  LE Bathing: Contact guard assistance  UE Dressing: Independent  LE Dressing: Contact guard assistance  Toileting: Contact guard assistance  Additional Comments: Pt EOB eating breakfast at start of session. Pt able to don/doff socks seated EOB SBA. Pt sit<>stand stand<>sit SBA with no LOB. Pt required CGA and increased time for functional mobility stating their swelling in B feet were making functional mobiliy more difficult than usual. Pt SBA bed mobility.            Transfers  Sit to stand: Stand by assistance  Stand to sit: Stand by assistance  Vision  Vision: Impaired  Vision Exceptions: Wears glasses at all times  Hearing  Hearing: Exceptions to [facility] (hearing loss in L ear)  Cognition  Overall Cognitive Status: [facility]  Orientation  Orientation Level: Oriented X4                  Education Given To: Patient  Education Provided: Role of Therapy;Plan of Care  Education Method: Verbal  Barriers to Learning: None  Education Outcome: Verbalized understanding                                                                     AM-PAC Score        AM-PAC Inpatient Daily Activity Raw Score: 21 (10/11/22 1414)  AM-PAC Inpatient ADL T-Scale Score : 44.27 (10/11/22 1414)  ADL Inpatient CMS 0-100% Score: 32.79 (10/11/22 1414)  ADL Inpatient CMS G-Code Modifier : CJ (10/11/22 1414)           Goals  Short Term Goals  Time Frame for Short Term Goals: by discharge  Short Term Goal 1: Pt will tolerate 20 mins functional activity while standing SBA  Short Term Goal 2: Pt will complete functional mobility/transfers with Supervision  Short Term Goal 3: Pt will demo Good dynamic standing balance during functional activity SBA  Short Term Goal 4: Pt will complete LB ADL's SBA  Patient Goals   Patient goals : to go home       Therapy Time   Individual Concurrent Group Co-treatment   Time In 0908         Time Out 0922         Minutes 14         Timed Code Treatment Minutes: [address] OTR/L

## 2022-10-11 NOTE — PROGRESS NOTES
Physical Therapy        Physical Therapy Cancel Note      DATE: 10/11/2022    NAME: Sima Varner  MRN: [id number]   : 1970      Patient not seen this date for Physical Therapy due to:    Patient Declined: Pt stated her stomach hurts; she didn't want to ambulate with PT.  RN notified. Plan to check back tomorrow 10/12/2022.       Electronically signed by Dale Lao PTA on 10/11/2022 at 3:16 PM

## 2022-10-11 NOTE — PROGRESS NOTES
[facility]  Progress Note    Date:   10/11/2022  Patient name:  Jose C Alvares  Date of admission:  10/9/2022 12:36 PM  MRN:   [id number]  YOB: 1970    SUBJECTIVE/Last 24 hours update:     Patient seen and examined at bedside this morning   Patient's UDS was positive for cocaine. While discussing with patient , she admitted to using cocaine \"one line\" past weekend. She stated the last time she was regularly using cocaine was July 2021 and had a relapse this weekend. Her dizziness and instability has improved . Still endorses fatigue at this time. Overinight patient  was placed on 2 L nasal cannula , we weaned her down , and was on room air during examination   BP elevated at 153/62 , all other vitals are stable   I/O: -460  Urine culture in progress  On rocephin for UTI   Currently on 18 units of Lantus   Considering adding 10 more units   POC glucose : 430-->321-->210-->225 (10/11)       Depression Screening   Patient reports 6 hours average per night of sleeping but says its not deep sleep and is constantly waking up q2hrs x6yrs. She use to go out with friends and loved dancing but now no longer takes interest x2yrs after father of her child gained custody. Says she often feels guilty and hopeless due to losing custody of her son. Energy and concentration are described as low. Says appetite is usually low as well except in the hospital she says she has been eating a lot. Pyshomotor is considered slow. No suicidal ideation as of right now but tried to commit suicide via medication ingestion 23 years ago and was hospitalized. Reports periods of time with elevated mood when patient says she gets \"chatty\" and likes to get creative with cooking and move furniture around in the house about 5 times a week. Denies grandiose ideas or history of hospitalization for these episodes.    Has tried antidepressants/medications in the past and said they helped. Does not wish to try therapy. Pt denies cocaine use despite sharing the positive urinanalysis. Attirubutes her postive test from using muscle relaxants prescribed for her lower back pain. She says she stopped using cocaine more than a year ago. HPI:   The patient is a 47 y.o.  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 Patient stated that she has been having lightheadedness intermittently for the last week. She currently takes 10 units of Lantus for type 2 diabetes however has been without it for the last 7 days and has not taken it. Patient also endorsed nausea, intermittent chest pains that has since resolved. She stated that she becomes really unstable when walking and becomes lightheaded and feels that she will pass out. Stated that she has been eating and drinking okay. Last week she also was also positive for a UTI however has not taken any antibiotics for it. Patient also complains of some right-sided congestion. At ER, patient's vitals were all within normal limits, EKG was normal.  Chest x-ray showed no acute abnormality. Her glucose was elevated at 409. Urinalysis was positive for nitrite and bacteria. Patient had an elevated lipase at 70 her out choline phosphatase was also elevated at 153. she is admitted to the hospital for the management of hyperglycemia    REVIEW OF SYSTEMS:   Review of Systems   Constitutional:  Positive for fatigue. Negative for chills and fever. HENT:  Negative for congestion. Eyes:  Negative for visual disturbance. Respiratory:  Negative for shortness of breath. Cardiovascular:  Negative for chest pain. Gastrointestinal:  Negative for abdominal pain. Genitourinary:  Negative for difficulty urinating. Neurological:  Negative for seizures. Hematological:  Negative for adenopathy. PAST MEDICAL HISTORY:      has a past medical history of Depression, Diabetes mellitus ([facility] [address] Hyperlipidemia, Pancreatitis, Pneumonia due to infectious organism, and Substance abuse ([facility] [address] PAST SURGICAL HISTORY:      has a past surgical history that includes Cholecystectomy; Ectopic pregnancy surgery; Carpal tunnel release (2014); Colonoscopy (N/A, 9/16/2019); Upper gastrointestinal endoscopy (N/A, 9/16/2019); and Upper gastrointestinal endoscopy (N/A, 12/27/2021). SOCIAL HISTORY:      reports that she has been smoking cigarettes. She has a 15.00 pack-year smoking history. She has never used smokeless tobacco. She reports current alcohol use. She reports current drug use. Drug: Cocaine. FAMILY HISTORY:     family history includes Alcohol Abuse in her brother; Bipolar Disorder in her brother; Cancer in her father; Coronary Art Dis in her mother; Diabetes in her brother, mother, and sister; Substance Abuse in her brother. HOME MEDICATIONS:      Prior to Admission medications    Medication Sig Start Date End Date Taking? Authorizing Provider   lovastatin (MEVACOR) 40 MG tablet Take 1 tablet by mouth nightly 10/5/22   Gale Valdez MD   Melatonin 3 MG CAPS Take 3 mg by mouth at bedtime 10/5/22 11/4/22  Gale Valdez MD   traZODone (DESYREL) 150 MG tablet Take 1 tablet by mouth nightly 10/5/22   Gale Valdez MD   gabapentin (NEURONTIN) 300 MG capsule Take by mouth three times daily (900mg in AM and midday, 1200mg at night; patient has 300mg and 600mg capsules on hand) 10/5/22 10/5/22  Gale Valdez MD   blood glucose monitor kit and supplies Dispense sufficient amount for indicated testing frequency plus additional to accommodate PRN testing needs. Dispense all needed supplies to include: monitor, strips, lancing device, lancets, control solutions, alcohol swabs.  10/5/22   Gale Valdez MD   Lancets MISC 1 each by Does not apply route daily 10/5/22   Gale Valdez MD   blood glucose monitor strips Test 4 times a day & as needed for symptoms of irregular blood glucose. Dispense sufficient amount for indicated testing frequency plus additional to accommodate PRN testing needs.  10/5/22   Isrrael Hu MD   empagliflozin (JARDIANCE) 10 MG tablet Take 1 tablet by mouth daily 10/5/22   Isrrael Hu MD   insulin glargine (LANTUS SOLOSTAR) 100 UNIT/ML injection pen Inject 10 Units into the skin nightly 10/5/22   Isrrael Hu MD   aluminum hydroxide-magnesium carbonate (GENATON)  MG/15ML suspension Take 15 mLs by mouth 4 times daily as needed for Indigestion  Patient not taking: No sig reported    Historical Provider, MD   gabapentin (NEURONTIN) 600 MG tablet Take by mouth three times daily (900mg in AM and midday, 1200mg at night; patient has 300mg and 600mg capsules on hand)  Patient not taking: No sig reported    Historical Provider, MD   ibuprofen (ADVIL;MOTRIN) 400 MG tablet Take 2 tablets by mouth every 8 hours as needed for Pain  Patient not taking: No sig reported 1/27/22   João Lyon MD   Blood Glucose Monitoring Suppl (ONE TOUCH ULTRA 2) w/Device KIT 1 kit by Does not apply route daily  Patient not taking: No sig reported 1/24/22   Miya Darling MD   Lancets 30G MISC 1 each by Does not apply route daily  Patient not taking: No sig reported 1/24/22   Miya Darling MD   Insulin Pen Needle 30G X 8 MM MISC 1 each by Does not apply route daily  Patient not taking: Reported on 10/5/2022 1/19/22   Miya Darling MD   FreeStyle Lancets MISC 1 each by Does not apply route daily  Patient not taking: No sig reported 1/19/22   Miya Darling MD   Multiple Vitamins-Minerals (MULTIVITAMIN WITH MINERALS) tablet Take 1 tablet by mouth daily  Patient not taking: No sig reported 1/19/22   Miya Darling MD   famotidine (PEPCID) 20 MG tablet Take 1 tablet by mouth 2 times daily  Patient not taking: No sig reported 1/4/22   João Lyon MD   ondansetron (ZOFRAN ODT) 4 MG disintegrating tablet Take 1 tablet by mouth every 8 hours as needed for Nausea or Vomiting  Patient not taking: No sig reported 1/4/22   Tony Garcia MD   glucose monitoring (FREESTYLE) kit 4 times daily  Patient not taking: No sig reported 12/10/21   Jaylin Murray MD   pantoprazole (PROTONIX) 40 MG tablet Take 1 tablet by mouth every morning (before breakfast)  Patient not taking: No sig reported 12/3/21   Emperatriz Sher MD       ALLERGIES:     Patient has no known allergies. OBJECTIVE:       Vitals:    10/09/22 1820 10/09/22 2016 10/10/22 0820 10/10/22 2045   BP: 129/65 124/64 111/61 (!) 153/62   Pulse: 85 92 77 99   Resp: 16 16 17 16   Temp: 97.7 °F (36.5 °C) 97.7 °F (36.5 °C) 98.2 °F (36.8 °C) 98.6 °F (37 °C)   TempSrc: Oral Oral Axillary Oral   SpO2: 98% 99% 94% 96%   Height:  5' 4.5\" (1.638 m)           Intake/Output Summary (Last 24 hours) at 10/11/2022 0733  Last data filed at 10/11/2022 0552  Gross per 24 hour   Intake 840 ml   Output 2350 ml   Net -1510 ml       PHYSICAL EXAM:  Physical Exam  Vitals reviewed. Constitutional:       General: She is not in acute distress. Appearance: Normal appearance. Cardiovascular:      Rate and Rhythm: Normal rate and regular rhythm. Pulses: Normal pulses. Heart sounds: Normal heart sounds. No murmur heard. Pulmonary:      Effort: Pulmonary effort is normal.      Breath sounds: Normal breath sounds. Abdominal:      General: Bowel sounds are normal.      Palpations: Abdomen is soft. Tenderness: There is no abdominal tenderness. Musculoskeletal:      Cervical back: Normal range of motion. Right lower leg: No edema. Left lower leg: No edema. Neurological:      Mental Status: She is alert.    Psychiatric:         Mood and Affect: Mood normal.       DIAGNOSTICS:     Laboratory Testing:    Recent Results (from the past 24 hour(s))   POC Glucose Fingerstick    Collection Time: 10/10/22  8:22 AM   Result Value Ref Range    POC Glucose 274 (H) 65 - 105 mg/dL   POC Glucose Fingerstick    Collection Time: 10/10/22 11:45 AM   Result Value Ref Range    POC Glucose 210 (H) 65 - 105 mg/dL   POC Glucose Fingerstick    Collection Time: 10/10/22  4:33 PM   Result Value Ref Range    POC Glucose 287 (H) 65 - 105 mg/dL   POC Glucose Fingerstick    Collection Time: 10/10/22  8:27 PM   Result Value Ref Range    POC Glucose 225 (H) 65 - 105 mg/dL   Comprehensive Metabolic Panel w/ Reflex to MG    Collection Time: 10/11/22  5:54 AM   Result Value Ref Range    Glucose 302 (H) 70 - 99 mg/dL    BUN 22 (H) 6 - 20 mg/dL    Creatinine 0.64 0.50 - 0.90 mg/dL    Est, Glom Filt Rate >60 >60 mL/min/1.73m2    Calcium 8.3 (L) 8.6 - 10.4 mg/dL    Sodium 136 135 - 144 mmol/L    Potassium 4.1 3.7 - 5.3 mmol/L    Chloride 106 98 - 107 mmol/L    CO2 22 20 - 31 mmol/L    Anion Gap 8 (L) 9 - 17 mmol/L    Alkaline Phosphatase 147 (H) 35 - 104 U/L    ALT 41 (H) 5 - 33 U/L    AST 50 (H) <32 U/L    Total Bilirubin <0.1 (L) 0.3 - 1.2 mg/dL    Total Protein 5.9 (L) 6.4 - 8.3 g/dL    Albumin 3.0 (L) 3.5 - 5.2 g/dL    Albumin/Globulin Ratio 1.0 1.0 - 2.5   CBC with Auto Differential    Collection Time: 10/11/22  5:54 AM   Result Value Ref Range    WBC 7.2 3.5 - 11.3 k/uL    RBC 3.45 (L) 3.95 - 5.11 m/uL    Hemoglobin 10.5 (L) 11.9 - 15.1 g/dL    Hematocrit 30.4 (L) 36.3 - 47.1 %    MCV 88.1 82.6 - 102.9 fL    MCH 30.4 25.2 - 33.5 pg    MCHC 34.5 28.4 - 34.8 g/dL    RDW 13.1 11.8 - 14.4 %    Platelets 716 442 - 734 k/uL    MPV 9.8 8.1 - 13.5 fL    NRBC Automated 0.0 0.0 per 100 WBC    Seg Neutrophils 53 36 - 65 %    Lymphocytes 36 24 - 43 %    Monocytes 6 3 - 12 %    Eosinophils % 4 1 - 4 %    Basophils 0 0 - 2 %    Immature Granulocytes 1 (H) 0 %    Segs Absolute 3.81 1.50 - 8.10 k/uL    Absolute Lymph # 2.58 1.10 - 3.70 k/uL    Absolute Mono # 0.44 0.10 - 1.20 k/uL    Absolute Eos # 0.28 0.00 - 0.44 k/uL    Basophils Absolute <0.03 0.00 - 0.20 k/uL    Absolute Immature Granulocyte 0.08 0.00 - 0.30 k/uL Imaging/Diagonstics:  EKG: normal EKG, normal sinus rhythm. XR CHEST PORTABLE    Result Date: 10/9/2022  EXAMINATION: ONE XRAY VIEW OF THE CHEST 10/9/2022 10:25 am COMPARISON: 01/04/2022 HISTORY: ORDERING SYSTEM PROVIDED HISTORY: Chest Pain TECHNOLOGIST PROVIDED HISTORY: Chest Pain FINDINGS: The lungs are without acute focal process. There is no effusion or pneumothorax. The cardiomediastinal silhouette is stable. The osseous structures are stable. No acute process.        Current Facility-Administered Medications   Medication Dose Route Frequency Provider Last Rate Last Admin    insulin lispro (HUMALOG) injection vial 0-16 Units  0-16 Units SubCUTAneous TID WC Quratulain Julian, DO   8 Units at 10/10/22 1752    insulin lispro (HUMALOG) injection vial 0-4 Units  0-4 Units SubCUTAneous Nightly Quratulain Julian, DO        insulin glargine (LANTUS) injection vial 18 Units  18 Units SubCUTAneous Nightly Gracie Way MD   18 Units at 10/10/22 2049    atorvastatin (LIPITOR) tablet 20 mg  20 mg Oral Nightly Quratulain Julian, DO   20 mg at 10/10/22 2049    traZODone (DESYREL) tablet 150 mg  150 mg Oral Nightly Quratulain Julian, DO   150 mg at 10/10/22 2050    sodium chloride flush 0.9 % injection 5-40 mL  5-40 mL IntraVENous 2 times per day Quratulain Julian, DO   10 mL at 10/10/22 0924    sodium chloride flush 0.9 % injection 5-40 mL  5-40 mL IntraVENous PRN Quratulain Julian, DO        0.9 % sodium chloride infusion   IntraVENous PRN Quratulain Julian, DO        enoxaparin (LOVENOX) injection 40 mg  40 mg SubCUTAneous Daily Quratulain Julian, DO   40 mg at 10/10/22 0923    ondansetron (ZOFRAN-ODT) disintegrating tablet 4 mg  4 mg Oral Q8H PRN Quratulain Julian, DO        Or    ondansetron (ZOFRAN) injection 4 mg  4 mg IntraVENous Q6H PRN Quratulain Julian, DO        polyethylene glycol (GLYCOLAX) packet 17 g  17 g Oral Daily PRN Quratulain Julian, DO        acetaminophen (TYLENOL) tablet 650 mg  650 mg Oral Q6H PRN Darien Amas, DO        Or    acetaminophen (TYLENOL) suppository 650 mg  650 mg Rectal Q6H PRN Quratulain Julian, DO        glucose chewable tablet 16 g  4 tablet Oral PRN Quratulain Julian, DO        dextrose bolus 10% 125 mL  125 mL IntraVENous PRN Quratulain Julian, DO        Or    dextrose bolus 10% 250 mL  250 mL IntraVENous PRN Quratulain Julian, DO        glucagon (rDNA) injection 1 mg  1 mg SubCUTAneous PRN Quratulain Julian, DO        dextrose 10 % infusion   IntraVENous Continuous PRN Quratulain Julian, DO        cefTRIAXone (ROCEPHIN) 1,000 mg in sterile water 10 mL IV syringe  1,000 mg IntraVENous Q24H Quratulain Julian, DO   1,000 mg at 10/10/22 1736    0.9 % sodium chloride infusion   IntraVENous Continuous Quratulain Julian,  mL/hr at 10/10/22 1700 New Bag at 10/10/22 1700    gabapentin (NEURONTIN) capsule 900 mg  900 mg Oral TID Urbano Pino MD   900 mg at 10/10/22 2049       ASSESSMENT:     Principal Problem:    Hyperglycemia  Active Problems:    Neuropathy due to type 2 diabetes mellitus ([facility] [address]    Other hyperlipidemia    Acute cystitis without hematuria  Resolved Problems:    * No resolved hospital problems. *      PLAN:     Patient status: Admit the patient as Inpatient in Med/Surge     Hyperglycemia secondary to medication non-compliance -  -POC glucose on admission: 409 -->321-->225; no anion gap , no acidosis , no ketones in urine   -Mildly increased osmolality at 315  ; ruled out HHS   -Most recent a1c 10/05: 9.8   -Hypoglycemia protocol   -High  dose sliding scale - Lantus 10 units ; increased to 18 units of Lantus   -Glucose checks every 6 hours   -IV fluids NS 100ml/hr         2. Urinary Tract Infection   -Rocephin 1000mg for 3 days ; last dose 10/12   -UA positive for nitrites and bacteria  -Urine culture pending      3. Neuropathy secondary to type 2 diabetes  -Restarted Gabapentin 300mg tid      4. Hyperlipidemia   -Continue home lipitor 20mg      5.  History of recurrent pancreatitis 2/2 to hypertriglyceridemia   -Lipase mildly elevated 10/9 : 70  -Lipid panel               -Cholesterol 120              -HDL low at 19              -LDL cholesterol normal                -Triglycerides elevated at 360      6.  UDS positive for cocaine  -Avoid using beta blockers   -Will follow up patient , considering echo outpatient   -outpatient referral for substance abuse treatment      DVT prophylaxis: Lovenox 40 mg SC  GI prophylaxis: Protonix 40 mg daily          Above plan discussed with the patient and family in room, who agreed to the above plan   Plan will be discussed with the attending,       [address] [place] [address] [address], [facility],   Family Medicine Resident  10/11/2022 7:33 AM

## 2022-10-11 NOTE — PLAN OF CARE
Problem: Skin/Tissue Integrity  Goal: Absence of new skin breakdown  Description: 1. Monitor for areas of redness and/or skin breakdown  2. Assess vascular access sites hourly  3. Every 4-6 hours minimum:  Change oxygen saturation probe site  4. Every 4-6 hours:  If on nasal continuous positive airway pressure, respiratory therapy assess nares and determine need for appliance change or resting period.   10/11/2022 1824 by Chicho Bianchi RN  Outcome: Progressing  10/11/2022 0520 by Clyde Sacks, RN  Outcome: Progressing     Problem: Safety - Adult  Goal: Free from fall injury  10/11/2022 1824 by Chicho Bianchi RN  Outcome: Progressing  10/11/2022 0520 by Clyde Sacks, RN  Outcome: Progressing     Problem: Pain  Goal: Verbalizes/displays adequate comfort level or baseline comfort level  10/11/2022 1824 by Chicho Bianchi RN  Outcome: Progressing  10/11/2022 0520 by Clyde Sacks, RN  Outcome: Progressing     Problem: ABCDS Injury Assessment  Goal: Absence of physical injury  10/11/2022 1824 by Chicho Bianchi RN  Outcome: Progressing  10/11/2022 0520 by Clyde Sacks, RN  Outcome: Progressing  Flowsheets (Taken 10/10/2022 2000)  Absence of Physical Injury: Implement safety measures based on patient assessment     Problem: Chronic Conditions and Co-morbidities  Goal: Patient's chronic conditions and co-morbidity symptoms are monitored and maintained or improved  10/11/2022 1824 by Chicho Bianchi RN  Outcome: Progressing  10/11/2022 0520 by Clyde Sacks, RN  Outcome: Progressing

## 2022-10-11 NOTE — PROGRESS NOTES
Pharmacy Note     Enoxaparin Dose Adjustment    Scot Records is a 47 y.o. female. Pharmacist assessment of enoxaparin dose for VTE prophylaxis. Recent Labs     10/10/22  0513 10/11/22  0554   BUN 24* 22*       Recent Labs     10/10/22  0513 10/11/22  0554   CREATININE 0.62 0.64       Estimated Creatinine Clearance: 79 mL/min (based on SCr of 0.64 mg/dL).   Estimated CrCl using Ideal Body Weight: 49.4 mL/min (based on IBW  kg)    Height:   Ht Readings from Last 1 Encounters:   10/09/22 5' 4.5\" (1.638 m)     Weight:  Wt Readings from Last 1 Encounters:   10/05/22 109 lb (49.4 kg)       The following enoxaparin dose has been adjusted based upon renal function and/or patient weight per P&T Guidelines:             Weight less than 50 kg, dose of lovenox reduced to 30 mg once daily per protocol      Thank you  Errol Biggs, PharmD, [facility]  Inpatient Clinical Pharmacist  [phone]

## 2022-10-12 ENCOUNTER — APPOINTMENT (OUTPATIENT)
Dept: CT IMAGING | Age: 52
DRG: 420 | End: 2022-10-12
Payer: MEDICAID

## 2022-10-12 LAB
ABSOLUTE EOS #: 0.32 K/UL (ref 0–0.44)
ABSOLUTE IMMATURE GRANULOCYTE: 0.07 K/UL (ref 0–0.3)
ABSOLUTE LYMPH #: 2.14 K/UL (ref 1.1–3.7)
ABSOLUTE MONO #: 0.4 K/UL (ref 0.1–1.2)
ALBUMIN SERPL-MCNC: 2.7 G/DL (ref 3.5–5.2)
ALBUMIN/GLOBULIN RATIO: 1 (ref 1–2.5)
ALP BLD-CCNC: 131 U/L (ref 35–104)
ALT SERPL-CCNC: 64 U/L (ref 5–33)
ANION GAP SERPL CALCULATED.3IONS-SCNC: 8 MMOL/L (ref 9–17)
AST SERPL-CCNC: 64 U/L
BASOPHILS # BLD: 1 % (ref 0–2)
BASOPHILS ABSOLUTE: 0.03 K/UL (ref 0–0.2)
BILIRUB SERPL-MCNC: <0.1 MG/DL (ref 0.3–1.2)
BUN BLDV-MCNC: 20 MG/DL (ref 6–20)
C-PEPTIDE: 4.7 NG/ML (ref 1.1–4.4)
CALCIUM SERPL-MCNC: 8.6 MG/DL (ref 8.6–10.4)
CHLORIDE BLD-SCNC: 103 MMOL/L (ref 98–107)
CO2: 23 MMOL/L (ref 20–31)
CREAT SERPL-MCNC: 0.58 MG/DL (ref 0.5–0.9)
EOSINOPHILS RELATIVE PERCENT: 5 % (ref 1–4)
GFR SERPL CREATININE-BSD FRML MDRD: >60 ML/MIN/1.73M2
GLUCOSE BLD-MCNC: 151 MG/DL (ref 65–105)
GLUCOSE BLD-MCNC: 186 MG/DL (ref 65–105)
GLUCOSE BLD-MCNC: 277 MG/DL (ref 65–105)
GLUCOSE BLD-MCNC: 325 MG/DL (ref 65–105)
GLUCOSE BLD-MCNC: 337 MG/DL (ref 70–99)
HCT VFR BLD CALC: 26.9 % (ref 36.3–47.1)
HEMOGLOBIN: 9.3 G/DL (ref 11.9–15.1)
IMMATURE GRANULOCYTES: 1 %
LV EF: 60 %
LVEF MODALITY: NORMAL
LYMPHOCYTES # BLD: 36 % (ref 24–43)
MCH RBC QN AUTO: 30.5 PG (ref 25.2–33.5)
MCHC RBC AUTO-ENTMCNC: 34.6 G/DL (ref 28.4–34.8)
MCV RBC AUTO: 88.2 FL (ref 82.6–102.9)
MONOCYTES # BLD: 7 % (ref 3–12)
NRBC AUTOMATED: 0 PER 100 WBC
PDW BLD-RTO: 13.2 % (ref 11.8–14.4)
PLATELET # BLD: 165 K/UL (ref 138–453)
PMV BLD AUTO: 10.4 FL (ref 8.1–13.5)
POTASSIUM SERPL-SCNC: 4.3 MMOL/L (ref 3.7–5.3)
RBC # BLD: 3.05 M/UL (ref 3.95–5.11)
SEG NEUTROPHILS: 50 % (ref 36–65)
SEGMENTED NEUTROPHILS ABSOLUTE COUNT: 3.04 K/UL (ref 1.5–8.1)
SODIUM BLD-SCNC: 134 MMOL/L (ref 135–144)
TOTAL PROTEIN: 5.4 G/DL (ref 6.4–8.3)
URIC ACID: 4.4 MG/DL (ref 2.4–5.7)
WBC # BLD: 6 K/UL (ref 3.5–11.3)

## 2022-10-12 PROCEDURE — 99232 SBSQ HOSP IP/OBS MODERATE 35: CPT | Performed by: FAMILY MEDICINE

## 2022-10-12 PROCEDURE — 6360000002 HC RX W HCPCS: Performed by: STUDENT IN AN ORGANIZED HEALTH CARE EDUCATION/TRAINING PROGRAM

## 2022-10-12 PROCEDURE — 85025 COMPLETE CBC W/AUTO DIFF WBC: CPT

## 2022-10-12 PROCEDURE — 2580000003 HC RX 258

## 2022-10-12 PROCEDURE — 36415 COLL VENOUS BLD VENIPUNCTURE: CPT

## 2022-10-12 PROCEDURE — 84681 ASSAY OF C-PEPTIDE: CPT

## 2022-10-12 PROCEDURE — 6370000000 HC RX 637 (ALT 250 FOR IP): Performed by: STUDENT IN AN ORGANIZED HEALTH CARE EDUCATION/TRAINING PROGRAM

## 2022-10-12 PROCEDURE — 6370000000 HC RX 637 (ALT 250 FOR IP)

## 2022-10-12 PROCEDURE — 80053 COMPREHEN METABOLIC PANEL: CPT

## 2022-10-12 PROCEDURE — 6360000004 HC RX CONTRAST MEDICATION: Performed by: NURSE PRACTITIONER

## 2022-10-12 PROCEDURE — 93306 TTE W/DOPPLER COMPLETE: CPT

## 2022-10-12 PROCEDURE — 93970 EXTREMITY STUDY: CPT

## 2022-10-12 PROCEDURE — 82947 ASSAY GLUCOSE BLOOD QUANT: CPT

## 2022-10-12 PROCEDURE — 74177 CT ABD & PELVIS W/CONTRAST: CPT

## 2022-10-12 PROCEDURE — 6360000002 HC RX W HCPCS

## 2022-10-12 PROCEDURE — 99253 IP/OBS CNSLTJ NEW/EST LOW 45: CPT | Performed by: NURSE PRACTITIONER

## 2022-10-12 PROCEDURE — 1200000000 HC SEMI PRIVATE

## 2022-10-12 PROCEDURE — 6360000002 HC RX W HCPCS: Performed by: FAMILY MEDICINE

## 2022-10-12 RX ORDER — SIMETHICONE 80 MG
80 TABLET,CHEWABLE ORAL EVERY 6 HOURS PRN
Status: DISCONTINUED | OUTPATIENT
Start: 2022-10-12 | End: 2022-10-15 | Stop reason: HOSPADM

## 2022-10-12 RX ORDER — MORPHINE SULFATE 2 MG/ML
1 INJECTION, SOLUTION INTRAMUSCULAR; INTRAVENOUS EVERY 6 HOURS PRN
Status: DISCONTINUED | OUTPATIENT
Start: 2022-10-12 | End: 2022-10-13

## 2022-10-12 RX ORDER — ALBUMIN, HUMAN INJ 5% 5 %
25 SOLUTION INTRAVENOUS ONCE
Status: DISCONTINUED | OUTPATIENT
Start: 2022-10-12 | End: 2022-10-12 | Stop reason: RX

## 2022-10-12 RX ORDER — INSULIN GLARGINE 100 [IU]/ML
15 INJECTION, SOLUTION SUBCUTANEOUS NIGHTLY
Status: DISCONTINUED | OUTPATIENT
Start: 2022-10-12 | End: 2022-10-13

## 2022-10-12 RX ADMIN — SODIUM CHLORIDE: 9 INJECTION, SOLUTION INTRAVENOUS at 03:58

## 2022-10-12 RX ADMIN — TRAZODONE HYDROCHLORIDE 150 MG: 50 TABLET ORAL at 20:41

## 2022-10-12 RX ADMIN — IOPAMIDOL 75 ML: 755 INJECTION, SOLUTION INTRAVENOUS at 16:09

## 2022-10-12 RX ADMIN — ONDANSETRON 4 MG: 2 INJECTION INTRAMUSCULAR; INTRAVENOUS at 14:23

## 2022-10-12 RX ADMIN — GABAPENTIN 900 MG: 300 CAPSULE ORAL at 09:27

## 2022-10-12 RX ADMIN — MORPHINE SULFATE 1 MG: 2 INJECTION, SOLUTION INTRAMUSCULAR; INTRAVENOUS at 14:23

## 2022-10-12 RX ADMIN — SODIUM CHLORIDE 100 ML/HR: 9 INJECTION, SOLUTION INTRAVENOUS at 14:39

## 2022-10-12 RX ADMIN — ATORVASTATIN CALCIUM 20 MG: 20 TABLET, FILM COATED ORAL at 20:41

## 2022-10-12 RX ADMIN — LEVOFLOXACIN 750 MG: 750 TABLET, FILM COATED ORAL at 14:22

## 2022-10-12 RX ADMIN — METFORMIN HYDROCHLORIDE 500 MG: 500 TABLET ORAL at 17:46

## 2022-10-12 RX ADMIN — SODIUM CHLORIDE, PRESERVATIVE FREE 10 ML: 5 INJECTION INTRAVENOUS at 09:29

## 2022-10-12 RX ADMIN — INSULIN LISPRO 8 UNITS: 100 INJECTION, SOLUTION INTRAVENOUS; SUBCUTANEOUS at 12:13

## 2022-10-12 RX ADMIN — INSULIN LISPRO 12 UNITS: 100 INJECTION, SOLUTION INTRAVENOUS; SUBCUTANEOUS at 09:28

## 2022-10-12 RX ADMIN — INSULIN GLARGINE 15 UNITS: 100 INJECTION, SOLUTION SUBCUTANEOUS at 23:23

## 2022-10-12 RX ADMIN — ONDANSETRON 4 MG: 2 INJECTION INTRAMUSCULAR; INTRAVENOUS at 21:07

## 2022-10-12 RX ADMIN — METFORMIN HYDROCHLORIDE 500 MG: 500 TABLET ORAL at 09:26

## 2022-10-12 RX ADMIN — ENOXAPARIN SODIUM 30 MG: 100 INJECTION SUBCUTANEOUS at 09:27

## 2022-10-12 RX ADMIN — ACETAMINOPHEN 650 MG: 325 TABLET ORAL at 03:52

## 2022-10-12 RX ADMIN — MORPHINE SULFATE 1 MG: 2 INJECTION, SOLUTION INTRAMUSCULAR; INTRAVENOUS at 20:37

## 2022-10-12 RX ADMIN — GABAPENTIN 900 MG: 300 CAPSULE ORAL at 14:22

## 2022-10-12 RX ADMIN — SODIUM CHLORIDE, PRESERVATIVE FREE 10 ML: 5 INJECTION INTRAVENOUS at 20:42

## 2022-10-12 RX ADMIN — PANTOPRAZOLE SODIUM 40 MG: 40 TABLET, DELAYED RELEASE ORAL at 05:48

## 2022-10-12 RX ADMIN — GABAPENTIN 900 MG: 300 CAPSULE ORAL at 20:41

## 2022-10-12 ASSESSMENT — PAIN DESCRIPTION - DESCRIPTORS
DESCRIPTORS: ACHING
DESCRIPTORS: CRAMPING;JABBING

## 2022-10-12 ASSESSMENT — PAIN DESCRIPTION - LOCATION
LOCATION: ABDOMEN
LOCATION: RIB CAGE

## 2022-10-12 ASSESSMENT — PAIN SCALES - GENERAL
PAINLEVEL_OUTOF10: 8
PAINLEVEL_OUTOF10: 7
PAINLEVEL_OUTOF10: 2

## 2022-10-12 ASSESSMENT — PAIN DESCRIPTION - ORIENTATION
ORIENTATION: LEFT
ORIENTATION: LEFT;OUTER;POSTERIOR

## 2022-10-12 ASSESSMENT — PAIN - FUNCTIONAL ASSESSMENT
PAIN_FUNCTIONAL_ASSESSMENT: PREVENTS OR INTERFERES SOME ACTIVE ACTIVITIES AND ADLS
PAIN_FUNCTIONAL_ASSESSMENT: PREVENTS OR INTERFERES SOME ACTIVE ACTIVITIES AND ADLS

## 2022-10-12 NOTE — PROGRESS NOTES
[facility]  Progress Note    Date:   10/12/2022  Patient name:  Amol Keene  Date of admission:  10/9/2022 12:36 PM  MRN:   [id number]  YOB: 1970    SUBJECTIVE/Last 24 hours update:       Patient seen and examined at bedside this morning   Patient's UDS was positive for cocaine. While discussing with patient , she admitted to using cocaine \"one line\" past weekend. She stated the last time she was regularly using cocaine was July 2021 and had a relapse this weekend. Patient stated that she has been having abdominal pain overnight. Patient was given ketorolac and a lidocaine patch over the left ribs. This morning patient still endorsed some abdominal pain, on inspection abdomen is distended. When patient was seen again in the afternoon, she endorsed intense pain in her abdomen and was tearful on exam  Was prescribed morphine 1 mg every 6 hours for the pain  Her diet was also changed to clear liquids, as she has not been able to tolerate her regular diet at this time  [place] patient  was placed on 2 L nasal cannula , we weaned her down , and was on room air during examination   BP elevated at 148/73, all other vitals are stable   Urine culture in positive for Enterococcus , on Levaquin oral   Currently on 22 units of Lantus   Considering adding 10 more units   POC glucose are still elevated at 325   HPI:   The patient is a 47 y.o.  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 Patient stated that she has been having lightheadedness intermittently for the last week. She currently takes 10 units of Lantus for type 2 diabetes however has been without it for the last 7 days and has not taken it. Patient also endorsed nausea, intermittent chest pains that has since resolved.   She stated that she becomes really unstable when walking and becomes lightheaded and feels that she will pass out. Stated that she has been eating and drinking okay. Last week she also was also positive for a UTI however has not taken any antibiotics for it. Patient also complains of some right-sided congestion. At ER, patient's vitals were all within normal limits, EKG was normal.  Chest x-ray showed no acute abnormality. Her glucose was elevated at 409. Urinalysis was positive for nitrite and bacteria. Patient had an elevated lipase at 70 her out choline phosphatase was also elevated at 153. she is admitted to the hospital for the management of hyperglycemia       REVIEW OF SYSTEMS:     Review of Systems   Constitutional:  Positive for fatigue. Negative for chills and fever. HENT:  Negative for congestion. Eyes:  Negative for visual disturbance. Respiratory:  Negative for shortness of breath. Cardiovascular:  Negative for chest pain. Gastrointestinal:  Negative for abdominal pain. Genitourinary:  Negative for difficulty urinating. Neurological:  Negative for seizures. Hematological:  Negative for adenopathy. PAST MEDICAL HISTORY:      has a past medical history of Depression, Diabetes mellitus ([address] Hyperlipidemia, Pancreatitis, Pneumonia due to infectious organism, and Substance abuse ([facility] [address] PAST SURGICAL HISTORY:      has a past surgical history that includes Cholecystectomy; Ectopic pregnancy surgery; Carpal tunnel release (2014); Colonoscopy (N/A, 9/16/2019); Upper gastrointestinal endoscopy (N/A, 9/16/2019); and Upper gastrointestinal endoscopy (N/A, 12/27/2021). SOCIAL HISTORY:      reports that she has been smoking cigarettes. She has a 15.00 pack-year smoking history. She has never used smokeless tobacco. She reports current alcohol use. She reports current drug use. Drug: Cocaine.      FAMILY HISTORY:     family history includes Alcohol Abuse in her brother; Bipolar Disorder in her brother; Cancer in her father; Coronary Art Dis in her mother; Diabetes in her brother, mother, and sister; Substance Abuse in her brother. HOME MEDICATIONS:      Prior to Admission medications    Medication Sig Start Date End Date Taking? Authorizing Provider   lovastatin (MEVACOR) 40 MG tablet Take 1 tablet by mouth nightly 10/5/22   Alyce Nath MD   Melatonin 3 MG CAPS Take 3 mg by mouth at bedtime 10/5/22 11/4/22  Alyce Nath MD   traZODone (DESYREL) 150 MG tablet Take 1 tablet by mouth nightly 10/5/22   Alyce Nath MD   gabapentin (NEURONTIN) 300 MG capsule Take by mouth three times daily (900mg in AM and midday, 1200mg at night; patient has 300mg and 600mg capsules on hand) 10/5/22 10/5/22  Alyce Nath MD   blood glucose monitor kit and supplies Dispense sufficient amount for indicated testing frequency plus additional to accommodate PRN testing needs. Dispense all needed supplies to include: monitor, strips, lancing device, lancets, control solutions, alcohol swabs. 10/5/22   Alyce Nath MD   Lancets MISC 1 each by Does not apply route daily 10/5/22   Alyce Nath MD   blood glucose monitor strips Test 4 times a day & as needed for symptoms of irregular blood glucose. Dispense sufficient amount for indicated testing frequency plus additional to accommodate PRN testing needs.  10/5/22   Alyce Nath MD   empagliflozin (JARDIANCE) 10 MG tablet Take 1 tablet by mouth daily 10/5/22   Alyce Nath MD   insulin glargine (LANTUS SOLOSTAR) 100 UNIT/ML injection pen Inject 10 Units into the skin nightly 10/5/22   Alyce Nath MD   aluminum hydroxide-magnesium carbonate (GENATON)  MG/15ML suspension Take 15 mLs by mouth 4 times daily as needed for Indigestion  Patient not taking: No sig reported    Historical Provider, MD   gabapentin (NEURONTIN) 600 MG tablet Take by mouth three times daily (900mg in AM and midday, 1200mg at night; patient has 300mg and 600mg capsules on hand)  Patient not taking: No sig reported    Historical Provider, MD   ibuprofen (ADVIL;MOTRIN) 400 MG tablet Take 2 tablets by mouth every 8 hours as needed for Pain  Patient not taking: No sig reported 1/27/22   Segun Yan MD   Blood Glucose Monitoring Suppl (ONE TOUCH ULTRA 2) w/Device KIT 1 kit by Does not apply route daily  Patient not taking: No sig reported 1/24/22   Aster Romeo MD   Lancets 30G MISC 1 each by Does not apply route daily  Patient not taking: No sig reported 1/24/22   Aster Romeo MD   Insulin Pen Needle 30G X 8 MM MISC 1 each by Does not apply route daily  Patient not taking: Reported on 10/5/2022 1/19/22   Aster Romeo MD   FreeStyle Lancets MISC 1 each by Does not apply route daily  Patient not taking: No sig reported 1/19/22   Aster Romeo MD   Multiple Vitamins-Minerals (MULTIVITAMIN WITH MINERALS) tablet Take 1 tablet by mouth daily  Patient not taking: No sig reported 1/19/22   Aster Romeo MD   famotidine (PEPCID) 20 MG tablet Take 1 tablet by mouth 2 times daily  Patient not taking: No sig reported 1/4/22   Segun Yan MD   ondansetron (ZOFRAN ODT) 4 MG disintegrating tablet Take 1 tablet by mouth every 8 hours as needed for Nausea or Vomiting  Patient not taking: No sig reported 1/4/22   Segun Yan MD   glucose monitoring (FREESTYLE) kit 4 times daily  Patient not taking: No sig reported 12/10/21   Aster Romeo MD   pantoprazole (PROTONIX) 40 MG tablet Take 1 tablet by mouth every morning (before breakfast)  Patient not taking: No sig reported 12/3/21   Adela Richey MD       ALLERGIES:     Patient has no known allergies.       OBJECTIVE:       Vitals:    10/10/22 2045 10/11/22 0730 10/11/22 1130 10/11/22 1945   BP: (!) 153/62 135/60 127/63 (!) 144/73   Pulse: 99 88 91 87   Resp: 16 16 16 16   Temp: 98.6 °F (37 °C) 97.1 °F (36.2 °C) 97.8 °F (36.6 °C) 97.8 °F (36.6 °C)   TempSrc: Oral Oral Oral Oral   SpO2: 96% 96% 91% 99%   Height: Intake/Output Summary (Last 24 hours) at 10/12/2022 0720  Last data filed at 10/12/2022 8759  Gross per 24 hour   Intake 6812.68 ml   Output 1100 ml   Net 5712.68 ml       PHYSICAL EXAM:    Physical Exam  Vitals reviewed. Constitutional:       General: She is not in acute distress. Appearance: Normal appearance. Cardiovascular:      Rate and Rhythm: Normal rate and regular rhythm. Pulses: Normal pulses. Heart sounds: Normal heart sounds. No murmur heard. Pulmonary:      Effort: Pulmonary effort is normal.      Breath sounds: Normal breath sounds. Abdominal:      General: Bowel sounds are normal.      Palpations: Abdomen is soft. Tenderness: There is no abdominal tenderness. Musculoskeletal:      Cervical back: Normal range of motion. Right lower leg: No edema. Left lower leg: No edema. Neurological:      Mental Status: She is alert.    Psychiatric:         Mood and Affect: Mood normal.     DIAGNOSTICS:     Laboratory Testing:    Recent Results (from the past 24 hour(s))   POC Glucose Fingerstick    Collection Time: 10/11/22  8:56 AM   Result Value Ref Range    POC Glucose 349 (H) 65 - 105 mg/dL   POC Glucose Fingerstick    Collection Time: 10/11/22 11:57 AM   Result Value Ref Range    POC Glucose 275 (H) 65 - 105 mg/dL   POC Glucose Fingerstick    Collection Time: 10/11/22  5:00 PM   Result Value Ref Range    POC Glucose 258 (H) 65 - 105 mg/dL   POC Glucose Fingerstick    Collection Time: 10/11/22  7:53 PM   Result Value Ref Range    POC Glucose 290 (H) 65 - 105 mg/dL   Uric Acid    Collection Time: 10/11/22  8:14 PM   Result Value Ref Range    Uric Acid 4.4 2.4 - 5.7 mg/dL   Comprehensive Metabolic Panel w/ Reflex to MG    Collection Time: 10/12/22  6:22 AM   Result Value Ref Range    Glucose 337 (H) 70 - 99 mg/dL    BUN 20 6 - 20 mg/dL    Creatinine 0.58 0.50 - 0.90 mg/dL    Est, Glom Filt Rate >60 >60 mL/min/1.73m2    Calcium 8.6 8.6 - 10.4 mg/dL    Sodium 134 (L) 135 - 144 mmol/L    Potassium 4.3 3.7 - 5.3 mmol/L    Chloride 103 98 - 107 mmol/L    CO2 23 20 - 31 mmol/L    Anion Gap 8 (L) 9 - 17 mmol/L    Alkaline Phosphatase 131 (H) 35 - 104 U/L    ALT 64 (H) 5 - 33 U/L    AST 64 (H) <32 U/L    Total Bilirubin PENDING mg/dL    Total Protein 5.4 (L) 6.4 - 8.3 g/dL    Albumin 2.7 (L) 3.5 - 5.2 g/dL    Albumin/Globulin Ratio 1.0 1.0 - 2.5   CBC with Auto Differential    Collection Time: 10/12/22  6:22 AM   Result Value Ref Range    WBC 6.0 3.5 - 11.3 k/uL    RBC 3.05 (L) 3.95 - 5.11 m/uL    Hemoglobin 9.3 (L) 11.9 - 15.1 g/dL    Hematocrit 26.9 (L) 36.3 - 47.1 %    MCV 88.2 82.6 - 102.9 fL    MCH 30.5 25.2 - 33.5 pg    MCHC 34.6 28.4 - 34.8 g/dL    RDW 13.2 11.8 - 14.4 %    Platelets 136 230 - 467 k/uL    MPV 10.4 8.1 - 13.5 fL    NRBC Automated 0.0 0.0 per 100 WBC    Seg Neutrophils 50 36 - 65 %    Lymphocytes 36 24 - 43 %    Monocytes 7 3 - 12 %    Eosinophils % 5 (H) 1 - 4 %    Basophils 1 0 - 2 %    Immature Granulocytes 1 (H) 0 %    Segs Absolute 3.04 1.50 - 8.10 k/uL    Absolute Lymph # 2.14 1.10 - 3.70 k/uL    Absolute Mono # 0.40 0.10 - 1.20 k/uL    Absolute Eos # 0.32 0.00 - 0.44 k/uL    Basophils Absolute 0.03 0.00 - 0.20 k/uL    Absolute Immature Granulocyte 0.07 0.00 - 0.30 k/uL   POC Glucose Fingerstick    Collection Time: 10/12/22  7:00 AM   Result Value Ref Range    POC Glucose 325 (H) 65 - 105 mg/dL         Imaging/Diagonstics:  EKG: normal EKG, normal sinus rhythm. US LIVER    Result Date: 10/11/2022  EXAMINATION: RIGHT UPPER QUADRANT ULTRASOUND 10/11/2022 4:09 pm COMPARISON: Increased HISTORY: ORDERING SYSTEM PROVIDED HISTORY: Elevated LFTs, new onset abdominal distention, history of substance abuse TECHNOLOGIST PROVIDED HISTORY: Elevated LFTs, new onset abdominal distention, history of substance abuse FINDINGS: LIVER:  The liver demonstrates increased echogenicity without evidence of intrahepatic biliary ductal dilatation.   The liver has normal hepatopetal flow. BILIARY SYSTEM:  Patient's at status post cholecystectomy. Common bile duct is dilated measuring 15 mm. RIGHT KIDNEY: No stone hydronephrosis within the right kidney. There is echogenic area within the superior pole of right kidney measuring 1.2 x 1 x 0.8 cm. The findings appears to be infiltration of adjacent perinephric fat. This is confirmed after evaluating prior CT of 12/26/2021. PANCREAS:  Visualized portions of the pancreas are unremarkable. OTHER: No evidence of right upper quadrant ascites. Mildly increased liver echogenicity likely suggestive of fatty liver. Status post cholecystectomy with moderate dilatation of common bile duct. There is echogenic area within the superior pole of right kidney measuring 1.2 x 1 x 0.8 cm. The findings appears to be infiltration of adjacent perinephric fat. This is confirmed after evaluating prior CT of 12/26/2021. RECOMMENDATIONS: Unavailable     XR CHEST PORTABLE    Result Date: 10/9/2022  EXAMINATION: ONE XRAY VIEW OF THE CHEST 10/9/2022 10:25 am COMPARISON: 01/04/2022 HISTORY: ORDERING SYSTEM PROVIDED HISTORY: Chest Pain TECHNOLOGIST PROVIDED HISTORY: Chest Pain FINDINGS: The lungs are without acute focal process. There is no effusion or pneumothorax. The cardiomediastinal silhouette is stable. The osseous structures are stable. No acute process.        Current Facility-Administered Medications   Medication Dose Route Frequency Provider Last Rate Last Admin    insulin glargine (LANTUS) injection vial 22 Units  22 Units SubCUTAneous Nightly Guille Magana MD   22 Units at 10/11/22 2056    levoFLOXacin (LEVAQUIN) tablet 750 mg  750 mg Oral Q24H Guille Magana MD   750 mg at 10/11/22 1556    enoxaparin Sodium (LOVENOX) injection 30 mg  30 mg SubCUTAneous Daily Giorgi Dawn MD        pantoprazole (PROTONIX) tablet 40 mg  40 mg Oral QAM AC Guille Magana MD   40 mg at 10/12/22 0548    lidocaine 4 % external patch 1 patch  1 patch TransDERmal Daily Guille Magana MD   1 patch at 10/11/22 1954    metFORMIN (GLUCOPHAGE) tablet 500 mg  500 mg Oral BID  Alejandro Munguia MD   500 mg at 10/11/22 1921    insulin lispro (HUMALOG) injection vial 0-16 Units  0-16 Units SubCUTAneous TID  Quratulain Julian, DO   8 Units at 10/11/22 1740    insulin lispro (HUMALOG) injection vial 0-4 Units  0-4 Units SubCUTAneous Nightly Quratulain Julian, DO        atorvastatin (LIPITOR) tablet 20 mg  20 mg Oral Nightly Quratulain Julian, DO   20 mg at 10/11/22 2057    traZODone (DESYREL) tablet 150 mg  150 mg Oral Nightly Quratulain Julian, DO   150 mg at 10/11/22 2058    sodium chloride flush 0.9 % injection 5-40 mL  5-40 mL IntraVENous 2 times per day Quratulain Julian, DO   10 mL at 10/10/22 0924    sodium chloride flush 0.9 % injection 5-40 mL  5-40 mL IntraVENous PRN Quratulain Julian, DO        0.9 % sodium chloride infusion   IntraVENous PRN Quratulain Julian, DO        ondansetron (ZOFRAN-ODT) disintegrating tablet 4 mg  4 mg Oral Q8H PRN Quratulain Julian, DO        Or    ondansetron (ZOFRAN) injection 4 mg  4 mg IntraVENous Q6H PRN Quratulain Julian, DO        polyethylene glycol (GLYCOLAX) packet 17 g  17 g Oral Daily PRN Quratulain Julian, DO        acetaminophen (TYLENOL) tablet 650 mg  650 mg Oral Q6H PRN Quratulain Julian, DO   650 mg at 10/12/22 2378    Or    acetaminophen (TYLENOL) suppository 650 mg  650 mg Rectal Q6H PRN Quratulain Julian, DO        glucose chewable tablet 16 g  4 tablet Oral PRN Quratulain Julian, DO        dextrose bolus 10% 125 mL  125 mL IntraVENous PRN Quratulain Julian, DO        Or    dextrose bolus 10% 250 mL  250 mL IntraVENous PRN Quratulain Julian, DO        glucagon (rDNA) injection 1 mg  1 mg SubCUTAneous PRN Quratulain Julian, DO        dextrose 10 % infusion   IntraVENous Continuous PRN Quratulain Julian, DO        0.9 % sodium chloride infusion   IntraVENous Continuous Quratulain Julian,  mL/hr at 10/12/22 0619 Rate Verify at 10/12/22 0451 gabapentin (NEURONTIN) capsule 900 mg  900 mg Oral TID Em Soto MD   900 mg at 10/11/22 2057       ASSESSMENT:     Principal Problem:    Hyperglycemia  Active Problems:    Neuropathy due to type 2 diabetes mellitus ([address]    Other hyperlipidemia    Acute cystitis without hematuria  Resolved Problems:    * No resolved hospital problems. *      PLAN:     Hyperglycemia secondary to medication non-compliance -  -POC glucose on admission: 225-->290-->325; no anion gap , no acidosis , no ketones in urine   -Was started on Metformin 500 twice daily   -Mildly increased osmolality at 315  ; ruled out HHS   -Most recent a1c 10/05: 9.8   -Hypoglycemia protocol   -High  dose sliding scale - Lantus 22 units   -Glucose checks every 6 hours   -IV fluids NS 100ml/hr        2. Sclerosing Mesenteritis   US abdomen showed  - Fatty liver as well as infiltration of the adjacent perinephric fat. - Reviewed CT scan from 12/2020 21 which suggested sclerosing mesenteritis  - CRP and ESR within normal limits  -AST ALT slightly elevated but baseline (AST 64 and ALT 64)  - Albumin is low at 2.7, will order  -GI is consulted, appreciate recs    2. Urinary Tract Infection   -Rocephin 1000mg for 4 days ; last dose 10/12   -UA positive for nitrites and bacteria  -Urine culture positive for Enterococcus Faecalis   -Started on Levaquin 750 mg once daily, for 5 days       4. History of recurrent pancreatitis 2/2 to hypertriglyceridemia ; ruled out   -Lipase mildly elevated 10/9 : 70  -Lipid panel               -Cholesterol 120              -HDL low at 19              -LDL cholesterol normal                -Triglycerides elevated at 360      6. UDS positive for cocaine  -Avoid using beta blockers   -outpatient referral for substance abuse treatment     3.  Neuropathy secondary to type 2 diabetes  -Restarted Gabapentin 900mg tid   -Serology for inflammatory markers - negative     DVT prophylaxis: Lovenox 40 mg SC  GI prophylaxis: Protonix 40 mg daily        Above plan discussed with the patient and family in room, who agreed to the above plan   Plan will be discussed with the attending, Dr. Emily De La Cruz,   Family Medicine Resident  10/12/2022 7:20 AM

## 2022-10-12 NOTE — CONSULTS
[facility]) Gastroenterology  Consultation Note     . Chief Complaint:  Abdominal pain, distension    Reason for consult:    Sclerosing Mesenteritis     History of present illness: This is a 47 y.o. female with PMH including cocaine misuse, depression, DM type II, gastroparesis, neuropathy, hyperlipidemia, alcohol induced pancreatitis, pneumonia presented to the ER with complaints of abdominal distention, left upper quadrant abdominal pain. Patient states that she has been experiencing lightheadedness, dizziness, increased abdominal distention and left upper quadrant pain present for the last 2 days that she describes as constant, sharp and non-radiating  She states that she has not been compliant with her diabetic medications. Patient also reports increasing bilateral 1+ lower leg edema   Patient admits that she used cocaine few days ago    In the ER patient was found to have a UTI  Glucose was 409, UA was positive for nitrite and bacteria  BMP unremarkable  CBC shows normocytic normal chromatic anemia with hemoglobin 9.2-9.3 g/dL. LFT's show Albumin 2.7, Alk Phos 104-147-131, ALT 10-41-64, AST 13-50-64  No abdominal imaging completed at this time    Patient states that she used cocaine approximately 1 week ago. No other street drugs  Admits to social drinking. Admits to smoking 1 to 3 cigarettes daily  No recent weight loss  No regular NSAID use  Recent change in medications. No herbals or vitamins    FH includes father passed away of colon cancer at age 79  Patient had colonoscopy in 2019 due to chronic abdominal pain and chronic diarrhea that was a normal exam with suboptimal prep. Normal biopsies-no malignancy  Pt also had EGD 1/2021 that showed mild reflux esophagitis in the lowe esophagus  BX were negative for malignancy and H. Pylori    Previous GI history:   12/2021 EGD per Dr. Merna Bloch  Findings[de-identified]   Esophagus: Grade a mild reflux esophagitis in the lower esophagus.   The esophagus was otherwise normal.  Stomach: Stomach had small amount of fluid which was suctioned. Stomach otherwise appeared normal.  The antrum of the stomach was biopsied with cold biopsy forceps to rule out H. pylori infection. Duodenum: Normal.  The ampulla was normal.  There was no evidence of celiac disease. Recommendations: Resume diabetic diet  Antireflux diet and lifestyle measures. Gastric emptying scan as an outpatient. PPI daily. Okay to discharge from GI standpoint with outpatient follow-up with . Consider outpatient rheumatology evaluation for abnormal CT findings. Electronically signed by Carlos Oglesby MD, FACG  on 12/27/2021 at 10:49 AM  2019 colonoscopy per Dr. Hartmann Stake: Rectal examination demonstrated no significant visible external abnormality and digital palpation was unremarkable. Following adequate conscious sedation the colonoscope was introduced and advanced under direct visualization to the cecum, which was identified by the ileocecal valve and appendiceal orifice. The bowel preparation was felt to be sub-optimal. This included small amounts of thick stool that was not able to be adequately irrigated and aspirated. Cecal intubation time was 2 minutes. Once maximally inserted, the endoscope was withdrawn and the mucosa was carefully inspected. The mucosal exam was normal. Random colon biopsies were obtained. Retroflexion was performed in the rectum and showed no pathology. RECOMMENDATIONS:   1) Follow up with referring provider, as previously scheduled. 2) Follow up on pathology report. 3) Follow up with me in office in 4-6 weeks.           Sandip Barber MD [facility]  Past Medical/Social/Family History:  Past Medical History:   Diagnosis Date    Depression     Diabetes mellitus ([facility] [address]     Hyperlipidemia     Pancreatitis     Pneumonia due to infectious organism 1/8/2017    Substance abuse [facility])      Past Surgical History:   Procedure Laterality Date    CARPAL TUNNEL RELEASE  2014    jerome carpal tunnel    CHOLECYSTECTOMY      COLONOSCOPY N/A 9/16/2019    COLONOSCOPY WITH BIOPSY performed by Debo Lovett MD at [place] [id number] ENDOSCOPY N/A 9/16/2019    EGD ESOPHAGOGASTRODUODENOSCOPY performed by Debo Lovett MD at [facility] N/A 12/27/2021    EGD BIOPSY performed by Vania Williamson MD at [facility] Endoscopy     Family History   Problem Relation Age of Onset    Diabetes Mother     Coronary Art Dis Mother     Cancer Father         Colon    Diabetes Sister     Diabetes Brother     Bipolar Disorder Brother     Alcohol Abuse Brother     Substance Abuse Brother      Previous records/history/ and notes were reviewed    Allergies:  No Known Allergies    Home medications:  Prior to Admission medications    Medication Sig Start Date End Date Taking? Authorizing Provider   lovastatin (MEVACOR) 40 MG tablet Take 1 tablet by mouth nightly 10/5/22   Gale Valdez MD   Melatonin 3 MG CAPS Take 3 mg by mouth at bedtime 10/5/22 11/4/22  Gale Valdez MD   traZODone (DESYREL) 150 MG tablet Take 1 tablet by mouth nightly 10/5/22   Gale Valdez MD   gabapentin (NEURONTIN) 300 MG capsule Take by mouth three times daily (900mg in AM and midday, 1200mg at night; patient has 300mg and 600mg capsules on hand) 10/5/22 10/5/22  Gale Valdez MD   blood glucose monitor kit and supplies Dispense sufficient amount for indicated testing frequency plus additional to accommodate PRN testing needs. Dispense all needed supplies to include: monitor, strips, lancing device, lancets, control solutions, alcohol swabs. 10/5/22   Gale Valdez MD   Lancets MISC 1 each by Does not apply route daily 10/5/22   Gale Valdez MD   blood glucose monitor strips Test 4 times a day & as needed for symptoms of irregular blood glucose. Dispense sufficient amount for indicated testing frequency plus additional to accommodate PRN testing needs. 10/5/22   Pino Guerra MD   empagliflozin (JARDIANCE) 10 MG tablet Take 1 tablet by mouth daily 10/5/22   Pino Guerra MD   insulin glargine (LANTUS SOLOSTAR) 100 UNIT/ML injection pen Inject 10 Units into the skin nightly 10/5/22   Pino Guerra MD   aluminum hydroxide-magnesium carbonate (GENATON)  MG/15ML suspension Take 15 mLs by mouth 4 times daily as needed for Indigestion  Patient not taking: No sig reported    Historical Provider, MD   gabapentin (NEURONTIN) 600 MG tablet Take by mouth three times daily (900mg in AM and midday, 1200mg at night; patient has 300mg and 600mg capsules on hand)  Patient not taking: No sig reported    Historical Provider, MD   ibuprofen (ADVIL;MOTRIN) 400 MG tablet Take 2 tablets by mouth every 8 hours as needed for Pain  Patient not taking: No sig reported 1/27/22   Thaddeus Adair MD   Blood Glucose Monitoring Suppl (ONE TOUCH ULTRA 2) w/Device KIT 1 kit by Does not apply route daily  Patient not taking: No sig reported 1/24/22   Vinod Adams MD   Lancets 30G MISC 1 each by Does not apply route daily  Patient not taking: No sig reported 1/24/22   Vinod Adams MD   Insulin Pen Needle 30G X 8 MM MISC 1 each by Does not apply route daily  Patient not taking: Reported on 10/5/2022 1/19/22   Vinod Adams MD   FreeStyle Lancets MISC 1 each by Does not apply route daily  Patient not taking: No sig reported 1/19/22   Vinod Adams MD   Multiple Vitamins-Minerals (MULTIVITAMIN WITH MINERALS) tablet Take 1 tablet by mouth daily  Patient not taking: No sig reported 1/19/22   Vinod Adams MD   famotidine (PEPCID) 20 MG tablet Take 1 tablet by mouth 2 times daily  Patient not taking: No sig reported 1/4/22   Thaddeus Adair MD   ondansetron (ZOFRAN ODT) 4 MG disintegrating tablet Take 1 tablet by mouth every 8 hours as needed for Nausea or Vomiting  Patient not taking: No sig reported 1/4/22   Thaddeus Adair MD   glucose monitoring (FREESTYLE) kit 4 times daily  Patient not taking: No sig reported 12/10/21   Ebonie Antonio MD   pantoprazole (PROTONIX) 40 MG tablet Take 1 tablet by mouth every morning (before breakfast)  Patient not taking: No sig reported 12/3/21   Tay Baez MD     .  Current Medications:  Scheduled Meds:   albumin human  25 g IntraVENous Once    insulin glargine  22 Units SubCUTAneous Nightly    levoFLOXacin  750 mg Oral Q24H    enoxaparin  30 mg SubCUTAneous Daily    pantoprazole  40 mg Oral QAM AC    lidocaine  1 patch TransDERmal Daily    metFORMIN  500 mg Oral BID WC    insulin lispro  0-16 Units SubCUTAneous TID WC    insulin lispro  0-4 Units SubCUTAneous Nightly    atorvastatin  20 mg Oral Nightly    traZODone  150 mg Oral Nightly    sodium chloride flush  5-40 mL IntraVENous 2 times per day    gabapentin  900 mg Oral TID     . Continuous Infusions:   sodium chloride      dextrose       . PRN Meds:simethicone, sodium chloride flush, sodium chloride, ondansetron **OR** ondansetron, polyethylene glycol, acetaminophen **OR** acetaminophen, glucose, dextrose bolus **OR** dextrose bolus, glucagon (rDNA), dextrose    REVIEW OF SYSTEMS:     Constitutional: No fever, no chills, no lethargy, no weakness, no weight loss  HEENT:  No headache, otalgia, itchy eyes, nasal discharge or sore throat. Cardiac:  No chest pain, dyspnea, orthopnea or PND. Chest:   No cough, phlegm or wheezing. Abdomen:  Abdominal pain, distension  Neuro:   No focal weakness, abnormal movements or seizure like activity. Skin:   No rashes, no itching. :   No hematuria, no pyuria, no dysuria, no flank pain. Extremities:  No swelling or joint pains. ROS was otherwise negative except as mentioned in the [address] PHYSICAL EXAM:    BP (!) 152/74   Pulse 77   Temp 97.5 °F (36.4 °C) (Oral)   Resp 16   Ht 5' 4.5\" (1.638 m)   LMP 05/21/2018 (LMP Unknown)   SpO2 97%   BMI 18.42 kg/m²   . TMAX[24]    General: Well developed, Well nourished, No apparent distress  Head:  Normocephalic, Atraumatic  EENT: EOMI, Sclera not icteric, Oropharynx moist  Neck:  Supple, Trachea midline  Lungs:CTA Bilaterally  Heart: RRR, No murmur, No rub, No gallop, PMI nondisplaced. Abdomen:Soft, very tender LUQ, Not distended, BS WNL,  No masses. No hepatomegalia   Ext:No clubbing. No cyanosis. No edema. Skin: No rashes. No jaundice. No stigmata of liver disease. Neuro:  A&O x Three, No focal neurological deficits    Imaging:       Hemotological labs: Anemia studies:  No results for input(s): LABIRON, TIBC, FERRITIN, [id number], FOLATE, OCCULTBLD in the last 72 hours. CBC:  Recent Labs     10/09/22  1255 10/10/22  0513 10/11/22  0554 10/12/22  0622   WBC 8.0 7.2 7.2 6.0   HGB 11.5* 9.2* 10.5* 9.3*   MCV 85.1 87.1 88.1 88.2   RDW 12.7 12.9 13.1 13.2    176 192 165       PT/INR:  No results for input(s): PROTIME, INR in the last 72 hours. BMP:  Recent Labs     10/10/22  0513 10/11/22  0554 10/12/22  0622    136 134*   K 4.1 4.1 4.3    106 103   CO2 21 22 23   BUN 24* 22* 20   CREATININE 0.62 0.64 0.58   GLUCOSE 304* 302* 337*   CALCIUM 8.2* 8.3* 8.6       Liver work up:  Hepatitis Functional Panel:  Recent Labs     10/12/22  0622   ALKPHOS 131*   ALT 64*   AST 64*   PROT 5.4*   BILITOT <0.1*   LABALBU 2.7*       Amylase/Lipase/Ammonia:  Recent Labs     10/09/22  1255   LIPASE 70*       Acute Hepatitis Panel:  Lab Results   Component Value Date/Time    HEPBSAG NONREACTIVE 01/08/2017 11:35 AM    HEPCAB NONREACTIVE 01/08/2017 11:35 AM    HEPBIGM NONREACTIVE 01/08/2017 11:35 AM    HEPAIGM NONREACTIVE 01/08/2017 11:35 AM            Principal Problem:    Hyperglycemia  Active Problems:    Neuropathy due to type 2 diabetes mellitus ([address]    Other hyperlipidemia    Acute cystitis without hematuria  Resolved Problems:    * No resolved hospital problems.  *       GI Impression:  RUQ pain with abdominal distension-previous CT 12/2021 concerning for vasculitis  -DDX is too broad at this time without current imaging-but doubt colonic malignancy given normal colonoscopy 2019, presentation doesn't align with mesenteric ischemia (due to recent cocaine use), diverticulosis/diverticulitis doesn't cause abdominal distension, no cirrhosis on previous imaging to cause ascites/distension  Recent cocaine use   UTI-on Levaquin orally-recently dx but did not take prescribed antibiotics  New onset lower leg edema  HX DM with poor compliance and neuropathy    Recommendations and plan:    Stat CT Abdomen and Pelvis  Daily labs including CBC, BMP, LFT  Rec dopplers BLE due to onset edema. Once DVT is ruled out, recommend EPC  Incentive spirometry hours due to congestion  DM diet   Cont supportive care with antiemetics, pain meds etc per primary  Will follow      This plan was formulated in collaboration with Dr. Lavonne Rascon MD  Please feel free to contact us with any questions or concerns      [facility]. [facility] Gastroenterology  Nicki Rose, [address] [place] [address]   [phone]  10/12/2022    11:25 AM     Estimated time of 60 mins reviewing chart, assessing patient and formulating plan of care    This note was created with the assistance of a speech-recognition program.  Although the intention is to generate a document that actually reflects the content of the visit, no guarantees can be provided that every mistake has been identified and corrected by editing.

## 2022-10-12 NOTE — PLAN OF CARE
Problem: Skin/Tissue Integrity  Goal: Absence of new skin breakdown  Description: 1. Monitor for areas of redness and/or skin breakdown  2. Assess vascular access sites hourly  3. Every 4-6 hours minimum:  Change oxygen saturation probe site  4. Every 4-6 hours:  If on nasal continuous positive airway pressure, respiratory therapy assess nares and determine need for appliance change or resting period.   10/12/2022 1907 by Sahnna Contreras RN  Outcome: Progressing  10/12/2022 0618 by Donna Sheridan RN  Outcome: Progressing     Problem: Safety - Adult  Goal: Free from fall injury  10/12/2022 1907 by Shanna Contreras RN  Outcome: Progressing  10/12/2022 0618 by Donna Sheridan RN  Outcome: Progressing     Problem: Pain  Goal: Verbalizes/displays adequate comfort level or baseline comfort level  10/12/2022 1907 by Shanna Contreras RN  Outcome: Progressing  10/12/2022 0618 by Donna Sheridan RN  Outcome: Progressing     Problem: ABCDS Injury Assessment  Goal: Absence of physical injury  10/12/2022 1907 by Shanna Contreras RN  Outcome: Progressing  10/12/2022 0618 by Donna Sheridan RN  Outcome: Progressing  Flowsheets (Taken 10/11/2022 1945)  Absence of Physical Injury: Implement safety measures based on patient assessment     Problem: Chronic Conditions and Co-morbidities  Goal: Patient's chronic conditions and co-morbidity symptoms are monitored and maintained or improved  10/12/2022 1907 by Shanna Contreras RN  Outcome: Progressing  10/12/2022 0618 by Donna Sheridan RN  Outcome: Progressing

## 2022-10-12 NOTE — PLAN OF CARE
Problem: Skin/Tissue Integrity  Goal: Absence of new skin breakdown  Description: 1. Monitor for areas of redness and/or skin breakdown  2. Assess vascular access sites hourly  3. Every 4-6 hours minimum:  Change oxygen saturation probe site  4. Every 4-6 hours:  If on nasal continuous positive airway pressure, respiratory therapy assess nares and determine need for appliance change or resting period.   10/12/2022 0618 by Na Davis RN  Outcome: Progressing  10/11/2022 1824 by Maggie Jiemnez RN  Outcome: Progressing     Problem: Safety - Adult  Goal: Free from fall injury  10/12/2022 0618 by Na Davis RN  Outcome: Progressing  10/11/2022 1824 by Maggie Jimenez RN  Outcome: Progressing     Problem: Pain  Goal: Verbalizes/displays adequate comfort level or baseline comfort level  10/12/2022 0618 by Na Davis RN  Outcome: Progressing  10/11/2022 1824 by Maggie Jimenez RN  Outcome: Progressing     Problem: ABCDS Injury Assessment  Goal: Absence of physical injury  10/12/2022 0618 by Na Davis RN  Outcome: Progressing  10/11/2022 1824 by Maggie Jimenez RN  Outcome: Progressing     Problem: Chronic Conditions and Co-morbidities  Goal: Patient's chronic conditions and co-morbidity symptoms are monitored and maintained or improved  10/12/2022 0618 by Na Davis RN  Outcome: Progressing  10/11/2022 1824 by Maggie Jimenez RN  Outcome: Progressing

## 2022-10-13 PROBLEM — R93.5 ABN FINDINGS ON DX IMAGING OF ABD REGIONS, INC RETROPERITON: Status: ACTIVE | Noted: 2022-10-13

## 2022-10-13 PROBLEM — H04.123 DRY MOUTH AND EYES: Status: ACTIVE | Noted: 2022-10-13

## 2022-10-13 PROBLEM — R68.2 DRY MOUTH AND EYES: Status: ACTIVE | Noted: 2022-10-13

## 2022-10-13 PROBLEM — H04.123 DRY EYES, BILATERAL: Status: ACTIVE | Noted: 2022-10-13

## 2022-10-13 LAB
GLUCOSE BLD-MCNC: 147 MG/DL (ref 65–105)
GLUCOSE BLD-MCNC: 185 MG/DL (ref 65–105)
GLUCOSE BLD-MCNC: 210 MG/DL (ref 65–105)
GLUCOSE BLD-MCNC: 220 MG/DL (ref 65–105)
GLUCOSE BLD-MCNC: 237 MG/DL (ref 65–105)

## 2022-10-13 PROCEDURE — 6370000000 HC RX 637 (ALT 250 FOR IP)

## 2022-10-13 PROCEDURE — 6370000000 HC RX 637 (ALT 250 FOR IP): Performed by: STUDENT IN AN ORGANIZED HEALTH CARE EDUCATION/TRAINING PROGRAM

## 2022-10-13 PROCEDURE — 6360000002 HC RX W HCPCS

## 2022-10-13 PROCEDURE — 2580000003 HC RX 258

## 2022-10-13 PROCEDURE — 36415 COLL VENOUS BLD VENIPUNCTURE: CPT

## 2022-10-13 PROCEDURE — 6360000002 HC RX W HCPCS: Performed by: STUDENT IN AN ORGANIZED HEALTH CARE EDUCATION/TRAINING PROGRAM

## 2022-10-13 PROCEDURE — 1200000000 HC SEMI PRIVATE

## 2022-10-13 PROCEDURE — 6360000002 HC RX W HCPCS: Performed by: FAMILY MEDICINE

## 2022-10-13 PROCEDURE — 99232 SBSQ HOSP IP/OBS MODERATE 35: CPT | Performed by: FAMILY MEDICINE

## 2022-10-13 PROCEDURE — 82787 IGG 1 2 3 OR 4 EACH: CPT

## 2022-10-13 PROCEDURE — 82947 ASSAY GLUCOSE BLOOD QUANT: CPT

## 2022-10-13 PROCEDURE — 99232 SBSQ HOSP IP/OBS MODERATE 35: CPT | Performed by: INTERNAL MEDICINE

## 2022-10-13 PROCEDURE — 97530 THERAPEUTIC ACTIVITIES: CPT

## 2022-10-13 RX ORDER — METOCLOPRAMIDE HYDROCHLORIDE 5 MG/ML
10 INJECTION INTRAMUSCULAR; INTRAVENOUS EVERY 6 HOURS
Status: DISCONTINUED | OUTPATIENT
Start: 2022-10-13 | End: 2022-10-15 | Stop reason: HOSPADM

## 2022-10-13 RX ORDER — INSULIN GLARGINE 100 [IU]/ML
22 INJECTION, SOLUTION SUBCUTANEOUS NIGHTLY
Status: DISCONTINUED | OUTPATIENT
Start: 2022-10-13 | End: 2022-10-15 | Stop reason: HOSPADM

## 2022-10-13 RX ORDER — POLYETHYLENE GLYCOL 3350 17 G/17G
17 POWDER, FOR SOLUTION ORAL EVERY 6 HOURS PRN
Status: DISCONTINUED | OUTPATIENT
Start: 2022-10-13 | End: 2022-10-15 | Stop reason: HOSPADM

## 2022-10-13 RX ORDER — MORPHINE SULFATE 2 MG/ML
1 INJECTION, SOLUTION INTRAMUSCULAR; INTRAVENOUS EVERY 4 HOURS PRN
Status: DISCONTINUED | OUTPATIENT
Start: 2022-10-13 | End: 2022-10-14

## 2022-10-13 RX ADMIN — SODIUM CHLORIDE, PRESERVATIVE FREE 10 ML: 5 INJECTION INTRAVENOUS at 21:16

## 2022-10-13 RX ADMIN — METFORMIN HYDROCHLORIDE 500 MG: 500 TABLET ORAL at 17:08

## 2022-10-13 RX ADMIN — METOCLOPRAMIDE 10 MG: 5 INJECTION, SOLUTION INTRAMUSCULAR; INTRAVENOUS at 17:08

## 2022-10-13 RX ADMIN — GABAPENTIN 900 MG: 300 CAPSULE ORAL at 16:09

## 2022-10-13 RX ADMIN — ONDANSETRON 4 MG: 2 INJECTION INTRAMUSCULAR; INTRAVENOUS at 03:31

## 2022-10-13 RX ADMIN — LEVOFLOXACIN 750 MG: 750 TABLET, FILM COATED ORAL at 17:03

## 2022-10-13 RX ADMIN — GABAPENTIN 900 MG: 300 CAPSULE ORAL at 21:16

## 2022-10-13 RX ADMIN — METFORMIN HYDROCHLORIDE 500 MG: 500 TABLET ORAL at 08:35

## 2022-10-13 RX ADMIN — PANTOPRAZOLE SODIUM 40 MG: 40 TABLET, DELAYED RELEASE ORAL at 06:04

## 2022-10-13 RX ADMIN — ENOXAPARIN SODIUM 30 MG: 100 INJECTION SUBCUTANEOUS at 08:35

## 2022-10-13 RX ADMIN — MORPHINE SULFATE 1 MG: 2 INJECTION, SOLUTION INTRAMUSCULAR; INTRAVENOUS at 21:18

## 2022-10-13 RX ADMIN — TRAZODONE HYDROCHLORIDE 150 MG: 50 TABLET ORAL at 21:16

## 2022-10-13 RX ADMIN — MORPHINE SULFATE 1 MG: 2 INJECTION, SOLUTION INTRAMUSCULAR; INTRAVENOUS at 17:08

## 2022-10-13 RX ADMIN — MORPHINE SULFATE 1 MG: 2 INJECTION, SOLUTION INTRAMUSCULAR; INTRAVENOUS at 08:35

## 2022-10-13 RX ADMIN — GABAPENTIN 900 MG: 300 CAPSULE ORAL at 08:35

## 2022-10-13 RX ADMIN — SODIUM CHLORIDE, PRESERVATIVE FREE 10 ML: 5 INJECTION INTRAVENOUS at 23:49

## 2022-10-13 RX ADMIN — ONDANSETRON 4 MG: 2 INJECTION INTRAMUSCULAR; INTRAVENOUS at 23:46

## 2022-10-13 RX ADMIN — INSULIN GLARGINE 22 UNITS: 100 INJECTION, SOLUTION SUBCUTANEOUS at 21:21

## 2022-10-13 RX ADMIN — INSULIN LISPRO 4 UNITS: 100 INJECTION, SOLUTION INTRAVENOUS; SUBCUTANEOUS at 08:35

## 2022-10-13 RX ADMIN — ATORVASTATIN CALCIUM 20 MG: 20 TABLET, FILM COATED ORAL at 21:16

## 2022-10-13 RX ADMIN — MORPHINE SULFATE 1 MG: 2 INJECTION, SOLUTION INTRAMUSCULAR; INTRAVENOUS at 02:42

## 2022-10-13 RX ADMIN — ONDANSETRON 4 MG: 2 INJECTION INTRAMUSCULAR; INTRAVENOUS at 17:08

## 2022-10-13 ASSESSMENT — ENCOUNTER SYMPTOMS
SHORTNESS OF BREATH: 0
ABDOMINAL PAIN: 1

## 2022-10-13 ASSESSMENT — PAIN DESCRIPTION - DESCRIPTORS
DESCRIPTORS: ACHING;TEARING
DESCRIPTORS: TEARING;SHARP

## 2022-10-13 ASSESSMENT — PAIN DESCRIPTION - ORIENTATION
ORIENTATION: LEFT;LOWER
ORIENTATION: LEFT;LOWER

## 2022-10-13 ASSESSMENT — PAIN DESCRIPTION - PAIN TYPE: TYPE: ACUTE PAIN

## 2022-10-13 ASSESSMENT — PAIN - FUNCTIONAL ASSESSMENT
PAIN_FUNCTIONAL_ASSESSMENT: ACTIVITIES ARE NOT PREVENTED
PAIN_FUNCTIONAL_ASSESSMENT: ACTIVITIES ARE NOT PREVENTED

## 2022-10-13 ASSESSMENT — PAIN SCALES - GENERAL
PAINLEVEL_OUTOF10: 6
PAINLEVEL_OUTOF10: 7
PAINLEVEL_OUTOF10: 7

## 2022-10-13 ASSESSMENT — PAIN DESCRIPTION - LOCATION
LOCATION: FLANK;BACK
LOCATION: FLANK;BACK;SHOULDER
LOCATION: FLANK;BACK

## 2022-10-13 ASSESSMENT — PAIN DESCRIPTION - FREQUENCY: FREQUENCY: INTERMITTENT

## 2022-10-13 NOTE — PROGRESS NOTES
[facility] Progress Note    Patient:   Helio Flor   :    1970   Facility:   [facility]   Date:    10/13/2022  Admission Dx:  Hyperglycemia [R73.9]  Requesting physician: Eliz Stack DO  Reason for consult:  **Abdominal pain with distention**    CC : \" abd pain & Distension\"        SUBJECTIVE:  Patient is complaining of more abdominal distention along with some swelling of her hands and lower extremity. Patient complains of dry eyes/gritty sensation. She also reported dry mouth upon asking. However, she does not have to take a sip of water when she eats food. Upon review of system, patient states that she always feels cold and remains constipated and felt that she is more forgetful/having brain fog lately. Patient also reported 7 previous episodes of acute pancreatitis attributing that to having increased triglycerides level. OBJECTIVE  Blood pressure (!) 113/59, pulse 83, temperature 97.9 °F (36.6 °C), temperature source Oral, resp. rate 16, height 5' 4.5\" (1.638 m), last menstrual period 2018, SpO2 92 %. PHYSICAL EXAMINATION  Physical Exam  Constitutional:       Appearance: She is normal weight. Cardiovascular:      Rate and Rhythm: Normal rate and regular rhythm. Pulses: Normal pulses. Heart sounds: Normal heart sounds. Pulmonary:      Effort: Pulmonary effort is normal.      Breath sounds: Normal breath sounds. Abdominal:      General: There is distension. Palpations: There is no mass. Tenderness: There is no abdominal tenderness. There is no guarding. Hernia: No hernia is present. Musculoskeletal:         General: Swelling present. No tenderness or signs of injury. Normal range of motion. Neurological:      General: No focal deficit present. Mental Status: She is alert. Mental status is at baseline.        PAST MEDICAL/SURGICAL HISTORY  Past Medical History:   Diagnosis Date    Depression Diabetes mellitus ([facility] [address]     Hyperlipidemia     Pancreatitis     Pneumonia due to infectious organism 1/8/2017    Substance abuse ([facility] [address]      Past Surgical History:   Procedure Laterality Date    CARPAL TUNNEL RELEASE  2014    jerome carpal tunnel    CHOLECYSTECTOMY      COLONOSCOPY N/A 9/16/2019    COLONOSCOPY WITH BIOPSY performed by Margie Ha MD at [address] [place] [address] N/A 9/16/2019    EGD ESOPHAGOGASTRODUODENOSCOPY performed by Margie Ha MD at [address] [place] [address] 12/27/2021    EGD BIOPSY performed by Berny Knutson MD at [facility]       ALLERGIES:  No Known Allergies    HOME MEDICATIONS:  Prior to Admission medications    Medication Sig Start Date End Date Taking? Authorizing Provider   lovastatin (MEVACOR) 40 MG tablet Take 1 tablet by mouth nightly 10/5/22   Otto Garza MD   Melatonin 3 MG CAPS Take 3 mg by mouth at bedtime 10/5/22 11/4/22  Otto Garza MD   traZODone (DESYREL) 150 MG tablet Take 1 tablet by mouth nightly 10/5/22   Otto Garza MD   gabapentin (NEURONTIN) 300 MG capsule Take by mouth three times daily (900mg in AM and midday, 1200mg at night; patient has 300mg and 600mg capsules on hand) 10/5/22 10/5/22  Otto Garza MD   blood glucose monitor kit and supplies Dispense sufficient amount for indicated testing frequency plus additional to accommodate PRN testing needs. Dispense all needed supplies to include: monitor, strips, lancing device, lancets, control solutions, alcohol swabs. 10/5/22   Otto Garza MD   Lancets MISC 1 each by Does not apply route daily 10/5/22   Otto Garza MD   blood glucose monitor strips Test 4 times a day & as needed for symptoms of irregular blood glucose. Dispense sufficient amount for indicated testing frequency plus additional to accommodate PRN testing needs.  10/5/22   Otto Garza MD   empagliflozin (JARDIANCE) 10 MG tablet Take 1 tablet by mouth daily 10/5/22   Santiago Pickens MD   insulin glargine (LANTUS SOLOSTAR) 100 UNIT/ML injection pen Inject 10 Units into the skin nightly 10/5/22   Santiago Pickens MD   aluminum hydroxide-magnesium carbonate (GENATON)  MG/15ML suspension Take 15 mLs by mouth 4 times daily as needed for Indigestion  Patient not taking: No sig reported    Historical Provider, MD   gabapentin (NEURONTIN) 600 MG tablet Take by mouth three times daily (900mg in AM and midday, 1200mg at night; patient has 300mg and 600mg capsules on hand)  Patient not taking: No sig reported    Historical Provider, MD   ibuprofen (ADVIL;MOTRIN) 400 MG tablet Take 2 tablets by mouth every 8 hours as needed for Pain  Patient not taking: No sig reported 1/27/22   Gulhsan Mendez MD   Blood Glucose Monitoring Suppl (ONE TOUCH ULTRA 2) w/Device KIT 1 kit by Does not apply route daily  Patient not taking: No sig reported 1/24/22   Deacon Miguel MD   Lancets 30G MISC 1 each by Does not apply route daily  Patient not taking: No sig reported 1/24/22   Deacon Miguel MD   Insulin Pen Needle 30G X 8 MM MISC 1 each by Does not apply route daily  Patient not taking: Reported on 10/5/2022 1/19/22   Deacon Miguel MD   FreeStyle Lancets MISC 1 each by Does not apply route daily  Patient not taking: No sig reported 1/19/22   Deacon Miguel MD   Multiple Vitamins-Minerals (MULTIVITAMIN WITH MINERALS) tablet Take 1 tablet by mouth daily  Patient not taking: No sig reported 1/19/22   Deacon Miguel MD   famotidine (PEPCID) 20 MG tablet Take 1 tablet by mouth 2 times daily  Patient not taking: No sig reported 1/4/22   Gulshan Mendez MD   ondansetron (ZOFRAN ODT) 4 MG disintegrating tablet Take 1 tablet by mouth every 8 hours as needed for Nausea or Vomiting  Patient not taking: No sig reported 1/4/22   Gulshan Mendez MD   glucose monitoring (FREESTYLE) kit 4 times daily  Patient not taking: No sig reported 12/10/21   Deacon Miguel MD   pantoprazole (PROTONIX) 40 MG tablet Take 1 tablet by mouth every morning (before breakfast)  Patient not taking: No sig reported 12/3/21   Debo Lovett MD       CURRENT MEDICATIONS:  Scheduled Meds:   insulin glargine  15 Units SubCUTAneous Nightly    levoFLOXacin  750 mg Oral Q24H    enoxaparin  30 mg SubCUTAneous Daily    pantoprazole  40 mg Oral QAM AC    lidocaine  1 patch TransDERmal Daily    metFORMIN  500 mg Oral BID WC    insulin lispro  0-16 Units SubCUTAneous TID WC    insulin lispro  0-4 Units SubCUTAneous Nightly    atorvastatin  20 mg Oral Nightly    traZODone  150 mg Oral Nightly    sodium chloride flush  5-40 mL IntraVENous 2 times per day    gabapentin  900 mg Oral TID     Continuous Infusions:   sodium chloride 100 mL/hr (10/12/22 1439)    dextrose       PRN Meds:morphine, simethicone, sodium chloride flush, sodium chloride, ondansetron **OR** ondansetron, polyethylene glycol, acetaminophen **OR** acetaminophen, glucose, dextrose bolus **OR** dextrose bolus, glucagon (rDNA), dextrose    SOCIAL HISTORY:     Tobacco:   reports that she has been smoking cigarettes. She has a 15.00 pack-year smoking history. She has never used smokeless tobacco.  Alcohol:   reports current alcohol use. Illicit drugs:  reports current drug use. Drug: Cocaine. FAMILY HISTORY:     Family History   Problem Relation Age of Onset    Diabetes Mother     Coronary Art Dis Mother     Cancer Father         Colon    Diabetes Sister     Diabetes Brother     Bipolar Disorder Brother     Alcohol Abuse Brother     Substance Abuse Brother        Review Of Systems:  Constitutional: No fever, no chills, no lethargy, no weakness. HEENT:  No headache, otalgia, itchy eyes, nasal discharge or sore throat. Cardiac:  No chest pain, dyspnea, orthopnea or PND. Chest:   No cough, phlegm or wheezing. Abdomen:  No abdominal pain, nausea or vomiting. Neuro:  No focal weakness, abnormal movements or seizure like activity. Skin:   No rashes, no itching.   :   No hematuria, no pyuria, no dysuria, no flank pain. Extremities:  No swelling or joint pains. ROS was otherwise negative except as mentioned in the [address] VITALS:  BP (!) 113/59 Comment: verbal notification to MD  Pulse 83   Temp 97.9 °F (36.6 °C) (Oral)   Resp 16   Ht 5' 4.5\" (1.638 m)   LMP 2018 (LMP Unknown)   SpO2 92%   BMI 18.42 kg/m²   TEMPERATURE:  Current - Temp: 97.9 °F (36.6 °C); Max - Temp  Av °F (36.7 °C)  Min: 97.6 °F (36.4 °C)  Max: 98.4 °F (36.9 °C)      LABS AND IMAGING:       CBC  Recent Labs     10/11/22  0554 10/12/22  0622   WBC 7.2 6.0   HGB 10.5* 9.3*   HCT 30.4* 26.9*   MCV 88.1 88.2   MCHC 34.5 34.6   RDW 13.1 13.2    165       Immature PLTs   No results found for: PLTFLUORE    ANEMIA STUDIES  No results for input(s): LABIRON, TIBC, IRON, FERRITIN, SHXNTKFS60, FOLATE, OCCULTBLD in the last 72 hours. BMP  Recent Labs     10/11/22  0554 10/12/22  06    134*   K 4.1 4.3    103   CO2 22 23   BUN 22* 20   CREATININE 0.64 0.58   GLUCOSE 302* 337*   CALCIUM 8.3* 8.6       LFTS  Recent Labs     10/11/22  0554 10/12/22  06   ALKPHOS 147* 131*   ALT 41* 64*   AST 50* 64*   BILITOT <0.1* <0.1*   LABALBU 3.0* 2.7*       AMYLASE/LIPASE/AMMONIA  No results for input(s): AMYLASE, LIPASE, AMMONIA in the last 72 hours.     Acute Hepatitis Panel   Lab Results   Component Value Date/Time    HEPBSAG NONREACTIVE 2017 11:35 AM    HEPCAB NONREACTIVE 2017 11:35 AM    HEPBIGM NONREACTIVE 2017 11:35 AM    HEPAIGM NONREACTIVE 2017 11:35 AM       HCV Genotype   No results found for: HEPATITISCGENOTYPE    HCV Quantitative   No results found for: HCVQNT    LIVER WORK UP:    AFP  Lab Results   Component Value Date/Time    AFP 2.5 2012 06:27 AM       Alpha 1 antitrypsin   Lab Results   Component Value Date/Time    A1A 245 2012 06:27 AM       Anti - Liver/Kidney Ab  No results found for: LIVER-KIDNEYMICROSOMALAB    RUBIN  Lab Results   Component Value Date/Time    RUBIN NEGATIVE 10/10/2022 12:25 PM       AMA  Lab Results   Component Value Date/Time    MITOAB 0.9 12/27/2021 01:11 PM       ASMA  Lab Results   Component Value Date/Time    SMOOTHMUSCAB NONE DETECTED 11/16/2012 03:34 PM       Ceruloplasmin  Lab Results   Component Value Date/Time    CERULOPLSM 49 11/16/2012 03:38 PM       Celiac panel  No results found for: TISSTRNTIIGG, TTGIGA, IGA    IgG  No results found for: IGG    IgM  No results found for: IGM    GGT   No results found for: LABGGT    PT/INR  No results for input(s): PROTIME, INR in the last 72 hours. Cancer Markers:  CEA:  No results for input(s): CEA in the last 72 hours. Ca 125:  No results for input(s):  in the last 72 hours. Ca 19-9:   No results for input(s):  in the last 72 hours. AFP: No results for input(s): AFP in the last 72 hours. Lactic acid:No results for input(s): LACTACIDWB in the last 72 hours. Radiology Review:    CT ABDOMEN PELVIS W IV CONTRAST Additional Contrast? None    Result Date: 10/12/2022  Small bilateral pleural effusions with bibasilar airspace disease, possibly representing atelectasis, as well as diffuse retroperitoneal edema and a small amount of lower abdominal and pelvic free fluid. Moderate colonic stool. No bowel obstruction. No acute intestinal abnormality. 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 US LIVER    Result Date: 10/11/2022  Mildly increased liver echogenicity likely suggestive of fatty liver. Status post cholecystectomy with moderate dilatation of common bile duct. There is echogenic area within the superior pole of right kidney measuring 1.2 x 1 x 0.8 cm. The findings appears to be infiltration of adjacent perinephric fat. This is confirmed after evaluating prior CT of 12/26/2021. RECOMMENDATIONS: Unavailable     XR CHEST PORTABLE    Result Date: 10/9/2022  No acute process.          Principal Problem:    Hyperglycemia  Active Problems:    Neuropathy due to type 2 diabetes mellitus ([address]    Other hyperlipidemia    Acute cystitis without hematuria  Resolved Problems:    * No resolved hospital problems. *       GI Assessment & Plan:  Patient's retroperitoneal edema, multiple acute pancreatitis episode, other autoimmune signs like dry eyes, dry mouth, cold intolerance, constipation hints towards autoimmune etiology. Will check IgG4 levels. Transaminase elevation could be secondary to fatty liver. Complete cessation of alcohol. Follow-up on LFT, BMP. Will follow along      This plan was formulated in collaboration with Dr. Ellen Ruiz . Thank you for allowing us to participate in the care of your patient. Electronically signed by: Kelly Damian MD on 10/13/2022 at 1:23 PM       Please note that this note was generated using a voice recognition dictation software. Although every effort was made to ensure the accuracy of this automated transcription, some errors in transcription may have occurred.

## 2022-10-13 NOTE — PLAN OF CARE
Problem: Skin/Tissue Integrity  Goal: Absence of new skin breakdown  Description: 1. Monitor for areas of redness and/or skin breakdown  2. Assess vascular access sites hourly  3. Every 4-6 hours minimum:  Change oxygen saturation probe site  4. Every 4-6 hours:  If on nasal continuous positive airway pressure, respiratory therapy assess nares and determine need for appliance change or resting period.   10/13/2022 1829 by Lissy Veronica RN  Outcome: Progressing  10/13/2022 0555 by Justyn Bond RN  Outcome: Progressing     Problem: Safety - Adult  Goal: Free from fall injury  10/13/2022 1829 by Lissy Veronica RN  Outcome: Progressing  10/13/2022 0555 by Justyn Bond RN  Outcome: Progressing     Problem: Pain  Goal: Verbalizes/displays adequate comfort level or baseline comfort level  10/13/2022 1829 by Lissy Veronica RN  Outcome: Progressing  10/13/2022 0555 by Justyn Bond RN  Outcome: Progressing     Problem: ABCDS Injury Assessment  Goal: Absence of physical injury  10/13/2022 1829 by Lissy Veronica RN  Outcome: Progressing  10/13/2022 0555 by Justyn Bond RN  Outcome: Progressing     Problem: Chronic Conditions and Co-morbidities  Goal: Patient's chronic conditions and co-morbidity symptoms are monitored and maintained or improved  10/13/2022 1829 by Lissy Veronica RN  Outcome: Progressing  10/13/2022 0555 by Justyn Bond RN  Outcome: Progressing

## 2022-10-13 NOTE — PLAN OF CARE
Problem: Skin/Tissue Integrity  Goal: Absence of new skin breakdown  Description: 1. Monitor for areas of redness and/or skin breakdown  2. Assess vascular access sites hourly  3. Every 4-6 hours minimum:  Change oxygen saturation probe site  4. Every 4-6 hours:  If on nasal continuous positive airway pressure, respiratory therapy assess nares and determine need for appliance change or resting period.   10/13/2022 0555 by Remy Grigsby RN  Outcome: Progressing  10/12/2022 1907 by Elliott Severe, RN  Outcome: Progressing     Problem: Safety - Adult  Goal: Free from fall injury  10/13/2022 0555 by Remy Grigsby RN  Outcome: Progressing  10/12/2022 1907 by Elliott Severe, RN  Outcome: Progressing     Problem: Pain  Goal: Verbalizes/displays adequate comfort level or baseline comfort level  10/13/2022 0555 by Remy Grigsby RN  Outcome: Progressing  10/12/2022 1907 by Elliott Severe, RN  Outcome: Progressing     Problem: ABCDS Injury Assessment  Goal: Absence of physical injury  10/13/2022 0555 by Remy Grigsby RN  Outcome: Progressing  10/12/2022 1907 by Elliott Severe, RN  Outcome: Progressing     Problem: Chronic Conditions and Co-morbidities  Goal: Patient's chronic conditions and co-morbidity symptoms are monitored and maintained or improved  10/13/2022 0555 by Remy Grigsby RN  Outcome: Progressing  10/12/2022 1907 by Elliott Severe, RN  Outcome: Progressing

## 2022-10-13 NOTE — PROGRESS NOTES
Occupational Therapy  [facility]  Occupational Therapy Treatment    Name: Tierra Multani  : 1970  MRN: [id number]  Date of Service: 10/13/2022    Discharge Recommendations:  No therapy recommended at discharge. Patient Diagnosis(es): The encounter diagnosis was Acute cystitis without hematuria. Past Medical History:  has a past medical history of Depression, Diabetes mellitus ([facility] [address] Hyperlipidemia, Pancreatitis, Pneumonia due to infectious organism, and Substance abuse ([facility] [address] Past Surgical History:  has a past surgical history that includes Cholecystectomy; Ectopic pregnancy surgery; Carpal tunnel release (); Colonoscopy (N/A, 2019); Upper gastrointestinal endoscopy (N/A, 2019); and Upper gastrointestinal endoscopy (N/A, 2021). Assessment   Prognosis: Good  No Skilled OT: Independent with ADL's  REQUIRES OT FOLLOW-UP: No  Activity Tolerance  Activity Tolerance: Patient Tolerated treatment well          Restrictions  Restrictions/Precautions  Restrictions/Precautions: Up as Tolerated, General Precautions  Required Braces or Orthoses?: No  Position Activity Restriction  Other position/activity restrictions: Up with assist    Subjective   General  Patient assessed for rehabilitation services?: Yes  Response to previous treatment: Patient with no complaints from previous session  Family / Caregiver Present: No  Subjective  Subjective: \"I'm doing good. I got myself showered up yesterday all by myself\" Pt reports IND level of function with no needs  General Comment  Comments: RN okayed for therapy. pt agreeable to eval. Pt reports 7/10 pain in B feet/R shoulder.  Pt able to progress with treatment            Objective                Safety Devices  Type of Devices: Left in bed;Call light within reach;Gait belt  Restraints  Restraints Initially in Place: No  Bed Mobility Training  Bed Mobility Training: No  Balance  Sitting: Without support (~5 mins EOB dynamic IND)  Standing: Without support (~5 mins SBA no LOB)  Transfer Training  Transfer Training: Yes  Overall Level of Assistance: Stand-by assistance  Sit to Stand: Stand-by assistance  Stand to Sit: Stand-by assistance  Gait  Overall Level of Assistance: Stand-by assistance (hallway distance. No LOB noted)        ADL  LE Dressing: Independent (Don/doff socks EOB)  Additional Comments: Pt seated EOB at start of session. Pt denies need for ADL training stating she took a shower and completed all ADL's IND while standing the night before with no concerns. Pt demo donning socks EOB IND. Pt requests to complete functional mobility to get out of her room for a moment and stretch out. Pt SBA with no device or LOB hallway distance. Pt has met all identified goals. No further needs identified at this time.               Vision  Vision: Impaired  Vision Exceptions: Wears glasses at all times  Hearing  Hearing: Exceptions to [address]  Overall Cognitive Status: [facility]  Orientation  Overall Orientation Status: Within Functional Limits  Orientation Level: Oriented X4                  Education Given To: Patient  Education Provided: Role of Therapy;Plan of Care;Transfer Training;Energy Conservation  Education Method: Verbal  Barriers to Learning: None  Education Outcome: Verbalized understanding;Demonstrated understanding                                                               AM-PAC Score        [facility] Daily Activity Raw Score: 24 (10/13/22 1549)  AM-PAC Inpatient ADL T-Scale Score : 57.54 (10/13/22 1549)  ADL Inpatient CMS 0-100% Score: 0 (10/13/22 1549)  ADL Inpatient CMS G-Code Modifier : [facility] (10/13/22 1549)           Goals  Short Term Goals  Time Frame for Short Term Goals: by discharge  Short Term Goal 1: Pt will tolerate 20 mins functional activity while standing SBA   Short Term Goal 2: Pt will complete functional mobility/transfers with Supervision  Short Term Goal 3: Pt will demo Good dynamic standing balance during functional activity SBA  Short Term Goal 4: Pt will complete LB ADL's SBA  Patient Goals   Patient goals : to go home       Therapy Time   Individual Concurrent Group Co-treatment   Time In 3013         Time Out 1537         Minutes 10         Timed Code Treatment Minutes: [address] OTR/L

## 2022-10-13 NOTE — DISCHARGE INSTR - COC
Continuity of Care Form    Patient Name: Jose Pulido   :  1970  MRN:  [id number]    Admit date:  10/9/2022  Discharge date:  ***    Code Status Order: Full Code   Advance Directives:     Admitting Physician:  Ganesh Rogers DO  PCP: Jacquelyn Cole MD    Discharging Nurse: [facility] Unit/Room#: 7164/5426-85  Discharging Unit Phone Number: ***    Emergency Contact:   Extended Emergency Contact Information  Primary Emergency Contact: Ebony Golden  Mobile Phone: [phone]  Relation: Child    Past Surgical History:  Past Surgical History:   Procedure Laterality Date    CARPAL TUNNEL RELEASE      jerome carpal tunnel    CHOLECYSTECTOMY      COLONOSCOPY N/A 2019    COLONOSCOPY WITH BIOPSY performed by Lucy Queen MD at [address] [place] [address] N/A 2019    EGD ESOPHAGOGASTRODUODENOSCOPY performed by Lucy Queen MD at [address] [place] [address] 2021    EGD BIOPSY performed by Miguel De La Cruz MD at [facility]       Immunization History:   Immunization History   Administered Date(s) Administered    Influenza, AFLURIA (age 1 yrs+), FLUZONE, (age 10 mo+), MDV, 0.5mL 2018    Influenza, FLUARIX, FLULAVAL, FLUZONE (age 10 mo+) AND AFLURIA, (age 1 y+), PF, 0.5mL 12/10/2016, 2020    Pneumococcal Polysaccharide ([id number]) 2018    Tdap (Boostrix, Adacel) 2018       Active Problems:  Patient Active Problem List   Diagnosis Code    Type 2 diabetes mellitus with diabetic polyneuropathy, with long-term current use of insulin ([facility] E11.42, Z79.4    Depression F32. A    GERD with esophagitis K21.00    Type 2 diabetes mellitus with hyperglycemia, with long-term current use of insulin ([facility]) E11.65, Z79.4    Bipolar disorder, mixed ([facility]) F31.60    Cocaine abuse ([facility] F14.10    Pneumonia due to infectious organism J18.9    Acute cystitis without hematuria N30.00    Tobacco use Z72.0    Sepsis due to other etiology ([facility] A41.89    High anion gap metabolic acidosis G48.05    Hyponatremia E87.1    Diabetic ketoacidosis without coma (HCC) E11.10    Respiratory alkalosis E87.3    Abdominal pain, epigastric R10.13    Acute on chronic pancreatitis (HCC) K85.90, K86.1    Abdominal pain R10.9    Non-intractable vomiting R11.10    Vasculitis of mesenteric artery ([facility]) I77.6    History of cocaine use F14.91    Hyperglycemia R73.9    Neuropathy due to type 2 diabetes mellitus ([facility] E11.40    Other hyperlipidemia E78.49       Isolation/Infection:   Isolation            No Isolation          Patient Infection Status       Infection Onset Added Last Indicated Last Indicated By Review Planned Expiration Resolved Resolved By    None active    Resolved    COVID-19 (Rule Out) 10/29/21 10/29/21 10/29/21 COVID-19, Rapid (Ordered)   10/29/21 Rule-Out Test Resulted            Nurse Assessment:  Last Vital Signs: BP (!) 113/59 Comment: verbal notification to MD  Pulse 83   Temp 97.9 °F (36.6 °C) (Oral)   Resp 16   Ht 5' 4.5\" (1.638 m)   LMP 05/21/2018 (LMP Unknown)   SpO2 92%   BMI 18.42 kg/m²     Last documented pain score (0-10 scale): Pain Level: 2  Last Weight:   Wt Readings from Last 1 Encounters:   10/05/22 109 lb (49.4 kg)     Mental Status:  {IP PT MENTAL STATUS:05327}    IV Access:  { MARY JANE IV ACCESS:837708750}    Nursing Mobility/ADLs:  Walking   {P DME IOAF:255025048}  Transfer  {P DME VIOY:954031743}  Bathing  {CHP DME SQFZ:071846786}  Dressing  {P DME XCMX:515008123}  Toileting  {CHP DME UWLU:920985601}  Feeding  {CHP DME WQXD:118096620}  Med Admin  {CHP DME XPWN:315405590}  Med Delivery   { MARY JANE MED Delivery:545021673}    Wound Care Documentation and Therapy:        Elimination:  Continence:    Bowel: {YES / EE:73819}  Bladder: {YES / LR:37276}  Urinary Catheter: {Urinary Catheter:833829601}   Colostomy/Ileostomy/Ileal Conduit: {YES / TH:57256}       Date of Last BM: ***    Intake/Output Summary (Last 24 hours) at 10/13/2022 1512  Last data filed at 10/13/2022 0610  Gross per 24 hour   Intake 1730 ml   Output 200 ml   Net 1530 ml     I/O last 3 completed shifts: In: 9402.7 [P.O.:4190; I.V.:5212.7]  Out: 700 [Urine:700]    Safety Concerns:     { MARY JANE Safety Concerns:633595355}    Impairments/Disabilities:      {[facility] Impairments/Disabilities:141450184}    Nutrition Therapy:  Current Nutrition Therapy:   [facility] Diet List:457881145}    Routes of Feeding: {[facility] DME Other Feedings:600974978}  Liquids: {Slp liquid thickness:10764}  Daily Fluid Restriction: {[facility] DME Yes amt example:322532502}  Last Modified Barium Swallow with Video (Video Swallowing Test): {Done Not Done WPWX:531407080}    Treatments at the Time of Hospital Discharge:   Respiratory Treatments: ***  Oxygen Therapy:  {Therapy; copd oxygen:61300}  Ventilator:    {[facility] Vent HOBA:379646002}    Rehab Therapies: {THERAPEUTIC INTERVENTION:9395495689}  Weight Bearing Status/Restrictions: [facility] Weight Bearin}  Other Medical Equipment (for information only, NOT a DME order):  {EQUIPMENT:460681463}  Other Treatments: ***    Patient's personal belongings (please select all that are sent with patient):  {[facility] DME Belongings:622748017}    RN SIGNATURE:  {Esignature:328847091}    CASE MANAGEMENT/SOCIAL WORK SECTION    Inpatient Status Date: ***    Readmission Risk Assessment Score:  Readmission Risk              Risk of Unplanned Readmission:  16           Discharging to Facility/ Agency   Name:   Address:  Phone:  Fax:    Dialysis Facility (if applicable)   Name:  Address:  Dialysis Schedule:  Phone:  Fax:    / signature: {Esignature:221368396}    PHYSICIAN SECTION    Prognosis: Fair    Condition at Discharge: Stable    Rehab Potential (if transferring to Rehab): Fair    Recommended Labs or Other Treatments After Discharge: Tight DM control plan. Parenteral insulin. Alcohol abstinence.  GI follow up and rheumatology evaluation care. PCP office in 0-7 days. Physician Certification: I certify the above information and transfer of Tierra Multani  is necessary for the continuing treatment of the diagnosis listed and that she requires Home Care for greater 30 days.      Update Admission H&P: No change in H&P    PHYSICIAN SIGNATURE:  Electronically signed by Lanre Cuadra DO on 10/13/22 at 3:14 PM EDT

## 2022-10-13 NOTE — PROGRESS NOTES
[facility]  Progress Note    Date:   10/13/2022  Patient name:  Ynes Chambers  Date of admission:  10/9/2022 12:36 PM  MRN:   [id number]  YOB: 1970    SUBJECTIVE/Last 24 hours update:     Patient seen and examined at the bed side , no new acute events overnight   Vitals are stable, is on room air  Patient still endorses some abdominal discomfort, pressure in her abdomen. It has subsided since yesterday after pain medication was given  She endorses being hungry and can tolerate food at this time, diet was changed to adult diet , soft and bite sized   No episodes of nausea or vomiting  No new complaints at this time      HPI:   The patient is a 47 y.o.  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 Patient stated that she has been having lightheadedness intermittently for the last week. She currently takes 10 units of Lantus for type 2 diabetes however has been without it for the last 7 days and has not taken it. Patient also endorsed nausea, intermittent chest pains that has since resolved. She stated that she becomes really unstable when walking and becomes lightheaded and feels that she will pass out. Stated that she has been eating and drinking okay. Last week she also was also positive for a UTI however has not taken any antibiotics for it. Patient also complains of some right-sided congestion. At ER, patient's vitals were all within normal limits, EKG was normal.  Chest x-ray showed no acute abnormality. Her glucose was elevated at 409. Urinalysis was positive for nitrite and bacteria. Patient had an elevated lipase at 70 her out choline phosphatase was also elevated at 153.   she is admitted to the hospital for the management of hyperglycemia       REVIEW OF SYSTEMS: amount for indicated testing frequency plus additional to accommodate PRN testing needs. Dispense all needed supplies to include: monitor, strips, lancing device, lancets, control solutions, alcohol swabs. 10/5/22   Tabitha Murdock MD   Lancets MISC 1 each by Does not apply route daily 10/5/22   Tabitha Murdock MD   blood glucose monitor strips Test 4 times a day & as needed for symptoms of irregular blood glucose. Dispense sufficient amount for indicated testing frequency plus additional to accommodate PRN testing needs.  10/5/22   Tabitha Murdock MD   empagliflozin (JARDIANCE) 10 MG tablet Take 1 tablet by mouth daily 10/5/22   Tabitha Murdock MD   insulin glargine (LANTUS SOLOSTAR) 100 UNIT/ML injection pen Inject 10 Units into the skin nightly 10/5/22   Tabitha Murdock MD   aluminum hydroxide-magnesium carbonate (GENATON)  MG/15ML suspension Take 15 mLs by mouth 4 times daily as needed for Indigestion  Patient not taking: No sig reported    Historical Provider, MD   gabapentin (NEURONTIN) 600 MG tablet Take by mouth three times daily (900mg in AM and midday, 1200mg at night; patient has 300mg and 600mg capsules on hand)  Patient not taking: No sig reported    Historical Provider, MD   ibuprofen (ADVIL;MOTRIN) 400 MG tablet Take 2 tablets by mouth every 8 hours as needed for Pain  Patient not taking: No sig reported 1/27/22   Sukumar Mott MD   Blood Glucose Monitoring Suppl (ONE TOUCH ULTRA 2) w/Device KIT 1 kit by Does not apply route daily  Patient not taking: No sig reported 1/24/22   Homero De La Cruz MD   Lancets 30G MISC 1 each by Does not apply route daily  Patient not taking: No sig reported 1/24/22   Homero De La Cruz MD   Insulin Pen Needle 30G X 8 MM MISC 1 each by Does not apply route daily  Patient not taking: Reported on 10/5/2022 1/19/22   Homero De La Cruz MD   FreeStyle Lancets MISC 1 each by Does not apply route daily  Patient not taking: No sig reported 1/19/22   Homero De La Cruz MD   Multiple Vitamins-Minerals (MULTIVITAMIN WITH MINERALS) tablet Take 1 tablet by mouth daily  Patient not taking: No sig reported 1/19/22   Trevor Eduardo MD   famotidine (PEPCID) 20 MG tablet Take 1 tablet by mouth 2 times daily  Patient not taking: No sig reported 1/4/22   Padmini Finch MD   ondansetron (ZOFRAN ODT) 4 MG disintegrating tablet Take 1 tablet by mouth every 8 hours as needed for Nausea or Vomiting  Patient not taking: No sig reported 1/4/22   Padmini Finch MD   glucose monitoring (FREESTYLE) kit 4 times daily  Patient not taking: No sig reported 12/10/21   Trevor Eduardo MD   pantoprazole (PROTONIX) 40 MG tablet Take 1 tablet by mouth every morning (before breakfast)  Patient not taking: No sig reported 12/3/21   Bianca Castano MD       ALLERGIES:     Patient has no known allergies. OBJECTIVE:       Vitals:    10/12/22 2006 10/12/22 2107 10/13/22 0312 10/13/22 0600   BP: (!) 144/66   110/67   Pulse: 99   85   Resp: 16 17 16 16   Temp: 98.4 °F (36.9 °C)   98.2 °F (36.8 °C)   TempSrc: Oral   Oral   SpO2: 95%   94%   Height:             Intake/Output Summary (Last 24 hours) at 10/13/2022 0739  Last data filed at 10/13/2022 0610  Gross per 24 hour   Intake 3190 ml   Output 700 ml   Net 2490 ml       PHYSICAL EXAM:  Physical Exam  HENT:      Head: Normocephalic. Cardiovascular:      Rate and Rhythm: Normal rate and regular rhythm. Pulses: Normal pulses. Heart sounds: Normal heart sounds. Abdominal:      General: There is distension. Tenderness: There is abdominal tenderness. Musculoskeletal:      Right lower leg: No edema. Left lower leg: No edema. Comments: Swelling of hands and feet    Neurological:      Mental Status: She is alert.        DIAGNOSTICS:     Laboratory Testing:    Recent Results (from the past 24 hour(s))   POC Glucose Fingerstick    Collection Time: 10/12/22 11:44 AM   Result Value Ref Range    POC Glucose 277 (H) 65 - 105 mg/dL   POC Glucose Fingerstick    Collection Time: 10/12/22  5:11 PM   Result Value Ref Range    POC Glucose 151 (H) 65 - 105 mg/dL   POC Glucose Fingerstick    Collection Time: 10/12/22  8:41 PM   Result Value Ref Range    POC Glucose 186 (H) 65 - 105 mg/dL   POC Glucose Fingerstick    Collection Time: 10/13/22  6:03 AM   Result Value Ref Range    POC Glucose 220 (H) 65 - 105 mg/dL         Imaging/Diagonstics:  EKG: normal EKG, normal sinus rhythm. CT ABDOMEN PELVIS W IV CONTRAST Additional Contrast? None    Result Date: 10/12/2022  EXAMINATION: CT OF THE ABDOMEN AND PELVIS WITH CONTRAST 10/12/2022 3:02 pm TECHNIQUE: CT of the abdomen and pelvis was performed with the administration of intravenous contrast. Multiplanar reformatted images are provided for review. Automated exposure control, iterative reconstruction, and/or weight based adjustment of the mA/kV was utilized to reduce the radiation dose to as low as reasonably achievable. 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 New small bilateral pleural effusions. Diffuse mesenteric haziness is unchanged, as is a left adrenal gland nodule. The gallbladder is surgically absent. The liver, spleen, pancreas, kidneys, and right adrenal gland are normal in appearance and without acute finding. There is a mild degree of retroperitoneal edema and trace lower abdominal and pelvic free fluid. Moderate colonic stool. No bowel obstruction. No acute intestinal abnormality. No dilated loops or areas of bowel wall thickening. The abdominal aorta and iliac arteries are patent and normal in caliber. There is no appreciable uterine or adnexal abnormality.   The urinary bladder is fluid-filled and mildly distended, without significant wall thickening. No significant osseous abnormality. No appreciable uterine or rectal abnormality. There is no abdominal wall or inguinal hernia. Small bilateral pleural effusions with bibasilar airspace disease, possibly representing atelectasis, as well as diffuse retroperitoneal edema and a small amount of lower abdominal and pelvic free fluid. Moderate colonic stool. No bowel obstruction. No acute intestinal abnormality. 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 US LIVER    Result Date: 10/11/2022  EXAMINATION: RIGHT UPPER QUADRANT ULTRASOUND 10/11/2022 4:09 pm COMPARISON: Increased HISTORY: ORDERING SYSTEM PROVIDED HISTORY: Elevated LFTs, new onset abdominal distention, history of substance abuse TECHNOLOGIST PROVIDED HISTORY: Elevated LFTs, new onset abdominal distention, history of substance abuse FINDINGS: LIVER:  The liver demonstrates increased echogenicity without evidence of intrahepatic biliary ductal dilatation. The liver has normal hepatopetal flow. BILIARY SYSTEM:  Patient's at status post cholecystectomy. Common bile duct is dilated measuring 15 mm. RIGHT KIDNEY: No stone hydronephrosis within the right kidney. There is echogenic area within the superior pole of right kidney measuring 1.2 x 1 x 0.8 cm. The findings appears to be infiltration of adjacent perinephric fat. This is confirmed after evaluating prior CT of 12/26/2021. PANCREAS:  Visualized portions of the pancreas are unremarkable. OTHER: No evidence of right upper quadrant ascites. Mildly increased liver echogenicity likely suggestive of fatty liver. Status post cholecystectomy with moderate dilatation of common bile duct. There is echogenic area within the superior pole of right kidney measuring 1.2 x 1 x 0.8 cm. The findings appears to be infiltration of adjacent perinephric fat.  This is confirmed after evaluating prior CT of 12/26/2021. RECOMMENDATIONS: Unavailable     XR CHEST PORTABLE    Result Date: 10/9/2022  EXAMINATION: ONE XRAY VIEW OF THE CHEST 10/9/2022 10:25 am COMPARISON: 01/04/2022 HISTORY: ORDERING SYSTEM PROVIDED HISTORY: Chest Pain TECHNOLOGIST PROVIDED HISTORY: Chest Pain FINDINGS: The lungs are without acute focal process. There is no effusion or pneumothorax. The cardiomediastinal silhouette is stable. The osseous structures are stable. No acute process.        Current Facility-Administered Medications   Medication Dose Route Frequency Provider Last Rate Last Admin    simethicone (MYLICON) chewable tablet 80 mg  80 mg Oral Q6H PRN Guille Magana MD        morphine (PF) injection 1 mg  1 mg IntraVENous Q6H PRN Guille Magana MD   1 mg at 10/13/22 0242    insulin glargine (LANTUS) injection vial 15 Units  15 Units SubCUTAneous Nightly Francis Lopez MD   15 Units at 10/12/22 2323    levoFLOXacin (LEVAQUIN) tablet 750 mg  750 mg Oral Q24H Guille Magana MD   750 mg at 10/12/22 1422    enoxaparin Sodium (LOVENOX) injection 30 mg  30 mg SubCUTAneous Daily Christen Chi MD   30 mg at 10/12/22 0927    pantoprazole (PROTONIX) tablet 40 mg  40 mg Oral QAM AC Guille Magana MD   40 mg at 10/13/22 0604    lidocaine 4 % external patch 1 patch  1 patch TransDERmal Daily Guille Magana MD   1 patch at 10/12/22 0927    metFORMIN (GLUCOPHAGE) tablet 500 mg  500 mg Oral BID  Francis Lopez MD   500 mg at 10/12/22 1746    insulin lispro (HUMALOG) injection vial 0-16 Units  0-16 Units SubCUTAneous TID  Quratulain Julian, DO   8 Units at 10/12/22 1213    insulin lispro (HUMALOG) injection vial 0-4 Units  0-4 Units SubCUTAneous Nightly Quratulain Julian, DO        atorvastatin (LIPITOR) tablet 20 mg  20 mg Oral Nightly Quratulain Julian, DO   20 mg at 10/12/22 2041    traZODone (DESYREL) tablet 150 mg  150 mg Oral Nightly Quratulain Julian, DO   150 mg at 10/12/22 2041    sodium chloride flush 0.9 % injection 5-40 mL  5-40 mL IntraVENous 2 times per day Quratulain Julian, DO   10 mL at 10/12/22 2042    sodium chloride flush 0.9 % injection 5-40 mL  5-40 mL IntraVENous PRN Quratulain Julian, DO        0.9 % sodium chloride infusion   IntraVENous PRN Quratulain Julian,  mL/hr at 10/12/22 1439 100 mL/hr at 10/12/22 1439    ondansetron (ZOFRAN-ODT) disintegrating tablet 4 mg  4 mg Oral Q8H PRN Quratulain Julian, DO        Or    ondansetron (ZOFRAN) injection 4 mg  4 mg IntraVENous Q6H PRN Quratulain Julian, DO   4 mg at 10/13/22 0331    polyethylene glycol (GLYCOLAX) packet 17 g  17 g Oral Daily PRN Quratulain Julian, DO        acetaminophen (TYLENOL) tablet 650 mg  650 mg Oral Q6H PRN Quratulain Julian, DO   650 mg at 10/12/22 9920    Or    acetaminophen (TYLENOL) suppository 650 mg  650 mg Rectal Q6H PRN Quratulain Julian, DO        glucose chewable tablet 16 g  4 tablet Oral PRN Quratulain Julian, DO        dextrose bolus 10% 125 mL  125 mL IntraVENous PRN Quratulain Julian, DO        Or    dextrose bolus 10% 250 mL  250 mL IntraVENous PRN Quratulain Julian, DO        glucagon (rDNA) injection 1 mg  1 mg SubCUTAneous PRN Quratulain Julian, DO        dextrose 10 % infusion   IntraVENous Continuous PRN Quratulain Julian, DO        gabapentin (NEURONTIN) capsule 900 mg  900 mg Oral TID Rosemarie Boas, MD   900 mg at 10/12/22 2041       ASSESSMENT:     Principal Problem:    Hyperglycemia  Active Problems:    Neuropathy due to type 2 diabetes mellitus ([facility]    Other hyperlipidemia    Acute cystitis without hematuria  Resolved Problems:    * No resolved hospital problems.  *      PLAN:     Hyperglycemia secondary to medication non-compliance -  -POC glucose on admission: 325-->220-->147; no anion gap , no acidosis , no ketones in urine   -Was started on Metformin 500 twice daily   -Mildly increased osmolality at 315  ; ruled out HHS   -Most recent a1c 10/05: 9.8   -Hypoglycemia protocol   -High  dose sliding scale - Lantus 15 units -Glucose checks every 6 hours   -IV fluids NS 100ml/hr         2. Sclerosing Mesenteritis   US abdomen showed  - Fatty liver as well as infiltration of the adjacent perinephric fat. - Reviewed CT scan from 12/2020 21 which suggested sclerosing mesenteritis  - CRP and ESR within normal limits  -AST ALT slightly elevated but baseline (AST 64 and ALT 64)  - Albumin is low at 2.7, will order  -GI is on board    -stat CT ordered: Small bilateral pleural effusions. Moderate colonic stool. 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 Urinary Tract Infection   -Rocephin completed 10/12   -UA positive for nitrites and bacteria  -Urine culture positive for Enterococcus Faecalis   -Started on Levaquin 750 mg once daily, for 5 days        4. History of recurrent pancreatitis 2/2 to hypertriglyceridemia ; ruled out   -Lipase mildly elevated 10/9 : 70  -Lipid panel               -Cholesterol 120              -HDL low at 19              -LDL cholesterol normal                -Triglycerides elevated at 360      6. UDS positive for cocaine  -Avoid using beta blockers   -outpatient referral for substance abuse treatment      3.  Neuropathy secondary to type 2 diabetes  -Restarted Gabapentin 900mg tid   -Serology for inflammatory markers - negative     DVT prophylaxis: Lovenox 40 mg SC  GI prophylaxis: Protonix 40 mg daily      Above plan discussed with the patient and family in room, who agreed to the above plan   Plan will be discussed with the attending, Dr. Fiordaliza Carter DO  Family Medicine Resident  10/13/2022 7:39 AM

## 2022-10-14 ENCOUNTER — APPOINTMENT (OUTPATIENT)
Dept: GENERAL RADIOLOGY | Age: 52
DRG: 420 | End: 2022-10-14
Payer: MEDICAID

## 2022-10-14 LAB
ALBUMIN SERPL-MCNC: 3.1 G/DL (ref 3.5–5.2)
ALBUMIN/GLOBULIN RATIO: 1.1 (ref 1–2.5)
ALP BLD-CCNC: 171 U/L (ref 35–104)
ALT SERPL-CCNC: 56 U/L (ref 5–33)
ANION GAP SERPL CALCULATED.3IONS-SCNC: 12 MMOL/L (ref 9–17)
AST SERPL-CCNC: 40 U/L
BILIRUB SERPL-MCNC: <0.1 MG/DL (ref 0.3–1.2)
BUN BLDV-MCNC: 21 MG/DL (ref 6–20)
CALCIUM SERPL-MCNC: 8.6 MG/DL (ref 8.6–10.4)
CHLORIDE BLD-SCNC: 99 MMOL/L (ref 98–107)
CO2: 25 MMOL/L (ref 20–31)
CREAT SERPL-MCNC: 0.75 MG/DL (ref 0.5–0.9)
GFR SERPL CREATININE-BSD FRML MDRD: >60 ML/MIN/1.73M2
GLUCOSE BLD-MCNC: 156 MG/DL (ref 65–105)
GLUCOSE BLD-MCNC: 162 MG/DL (ref 65–105)
GLUCOSE BLD-MCNC: 275 MG/DL (ref 65–105)
GLUCOSE BLD-MCNC: 290 MG/DL (ref 65–105)
GLUCOSE BLD-MCNC: 310 MG/DL (ref 65–105)
GLUCOSE BLD-MCNC: 366 MG/DL (ref 70–99)
POTASSIUM SERPL-SCNC: 4 MMOL/L (ref 3.7–5.3)
SODIUM BLD-SCNC: 136 MMOL/L (ref 135–144)
TOTAL PROTEIN: 5.8 G/DL (ref 6.4–8.3)

## 2022-10-14 PROCEDURE — 6370000000 HC RX 637 (ALT 250 FOR IP): Performed by: STUDENT IN AN ORGANIZED HEALTH CARE EDUCATION/TRAINING PROGRAM

## 2022-10-14 PROCEDURE — 82947 ASSAY GLUCOSE BLOOD QUANT: CPT

## 2022-10-14 PROCEDURE — 99232 SBSQ HOSP IP/OBS MODERATE 35: CPT | Performed by: INTERNAL MEDICINE

## 2022-10-14 PROCEDURE — 6370000000 HC RX 637 (ALT 250 FOR IP)

## 2022-10-14 PROCEDURE — 1200000000 HC SEMI PRIVATE

## 2022-10-14 PROCEDURE — 36415 COLL VENOUS BLD VENIPUNCTURE: CPT

## 2022-10-14 PROCEDURE — 99239 HOSP IP/OBS DSCHRG MGMT >30: CPT | Performed by: FAMILY MEDICINE

## 2022-10-14 PROCEDURE — 6360000002 HC RX W HCPCS

## 2022-10-14 PROCEDURE — 80053 COMPREHEN METABOLIC PANEL: CPT

## 2022-10-14 PROCEDURE — 2580000003 HC RX 258

## 2022-10-14 PROCEDURE — 6360000002 HC RX W HCPCS: Performed by: STUDENT IN AN ORGANIZED HEALTH CARE EDUCATION/TRAINING PROGRAM

## 2022-10-14 PROCEDURE — 74018 RADEX ABDOMEN 1 VIEW: CPT

## 2022-10-14 PROCEDURE — 6360000002 HC RX W HCPCS: Performed by: FAMILY MEDICINE

## 2022-10-14 PROCEDURE — 6370000000 HC RX 637 (ALT 250 FOR IP): Performed by: INTERNAL MEDICINE

## 2022-10-14 RX ORDER — FENOFIBRATE 54 MG/1
54 TABLET ORAL DAILY
Status: DISCONTINUED | OUTPATIENT
Start: 2022-10-14 | End: 2022-10-15 | Stop reason: HOSPADM

## 2022-10-14 RX ORDER — AMOXICILLIN 250 MG
1 CAPSULE ORAL 2 TIMES DAILY
Status: DISCONTINUED | OUTPATIENT
Start: 2022-10-14 | End: 2022-10-15 | Stop reason: HOSPADM

## 2022-10-14 RX ADMIN — POLYETHYLENE GLYCOL 3350 17 G: 17 POWDER, FOR SOLUTION ORAL at 21:44

## 2022-10-14 RX ADMIN — SIMETHICONE 80 MG: 80 TABLET, CHEWABLE ORAL at 18:09

## 2022-10-14 RX ADMIN — SODIUM CHLORIDE, PRESERVATIVE FREE 10 ML: 5 INJECTION INTRAVENOUS at 06:47

## 2022-10-14 RX ADMIN — METFORMIN HYDROCHLORIDE 500 MG: 500 TABLET ORAL at 09:39

## 2022-10-14 RX ADMIN — MORPHINE SULFATE 1 MG: 2 INJECTION, SOLUTION INTRAMUSCULAR; INTRAVENOUS at 12:43

## 2022-10-14 RX ADMIN — SODIUM CHLORIDE, PRESERVATIVE FREE 10 ML: 5 INJECTION INTRAVENOUS at 02:45

## 2022-10-14 RX ADMIN — DOCUSATE SODIUM 50 MG AND SENNOSIDES 8.6 MG 1 TABLET: 8.6; 5 TABLET, FILM COATED ORAL at 18:49

## 2022-10-14 RX ADMIN — GABAPENTIN 900 MG: 300 CAPSULE ORAL at 21:31

## 2022-10-14 RX ADMIN — ENOXAPARIN SODIUM 30 MG: 100 INJECTION SUBCUTANEOUS at 09:39

## 2022-10-14 RX ADMIN — SIMETHICONE 80 MG: 80 TABLET, CHEWABLE ORAL at 09:40

## 2022-10-14 RX ADMIN — FENOFIBRATE 54 MG: 54 TABLET ORAL at 18:49

## 2022-10-14 RX ADMIN — INSULIN LISPRO 12 UNITS: 100 INJECTION, SOLUTION INTRAVENOUS; SUBCUTANEOUS at 09:39

## 2022-10-14 RX ADMIN — SODIUM CHLORIDE, PRESERVATIVE FREE 10 ML: 5 INJECTION INTRAVENOUS at 09:40

## 2022-10-14 RX ADMIN — METOCLOPRAMIDE 10 MG: 5 INJECTION, SOLUTION INTRAMUSCULAR; INTRAVENOUS at 01:22

## 2022-10-14 RX ADMIN — ACETAMINOPHEN 650 MG: 325 TABLET ORAL at 21:31

## 2022-10-14 RX ADMIN — METOCLOPRAMIDE 10 MG: 5 INJECTION, SOLUTION INTRAMUSCULAR; INTRAVENOUS at 22:54

## 2022-10-14 RX ADMIN — SIMETHICONE 80 MG: 80 TABLET, CHEWABLE ORAL at 21:31

## 2022-10-14 RX ADMIN — METFORMIN HYDROCHLORIDE 500 MG: 500 TABLET ORAL at 18:09

## 2022-10-14 RX ADMIN — SODIUM CHLORIDE, PRESERVATIVE FREE 10 ML: 5 INJECTION INTRAVENOUS at 21:33

## 2022-10-14 RX ADMIN — GABAPENTIN 900 MG: 300 CAPSULE ORAL at 09:39

## 2022-10-14 RX ADMIN — PANTOPRAZOLE SODIUM 40 MG: 40 TABLET, DELAYED RELEASE ORAL at 06:47

## 2022-10-14 RX ADMIN — GABAPENTIN 900 MG: 300 CAPSULE ORAL at 14:48

## 2022-10-14 RX ADMIN — TRAZODONE HYDROCHLORIDE 150 MG: 50 TABLET ORAL at 21:31

## 2022-10-14 RX ADMIN — INSULIN LISPRO 8 UNITS: 100 INJECTION, SOLUTION INTRAVENOUS; SUBCUTANEOUS at 12:44

## 2022-10-14 RX ADMIN — METOCLOPRAMIDE 10 MG: 5 INJECTION, SOLUTION INTRAMUSCULAR; INTRAVENOUS at 12:43

## 2022-10-14 RX ADMIN — METOCLOPRAMIDE 10 MG: 5 INJECTION, SOLUTION INTRAMUSCULAR; INTRAVENOUS at 18:09

## 2022-10-14 RX ADMIN — METOCLOPRAMIDE 10 MG: 5 INJECTION, SOLUTION INTRAMUSCULAR; INTRAVENOUS at 06:47

## 2022-10-14 RX ADMIN — INSULIN GLARGINE 22 UNITS: 100 INJECTION, SOLUTION SUBCUTANEOUS at 21:31

## 2022-10-14 RX ADMIN — MAGNESIUM CITRATE 296 ML: 1.75 LIQUID ORAL at 18:09

## 2022-10-14 RX ADMIN — MORPHINE SULFATE 1 MG: 2 INJECTION, SOLUTION INTRAMUSCULAR; INTRAVENOUS at 02:44

## 2022-10-14 RX ADMIN — POLYETHYLENE GLYCOL 3350, SODIUM SULFATE ANHYDROUS, SODIUM BICARBONATE, SODIUM CHLORIDE, POTASSIUM CHLORIDE 4000 ML: 236; 22.74; 6.74; 5.86; 2.97 POWDER, FOR SOLUTION ORAL at 10:42

## 2022-10-14 RX ADMIN — LEVOFLOXACIN 750 MG: 750 TABLET, FILM COATED ORAL at 18:09

## 2022-10-14 ASSESSMENT — PAIN DESCRIPTION - LOCATION
LOCATION: ABDOMEN;FLANK
LOCATION: ABDOMEN
LOCATION: ABDOMEN;FLANK

## 2022-10-14 ASSESSMENT — PAIN - FUNCTIONAL ASSESSMENT
PAIN_FUNCTIONAL_ASSESSMENT: ACTIVITIES ARE NOT PREVENTED

## 2022-10-14 ASSESSMENT — PAIN DESCRIPTION - DESCRIPTORS
DESCRIPTORS: ACHING
DESCRIPTORS: CRAMPING
DESCRIPTORS: ACHING;TEARING

## 2022-10-14 ASSESSMENT — PAIN DESCRIPTION - ORIENTATION
ORIENTATION: LEFT;LOWER
ORIENTATION: LEFT;LOWER
ORIENTATION: LEFT

## 2022-10-14 ASSESSMENT — PAIN SCALES - GENERAL
PAINLEVEL_OUTOF10: 4
PAINLEVEL_OUTOF10: 0
PAINLEVEL_OUTOF10: 8
PAINLEVEL_OUTOF10: 7
PAINLEVEL_OUTOF10: 3
PAINLEVEL_OUTOF10: 0

## 2022-10-14 ASSESSMENT — PAIN DESCRIPTION - FREQUENCY
FREQUENCY: CONTINUOUS
FREQUENCY: INTERMITTENT

## 2022-10-14 ASSESSMENT — PAIN DESCRIPTION - PAIN TYPE: TYPE: ACUTE PAIN

## 2022-10-14 ASSESSMENT — PAIN DESCRIPTION - ONSET: ONSET: ON-GOING

## 2022-10-14 NOTE — PLAN OF CARE
Problem: Skin/Tissue Integrity  Goal: Absence of new skin breakdown  Description: 1. Monitor for areas of redness and/or skin breakdown  2. Assess vascular access sites hourly  3. Every 4-6 hours minimum:  Change oxygen saturation probe site  4. Every 4-6 hours:  If on nasal continuous positive airway pressure, respiratory therapy assess nares and determine need for appliance change or resting period.   10/14/2022 0543 by Zee Chirinos RN  Outcome: Progressing  10/13/2022 1829 by Landon Barcenas RN  Outcome: Progressing     Problem: Safety - Adult  Goal: Free from fall injury  10/14/2022 0543 by Zee Chirinos RN  Outcome: Progressing  Flowsheets (Taken 10/13/2022 1940)  Free From Fall Injury: Instruct family/caregiver on patient safety  10/13/2022 1829 by Landon Barcenas RN  Outcome: Progressing     Problem: Pain  Goal: Verbalizes/displays adequate comfort level or baseline comfort level  10/14/2022 0543 by Zee Chirinos RN  Outcome: Progressing  10/13/2022 Trg Revolucchade 1 by Landon Barcenas RN  Outcome: Progressing     Problem: ABCDS Injury Assessment  Goal: Absence of physical injury  10/14/2022 0543 by Zee Chirinos RN  Outcome: Progressing  Flowsheets (Taken 10/13/2022 1940)  Absence of Physical Injury: Implement safety measures based on patient assessment  10/13/2022 1829 by Landon Barcenas RN  Outcome: Progressing     Problem: Chronic Conditions and Co-morbidities  Goal: Patient's chronic conditions and co-morbidity symptoms are monitored and maintained or improved  10/14/2022 0543 by Zee Chirinos RN  Outcome: Progressing  Flowsheets (Taken 10/13/2022 1940)  Care Plan - Patient's Chronic Conditions and Co-Morbidity Symptoms are Monitored and Maintained or Improved: Monitor and assess patient's chronic conditions and comorbid symptoms for stability, deterioration, or improvement  10/13/2022 1829 by Landon Barcenas RN  Outcome: Progressing

## 2022-10-14 NOTE — PLAN OF CARE
Problem: Skin/Tissue Integrity  Goal: Absence of new skin breakdown  Description: 1. Monitor for areas of redness and/or skin breakdown  2. Assess vascular access sites hourly  3. Every 4-6 hours minimum:  Change oxygen saturation probe site  4. Every 4-6 hours:  If on nasal continuous positive airway pressure, respiratory therapy assess nares and determine need for appliance change or resting period.   10/14/2022 1746 by Katherine Amanda RN  Outcome: Progressing  10/14/2022 0543 by Tyler Mena RN  Outcome: Progressing     Problem: Safety - Adult  Goal: Free from fall injury  10/14/2022 1746 by Katherine Amanda RN  Outcome: Progressing  10/14/2022 0543 by Tyler Mena RN  Outcome: Progressing  Flowsheets (Taken 10/13/2022 1940)  Free From Fall Injury: Instruct family/caregiver on patient safety     Problem: Pain  Goal: Verbalizes/displays adequate comfort level or baseline comfort level  10/14/2022 1746 by Katherine Amanda RN  Outcome: Progressing  10/14/2022 0543 by Tyler Mena RN  Outcome: Progressing     Problem: ABCDS Injury Assessment  Goal: Absence of physical injury  10/14/2022 1746 by Katherine Amanda RN  Outcome: Progressing  10/14/2022 0543 by Tyler Mena RN  Outcome: Progressing  Flowsheets (Taken 10/13/2022 1940)  Absence of Physical Injury: Implement safety measures based on patient assessment     Problem: Chronic Conditions and Co-morbidities  Goal: Patient's chronic conditions and co-morbidity symptoms are monitored and maintained or improved  10/14/2022 1746 by Katherine Amanda RN  Outcome: Progressing  10/14/2022 0543 by Tyler Mena RN  Outcome: Progressing  Flowsheets (Taken 10/13/2022 1940)  Care Plan - Patient's Chronic Conditions and Co-Morbidity Symptoms are Monitored and Maintained or Improved: Monitor and assess patient's chronic conditions and comorbid symptoms for stability, deterioration, or improvement

## 2022-10-14 NOTE — CONSULTS
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 Jonelle Chávez. [address] [place] [address]  Primary Care Physician: Roshni Flores MD    CHIEF COMPLAINT:    Chief Complaint   Patient presents with    Fatigue    Nausea    Chest Pain       History Obtained From:  patient    HISTORY OF PRESENT ILLNESS:                The patient is a 47 y.o. female with significant past medical history of Type 2 diabetes mellitus, hyperlipidemia, recurrent pancreatitis, peripheral neuropathy due to diabetes mellitus, cirrhosis of liver,Due to alcoholic liver disease, and cocaine abuse is presenting to the hospital with dizziness and lightheadedness and abdominal pain for the last 2 days. Currently patient was camping with her sister and she started to feel weak and was having difficulty standing up and walking on her own. The abdominal pain is mainly in the left upper quadrant, sharp in character, nonradiating, constant, associated with abdominal distention and nausea. Patient denies any joints swelling, redness but reports of right shoulder joint pain. Patient vitals are stable in the emergency department, EKG normal sinus rhythm with no acute changes and chest x-ray was unremarkable. Patient was found to have elevated blood sugars of 439 in the emergency department with positive urinalysis for nitrite and bacteria. Lipase was 70 and BMP unremarkable. CBC shows normocytic normal chromatic anemia with hemoglobin 9.2-9.3 g/dL. LFT's show Albumin 2.7, Alk Phos 258-167-334- 171, ALT 10-41-64- 56, AST 13-50-64-40. CT abdomen and pelvis showed small bilateral pleural effusions with bibasilar airspace disease possibly representing atelectasis as well as diffusely. Minimal edema and a small amount of lower abdominal and pelvic free fluid.   Moderate colonic stool with no bowel obstruction and no acute intestinal abnormality. Diffuse haziness of the mesentery is unchanged and likely reflects findings of chronic mesenteric panniculitis/ mesenteric panniculitis. Pertinent labs:  RUBIN negative  Anti- ds DNA negative  TSH 0.54  Sed rate 8     CRP 3.9  Rheumatoid factor negative  ANCA myeloperoxidase negative  Antimitochondrial antibodies negative  Smooth muscle Ab -ve  Complement C4 50  Hep A IgM negative, hepatitis B surface antigen negative, hepatitis C antibodies and hepatitis B core antibodies negative  Urine drug screen positive for cocaine  Liver shows mildly increased liver echogenicity likely suggestive of fatty liver status postcholecystectomy with moderate dilatation of common bile duct. There is echogenic area within the posterior pole of right kidney measuring 1.2 into 1 into 0.8 cm suggestive of infiltration of adjacent perinephric fat. Patient IgG4 antibodies are still in process. Past Medical History:    Past Medical History:   Diagnosis Date    Depression     Diabetes mellitus ([facility]     Hyperlipidemia     Pancreatitis     Pneumonia due to infectious organism 1/8/2017    Substance abuse ([facility]        Past Surgical History:    Past Surgical History:   Procedure Laterality Date    CARPAL TUNNEL RELEASE  2014    jerome carpal tunnel    CHOLECYSTECTOMY      COLONOSCOPY N/A 9/16/2019    COLONOSCOPY WITH BIOPSY performed by Jyoti Villafuerte MD at [address] [place] [address] N/A 9/16/2019    EGD ESOPHAGOGASTRODUODENOSCOPY performed by Jyoti Villafuerte MD at [address] [place] [address] 12/27/2021    EGD BIOPSY performed by Ramses Tijerina MD at [facility]       Allergies:  Patient has no known allergies.       Current Medications:    Current Facility-Administered Medications   Medication Dose Route Frequency Provider Last Rate Last Admin    polyethylene glycol (GoLYTELY) solution 4,000 mL  4,000 mL Oral Once Bennie Ha MD morphine (PF) injection 1 mg  1 mg IntraVENous Q4H PRN Ericka Sanders MD   1 mg at 10/14/22 0244    metoclopramide (REGLAN) injection 10 mg  10 mg IntraVENous Q6H Nicolasa Chamorro MD   10 mg at 10/14/22 0647    polyethylene glycol (GLYCOLAX) packet 17 g  17 g Oral Q6H PRN Shannon Santiago MD        insulin glargine (LANTUS) injection vial 22 Units  22 Units SubCUTAneous Nightly Quratulain Julian, DO   22 Units at 10/13/22 2121    simethicone (MYLICON) chewable tablet 80 mg  80 mg Oral Q6H PRN Guille Magana MD   80 mg at 10/14/22 0940    levoFLOXacin (LEVAQUIN) tablet 750 mg  750 mg Oral Q24H Guille Magana MD   750 mg at 10/13/22 1703    enoxaparin Sodium (LOVENOX) injection 30 mg  30 mg SubCUTAneous Daily Kory Benites MD   30 mg at 10/14/22 0939    pantoprazole (PROTONIX) tablet 40 mg  40 mg Oral QAM AC Guille Magana MD   40 mg at 10/14/22 0647    lidocaine 4 % external patch 1 patch  1 patch TransDERmal Daily Guille Magana MD   1 patch at 10/14/22 0939    metFORMIN (GLUCOPHAGE) tablet 500 mg  500 mg Oral BID  Nasra Montiel MD   500 mg at 10/14/22 0939    insulin lispro (HUMALOG) injection vial 0-16 Units  0-16 Units SubCUTAneous TID  Quratulain Julian, DO   12 Units at 10/14/22 0939    insulin lispro (HUMALOG) injection vial 0-4 Units  0-4 Units SubCUTAneous Nightly Quratulain Julian, DO        atorvastatin (LIPITOR) tablet 20 mg  20 mg Oral Nightly Quratulain Julian, DO   20 mg at 10/13/22 2116    traZODone (DESYREL) tablet 150 mg  150 mg Oral Nightly Quratulain Julian, DO   150 mg at 10/13/22 2116    sodium chloride flush 0.9 % injection 5-40 mL  5-40 mL IntraVENous 2 times per day Quratulain Julian, DO   10 mL at 10/14/22 0940    sodium chloride flush 0.9 % injection 5-40 mL  5-40 mL IntraVENous PRN Quratulain Julian, DO   10 mL at 10/14/22 0647    0.9 % sodium chloride infusion   IntraVENous PRN Quratulain Julian,  mL/hr at 10/12/22 1439 100 mL/hr at 10/12/22 1439    ondansetron (ZOFRAN-ODT) disintegrating tablet 4 mg  4 mg Oral Q8H PRN Quratulain Julian, DO        Or    ondansetron (ZOFRAN) injection 4 mg  4 mg IntraVENous Q6H PRN Quratulain Julian, DO   4 mg at 10/13/22 2346    acetaminophen (TYLENOL) tablet 650 mg  650 mg Oral Q6H PRN Quratulain Julian, DO   650 mg at 10/12/22 3290    Or    acetaminophen (TYLENOL) suppository 650 mg  650 mg Rectal Q6H PRN Quratulain Julian, DO        glucose chewable tablet 16 g  4 tablet Oral PRN Quratulain Julian, DO        dextrose bolus 10% 125 mL  125 mL IntraVENous PRN Quratulain Julian, DO        Or    dextrose bolus 10% 250 mL  250 mL IntraVENous PRN Quratulain Julian, DO        glucagon (rDNA) injection 1 mg  1 mg SubCUTAneous PRN Quratulain Julian, DO        dextrose 10 % infusion   IntraVENous Continuous PRN Quratulain Julian, DO        gabapentin (NEURONTIN) capsule 900 mg  900 mg Oral TID Abilio Hdz MD   900 mg at 10/14/22 0939       Home Medications:    Prior to Admission medications    Medication Sig Start Date End Date Taking? Authorizing Provider   lovastatin (MEVACOR) 40 MG tablet Take 1 tablet by mouth nightly 10/5/22   Kristin Savage MD   Melatonin 3 MG CAPS Take 3 mg by mouth at bedtime 10/5/22 11/4/22  Kristin Savage MD   traZODone (DESYREL) 150 MG tablet Take 1 tablet by mouth nightly 10/5/22   Kristin Savage MD   gabapentin (NEURONTIN) 300 MG capsule Take by mouth three times daily (900mg in AM and midday, 1200mg at night; patient has 300mg and 600mg capsules on hand) 10/5/22 10/5/22  Kristin Savage MD   blood glucose monitor kit and supplies Dispense sufficient amount for indicated testing frequency plus additional to accommodate PRN testing needs. Dispense all needed supplies to include: monitor, strips, lancing device, lancets, control solutions, alcohol swabs.  10/5/22   Kristin Savage MD   Lancets MISC 1 each by Does not apply route daily 10/5/22   Kristin Savage MD   blood glucose monitor strips Test 4 times a day & as needed for symptoms of irregular blood glucose. Dispense sufficient amount for indicated testing frequency plus additional to accommodate PRN testing needs.  10/5/22   Lakisha Stevens MD   empagliflozin (JARDIANCE) 10 MG tablet Take 1 tablet by mouth daily 10/5/22   Lakisha Stevens MD   insulin glargine (LANTUS SOLOSTAR) 100 UNIT/ML injection pen Inject 10 Units into the skin nightly 10/5/22   Lakisha Stevens MD   aluminum hydroxide-magnesium carbonate (GENATON)  MG/15ML suspension Take 15 mLs by mouth 4 times daily as needed for Indigestion  Patient not taking: No sig reported    Historical Provider, MD   gabapentin (NEURONTIN) 600 MG tablet Take by mouth three times daily (900mg in AM and midday, 1200mg at night; patient has 300mg and 600mg capsules on hand)  Patient not taking: No sig reported    Historical Provider, MD   ibuprofen (ADVIL;MOTRIN) 400 MG tablet Take 2 tablets by mouth every 8 hours as needed for Pain  Patient not taking: No sig reported 1/27/22   Zuleyma Abel MD   Blood Glucose Monitoring Suppl (ONE TOUCH ULTRA 2) w/Device KIT 1 kit by Does not apply route daily  Patient not taking: No sig reported 1/24/22   Jakob Tineo MD   Lancets 30G MISC 1 each by Does not apply route daily  Patient not taking: No sig reported 1/24/22   Jakob Tineo MD   Insulin Pen Needle 30G X 8 MM MISC 1 each by Does not apply route daily  Patient not taking: Reported on 10/5/2022 1/19/22   Jakob Tineo MD   FreeStyle Lancets MISC 1 each by Does not apply route daily  Patient not taking: No sig reported 1/19/22   Jakob Tineo MD   Multiple Vitamins-Minerals (MULTIVITAMIN WITH MINERALS) tablet Take 1 tablet by mouth daily  Patient not taking: No sig reported 1/19/22   Jakob Tineo MD   famotidine (PEPCID) 20 MG tablet Take 1 tablet by mouth 2 times daily  Patient not taking: No sig reported 1/4/22   Zuleyma Abel MD   ondansetron (ZOFRAN ODT) 4 MG disintegrating tablet Take 1 tablet by mouth every 8 hours as needed for Nausea or Vomiting  Patient not taking: No sig reported 1/4/22   Ruth Evans MD   glucose monitoring (FREESTYLE) kit 4 times daily  Patient not taking: No sig reported 12/10/21   Nigel Frank MD   pantoprazole (PROTONIX) 40 MG tablet Take 1 tablet by mouth every morning (before breakfast)  Patient not taking: No sig reported 12/3/21   Benito Lennox, MD       REVIEW OF SYSTEMS:    CONSTITUTIONAL:  fevers, chills, and sweats  DERMATOLOGICAL: negative  NEUROLOGICAL:  negative  EYES:  positive for  dry eyes  HEENT:  negative  RESPIRATORY:  negative  CARDIOVASCULAR:  negative  GASTROINTESTINAL:  positive for nausea, abdominal pain, abdominal distention, and reflux  GENITO-URINARY: no dysuria, trouble voiding, or hematuria  ENDOCRINE: negative  MUSCULOSKELETAL:  positive for  pain, decreased range of motion of the right shoulder joint  HEMATOLOGICAL AND LYMPHATIC: negative  IMMUNOLOGICAL: negative  PSYCHOLOGICAL: negative    Family History:       Problem Relation Age of Onset    Diabetes Mother     Coronary Art Dis Mother     Cancer Father         Colon    Diabetes Sister     Diabetes Brother     Bipolar Disorder Brother     Alcohol Abuse Brother     Substance Abuse Brother        Social History:    TOBACCO:  Currently smokes. Type of tobacco used:  Cigarettes. Quantity per day:  1 cigarette. Duration of tobacco use: Since age of 11. Past attempts to quit? No.  Smoking cessation advice provided?  yes. ETOH:  Current alcohol usage:  Type of Drink(s):  Wine. Frequency of use: Socially. DRUGS:   reports current drug use. Drug: Cocaine. PHYSICAL EXAM:      Vitals:    BP (!) 132/59   Pulse 89   Temp 98.2 °F (36.8 °C) (Oral)   Resp 18   Ht 5' 4.5\" (1.638 m)   LMP 05/21/2018 (LMP Unknown)   SpO2 94%   BMI 18.42 kg/m²   GENERAL EXAM: On exam- pt appears stated age. He/she is a pleasant male/female in no acute distress. NEURO:  Alert and oriented x 3.  Cranial nerves intact Motor strength 5/5 grossly normal  HEENT: head- atraumatic-normocephalic. No discharge from ears, nose or throat. NECK: Supple. No jugular venous distention. LUNGS: Bilateral chest movements without the use of accessory muscles. Respirations easy, nonlabored, breath sounds clear. CARDIOVASCULAR:  Heart rate and rhythm regular. ABDOMEN: Abdomen is soft, distended, tender in the left upper quadrant with no rebound tenderness, guarding and rigidity. RECTAL: exam deferred. EXTREMITIES: Positive for peripheral edema but negative for any joint tenderness  MUSCULOSKELETAL:   NECK:  Full ROM  SHOULDERS: Up to 90 degrees abduction and 60 degree extension on the right side with normal movements on the left side  ELBOWS:  No swelling, warmth, full ROM  WRISTS:  No swelling, warmth, full ROM  MCP: no synovial thickening, Non-tender. PIP: no swelling, no tenderness  DIP: no swelling, no tenderness  No flexor crepitus, : 4+/4+  HIPS: full ROM on FABERE  KNEES: no swelling, no warmth, no effusion  ANKLES:  No warmth, no swelling, no erythema. Full ROM  TOES: non-tender  SKIN: No rashes or nodules noted.     DATA:    CBC:   Lab Results   Component Value Date/Time    WBC 6.0 10/12/2022 06:22 AM    RBC 3.05 10/12/2022 06:22 AM    RBC 3.74 10/21/2011 06:25 AM    HGB 9.3 10/12/2022 06:22 AM    HCT 26.9 10/12/2022 06:22 AM    MCV 88.2 10/12/2022 06:22 AM    MCH 30.5 10/12/2022 06:22 AM    MCHC 34.6 10/12/2022 06:22 AM    RDW 13.2 10/12/2022 06:22 AM     10/12/2022 06:22 AM     10/21/2011 06:25 AM    MPV 10.4 10/12/2022 06:22 AM     Urine Culture:  No components found for: MCKENZIE    IMPRESSION/RECOMMENDATIONS:      Patient Active Problem List   Diagnosis    Type 2 diabetes mellitus with diabetic polyneuropathy, with long-term current use of insulin (HCC)    Depression    GERD with esophagitis    Type 2 diabetes mellitus with hyperglycemia, with long-term current use of insulin ([facility])    Bipolar disorder, mixed ([address]    Cocaine abuse ([address]    Pneumonia due to infectious organism    Acute cystitis without hematuria    Tobacco use    Sepsis due to other etiology ([address]    High anion gap metabolic acidosis    Hyponatremia    Diabetic ketoacidosis without coma (HCC)    Respiratory alkalosis    Abdominal pain, epigastric    Acute on chronic pancreatitis (HCC)    Abdominal pain    Non-intractable vomiting    Vasculitis of mesenteric artery (HCC)    History of cocaine use    Hyperglycemia    Neuropathy due to type 2 diabetes mellitus ([address]    Other hyperlipidemia    Dry mouth and eyes    Abn findings on dx imaging of abd regions, inc retroperiton    Dry eyes, bilateral   Patient with history of abdominal and back pain and previous episodes of recurrent pancreatitis and also dry mouth and dry eyes with CT scan of the abdomen showing retroperitoneal edema - Low suspicion for IgG4   Uncontrolled type 2 diabetes mellitus secondary to noncompliance  Neuropathy secondary to type 2 diabetes mellitus  Urinary tract infection  History of recurrent pancreatitis secondary to hypertriglyceridemia/IgG4 disease  Cirrhosis liver due to alcoholic liver disease    Recommendations: Follow-up on IgG4 antibodies  On Levaquin for UTI  Rest of the management as per the primary team  We will continue to follow    Malena Arce MD   Resident internal medicine, PGY- [facility]. 10/14/22 12:10 PM    Attending Note:    I have discussed the care of the patient including pertinent history and exam findings, with Dr. Malena Arce. I have seen and examined the patient and the key elements of all parts of the encounter have been performed by me. I agree with the assessment, plan and orders as documented by Dr. Malena Arce  58-year-old  female patient with multiple medical problems as stated above. She was admitted to the hospital with dizziness and lightheadedness after she had used cocaine.   CT scan of the abdomen is showing some chronic mesenteric panniculitis. We were consulted to evaluate for IgG4 related disorder. I have to admit that she is a low suspicion clinically for that. Her recurrent pancreatitis is mostly secondary to her hyperlipidemia. She has no retroperitoneal fibrosis. She has some dry symptoms but that could be secondary to her chronic liver disease and cirrhosis. We will follow her IgG4 level but our suspicion for the diagnosis is really low. We will follow-up the patient with you. Carlita Talamantes M.D.  [address] clinic/[facility]  [phone]

## 2022-10-14 NOTE — PROGRESS NOTES
[address] [facility] [address]  PROGRESS NOTE    Shift date: 10/13/22  Shift day: Thursday   Shift # 2    Room # 9334/2469-48   Name: Zahira Cruz                Restoration: [address] of [place]:     Referral: Routine Visit    Admit Date & Time: 10/9/2022 12:36 PM    Assessment:  Zahira Cruz is a 47 y.o. female     Intervention:  Writer introduced self and title as . Patient appeared calm, resting, and coping. Writer offered space for patient  to express feelings, needs, and concerns and provided a ministry presence. Patient expressed gratitude for Ivette Hernadez being brought to room. Outcome:  Patient continues to process her hospital visit and appeared receptive to  visit. Plan:  Chaplains will remain available to offer spiritual and emotional support as needed.       Electronically signed by Jacquelyn Fagan on 10/13/2022 at 9:43 PM.  [facility]  [phone]

## 2022-10-14 NOTE — PROGRESS NOTES
Physical Therapy Cancel Note      DATE: 10/14/2022    NAME: Phillip Redding  MRN: [id number]   : 1970      Patient not seen this date for Physical Therapy due to:    Patient Declined: pt states she just got done ambulating independently in the hallway (she's supposed to be ambulating q hour), RN states she's getting around independently in her room. Pt denies PT needs and states she doesn't want to continue with PT. Educated pt on the importance of getting OOB and ambulating frequently per MD order.   DC pt from PT per pt wishes      Electronically signed by Harris Felton PT on 10/14/2022 at 3:28 PM

## 2022-10-14 NOTE — PROGRESS NOTES
Physical Therapy        Physical Therapy Cancel Note      DATE: 10/14/2022    NAME: Sima Varner  MRN: [id number]   : 1970      Patient not seen this date for Physical Therapy due to:    Patient Declined: pt states she is sleeping at this time and requesting PT check back later.  Ck in PM or 10/15      Electronically signed by Christopher Linares PT on 10/14/2022 at 11:37 AM

## 2022-10-14 NOTE — CARE COORDINATION
Transitional Planning:    Discussed transition plan. Pt states she goes between her sister's house and the house of the father of her babies. Declines any needs at discharge as she states she will have help and is getting around fine.

## 2022-10-14 NOTE — PROGRESS NOTES
[facility]  Progress Note    Date:   10/14/2022  Patient name:  Clara Brantley  Date of admission:  10/9/2022 12:36 PM  MRN:   [id number]  YOB: 1970    SUBJECTIVE/Last 24 hours update:     Patient seen and examined at the bed side , no new acute events overnight   Vitals are stable, is on room air  Patient still endorses some abdominal discomfort, pressure in her abdomen. It has subsided since yesterday after pain medication was given  She endorses being hungry and can tolerate food at this time, diet was changed to adult diet , soft and bite sized   No episodes of nausea or vomiting  Patient's abdomen is distended, per GI we will start her on GoLytely. Have consulted rheumatology for patient's possible IgG syndrome. Most likely be discharged after rheumatology recommendations with outpatient follow-up with them  No new complaints at this time  HPI:   The patient is a 47 y.o.  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 Patient stated that she has been having lightheadedness intermittently for the last week. She currently takes 10 units of Lantus for type 2 diabetes however has been without it for the last 7 days and has not taken it. Patient also endorsed nausea, intermittent chest pains that has since resolved. She stated that she becomes really unstable when walking and becomes lightheaded and feels that she will pass out. Stated that she has been eating and drinking okay. Last week she also was also positive for a UTI however has not taken any antibiotics for it. Patient also complains of some right-sided congestion. At ER, patient's vitals were all within normal limits, EKG was normal.  Chest x-ray showed no acute abnormality. Her glucose was elevated at 409. Urinalysis was positive for nitrite and bacteria. Patient had an elevated lipase at 70 her out choline phosphatase was also elevated at 153. she is admitted to the hospital for the management of hyperglycemia    REVIEW OF SYSTEMS:   Review of Systems   Constitutional:  Negative for chills and fever. HENT:  Negative for congestion. Respiratory:  Negative for shortness of breath. Cardiovascular:  Negative for chest pain. Gastrointestinal:  Positive for abdominal pain. Genitourinary:  Negative for difficulty urinating. Neurological:  Negative for seizures. PAST MEDICAL HISTORY:      has a past medical history of Depression, Diabetes mellitus ([address] Hyperlipidemia, Pancreatitis, Pneumonia due to infectious organism, and Substance abuse ([facility] [address] PAST SURGICAL HISTORY:      has a past surgical history that includes Cholecystectomy; Ectopic pregnancy surgery; Carpal tunnel release (2014); Colonoscopy (N/A, 9/16/2019); Upper gastrointestinal endoscopy (N/A, 9/16/2019); and Upper gastrointestinal endoscopy (N/A, 12/27/2021). SOCIAL HISTORY:      reports that she has been smoking cigarettes. She has a 15.00 pack-year smoking history. She has never used smokeless tobacco. She reports current alcohol use. She reports current drug use. Drug: Cocaine. FAMILY HISTORY:     family history includes Alcohol Abuse in her brother; Bipolar Disorder in her brother; Cancer in her father; Coronary Art Dis in her mother; Diabetes in her brother, mother, and sister; Substance Abuse in her brother. HOME MEDICATIONS:      Prior to Admission medications    Medication Sig Start Date End Date Taking?  Authorizing Provider   lovastatin (MEVACOR) 40 MG tablet Take 1 tablet by mouth nightly 10/5/22   Trena Pope MD   Melatonin 3 MG CAPS Take 3 mg by mouth at bedtime 10/5/22 11/4/22  Trena Pope MD   traZODone (DESYREL) 150 MG tablet Take 1 tablet by mouth nightly 10/5/22   Trena Pope MD   gabapentin (NEURONTIN) 300 MG capsule Take by mouth three times daily (900mg in AM and midday, 1200mg at night; patient has 300mg and 600mg capsules on hand) 10/5/22 10/5/22  Isrrael Hu MD   blood glucose monitor kit and supplies Dispense sufficient amount for indicated testing frequency plus additional to accommodate PRN testing needs. Dispense all needed supplies to include: monitor, strips, lancing device, lancets, control solutions, alcohol swabs. 10/5/22   Isrrael Hu MD   Lancets MISC 1 each by Does not apply route daily 10/5/22   Isrrael Hu MD   blood glucose monitor strips Test 4 times a day & as needed for symptoms of irregular blood glucose. Dispense sufficient amount for indicated testing frequency plus additional to accommodate PRN testing needs.  10/5/22   Isrrael Hu MD   empagliflozin (JARDIANCE) 10 MG tablet Take 1 tablet by mouth daily 10/5/22   Isrrael Hu MD   insulin glargine (LANTUS SOLOSTAR) 100 UNIT/ML injection pen Inject 10 Units into the skin nightly 10/5/22   Isrrael Hu MD   aluminum hydroxide-magnesium carbonate (GENATON)  MG/15ML suspension Take 15 mLs by mouth 4 times daily as needed for Indigestion  Patient not taking: No sig reported    Historical Provider, MD   gabapentin (NEURONTIN) 600 MG tablet Take by mouth three times daily (900mg in AM and midday, 1200mg at night; patient has 300mg and 600mg capsules on hand)  Patient not taking: No sig reported    Historical Provider, MD   ibuprofen (ADVIL;MOTRIN) 400 MG tablet Take 2 tablets by mouth every 8 hours as needed for Pain  Patient not taking: No sig reported 1/27/22   João Lyon MD   Blood Glucose Monitoring Suppl (ONE TOUCH ULTRA 2) w/Device KIT 1 kit by Does not apply route daily  Patient not taking: No sig reported 1/24/22   Miya Darling MD   Lancets 30G MISC 1 each by Does not apply route daily  Patient not taking: No sig reported 1/24/22   Miya Darling MD   Insulin Pen Needle 30G X 8 MM MISC 1 each by Does not apply route daily  Patient not taking: Reported on 10/5/2022 1/19/22   Avi Padron MD   FreeStyle Lancets MISC 1 each by Does not apply route daily  Patient not taking: No sig reported 1/19/22   Avi Padron MD   Multiple Vitamins-Minerals (MULTIVITAMIN WITH MINERALS) tablet Take 1 tablet by mouth daily  Patient not taking: No sig reported 1/19/22   Avi Padron MD   famotidine (PEPCID) 20 MG tablet Take 1 tablet by mouth 2 times daily  Patient not taking: No sig reported 1/4/22   Anastasia Hickman MD   ondansetron (ZOFRAN ODT) 4 MG disintegrating tablet Take 1 tablet by mouth every 8 hours as needed for Nausea or Vomiting  Patient not taking: No sig reported 1/4/22   Anastasia Hickman MD   glucose monitoring (FREESTYLE) kit 4 times daily  Patient not taking: No sig reported 12/10/21   Avi Padron MD   pantoprazole (PROTONIX) 40 MG tablet Take 1 tablet by mouth every morning (before breakfast)  Patient not taking: No sig reported 12/3/21   Merary Leung MD       ALLERGIES:     Patient has no known allergies. OBJECTIVE:       Vitals:    10/13/22 2148 10/14/22 0244 10/14/22 0314 10/14/22 0803   BP:    (!) 132/59   Pulse:    89   Resp: 16 20 18 18   Temp:    98.2 °F (36.8 °C)   TempSrc:    Oral   SpO2:    94%   Height:             Intake/Output Summary (Last 24 hours) at 10/14/2022 0949  Last data filed at 10/14/2022 0647  Gross per 24 hour   Intake 30 ml   Output --   Net 30 ml       PHYSICAL EXAM:  Physical Exam  HENT:      Head: Normocephalic. Cardiovascular:      Rate and Rhythm: Normal rate and regular rhythm. Pulses: Normal pulses. Heart sounds: Normal heart sounds. Abdominal:      General: There is distension. Tenderness: There is abdominal tenderness. Musculoskeletal:      Right lower leg: No edema. Left lower leg: No edema. Comments: Swelling of hands and feet    Neurological:      Mental Status: She is alert.      DIAGNOSTICS:     Laboratory Testing:    Recent Results (from the past 24 hour(s))   POC Glucose Fingerstick    Collection Time: 10/13/22 12:02 PM   Result Value Ref Range    POC Glucose 147 (H) 65 - 105 mg/dL   POC Glucose Fingerstick    Collection Time: 10/13/22  5:04 PM   Result Value Ref Range    POC Glucose 185 (H) 65 - 105 mg/dL   POC Glucose Fingerstick    Collection Time: 10/13/22  8:08 PM   Result Value Ref Range    POC Glucose 237 (H) 65 - 105 mg/dL   POC Glucose Fingerstick    Collection Time: 10/14/22  6:52 AM   Result Value Ref Range    POC Glucose 290 (H) 65 - 105 mg/dL   POC Glucose Fingerstick    Collection Time: 10/14/22  8:09 AM   Result Value Ref Range    POC Glucose 310 (H) 65 - 105 mg/dL         Imaging/Diagonstics:  EKG: normal EKG, normal sinus rhythm. CT ABDOMEN PELVIS W IV CONTRAST Additional Contrast? None    Result Date: 10/12/2022  EXAMINATION: CT OF THE ABDOMEN AND PELVIS WITH CONTRAST 10/12/2022 3:02 pm TECHNIQUE: CT of the abdomen and pelvis was performed with the administration of intravenous contrast. Multiplanar reformatted images are provided for review. Automated exposure control, iterative reconstruction, and/or weight based adjustment of the mA/kV was utilized to reduce the radiation dose to as low as reasonably achievable. 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 New small bilateral pleural effusions. Diffuse mesenteric haziness is unchanged, as is a left adrenal gland nodule. The gallbladder is surgically absent. The liver, spleen, pancreas, kidneys, and right adrenal gland are normal in appearance and without acute finding.  There is a mild degree of retroperitoneal edema and trace lower abdominal and pelvic free fluid. Moderate colonic stool. No bowel obstruction. No acute intestinal abnormality. No dilated loops or areas of bowel wall thickening. The abdominal aorta and iliac arteries are patent and normal in caliber. There is no appreciable uterine or adnexal abnormality. The urinary bladder is fluid-filled and mildly distended, without significant wall thickening. No significant osseous abnormality. No appreciable uterine or rectal abnormality. There is no abdominal wall or inguinal hernia. Small bilateral pleural effusions with bibasilar airspace disease, possibly representing atelectasis, as well as diffuse retroperitoneal edema and a small amount of lower abdominal and pelvic free fluid. Moderate colonic stool. No bowel obstruction. No acute intestinal abnormality. 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 US LIVER    Result Date: 10/11/2022  EXAMINATION: RIGHT UPPER QUADRANT ULTRASOUND 10/11/2022 4:09 pm COMPARISON: Increased HISTORY: ORDERING SYSTEM PROVIDED HISTORY: Elevated LFTs, new onset abdominal distention, history of substance abuse TECHNOLOGIST PROVIDED HISTORY: Elevated LFTs, new onset abdominal distention, history of substance abuse FINDINGS: LIVER:  The liver demonstrates increased echogenicity without evidence of intrahepatic biliary ductal dilatation. The liver has normal hepatopetal flow. BILIARY SYSTEM:  Patient's at status post cholecystectomy. Common bile duct is dilated measuring 15 mm. RIGHT KIDNEY: No stone hydronephrosis within the right kidney. There is echogenic area within the superior pole of right kidney measuring 1.2 x 1 x 0.8 cm. The findings appears to be infiltration of adjacent perinephric fat. This is confirmed after evaluating prior CT of 12/26/2021. PANCREAS:  Visualized portions of the pancreas are unremarkable. OTHER: No evidence of right upper quadrant ascites.      Mildly increased liver echogenicity likely suggestive of fatty liver. Status post cholecystectomy with moderate dilatation of common bile duct. There is echogenic area within the superior pole of right kidney measuring 1.2 x 1 x 0.8 cm. The findings appears to be infiltration of adjacent perinephric fat. This is confirmed after evaluating prior CT of 12/26/2021. RECOMMENDATIONS: Unavailable     XR CHEST PORTABLE    Result Date: 10/9/2022  EXAMINATION: ONE XRAY VIEW OF THE CHEST 10/9/2022 10:25 am COMPARISON: 01/04/2022 HISTORY: ORDERING SYSTEM PROVIDED HISTORY: Chest Pain TECHNOLOGIST PROVIDED HISTORY: Chest Pain FINDINGS: The lungs are without acute focal process. There is no effusion or pneumothorax. The cardiomediastinal silhouette is stable. The osseous structures are stable. No acute process. VL DUP LOWER EXTREMITY VENOUS BILATERAL    Result Date: 10/13/2022    [facility]  Vascular Lower Extremities DVT Study Procedure   Patient Name   Leesa Doan Date of Study           10/12/2022                 A   Date of Birth  1970  Gender                  Female   Age            47 year(s)  Race                    Other   Room Number    2361        Height:                 64 inch, 162.56 cm   Corporate ID # [id number]    Weight:                 120 pounds, 54.4 kg   Patient Acct # [de-identified]   BSA:        1.57 m^2    BMI:       20.6 kg/m^2   MR #           [id number]     Natasha Moe [facility]   Accession #    [id number]  Interpreting Physician  [address] [place] [address]   Referring                  Referring Physician     [address] [facility] [address]  Nurse  Practitioner  Procedure Type of Study:   Veins: Lower Extremities DVT Study, Venous Scan Lower Bilateral.  Indications for Study:Edema. Patient Status: In Patient. Technical Quality:Adequate visualization. Conclusions   Summary   No evidence of superficial or deep venous thrombosis in both lower  extremities.    Signature ----------------------------------------------------------------  Electronically signed by Mio Flor RVT(Sonographer) on  10/12/2022 07:30 PM  ----------------------------------------------------------------   ----------------------------------------------------------------  Electronically signed by Roslyn Kugel Reyes,Arthur(Interpreting  physician) on 10/13/2022 07:19 PM  ----------------------------------------------------------------  Findings:   Right Impression:                    Left Impression:  The common femoral, femoral,         The common femoral, femoral,  popliteal and tibial veins           popliteal and tibial veins  demonstrate normal compressibility   demonstrate normal compressibility  and augmentation. and augmentation. Normal compressibility of the great  Normal compressibility of the great  saphenous vein. saphenous vein. Normal compressibility of the small  Normal compressibility of the small  saphenous vein. saphenous vein. Enlarged lymph nodes are noted at    Enlarged lymph nodes are noted at the  the right groin level. left groin level. Velocities are measured in cm/s ; Diameters are measured in cm Right Lower Extremities DVT Study Measurements Right 2D Measurements +------------------------------------+----------+---------------+----------+ ! Location                            ! Visualized! Compressibility! Thrombosis! +------------------------------------+----------+---------------+----------+ ! Common Femoral                      !Yes       ! Yes            ! None      ! +------------------------------------+----------+---------------+----------+ ! Prox Femoral                        !Yes       ! Yes            ! None      ! +------------------------------------+----------+---------------+----------+ ! Mid Femoral                         !Yes       ! Yes            ! None      ! +------------------------------------+----------+---------------+----------+ ! Dist Femoral                        !Yes       ! Yes            ! None      ! +------------------------------------+----------+---------------+----------+ ! Popliteal                           !Yes       ! Yes            ! None      ! +------------------------------------+----------+---------------+----------+ ! Sapheno Femoral Junction            ! Yes       ! Yes            ! None      ! +------------------------------------+----------+---------------+----------+ ! PTV                                 ! Yes       ! Yes            ! None      ! +------------------------------------+----------+---------------+----------+ ! Peroneal                            !Yes       ! Yes            ! None      ! +------------------------------------+----------+---------------+----------+ ! Gastroc                             ! Yes       ! Yes            ! None      ! +------------------------------------+----------+---------------+----------+ ! GSV Thigh                           ! Yes       ! Yes            ! None      ! +------------------------------------+----------+---------------+----------+ ! GSV Knee                            ! Yes       ! Yes            ! None      ! +------------------------------------+----------+---------------+----------+ ! GSV Ankle                           ! Yes       ! Yes            ! None      ! +------------------------------------+----------+---------------+----------+ ! SSV                                 ! Yes       ! Yes            ! None      ! +------------------------------------+----------+---------------+----------+ Right Doppler Measurements +---------------------------+------+------+--------------------------------+ ! Location                   ! Signal!Reflux! Reflux (msec)                   ! +---------------------------+------+------+--------------------------------+ ! Common Femoral             !Phasic!      ! ! +---------------------------+------+------+--------------------------------+ ! Prox Femoral               !Phasic!      !                                ! +---------------------------+------+------+--------------------------------+ ! Popliteal                  !Phasic!      !                                ! +---------------------------+------+------+--------------------------------+ Left Lower Extremities DVT Study Measurements Left 2D Measurements +------------------------------------+----------+---------------+----------+ ! Location                            ! Visualized! Compressibility! Thrombosis! +------------------------------------+----------+---------------+----------+ ! Common Femoral                      !Yes       ! Yes            ! None      ! +------------------------------------+----------+---------------+----------+ ! Prox Femoral                        !Yes       ! Yes            ! None      ! +------------------------------------+----------+---------------+----------+ ! Mid Femoral                         !Yes       ! Yes            ! None      ! +------------------------------------+----------+---------------+----------+ ! Dist Femoral                        !Yes       ! Yes            ! None      ! +------------------------------------+----------+---------------+----------+ ! Popliteal                           !Yes       ! Yes            ! None      ! +------------------------------------+----------+---------------+----------+ ! Sapheno Femoral Junction            ! Yes       ! Yes            ! None      ! +------------------------------------+----------+---------------+----------+ ! PTV                                 ! Yes       ! Yes            ! None      ! +------------------------------------+----------+---------------+----------+ ! Perbeau                            !Yes       ! Yes            ! None      ! +------------------------------------+----------+---------------+----------+ ! Gastroc !Yes       !Yes            ! None      ! +------------------------------------+----------+---------------+----------+ ! GSV Thigh                           ! Yes       ! Yes            ! None      ! +------------------------------------+----------+---------------+----------+ ! GSV Knee                            ! Yes       ! Yes            ! None      ! +------------------------------------+----------+---------------+----------+ ! GSV Ankle                           ! Yes       ! Yes            ! None      ! +------------------------------------+----------+---------------+----------+ ! SSV                                 ! Yes       ! Yes            ! None      ! +------------------------------------+----------+---------------+----------+ Left Doppler Measurements +---------------------------+------+------+--------------------------------+ ! Location                   ! Signal!Reflux! Reflux (msec)                   ! +---------------------------+------+------+--------------------------------+ ! Common Femoral             !Phasic!      !                                ! +---------------------------+------+------+--------------------------------+ ! Prox Femoral               !Phasic!      !                                ! +---------------------------+------+------+--------------------------------+ ! Popliteal                  !Phasic!      !                                ! +---------------------------+------+------+--------------------------------+      Current Facility-Administered Medications   Medication Dose Route Frequency Provider Last Rate Last Admin    polyethylene glycol (GoLYTELY) solution 4,000 mL  4,000 mL Oral Once Janie Merida MD        morphine (PF) injection 1 mg  1 mg IntraVENous Q4H PRN Guille Magana MD   1 mg at 10/14/22 0244    metoclopramide (REGLAN) injection 10 mg  10 mg IntraVENous Q6H Nicolasa Colin MD   10 mg at 10/14/22 0647    polyethylene glycol (GLYCOLAX) packet 17 g  17 g Oral Q6H PRN Nicolasa Chamorro, MD        insulin glargine (LANTUS) injection vial 22 Units  22 Units SubCUTAneous Nightly Quratulain Julian, DO   22 Units at 10/13/22 2121    simethicone (MYLICON) chewable tablet 80 mg  80 mg Oral Q6H PRN Guille Magana MD   80 mg at 10/14/22 0940    levoFLOXacin (LEVAQUIN) tablet 750 mg  750 mg Oral Q24H Guille Magana MD   750 mg at 10/13/22 1703    enoxaparin Sodium (LOVENOX) injection 30 mg  30 mg SubCUTAneous Daily Tracy Stuart MD   30 mg at 10/14/22 0939    pantoprazole (PROTONIX) tablet 40 mg  40 mg Oral QAM AC Guille Magana MD   40 mg at 10/14/22 0647    lidocaine 4 % external patch 1 patch  1 patch TransDERmal Daily Guille Magana MD   1 patch at 10/14/22 0939    metFORMIN (GLUCOPHAGE) tablet 500 mg  500 mg Oral BID  Otoniel Seo MD   500 mg at 10/14/22 0939    insulin lispro (HUMALOG) injection vial 0-16 Units  0-16 Units SubCUTAneous TID  Quratulain Julian, DO   12 Units at 10/14/22 0939    insulin lispro (HUMALOG) injection vial 0-4 Units  0-4 Units SubCUTAneous Nightly Quratulain Julian, DO        atorvastatin (LIPITOR) tablet 20 mg  20 mg Oral Nightly Quratulain Julian, DO   20 mg at 10/13/22 2116    traZODone (DESYREL) tablet 150 mg  150 mg Oral Nightly Quratulain Julian, DO   150 mg at 10/13/22 2116    sodium chloride flush 0.9 % injection 5-40 mL  5-40 mL IntraVENous 2 times per day Quratulain Julian, DO   10 mL at 10/14/22 0940    sodium chloride flush 0.9 % injection 5-40 mL  5-40 mL IntraVENous PRN Quratulain Julian, DO   10 mL at 10/14/22 0647    0.9 % sodium chloride infusion   IntraVENous PRN Quratulain Julian,  mL/hr at 10/12/22 1439 100 mL/hr at 10/12/22 1439    ondansetron (ZOFRAN-ODT) disintegrating tablet 4 mg  4 mg Oral Q8H PRN Quratulain Julian, DO        Or    ondansetron (ZOFRAN) injection 4 mg  4 mg IntraVENous Q6H PRN Quratulain Julian, DO   4 mg at 10/13/22 3411    acetaminophen (TYLENOL) tablet 650 mg  650 mg Oral Q6H PRN Quratulain Julian, DO   650 mg at 10/12/22 7333 Or    acetaminophen (TYLENOL) suppository 650 mg  650 mg Rectal Q6H PRN Quratulain Julian, DO        glucose chewable tablet 16 g  4 tablet Oral PRN Quratulain Julian, DO        dextrose bolus 10% 125 mL  125 mL IntraVENous PRN Quratulain Julian, DO        Or    dextrose bolus 10% 250 mL  250 mL IntraVENous PRN Quratulain Julian, DO        glucagon (rDNA) injection 1 mg  1 mg SubCUTAneous PRN Quratulain Julian, DO        dextrose 10 % infusion   IntraVENous Continuous PRN Quratulain Julian, DO        gabapentin (NEURONTIN) capsule 900 mg  900 mg Oral TID Inna Patel MD   900 mg at 10/14/22 6898       ASSESSMENT:     Principal Problem:    Hyperglycemia  Active Problems:    Neuropathy due to type 2 diabetes mellitus ([address]    Other hyperlipidemia    Dry mouth and eyes    Abn findings on dx imaging of abd regions, inc retroperiton    Dry eyes, bilateral    Acute cystitis without hematuria  Resolved Problems:    * No resolved hospital problems. *      PLAN:     Hyperglycemia secondary to medication non-compliance -  -POC glucose on admission: 325-->220-->147; no anion gap , no acidosis , no ketones in urine   -Was started on Metformin 500 twice daily   -Mildly increased osmolality at 315  ; ruled out HHS   -Most recent a1c 10/05: 9.8   -Hypoglycemia protocol   -High  dose sliding scale - Lantus 15 units   -Glucose checks every 6 hours   -IV fluids NS 100ml/hr         2. Sclerosing Mesenteritis   US abdomen showed  - Fatty liver as well as infiltration of the adjacent perinephric fat. - Reviewed CT scan from 12/2020 21 which suggested sclerosing mesenteritis  - CRP and ESR within normal limits  -AST ALT slightly elevated but baseline (AST 64 and ALT 64)  - Albumin is low at 2.7, will order  -GI is on board               -stat CT ordered: Small bilateral pleural effusions. Moderate colonic stool.   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 Urinary Tract Infection   -Rocephin completed 10/12   -UA positive for nitrites and bacteria  -Urine culture positive for Enterococcus Faecalis   -Started on Levaquin 750 mg once daily, for 5 days        4. History of recurrent pancreatitis 2/2 to hypertriglyceridemia ; ruled out   -Lipase mildly elevated 10/9 : 70  -Lipid panel               -Cholesterol 120              -HDL low at 19              -LDL cholesterol normal                -Triglycerides elevated at 360      6. UDS positive for cocaine  -Avoid using beta blockers   -outpatient referral for substance abuse treatment      3. Neuropathy secondary to type 2 diabetes  -Restarted Gabapentin 900mg tid   -Serology for inflammatory markers - negative     DVT prophylaxis: Lovenox 40 mg SC  GI prophylaxis: Protonix 40 mg daily         Above plan discussed with the patient and family in room, who agreed to the above plan   Plan will be discussed with the attending, Dr. Jozef Govea,   Family Medicine Resident  10/14/2022 9:49 AM   Attending Physician Statement  I have discussed the care of Nolberto Bal, including pertinent history and exam findings,  with the resident. I have seen and examined the patient and the key elements of all parts of the encounter have been performed by me. I agree with the assessment, plan and orders as documented by the resident. ([address] [facility])   Patient seen and examined along with the resident physicians. Admitted for Sclerosing Mesenteritis/Hyperglycemia and History of recurrent Pancreatitis and Diabetes and Neuropathy. Still has some Abdominal Distension and getting Enema and awaiting Rheumatologist evaluation. If cleared she could be discharged today late if cleared by Rheumatology. Continue supportive care and coordination of care. Kirstin De La Torre.

## 2022-10-14 NOTE — PROGRESS NOTES
[facility]. [facility]   Gastroenterology Progress Note    Elen Shultz is a 47 y.o. female patient. Hospitalization Day:5      Chief consult reason:   Sclerosing Mesenteritis     Subjective:  Patient seen and examined. Patient reports only 1 small BM overnight, still very bloated, very distended. Only able to tolerate couple bites of food    VITALS:  BP (!) 132/59   Pulse 89   Temp 98.2 °F (36.8 °C) (Oral)   Resp 18   Ht 5' 4.5\" (1.638 m)   LMP 2018 (LMP Unknown)   SpO2 94%   BMI 18.42 kg/m²   TEMPERATURE:  Current - Temp: 98.2 °F (36.8 °C); Max - Temp  Av.5 °F (36.9 °C)  Min: 98.2 °F (36.8 °C)  Max: 98.8 °F (37.1 °C)    Physical Assessment:  General appearance:  alert, cooperative and mild distress  Mental Status:  oriented to person, place and time and normal affect  Lungs:  clear to auscultation bilaterally, normal effort  Heart:  regular rate and rhythm, no murmur  Abdomen:  soft, slightly tender, distended, normal bowel sounds, no masses, hepatomegaly, splenomegaly  Extremities:  no edema, redness, tenderness in the calves  Skin:  no gross lesions, rashes, induration    Data Review:    Labs and Imaging:     CBC:  Recent Labs     10/12/22  0622   WBC 6.0   HGB 9.3*   MCV 88.2   RDW 13.2          ANEMIA STUDIES:  No results for input(s): LABIRON, TIBC, FERRITIN, [id number], FOLATE, OCCULTBLD in the last 72 hours. BMP:  Recent Labs     10/12/22  0622 10/14/22  1023   * 136   K 4.3 4.0    99   CO2 23 25   BUN 20 21*   CREATININE 0.58 0.75   GLUCOSE 337* 366*   CALCIUM 8.6 8.6       LFTS:  Recent Labs     10/12/22  0622 10/14/22  1023   ALKPHOS 131* 171*   ALT 64* 56*   AST 64* 40*   BILITOT <0.1* <0.1*   LABALBU 2.7* 3.1*       Amylase/Lipase and Ammonia:  No results for input(s): AMYLASE, LIPASE, AMMONIA in the last 72 hours.     Acute Hepatitis Panel:  Lab Results   Component Value Date/Time    HEPBSAG NONREACTIVE 2017 11:35 AM    HEPCAB NONREACTIVE 01/08/2017 11:35 AM    HEPBIGM NONREACTIVE 01/08/2017 11:35 AM    HEPAIGM NONREACTIVE 01/08/2017 11:35 AM       HCV Genotype:  No results found for: HEPATITISCGENOTYPE    HCV Quantitative:  No results found for: HCVQNT    LIVER WORK UP:    AFP  Lab Results   Component Value Date/Time    AFP 2.5 11/17/2012 06:27 AM       Alpha 1 antitrypsin   Lab Results   Component Value Date/Time    A1A 245 11/17/2012 06:27 AM       RUBIN  Lab Results   Component Value Date/Time    RUBIN NEGATIVE 10/10/2022 12:25 PM       AMA  Lab Results   Component Value Date/Time    MITOAB 0.9 12/27/2021 01:11 PM       ASMA  Lab Results   Component Value Date/Time    SMOOTHMUSCAB NONE DETECTED 11/16/2012 03:34 PM       PT/INR  No results for input(s): PROTIME, INR in the last 72 hours. Cancer Markers:  CEA:  No results for input(s): CEA in the last 72 hours. Ca 125:  No results for input(s):  in the last 72 hours. Ca 19-9:   Invalid input(s):   AFP: No results for input(s): AFP in the last 72 hours. Lactic acid:Invalid input(s): LACTIC ACID    Radiology Review:    US LIVER    Result Date: 10/11/2022  EXAMINATION: RIGHT UPPER QUADRANT ULTRASOUND 10/11/2022 4:09 pm COMPARISON: Increased HISTORY: ORDERING SYSTEM PROVIDED HISTORY: Elevated LFTs, new onset abdominal distention, history of substance abuse TECHNOLOGIST PROVIDED HISTORY: Elevated LFTs, new onset abdominal distention, history of substance abuse FINDINGS: LIVER:  The liver demonstrates increased echogenicity without evidence of intrahepatic biliary ductal dilatation. The liver has normal hepatopetal flow. BILIARY SYSTEM:  Patient's at status post cholecystectomy. Common bile duct is dilated measuring 15 mm. RIGHT KIDNEY: No stone hydronephrosis within the right kidney. There is echogenic area within the superior pole of right kidney measuring 1.2 x 1 x 0.8 cm. The findings appears to be infiltration of adjacent perinephric fat.  This is confirmed after evaluating prior CT of 12/26/2021. PANCREAS:  Visualized portions of the pancreas are unremarkable. OTHER: No evidence of right upper quadrant ascites. Mildly increased liver echogenicity likely suggestive of fatty liver. Status post cholecystectomy with moderate dilatation of common bile duct. There is echogenic area within the superior pole of right kidney measuring 1.2 x 1 x 0.8 cm. The findings appears to be infiltration of adjacent perinephric fat. This is confirmed after evaluating prior CT of 12/26/2021. RECOMMENDATIONS: Unavailable         Principal Problem:    Hyperglycemia  Active Problems:    Neuropathy due to type 2 diabetes mellitus ([address]    Other hyperlipidemia    Dry mouth and eyes    Abn findings on dx imaging of abd regions, inc retroperiton    Dry eyes, bilateral    Acute cystitis without hematuria  Resolved Problems:    * No resolved hospital problems. 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 Kassandra Yoon 2-4 weeks      This plan was formulated in collaboration with Dr. Mckayla Rush MD    Thank you for allowing me to participate in the care of your patient. Please feel free to contact me with any questions or concerns.      [facility] [facility]   [place] [address]   [phone]  10/14/2022  1:00 PM    Estimated time of  30 mins reviewing chart, assessing patient and formulating plan of care    This note was created with the assistance of a speech-recognition program.  Although the intention is to generate a document that actually reflects the content of the visit, no guarantees can be provided that every mistake has been identified and corrected by editing.

## 2022-10-15 ENCOUNTER — APPOINTMENT (OUTPATIENT)
Dept: GENERAL RADIOLOGY | Age: 52
DRG: 420 | End: 2022-10-15
Payer: MEDICAID

## 2022-10-15 VITALS
HEIGHT: 65 IN | SYSTOLIC BLOOD PRESSURE: 118 MMHG | TEMPERATURE: 98.2 F | BODY MASS INDEX: 18.42 KG/M2 | RESPIRATION RATE: 17 BRPM | HEART RATE: 81 BPM | DIASTOLIC BLOOD PRESSURE: 67 MMHG | OXYGEN SATURATION: 96 %

## 2022-10-15 LAB
ABSOLUTE EOS #: 0.23 K/UL (ref 0–0.44)
ABSOLUTE IMMATURE GRANULOCYTE: <0.03 K/UL (ref 0–0.3)
ABSOLUTE LYMPH #: 1.5 K/UL (ref 1.1–3.7)
ABSOLUTE MONO #: 0.52 K/UL (ref 0.1–1.2)
BASOPHILS # BLD: 0 % (ref 0–2)
BASOPHILS ABSOLUTE: <0.03 K/UL (ref 0–0.2)
EOSINOPHILS RELATIVE PERCENT: 4 % (ref 1–4)
GLUCOSE BLD-MCNC: 187 MG/DL (ref 65–105)
HCT VFR BLD CALC: 27.6 % (ref 36.3–47.1)
HEMOGLOBIN: 9.4 G/DL (ref 11.9–15.1)
IGG 1: 316 MG/DL (ref 240–1118)
IGG 2: 270 MG/DL (ref 124–549)
IGG 3: 54 MG/DL (ref 21–134)
IGG 4: 34 MG/DL (ref 1–123)
IMMATURE GRANULOCYTES: 0 %
LYMPHOCYTES # BLD: 28 % (ref 24–43)
MCH RBC QN AUTO: 30.4 PG (ref 25.2–33.5)
MCHC RBC AUTO-ENTMCNC: 34.1 G/DL (ref 28.4–34.8)
MCV RBC AUTO: 89.3 FL (ref 82.6–102.9)
MONOCYTES # BLD: 10 % (ref 3–12)
NRBC AUTOMATED: 0 PER 100 WBC
PDW BLD-RTO: 13.6 % (ref 11.8–14.4)
PLATELET # BLD: 167 K/UL (ref 138–453)
PMV BLD AUTO: 10.1 FL (ref 8.1–13.5)
RBC # BLD: 3.09 M/UL (ref 3.95–5.11)
SEG NEUTROPHILS: 58 % (ref 36–65)
SEGMENTED NEUTROPHILS ABSOLUTE COUNT: 3 K/UL (ref 1.5–8.1)
WBC # BLD: 5.3 K/UL (ref 3.5–11.3)

## 2022-10-15 PROCEDURE — 6360000002 HC RX W HCPCS

## 2022-10-15 PROCEDURE — 6370000000 HC RX 637 (ALT 250 FOR IP): Performed by: INTERNAL MEDICINE

## 2022-10-15 PROCEDURE — 2580000003 HC RX 258

## 2022-10-15 PROCEDURE — 6370000000 HC RX 637 (ALT 250 FOR IP): Performed by: STUDENT IN AN ORGANIZED HEALTH CARE EDUCATION/TRAINING PROGRAM

## 2022-10-15 PROCEDURE — 36415 COLL VENOUS BLD VENIPUNCTURE: CPT

## 2022-10-15 PROCEDURE — 74018 RADEX ABDOMEN 1 VIEW: CPT

## 2022-10-15 PROCEDURE — 82947 ASSAY GLUCOSE BLOOD QUANT: CPT

## 2022-10-15 PROCEDURE — 85025 COMPLETE CBC W/AUTO DIFF WBC: CPT

## 2022-10-15 PROCEDURE — 99239 HOSP IP/OBS DSCHRG MGMT >30: CPT | Performed by: FAMILY MEDICINE

## 2022-10-15 PROCEDURE — 6360000002 HC RX W HCPCS: Performed by: FAMILY MEDICINE

## 2022-10-15 RX ORDER — SIMETHICONE 80 MG
80 TABLET,CHEWABLE ORAL EVERY 6 HOURS PRN
Qty: 180 TABLET | Refills: 3 | Status: SHIPPED | OUTPATIENT
Start: 2022-10-15

## 2022-10-15 RX ORDER — INSULIN GLARGINE 100 [IU]/ML
22 INJECTION, SOLUTION SUBCUTANEOUS NIGHTLY
Qty: 10 ML | Refills: 3 | Status: SHIPPED | OUTPATIENT
Start: 2022-10-15

## 2022-10-15 RX ORDER — POLYETHYLENE GLYCOL 3350 17 G/17G
17 POWDER, FOR SOLUTION ORAL 2 TIMES DAILY
Qty: 60 EACH | Refills: 1 | Status: SHIPPED | OUTPATIENT
Start: 2022-10-15 | End: 2022-11-14

## 2022-10-15 RX ORDER — PANTOPRAZOLE SODIUM 40 MG/1
40 TABLET, DELAYED RELEASE ORAL
Qty: 30 TABLET | Refills: 3 | Status: SHIPPED | OUTPATIENT
Start: 2022-10-16

## 2022-10-15 RX ORDER — AMOXICILLIN 250 MG
1 CAPSULE ORAL DAILY
Qty: 30 TABLET | Refills: 0 | Status: SHIPPED | OUTPATIENT
Start: 2022-10-15

## 2022-10-15 RX ORDER — FENOFIBRATE 54 MG/1
54 TABLET ORAL DAILY
Qty: 30 TABLET | Refills: 3 | Status: SHIPPED | OUTPATIENT
Start: 2022-10-15

## 2022-10-15 RX ADMIN — DOCUSATE SODIUM 50 MG AND SENNOSIDES 8.6 MG 1 TABLET: 8.6; 5 TABLET, FILM COATED ORAL at 09:16

## 2022-10-15 RX ADMIN — ENOXAPARIN SODIUM 30 MG: 100 INJECTION SUBCUTANEOUS at 09:17

## 2022-10-15 RX ADMIN — SODIUM CHLORIDE, PRESERVATIVE FREE 10 ML: 5 INJECTION INTRAVENOUS at 09:17

## 2022-10-15 RX ADMIN — METFORMIN HYDROCHLORIDE 500 MG: 500 TABLET ORAL at 09:16

## 2022-10-15 RX ADMIN — PANTOPRAZOLE SODIUM 40 MG: 40 TABLET, DELAYED RELEASE ORAL at 05:27

## 2022-10-15 RX ADMIN — METOCLOPRAMIDE 10 MG: 5 INJECTION, SOLUTION INTRAMUSCULAR; INTRAVENOUS at 11:10

## 2022-10-15 RX ADMIN — GABAPENTIN 900 MG: 300 CAPSULE ORAL at 09:17

## 2022-10-15 RX ADMIN — FENOFIBRATE 54 MG: 54 TABLET ORAL at 09:17

## 2022-10-15 RX ADMIN — METOCLOPRAMIDE 10 MG: 5 INJECTION, SOLUTION INTRAMUSCULAR; INTRAVENOUS at 05:26

## 2022-10-15 ASSESSMENT — ENCOUNTER SYMPTOMS
VOMITING: 0
NAUSEA: 0

## 2022-10-15 ASSESSMENT — PAIN SCALES - GENERAL
PAINLEVEL_OUTOF10: 0
PAINLEVEL_OUTOF10: 0

## 2022-10-15 NOTE — PROGRESS NOTES
Progress Note  Date:10/15/2022       Room:0443/0443-01  Patient Mitchell Sherman     YOB: 1970     Age:52 y.o. Subjective    Subjective:  Symptoms:  Improved. She reports malaise. Diet:  Adequate intake. No nausea or vomiting. Activity level: Normal.    Pain:  She reports no pain. Review of Systems   Gastrointestinal:  Negative for nausea and vomiting. Objective         Vitals Last 24 Hours:  TEMPERATURE:  Temp  Av.4 °F (36.9 °C)  Min: 98.2 °F (36.8 °C)  Max: 98.5 °F (36.9 °C)  RESPIRATIONS RANGE: Resp  Av.5  Min: 16  Max: 17  PULSE OXIMETRY RANGE: SpO2  Av.5 %  Min: 82 %  Max: 98 %  PULSE RANGE: Pulse  Av.5  Min: 81  Max: 98  BLOOD PRESSURE RANGE: Systolic (65NEW), QRY:519 , Min:118 , SIT:461   ; Diastolic (73CTG), GFO:05, Min:57, Max:67    I/O (24Hr): Intake/Output Summary (Last 24 hours) at 10/15/2022 1031  Last data filed at 10/15/2022 0530  Gross per 24 hour   Intake 2050 ml   Output --   Net 2050 ml     Objective:  General Appearance: In no acute distress. Vital signs: (most recent): Blood pressure 118/67, pulse 81, temperature 98.2 °F (36.8 °C), temperature source Oral, resp. rate 17, height 5' 4.5\" (1.638 m), last menstrual period 2018, SpO2 96 %. Vital signs are normal.    Output: Producing urine and producing stool. Lungs:  Normal effort and normal respiratory rate. Breath sounds clear to auscultation. She is not in respiratory distress. No stridor. No rales, decreased breath sounds or wheezes. Heart: Normal rate. Regular rhythm. S1 normal and S2 normal.    Abdomen: Abdomen is soft and flat. Bowel sounds are normal.     Neurological: Patient is oriented to person, place and time.     Labs/Imaging/Diagnostics    Labs:  CBC:  Recent Labs     10/15/22  0547   WBC 5.3   RBC 3.09*   HGB 9.4*   HCT 27.6*   MCV 89.3   RDW 13.6        CHEMISTRIES:  Recent Labs     10/14/22  1023      K 4.0   CL 99   CO2 25   BUN 21* CREATININE 0.75   GLUCOSE 366*     PT/INR:No results for input(s): PROTIME, INR in the last 72 hours. APTT:No results for input(s): APTT in the last 72 hours. LIVER PROFILE:  Recent Labs     10/14/22  1023   AST 40*   ALT 56*   BILITOT <0.1*   ALKPHOS 171*       Imaging Last 24 Hours:  XR ABDOMEN (KUB) (SINGLE AP VIEW)    Result Date: 10/15/2022  EXAMINATION: ONE SUPINE XRAY VIEW(S) OF THE ABDOMEN 10/15/2022 6:44 am COMPARISON: 10/14/2022, 1630 hours HISTORY: ORDERING SYSTEM PROVIDED HISTORY: assess for constipation TECHNOLOGIST PROVIDED HISTORY: Bedside please assess for constipation 79-year-old female; assess for constipation. FINDINGS: Portable supine view of the abdomen. Surgical clips project over the right upper quadrant consistent with prior cholecystectomy. Mild stool burden. Stool burden is diminished/decrease since the prior study. Distention of the urinary bladder. Pelvic phleboliths. Mild degenerative changes in the spine. Psoas shadows symmetric in appearance. Prominent nondilated small bowel loop in the left lower quadrant measuring up to 2.3 cm. Iliac wings symmetric in appearance. 1. Prominent nondilated small bowel loop in the left lower quadrant measuring 2.3 cm. 2. Mild stool burden, improved from the previous exam.     XR ABDOMEN (KUB) (SINGLE AP VIEW)    Result Date: 10/14/2022  EXAMINATION: ONE SUPINE XRAY VIEW(S) OF THE ABDOMEN 10/14/2022 5:07 pm COMPARISON: None. HISTORY: ORDERING SYSTEM PROVIDED HISTORY: assess for constipation please TECHNOLOGIST PROVIDED HISTORY: Bedside please assess for constipation please FINDINGS: Bowel-gas pattern nonspecific. Large amount of stool noted throughout the colon. Right upper quadrant surgical clips. Osseous structures unremarkable. No bowel obstruction. Findings consistent with constipation. Status post cholecystectomy.      Assessment//Plan           Hospital Problems             Last Modified POA    * (Principal) Hyperglycemia 10/9/2022 Yes    Neuropathy due to type 2 diabetes mellitus ([facility] [address] 10/9/2022 Yes    Other hyperlipidemia 10/9/2022 Yes    Dry mouth and eyes 10/13/2022 Yes    Abn findings on dx imaging of abd regions, inc retroperiton 10/13/2022 Yes    Dry eyes, bilateral 10/13/2022 Yes    Acute cystitis without hematuria 10/9/2022 Yes     Assessment:    Condition: In stable condition. Improving.   (Abdominal Pain. IgG4 Disorder. Constipation. Fatty Liver. [place] Misuse Disorder). Plan:   Encourage ambulation. Consults: physical therapy and . Regular diet. (Evaluated by GI and Rheumatology and await there recommendations. Further work-up by Rheumatology would be as Outpatient. Gastroenterology Signed off. Constipation much better on Enemas. Would be Discharged Home today and advised to Follow-up as Outpatient with the PCP in 01 weeks time. ).      Electronically signed by Quinn Leyva MD on 10/15/22 at 10:31 AM EDT

## 2022-10-15 NOTE — PROGRESS NOTES
[facility]. [facility]   Gastroenterology Progress Note    Nelly Stuart is a 47 y.o. female patient. Hospitalization Day:6      Chief consult reason:   Sclerosing Mesenteritis     Subjective:  Patient seen and examined. Patient reports multiple bowel movements overnight. Abdomen is much softer, much less distended. Patient denies any abdominal pain nausea or vomiting  She is requesting discharge today    VITALS:  /67   Pulse 81   Temp 98.2 °F (36.8 °C) (Oral)   Resp 17   Ht 5' 4.5\" (1.638 m)   LMP 2018 (LMP Unknown)   SpO2 96%   BMI 18.42 kg/m²   TEMPERATURE:  Current - Temp: 98.2 °F (36.8 °C); Max - Temp  Av.4 °F (36.9 °C)  Min: 98.2 °F (36.8 °C)  Max: 98.5 °F (36.9 °C)    Physical Assessment:  General appearance:  alert, cooperative and mild distress  Mental Status:  oriented to person, place and time and normal affect  Lungs:  clear to auscultation bilaterally, normal effort  Heart:  regular rate and rhythm, no murmur  Abdomen:  soft, non tended, non distended normal bowel sounds, no masses, hepatomegaly, splenomegaly  Extremities:  no edema, redness, tenderness in the calves  Skin:  no gross lesions, rashes, induration    Data Review:    Labs and Imaging:     CBC:  Recent Labs     10/15/22  0547   WBC 5.3   HGB 9.4*   MCV 89.3   RDW 13.6            ANEMIA STUDIES:  No results for input(s): LABIRON, TIBC, FERRITIN, [id number], FOLATE, OCCULTBLD in the last 72 hours. BMP:  Recent Labs     10/14/22  1023      K 4.0   CL 99   CO2 25   BUN 21*   CREATININE 0.75   GLUCOSE 366*   CALCIUM 8.6         LFTS:  Recent Labs     10/14/22  1023   ALKPHOS 171*   ALT 56*   AST 40*   BILITOT <0.1*   LABALBU 3.1*         Amylase/Lipase and Ammonia:  No results for input(s): AMYLASE, LIPASE, AMMONIA in the last 72 hours.     Acute Hepatitis Panel:  Lab Results   Component Value Date/Time    HEPBSAG NONREACTIVE 2017 11:35 AM    HEPCAB NONREACTIVE 2017 11:35 AM HEPBIGM NONREACTIVE 01/08/2017 11:35 AM    HEPAIGM NONREACTIVE 01/08/2017 11:35 AM       HCV Genotype:  No results found for: HEPATITISCGENOTYPE    HCV Quantitative:  No results found for: HCVQNT    LIVER WORK UP:    AFP  Lab Results   Component Value Date/Time    AFP 2.5 11/17/2012 06:27 AM       Alpha 1 antitrypsin   Lab Results   Component Value Date/Time    A1A 245 11/17/2012 06:27 AM       RUBIN  Lab Results   Component Value Date/Time    RUBIN NEGATIVE 10/10/2022 12:25 PM       AMA  Lab Results   Component Value Date/Time    MITOAB 0.9 12/27/2021 01:11 PM       ASMA  Lab Results   Component Value Date/Time    SMOOTHMUSCAB NONE DETECTED 11/16/2012 03:34 PM       PT/INR  No results for input(s): PROTIME, INR in the last 72 hours. Cancer Markers:  CEA:  No results for input(s): CEA in the last 72 hours. Ca 125:  No results for input(s):  in the last 72 hours. Ca 19-9:   Invalid input(s):   AFP: No results for input(s): AFP in the last 72 hours. Lactic acid:Invalid input(s): LACTIC ACID    Radiology Review:    US LIVER    Result Date: 10/11/2022  EXAMINATION: RIGHT UPPER QUADRANT ULTRASOUND 10/11/2022 4:09 pm COMPARISON: Increased HISTORY: ORDERING SYSTEM PROVIDED HISTORY: Elevated LFTs, new onset abdominal distention, history of substance abuse TECHNOLOGIST PROVIDED HISTORY: Elevated LFTs, new onset abdominal distention, history of substance abuse FINDINGS: LIVER:  The liver demonstrates increased echogenicity without evidence of intrahepatic biliary ductal dilatation. The liver has normal hepatopetal flow. BILIARY SYSTEM:  Patient's at status post cholecystectomy. Common bile duct is dilated measuring 15 mm. RIGHT KIDNEY: No stone hydronephrosis within the right kidney. There is echogenic area within the superior pole of right kidney measuring 1.2 x 1 x 0.8 cm. The findings appears to be infiltration of adjacent perinephric fat. This is confirmed after evaluating prior CT of 12/26/2021. PANCREAS:  Visualized portions of the pancreas are unremarkable. OTHER: No evidence of right upper quadrant ascites. Mildly increased liver echogenicity likely suggestive of fatty liver. Status post cholecystectomy with moderate dilatation of common bile duct. There is echogenic area within the superior pole of right kidney measuring 1.2 x 1 x 0.8 cm. The findings appears to be infiltration of adjacent perinephric fat. This is confirmed after evaluating prior CT of 12/26/2021. RECOMMENDATIONS: Unavailable         Principal Problem:    Hyperglycemia  Active Problems:    Neuropathy due to type 2 diabetes mellitus ([address]    Other hyperlipidemia    Dry mouth and eyes    Abn findings on dx imaging of abd regions, inc retroperiton    Dry eyes, bilateral    Acute cystitis without hematuria  Resolved Problems:    * No resolved hospital problems. 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 Patient reports multiple BMs overnight. KUB shows improvement in stool burden  Transaminase elevation-most likely due to fatty liver  Alcohol misuse disorder-no signs or symptoms of withdrawal    Plan and Recommendations:    Diet as tolerated. Push oral fluids  Patient will need to be discharged on MiraLAX 17 g 1-2 times daily-titrate to bowel movements  Pt will need to follow up int he office with Dr. Alphonso Sarkar 2-4 weeks for ongoing care and possible ongoing testing. Patient will need screening colonoscopy which can be scheduled at follow-up appointment. No further recommendations at this time GI will sign off      This plan was formulated in collaboration with Dr. Darryn Page MD    Thank you for allowing me to participate in the care of your patient. Please feel free to contact me with any questions or concerns.      [facility]. [facility]   Lena Winters, [address]   [phone]  10/15/2022  11:44 AM    Estimated time of  30 mins reviewing chart, assessing patient and formulating plan of care    This note was created with the assistance of a speech-recognition program.  Although the intention is to generate a document that actually reflects the content of the visit, no guarantees can be provided that every mistake has been identified and corrected by editing.

## 2022-10-15 NOTE — DISCHARGE SUMMARY
Patient discharged with family and all belongings to private residence. Discharge paperwork provided and discussed. All questions answered. One IV removed with no complications and catheter intact. Patient offered wheelchair but preferred to ambulate off unit.

## 2022-10-23 NOTE — PROGRESS NOTES
Department of [address] [place] [address] [address] [facility]    Discharge Summary      NAME:  Helio Flor  :  1970  MRN:  [id number]    Admit date:  10/9/2022  Discharge date:  10/15/2022    Admitting Physician:  Eliz Stack DO    Primary Diagnosis on Admission:   Present on Admission:   Hyperglycemia   Acute cystitis without hematuria   Neuropathy due to type 2 diabetes mellitus ([address]   Other hyperlipidemia   Dry mouth and eyes   Abn findings on dx imaging of abd regions, inc retroperiton   Dry eyes, bilateral      Secondary Diagnoses:  does not have any pertinent problems on file. Admission Condition:  poor     Discharged Condition: fair    Hospital Course:     Patient is a 48-year-old female with history of type 2 diabetes, hyperlipidemia, neuropathy secondary to type 2 diabetes, substance use disorder who presented initially with dizziness, lightheadedness that started 1 day previous to admission. She said that she usually takes 10 units of Lantus for type 2 diabetes however had been without it for the last 7 days as she had gone camping. She stated that she becomes really unstable when walking and becomes lightheaded and feels that she will pass out. 1 week prior to admission she was also positive for UTI however has not taken any antibiotics for it. In the ED patient's vitals were all within normal limits, EKG was normal.  Chest x-ray showed no acute abnormality. Glucose was extremely elevated at 409. Urinalysis was positive for nitrite and bacteria and she had an elevated lipase at 70. Was initially admitted for the management of hyperglycemia. Was placed on Rocephin 1000 mg for the UTI. And her home medications were restarted and she was placed on a hypoglycemia protocol with low-dose sliding scale of 10 units of Lantus. 10/10  Stated that she still feels extremely fatigued. POC glucose was improving at 304.   Patient stated that she has daytime sleepiness. Patient's UDS came back positive for cocaine, avoided beta-blockers at all time  She stated that she had been using cocaine 1 week prior to admission . She has not regularly used cocaine since July 2021 however relapsed this weekend. 10/11  Her Lantus was increased to 18 units       10/12  Lantus increased to 22 units  She had had abdominal pain overnight, on physical exam patient was in extreme pain. And her abdomen looked extremely distended. Urine culture also came back positive for Enterococcus and she was placed on Levaquin oral.  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 At this time GI was consulted. 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 Recommended patient follow-up with GI and rheumatology for outpatient. She was discharged on 10/15 with recommended follow-up with rheumatology, GI and PCP      On day of discharge pt feels better with no further complaints. Vitals and Labs are at pts baseline. All consultants involved during this admission are agreeable to d/c. Consults:  GI, Rheumatology     Significant Diagnostic/theraputic interventions: IV fluids, IV antibiotics       Disposition:   home    Instructions to Patient: Please follow-up with PCP, GI, rheumatology.      Follow up with Pino Guerra MD in  1-2weeks    Discharge Medications:       Medication List        START taking these medications      fenofibrate 54 MG tablet  Commonly known as: TRICOR  Take 1 tablet by mouth daily [facility] pharmacy: Please dispense generic fenofibrate unless prescriber denote     insulin glargine 100 UNIT/ML injection vial  Commonly known as: LANTUS  Inject 22 Units into the skin nightly  Replaces: Lantus SoloStar 100 UNIT/ML injection pen     metFORMIN 500 MG tablet  Commonly known as: GLUCOPHAGE  Take 1 tablet by mouth 2 times daily (with meals) multivitamin with minerals tablet  Take 1 tablet by mouth daily     polyethylene glycol 17 g packet  Commonly known as: GLYCOLAX  Take 17 g by mouth 2 times daily     senna-docusate 8.6-50 MG per tablet  Commonly known as: PERICOLACE  Take 1 tablet by mouth daily     simethicone 80 MG chewable tablet  Commonly known as: MYLICON  Take 1 tablet by mouth every 6 hours as needed for Flatulence            CHANGE how you take these medications      gabapentin 300 MG capsule  Commonly known as: NEURONTIN  Take by mouth three times daily (900mg in AM and midday, 1200mg at night; patient has 300mg and 600mg capsules on hand)  What changed: Another medication with the same name was removed. Continue taking this medication, and follow the directions you see here. CONTINUE taking these medications      blood glucose test strips  Test 4 times a day & as needed for symptoms of irregular blood glucose. Dispense sufficient amount for indicated testing frequency plus additional to accommodate PRN testing needs. empagliflozin 10 MG tablet  Commonly known as: Jardiance  Take 1 tablet by mouth daily     * FreeStyle Lancets Misc  1 each by Does not apply route daily     * Lancets 30G Misc  1 each by Does not apply route daily     * Lancets Misc  1 each by Does not apply route daily     * glucose monitoring kit  4 times daily     * ONE TOUCH ULTRA 2 w/Device Kit  1 kit by Does not apply route daily     * blood glucose monitor kit and supplies  Dispense sufficient amount for indicated testing frequency plus additional to accommodate PRN testing needs. Dispense all needed supplies to include: monitor, strips, lancing device, lancets, control solutions, alcohol swabs.      Insulin Pen Needle 30G X 8 MM Misc  1 each by Does not apply route daily     lovastatin 40 MG tablet  Commonly known as: MEVACOR  Take 1 tablet by mouth nightly     Melatonin 3 MG Caps  Take 3 mg by mouth at bedtime     ondansetron 4 MG disintegrating tablet  Commonly known as: Zofran ODT  Take 1 tablet by mouth every 8 hours as needed for Nausea or Vomiting     pantoprazole 40 MG tablet  Commonly known as: PROTONIX  Take 1 tablet by mouth every morning (before breakfast)     traZODone 150 MG tablet  Commonly known as: DESYREL  Take 1 tablet by mouth nightly           * This list has 6 medication(s) that are the same as other medications prescribed for you. Read the directions carefully, and ask your doctor or other care provider to review them with you. STOP taking these medications      aluminum hydroxide-magnesium carbonate  MG/15ML suspension  Commonly known as: GENATON     famotidine 20 MG tablet  Commonly known as: Pepcid     ibuprofen 400 MG tablet  Commonly known as: ADVIL;MOTRIN     Lantus SoloStar 100 UNIT/ML injection pen  Generic drug: insulin glargine  Replaced by: insulin glargine 100 UNIT/ML injection vial               Where to Get Your Medications        These medications were sent to [address] [place] [address] [address]      Phone: [phone]   fenofibrate 54 MG tablet  insulin glargine 100 UNIT/ML injection vial  metFORMIN 500 MG tablet  pantoprazole 40 MG tablet  polyethylene glycol 17 g packet  senna-docusate 8.6-50 MG per tablet  simethicone 80 MG chewable tablet         Send Copies to: Kimmie Guevara MD,       Note that over 30 minutes was spent in preparing discharge papers, discussing discharge with patient and family, medication review, etc.      Kervin Muniz DO  Family Medicine Resident  Family Medicine Inpatient Service  10/23/2022 2:36 PM          Please note that this chart was generated using voice recognition Dragon dictation software.   Although every effort was made to ensure the accuracy of this automated transcription, some errors in transcription may have occurred

## 2022-10-26 NOTE — DISCHARGE SUMMARY
Department of [address] [place] [address] [address] [facility]    Discharge Summary      NAME:  Clara Brantley  :  1970  MRN:  [id number]    Admit date:  10/9/2022  Discharge date:  10/15/2022    Admitting Physician:  Roxane Mann DO    Primary Diagnosis on Admission:   Present on Admission:   Hyperglycemia   Acute cystitis without hematuria   Neuropathy due to type 2 diabetes mellitus ([address]   Other hyperlipidemia   Dry mouth and eyes   Abn findings on dx imaging of abd regions, inc retroperiton   Dry eyes, bilateral      Secondary Diagnoses:  does not have any pertinent problems on file. Admission Condition:  poor     Discharged Condition: fair    Hospital Course:     Patient is a 58-year-old female with history of type 2 diabetes, hyperlipidemia, neuropathy secondary to type 2 diabetes, substance use disorder who presented initially with dizziness, lightheadedness that started 1 day previous to admission. She said that she usually takes 10 units of Lantus for type 2 diabetes however had been without it for the last 7 days as she had gone camping. She stated that she becomes really unstable when walking and becomes lightheaded and feels that she will pass out. 1 week prior to admission she was also positive for UTI however has not taken any antibiotics for it. In the ED patient's vitals were all within normal limits, EKG was normal.  Chest x-ray showed no acute abnormality. Glucose was extremely elevated at 409. Urinalysis was positive for nitrite and bacteria and she had an elevated lipase at 70. Was initially admitted for the management of hyperglycemia. Was placed on Rocephin 1000 mg for the UTI. And her home medications were restarted and she was placed on a hypoglycemia protocol with low-dose sliding scale of 10 units of Lantus. 10/10  Stated that she still feels extremely fatigued. POC glucose was improving at 304.   Patient stated that she has daytime sleepiness. Patient's UDS came back positive for cocaine, avoided beta-blockers at all time  She stated that she had been using cocaine 1 week prior to admission . She has not regularly used cocaine since July 2021 however relapsed this weekend. 10/11  Her Lantus was increased to 18 units       10/12  Lantus increased to 22 units  She had had abdominal pain overnight, on physical exam patient was in extreme pain. And her abdomen looked extremely distended. Urine culture also came back positive for Enterococcus and she was placed on Levaquin oral.  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 At this time GI was consulted. 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 Recommended patient follow-up with GI and rheumatology for outpatient. She was discharged on 10/15 with recommended follow-up with rheumatology, GI and PCP      On day of discharge pt feels better with no further complaints. Vitals and Labs are at pts baseline. All consultants involved during this admission are agreeable to d/c. Consults:  GI, Rheumatology     Significant Diagnostic/theraputic interventions: IV fluids, IV antibiotics       Disposition:   home    Instructions to Patient: Please follow-up with PCP, GI, rheumatology.      Follow up with Jacquelyn Cole MD in  1-2weeks    Discharge Medications:       Medication List        START taking these medications      fenofibrate 54 MG tablet  Commonly known as: TRICOR  Take 1 tablet by mouth daily [facility]: Please dispense generic fenofibrate unless prescriber denote     insulin glargine 100 UNIT/ML injection vial  Commonly known as: LANTUS  Inject 22 Units into the skin nightly  Replaces: Lantus SoloStar 100 UNIT/ML injection pen     metFORMIN 500 MG tablet  Commonly known as: GLUCOPHAGE  Take 1 tablet by mouth 2 times daily (with meals) multivitamin with minerals tablet  Take 1 tablet by mouth daily     polyethylene glycol 17 g packet  Commonly known as: GLYCOLAX  Take 17 g by mouth 2 times daily     senna-docusate 8.6-50 MG per tablet  Commonly known as: PERICOLACE  Take 1 tablet by mouth daily     simethicone 80 MG chewable tablet  Commonly known as: MYLICON  Take 1 tablet by mouth every 6 hours as needed for Flatulence            CHANGE how you take these medications      gabapentin 300 MG capsule  Commonly known as: NEURONTIN  Take by mouth three times daily (900mg in AM and midday, 1200mg at night; patient has 300mg and 600mg capsules on hand)  What changed: Another medication with the same name was removed. Continue taking this medication, and follow the directions you see here. CONTINUE taking these medications      blood glucose test strips  Test 4 times a day & as needed for symptoms of irregular blood glucose. Dispense sufficient amount for indicated testing frequency plus additional to accommodate PRN testing needs. empagliflozin 10 MG tablet  Commonly known as: Jardiance  Take 1 tablet by mouth daily     * FreeStyle Lancets Misc  1 each by Does not apply route daily     * Lancets 30G Misc  1 each by Does not apply route daily     * Lancets Misc  1 each by Does not apply route daily     * glucose monitoring kit  4 times daily     * ONE TOUCH ULTRA 2 w/Device Kit  1 kit by Does not apply route daily     * blood glucose monitor kit and supplies  Dispense sufficient amount for indicated testing frequency plus additional to accommodate PRN testing needs. Dispense all needed supplies to include: monitor, strips, lancing device, lancets, control solutions, alcohol swabs.      Insulin Pen Needle 30G X 8 MM Misc  1 each by Does not apply route daily     lovastatin 40 MG tablet  Commonly known as: MEVACOR  Take 1 tablet by mouth nightly     Melatonin 3 MG Caps  Take 3 mg by mouth at bedtime     ondansetron 4 MG disintegrating tablet  Commonly known as: Zofran ODT  Take 1 tablet by mouth every 8 hours as needed for Nausea or Vomiting     pantoprazole 40 MG tablet  Commonly known as: PROTONIX  Take 1 tablet by mouth every morning (before breakfast)     traZODone 150 MG tablet  Commonly known as: DESYREL  Take 1 tablet by mouth nightly           * This list has 6 medication(s) that are the same as other medications prescribed for you. Read the directions carefully, and ask your doctor or other care provider to review them with you. STOP taking these medications      aluminum hydroxide-magnesium carbonate  MG/15ML suspension  Commonly known as: GENATON     famotidine 20 MG tablet  Commonly known as: Pepcid     ibuprofen 400 MG tablet  Commonly known as: ADVIL;MOTRIN     Lantus SoloStar 100 UNIT/ML injection pen  Generic drug: insulin glargine  Replaced by: insulin glargine 100 UNIT/ML injection vial               Where to Get Your Medications        These medications were sent to [address] [place] [address] [place] [address]      Phone: [phone]   fenofibrate 54 MG tablet  insulin glargine 100 UNIT/ML injection vial  metFORMIN 500 MG tablet  pantoprazole 40 MG tablet  polyethylene glycol 17 g packet  senna-docusate 8.6-50 MG per tablet  simethicone 80 MG chewable tablet         Send Copies to: Polo Gallego MD,       Note that over 30 minutes was spent in preparing discharge papers, discussing discharge with patient and family, medication review, etc.      Ralph Whatley DO  Family Medicine Resident  Family Medicine Inpatient Service  10/26/2022 5:12 PM          Please note that this chart was generated using voice recognition Dragon dictation software.   Although every effort was made to ensure the accuracy of this automated transcription, some errors in transcription may have occurred

## 2023-03-17 ENCOUNTER — HOSPITAL ENCOUNTER (EMERGENCY)
Age: 53
Discharge: HOME | End: 2023-03-17
Attending: EMERGENCY MEDICINE
Payer: MEDICAID

## 2023-03-17 ENCOUNTER — APPOINTMENT (OUTPATIENT)
Dept: CT IMAGING | Age: 53
End: 2023-03-17
Payer: MEDICAID

## 2023-03-17 ENCOUNTER — OFFICE VISIT (OUTPATIENT)
Dept: FAMILY MEDICINE CLINIC | Age: 53
End: 2023-03-17
Payer: MEDICAID

## 2023-03-17 VITALS
BODY MASS INDEX: 20.66 KG/M2 | SYSTOLIC BLOOD PRESSURE: 153 MMHG | HEART RATE: 88 BPM | DIASTOLIC BLOOD PRESSURE: 71 MMHG | WEIGHT: 121 LBS | HEIGHT: 64 IN

## 2023-03-17 VITALS
HEART RATE: 85 BPM | TEMPERATURE: 97.9 F | SYSTOLIC BLOOD PRESSURE: 157 MMHG | OXYGEN SATURATION: 99 % | DIASTOLIC BLOOD PRESSURE: 80 MMHG | RESPIRATION RATE: 18 BRPM

## 2023-03-17 DIAGNOSIS — K21.00 GASTROESOPHAGEAL REFLUX DISEASE WITH ESOPHAGITIS, UNSPECIFIED WHETHER HEMORRHAGE: ICD-10-CM

## 2023-03-17 DIAGNOSIS — Z79.4 TYPE 2 DIABETES MELLITUS WITH DIABETIC POLYNEUROPATHY, WITH LONG-TERM CURRENT USE OF INSULIN (HCC): ICD-10-CM

## 2023-03-17 DIAGNOSIS — K04.7 DENTAL INFECTION: ICD-10-CM

## 2023-03-17 DIAGNOSIS — E11.42 TYPE 2 DIABETES MELLITUS WITH DIABETIC POLYNEUROPATHY, WITH LONG-TERM CURRENT USE OF INSULIN (HCC): ICD-10-CM

## 2023-03-17 DIAGNOSIS — K64.9 HEMORRHOIDS, UNSPECIFIED HEMORRHOID TYPE: ICD-10-CM

## 2023-03-17 DIAGNOSIS — R39.9 UTI SYMPTOMS: ICD-10-CM

## 2023-03-17 DIAGNOSIS — N30.00 ACUTE CYSTITIS WITHOUT HEMATURIA: Primary | ICD-10-CM

## 2023-03-17 DIAGNOSIS — R22.0 FACIAL SWELLING: Primary | ICD-10-CM

## 2023-03-17 DIAGNOSIS — E11.65 HYPERGLYCEMIA DUE TO DIABETES MELLITUS (HCC): ICD-10-CM

## 2023-03-17 DIAGNOSIS — F51.01 PRIMARY INSOMNIA: ICD-10-CM

## 2023-03-17 LAB
ABSOLUTE EOS #: 0.25 K/UL (ref 0–0.44)
ABSOLUTE IMMATURE GRANULOCYTE: 0.03 K/UL (ref 0–0.3)
ABSOLUTE LYMPH #: 2.65 K/UL (ref 1.1–3.7)
ABSOLUTE MONO #: 0.6 K/UL (ref 0.1–1.2)
ALBUMIN SERPL-MCNC: 4.1 G/DL (ref 3.5–5.2)
ALBUMIN/GLOBULIN RATIO: 1.3 (ref 1–2.5)
ALP SERPL-CCNC: 127 U/L (ref 35–104)
ALT SERPL-CCNC: 19 U/L (ref 5–33)
ANION GAP SERPL CALCULATED.3IONS-SCNC: 10 MMOL/L (ref 9–17)
AST SERPL-CCNC: 13 U/L
BACTERIA: ABNORMAL
BASOPHILS # BLD: 1 % (ref 0–2)
BASOPHILS ABSOLUTE: 0.04 K/UL (ref 0–0.2)
BILIRUB SERPL-MCNC: 0.2 MG/DL (ref 0.3–1.2)
BILIRUBIN URINE: NEGATIVE
BILIRUBIN, POC: NEGATIVE
BLOOD URINE, POC: ABNORMAL
BUN SERPL-MCNC: 24 MG/DL (ref 6–20)
CALCIUM SERPL-MCNC: 9.5 MG/DL (ref 8.6–10.4)
CASTS UA: ABNORMAL /LPF (ref 0–8)
CHLORIDE SERPL-SCNC: 106 MMOL/L (ref 98–107)
CLARITY, POC: ABNORMAL
CO2 SERPL-SCNC: 22 MMOL/L (ref 20–31)
COLOR, POC: ABNORMAL
COLOR: YELLOW
CREAT SERPL-MCNC: 0.84 MG/DL (ref 0.5–0.9)
EOSINOPHILS RELATIVE PERCENT: 4 % (ref 1–4)
EPITHELIAL CELLS UA: ABNORMAL /HPF (ref 0–5)
GFR SERPL CREATININE-BSD FRML MDRD: >60 ML/MIN/1.73M2
GLUCOSE BLD-MCNC: 251 MG/DL (ref 65–105)
GLUCOSE SERPL-MCNC: 308 MG/DL (ref 70–99)
GLUCOSE UR STRIP.AUTO-MCNC: ABNORMAL MG/DL
GLUCOSE URINE, POC: ABNORMAL
HBA1C MFR BLD: 7.8 %
HCT VFR BLD AUTO: 31.1 % (ref 36.3–47.1)
HGB BLD-MCNC: 10.3 G/DL (ref 11.9–15.1)
IMMATURE GRANULOCYTES: 0 %
KETONES UR STRIP.AUTO-MCNC: NEGATIVE MG/DL
KETONES, POC: NEGATIVE
LEUKOCYTE EST, POC: ABNORMAL
LEUKOCYTE ESTERASE UR QL STRIP.AUTO: ABNORMAL
LIPASE SERPL-CCNC: 41 U/L (ref 13–60)
LYMPHOCYTES # BLD: 38 % (ref 24–43)
MCH RBC QN AUTO: 29.4 PG (ref 25.2–33.5)
MCHC RBC AUTO-ENTMCNC: 33.1 G/DL (ref 28.4–34.8)
MCV RBC AUTO: 88.9 FL (ref 82.6–102.9)
MONOCYTES # BLD: 9 % (ref 3–12)
NITRITE UR QL STRIP.AUTO: NEGATIVE
NITRITE, POC: POSITIVE
NRBC AUTOMATED: 0 PER 100 WBC
PDW BLD-RTO: 12.8 % (ref 11.8–14.4)
PH, POC: 5.5
PLATELET # BLD AUTO: 239 K/UL (ref 138–453)
PMV BLD AUTO: 9.4 FL (ref 8.1–13.5)
POTASSIUM SERPL-SCNC: 4.6 MMOL/L (ref 3.7–5.3)
PROT SERPL-MCNC: 7.3 G/DL (ref 6.4–8.3)
PROT UR STRIP.AUTO-MCNC: 5.5 MG/DL (ref 5–8)
PROT UR STRIP.AUTO-MCNC: ABNORMAL MG/DL
PROTEIN, POC: ABNORMAL
RBC # BLD: 3.5 M/UL (ref 3.95–5.11)
RBC CLUMPS #/AREA URNS AUTO: ABNORMAL /HPF (ref 0–4)
SEG NEUTROPHILS: 48 % (ref 36–65)
SEGMENTED NEUTROPHILS ABSOLUTE COUNT: 3.44 K/UL (ref 1.5–8.1)
SODIUM SERPL-SCNC: 138 MMOL/L (ref 135–144)
SPECIFIC GRAVITY UA: 1.02 (ref 1–1.03)
SPECIFIC GRAVITY, POC: 1.01
TURBIDITY: ABNORMAL
URINE HGB: ABNORMAL
UROBILINOGEN, POC: 0.2
UROBILINOGEN, URINE: NORMAL
WBC # BLD AUTO: 7 K/UL (ref 3.5–11.3)
WBC UA: ABNORMAL /HPF (ref 0–5)

## 2023-03-17 PROCEDURE — 99285 EMERGENCY DEPT VISIT HI MDM: CPT

## 2023-03-17 PROCEDURE — 83690 ASSAY OF LIPASE: CPT

## 2023-03-17 PROCEDURE — 81001 URINALYSIS AUTO W/SCOPE: CPT

## 2023-03-17 PROCEDURE — 6360000004 HC RX CONTRAST MEDICATION: Performed by: STUDENT IN AN ORGANIZED HEALTH CARE EDUCATION/TRAINING PROGRAM

## 2023-03-17 PROCEDURE — 6370000000 HC RX 637 (ALT 250 FOR IP): Performed by: STUDENT IN AN ORGANIZED HEALTH CARE EDUCATION/TRAINING PROGRAM

## 2023-03-17 PROCEDURE — 83036 HEMOGLOBIN GLYCOSYLATED A1C: CPT

## 2023-03-17 PROCEDURE — 80053 COMPREHEN METABOLIC PANEL: CPT

## 2023-03-17 PROCEDURE — 82947 ASSAY GLUCOSE BLOOD QUANT: CPT

## 2023-03-17 PROCEDURE — 81002 URINALYSIS NONAUTO W/O SCOPE: CPT

## 2023-03-17 PROCEDURE — 70487 CT MAXILLOFACIAL W/DYE: CPT

## 2023-03-17 PROCEDURE — 85025 COMPLETE CBC W/AUTO DIFF WBC: CPT

## 2023-03-17 RX ORDER — CLINDAMYCIN HYDROCHLORIDE 150 MG/1
450 CAPSULE ORAL 3 TIMES DAILY
Qty: 63 CAPSULE | Refills: 0 | Status: SHIPPED | OUTPATIENT
Start: 2023-03-17 | End: 2023-03-24

## 2023-03-17 RX ORDER — SIMETHICONE 125 MG
TABLET,CHEWABLE ORAL
COMMUNITY
Start: 2023-01-30 | End: 2023-03-17

## 2023-03-17 RX ORDER — ALCOHOL ANTISEPTIC PADS
PADS, MEDICATED (EA) TOPICAL
COMMUNITY
Start: 2023-01-19

## 2023-03-17 RX ORDER — INSULIN GLARGINE 100 [IU]/ML
22 INJECTION, SOLUTION SUBCUTANEOUS NIGHTLY
Qty: 10 ML | Refills: 3 | Status: SHIPPED | OUTPATIENT
Start: 2023-03-17

## 2023-03-17 RX ORDER — IBUPROFEN 400 MG/1
TABLET ORAL
COMMUNITY
Start: 2023-01-30 | End: 2023-03-17

## 2023-03-17 RX ORDER — ONDANSETRON 4 MG/1
4 TABLET, ORALLY DISINTEGRATING ORAL EVERY 8 HOURS PRN
Qty: 5 TABLET | Refills: 0 | Status: CANCELLED | OUTPATIENT
Start: 2023-03-17

## 2023-03-17 RX ORDER — AMOXICILLIN 250 MG
1 CAPSULE ORAL DAILY
Qty: 30 TABLET | Refills: 0 | Status: SHIPPED | OUTPATIENT
Start: 2023-03-17

## 2023-03-17 RX ORDER — ACETAMINOPHEN 500 MG
1000 TABLET ORAL ONCE
Status: COMPLETED | OUTPATIENT
Start: 2023-03-17 | End: 2023-03-17

## 2023-03-17 RX ORDER — CLINDAMYCIN HYDROCHLORIDE 150 MG/1
450 CAPSULE ORAL ONCE
Status: COMPLETED | OUTPATIENT
Start: 2023-03-17 | End: 2023-03-17

## 2023-03-17 RX ORDER — TRAZODONE HYDROCHLORIDE 150 MG/1
150 TABLET ORAL NIGHTLY
Qty: 30 TABLET | Refills: 0 | Status: SHIPPED | OUTPATIENT
Start: 2023-03-17

## 2023-03-17 RX ORDER — MULTIVIT-MIN/IRON FUM/FOLIC AC 7.5 MG-4
1 TABLET ORAL DAILY
Qty: 30 TABLET | Refills: 3 | Status: SHIPPED | OUTPATIENT
Start: 2023-03-17

## 2023-03-17 RX ORDER — NITROFURANTOIN 25; 75 MG/1; MG/1
100 CAPSULE ORAL 2 TIMES DAILY
Qty: 10 CAPSULE | Refills: 0 | Status: SHIPPED | OUTPATIENT
Start: 2023-03-17 | End: 2023-03-22

## 2023-03-17 RX ORDER — PANTOPRAZOLE SODIUM 40 MG/1
40 TABLET, DELAYED RELEASE ORAL
Qty: 30 TABLET | Refills: 3 | Status: SHIPPED | OUTPATIENT
Start: 2023-03-17

## 2023-03-17 RX ORDER — MIRTAZAPINE 45 MG/1
TABLET, FILM COATED ORAL
COMMUNITY
Start: 2023-01-30 | End: 2023-03-17

## 2023-03-17 RX ORDER — INSULIN HUMAN 100 [IU]/ML
INJECTION, SOLUTION PARENTERAL
COMMUNITY
Start: 2023-01-19

## 2023-03-17 RX ORDER — LOVASTATIN 40 MG/1
40 TABLET ORAL NIGHTLY
Qty: 30 TABLET | Refills: 2 | Status: SHIPPED | OUTPATIENT
Start: 2023-03-17

## 2023-03-17 RX ORDER — SYRINGE-NEEDLE,INSULIN,0.5 ML 31 GX5/16"
SYRINGE, EMPTY DISPOSABLE MISCELLANEOUS
COMMUNITY
Start: 2023-01-19

## 2023-03-17 RX ORDER — NITROFURANTOIN 25; 75 MG/1; MG/1
100 CAPSULE ORAL ONCE
Status: COMPLETED | OUTPATIENT
Start: 2023-03-17 | End: 2023-03-17

## 2023-03-17 RX ADMIN — ACETAMINOPHEN 1000 MG: 500 TABLET ORAL at 16:30

## 2023-03-17 RX ADMIN — CLINDAMYCIN HYDROCHLORIDE 450 MG: 150 CAPSULE ORAL at 16:31

## 2023-03-17 RX ADMIN — IOPAMIDOL 75 ML: 755 INJECTION, SOLUTION INTRAVENOUS at 16:32

## 2023-03-17 RX ADMIN — NITROFURANTOIN (MONOHYDRATE/MACROCRYSTALS) 100 MG: 75; 25 CAPSULE ORAL at 18:14

## 2023-03-17 ASSESSMENT — ENCOUNTER SYMPTOMS
BACK PAIN: 0
NAUSEA: 1
ABDOMINAL DISTENTION: 0
COUGH: 0
CONSTIPATION: 0
ABDOMINAL PAIN: 0
SHORTNESS OF BREATH: 0
NAUSEA: 1
RHINORRHEA: 0
VOMITING: 0
EYE PAIN: 0
COUGH: 0
DIARRHEA: 0
EYE REDNESS: 0
DIARRHEA: 0
VOMITING: 0
EYES NEGATIVE: 1
TROUBLE SWALLOWING: 0
FACIAL SWELLING: 0
SINUS PRESSURE: 0
ABDOMINAL PAIN: 1
SINUS PAIN: 0
CONSTIPATION: 0
SHORTNESS OF BREATH: 0
PHOTOPHOBIA: 0

## 2023-03-17 NOTE — ED NOTES
Pt reports to the ED with complaints of hyperglycemia, abdominal cramping and dental pain. Pt states she is concerned for high sugar because she states she was incarcerated and hasn't had insulin since 3/1. Pt also states she's been having lower dental pain for the last few days, denies dysphagia at this time. Pt states she has been having some abdominal cramping for the last few days as well and states she believes she has a UTI, denies dysuria. Pt does not have any other complaints at this time. Pt is A&O x4 and speaking in complete sentences. Pt is resting in bed comfortably, NAD noted. Pt denies chest pain, SOB.  Care ongoing       Jimenez Morales RN  03/17/23 [phone]

## 2023-03-17 NOTE — ED NOTES
The following labs were labeled with appropriate pt sticker and tubed to lab:     [x] Blue     [x] Lavender   [] on ice  [x] Green/yellow  [] Green/black [] on ice  [] Gabriella Nicci  [] on ice  [] Yellow  [x] Red  [] Type/ Screen  [] ABG  [] VBG    [] COVID-19 swab    [] Rapid  [] PCR  [] Flu swab  [] Peds Viral Panel     [] Urine Sample  [] Fecal Sample  [] Pelvic Cultures  [] Blood Cultures  [] X 2  [] STREP Cultures         Paralee Burn, RN  03/17/23 2021

## 2023-03-17 NOTE — PROGRESS NOTES
Attending Physician Statement  I have discussed the care of Mitra hernandez.  female, including pertinent history and exam findings,  with the resident Dr. Sari Ramirez MD.  History and Exam:   Chief Complaint   Patient presents with    Urinary Tract Infection     Strong odor and discoloration, slight irritation upon urination, started a week ago    Medication Refill     Pt needs refill on insulin, blood sugars elevated. Also would like to discuss getting gabapentin again. Requesting hemorrhoidal cream additionally. Oral Swelling     Mouth swelling over the last two days due to tooth infection     Patient seen and examined in office with resident present. Patient has mild swelling on the right lower jaw without overlying skin changes. Patient does state numbness in this area as well as well as significant pain on palpation. Patient also states significant pain related to a tooth fracture with, \"rotting\", of a right frontal tooth. On exam patient has necrotic or melanotic looking gum and mucosa in the area of the right frontal tooth, no discharge noted, and significant lymphadenopathy as well as tenderness along right lateral neck. Patient recently incarcerated for the last 2 to 3 weeks. Past Medical History:   Diagnosis Date    Depression     Diabetes mellitus ([facility]     Hyperlipidemia     Pancreatitis     Pneumonia due to infectious organism 1/8/2017    Substance abuse ([facility]      No Known Allergies I have seen and examined the patient and the key elements of the encounter have been performed by me.   BP Readings from Last 3 Encounters:   03/21/23 119/72   03/18/23 (!) 189/81   03/17/23 (!) 157/80     BP (!) 153/71 (Site: Left Upper Arm, Position: Sitting, Cuff Size: Medium Adult)   Pulse 88   Ht 5' 4\" (1.626 m)   Wt 121 lb (54.9 kg)   LMP 05/21/2018 (LMP Unknown)   BMI 20.77 kg/m²   Lab Results   Component Value Date    WBC 7.0 03/17/2023    HGB 10.3 (L) 03/17/2023    HCT 31.1 (L) 03/17/2023
Visit Information    Have you changed or started any medications since your last visit including any over-the-counter medicines, vitamins, or herbal medicines? Have you stopped taking any of your medications? Is so, why? -    Are you having any side effects from any of your medications? -     Have you seen any other physician or provider since your last visit? Have you had any other diagnostic tests since your last visit? Have you been seen in the emergency room and/or had an admission in a hospital since we last saw you? Have you had your routine dental cleaning in the past 6 months? Do you have an active MyChart account? If no, what is the barrier?       Patient Care Team:  Ginger Epperson MD as PCP - General (Family Medicine)    Medical History Review  Past Medical, Family, and Social History reviewed and does contribute to the patient presenting condition    Health Maintenance   Topic Date Due    COVID-19 Vaccine (1) Never done    Diabetic retinal exam  Never done    Hepatitis B vaccine (1 of 3 - Risk 3-dose series) Never done    Cervical cancer screen  Never done    Shingles vaccine (1 of 2) Never done    Diabetic Alb to Cr ratio (uACR) test  02/21/2020    Flu vaccine (1) 08/01/2022    A1C test (Diabetic or Prediabetic)  01/05/2023    Depression Monitoring  01/19/2023    Diabetic foot exam  10/05/2023    Lipids  10/09/2023    GFR test (Diabetes, CKD 3-4, OR last GFR 15-59)  10/14/2023    Breast cancer screen  01/26/2024    DTaP/Tdap/Td vaccine (2 - Td or Tdap) 09/21/2028    Colorectal Cancer Screen  09/16/2029    Pneumococcal 0-64 years Vaccine  Completed    Hepatitis C screen  Completed    HIV screen  Completed    Hepatitis A vaccine  Aged Out    Hib vaccine  Aged Out    Meningococcal (ACWY) vaccine  Aged Out
Brother     Substance Abuse Brother        Objective:    BP (!) 153/71 (Site: Left Upper Arm, Position: Sitting, Cuff Size: Medium Adult)   Pulse 88   Ht 5' 4\" (1.626 m)   Wt 121 lb (54.9 kg)   LMP 05/21/2018 (LMP Unknown)   BMI 20.77 kg/m²    BP Readings from Last 3 Encounters:   03/17/23 (!) 153/71   10/15/22 118/67   10/05/22 129/74       Physical Exam  Vitals and nursing note reviewed. HENT:      Head:      Jaw: Swelling (right mandibular) present. Mouth/Throat:      Dentition: Dental caries present. Cardiovascular:      Rate and Rhythm: Normal rate and regular rhythm. Pulses: Normal pulses. Heart sounds: Normal heart sounds. Pulmonary:      Effort: No respiratory distress. Breath sounds: Normal breath sounds. Abdominal:      General: Bowel sounds are normal.      Palpations: Abdomen is soft. Neurological:      Mental Status: She is oriented to person, place, and time. Lab Results   Component Value Date    WBC 5.3 10/15/2022    HGB 9.4 (L) 10/15/2022    HCT 27.6 (L) 10/15/2022     10/15/2022    CHOL 120 10/09/2022    TRIG 360 (H) 10/09/2022    HDL 19 (L) 10/09/2022    LDLDIRECT 91 02/16/2019    ALT 56 (H) 10/14/2022    AST 40 (H) 10/14/2022     10/14/2022    K 4.0 10/14/2022    CL 99 10/14/2022    CREATININE 0.75 10/14/2022    BUN 21 (H) 10/14/2022    CO2 25 10/14/2022    TSH 0.54 10/10/2022    INR 1.0 11/16/2012    LABA1C 7.8 03/17/2023    LABMICR 147 (H) 02/21/2019     Lab Results   Component Value Date    CALCIUM 8.6 10/14/2022    PHOS 2.4 (L) 02/15/2017     Lab Results   Component Value Date    LDLCHOLESTEROL 29 10/09/2022    LDLDIRECT 91 02/16/2019       Assessment and Plan:    Patient has UTI, facial swelling (dental infection vs osteonecrosis), elevated blood sugar levels, A1c 7.8 in clinic today. It is a recommendation to patient go to ER for proper evaluation and treatment and follow-up back in clinic after discharge.   Patient voices agreement to

## 2023-03-17 NOTE — DISCHARGE INSTRUCTIONS
You are seen in the emergency department for dental infection and UTI. Treating dental infection with clindamycin. Treating UTI with Macrobid. Please take these prescriptions as prescribed. Please continue to take your diabetes medication as prescribed. If you begin to have worsening symptoms or other concerning symptoms then please return present to the emergency department for reevaluation.   Please follow-up with your PCP within 1 day

## 2023-03-17 NOTE — ED NOTES
The following labs were labeled with appropriate pt sticker and tubed to lab:     [] Blue     [] Lavender   [] on ice  [] Green/yellow  [] Green/black [] on ice  [] Seretha Keepers  [] on ice  [] Yellow  [] Red  [] Type/ Screen  [] ABG  [] VBG    [] COVID-19 swab    [] Rapid  [] PCR  [] Flu swab  [] Peds Viral Panel     [x] Urine Sample  [] Fecal Sample  [] Pelvic Cultures  [] Blood Cultures  [] X 2  [] STREP Cultures       Vincenzo Castaneda RN  03/17/23 0055

## 2023-03-17 NOTE — ED PROVIDER NOTES
[facility]  Emergency Department  Faculty Attestation     I performed a history and physical examination of the patient and discussed management with the resident. I reviewed the residents note and agree with the documented findings and plan of care. Any areas of disagreement are noted on the chart. I was personally present for the key portions of any procedures. I have documented in the chart those procedures where I was not present during the key portions. I have reviewed the emergency nurses triage note. I agree with the chief complaint, past medical history, past surgical history, allergies, medications, social and family history as documented unless otherwise noted below. For Physician Assistant/ Nurse Practitioner cases/documentation I have personally evaluated this patient and have completed at least one if not all key elements of the E/M (history, physical exam, and MDM). Additional findings are as noted. Primary Care Physician:  Tabitha Murdock MD    Screenings:  [unfilled]    CHIEF COMPLAINT       Chief Complaint   Patient presents with    Hyperglycemia     States hasn't had insulin since the 1st    Abdominal Cramping     States hx of pancreatitis    Dental Pain       RECENT VITALS:   Temp: 97.9 °F (36.6 °C),  Heart Rate: 85, Resp: 18, BP: (!) 157/80    LABS:  Labs Reviewed   POC GLUCOSE FINGERSTICK - Abnormal; Notable for the following components:       Result Value    POC Glucose 251 (*)     All other components within normal limits   CBC WITH AUTO DIFFERENTIAL   COMPREHENSIVE METABOLIC PANEL   URINALYSIS   LIPASE       Radiology  CT FACIAL BONES W CONTRAST    (Results Pending)     Attending Physician Additional  Notes  Patient was sent by her family practice clinic office for evaluation of UTI, hyperglycemia, mandibular pain and swelling. There was concern for infectious process, questionable osteonecrosis of the mandible.   Has been having a tooth issue in the right lower anterior mandible for some time, has the past week having increasing swelling starting in the mental region going to the right lateral mandible. No fever chills or sweats. No facial abscess. No trismus. No Kim's angina. She is not on Fosamax or similar osteo porosis medications. She has some urine discomfort. Minimal frequency. Blood sugar was elevated. She is not short of breath or having chest pain. On exam she is afebrile nontoxic vital signs normal.  There is normal speech. No trismus. There is a tooth eroded to gumline in the right lower region by the canine. There is some gingival swelling in the mental region and across to the mandible but no palpable abscess that could require drainage. No Kim's angina. Skin is warm and dry. Neck is supple without lymphadenopathy. Impression is hyperglycemia, rule out UTI, odontogenic infection, gingivitis, rule out osteonecrosis though less likely. Plan is IV access, laboratory studies, urinalysis, fluids and/or insulin if needed, analgesics, antibiotics, facial CT, anticipate discharge with follow-up to dentist and oral surgeon on clindamycin, pain control. Taty Cue.  Prabha Mixon MD, [facility]  Attending Emergency  Physician                Kait Jenkins MD  03/17/23 1553

## 2023-03-17 NOTE — ED PROVIDER NOTES
[facility]  ED  Emergency Department Encounter  Emergency Medicine Resident     Pt Name:Fang Jaquez  MRN: [id number]  [place] 1970  Date of evaluation: 3/17/23  PCP:  Sanjay Fried MD  Note Started: 3:22 PM EDT      CHIEF COMPLAINT       Chief Complaint   Patient presents with    Hyperglycemia     States hasn't had insulin since the 1st    Abdominal Cramping     States hx of pancreatitis    Dental Pain       HISTORY OF PRESENT ILLNESS  (Location/Symptom, Timing/Onset, Context/Setting, Quality, Duration, Modifying Factors, Severity.)      Angel Diez is a 47 y.o. female with PMH including type II DM with neuropathy who presents with elevated blood glucose levels, abdominal pain, urinary frequency/hesitancy, and a dental infection (teeth #25-26). She was seen earlier today in family medicine's outpatient clinic where she was diagnosed with a UTI and elevated blood glucose levels with a hemoglobin A1c of 7.8% today. Patiently she presented to the outpatient family medicine clinic with facial swelling, which could be secondary to a dental infection vs osteonecrosis. She was urged to go to the ED at the end of her appointment. Here in the ED, the patient presents hemodynamically stable and afebrile. She is nontoxic-appearing and A&O x3. She states that she has had a \"lower tooth\" infection for the past couple months. She is unsure if she fractured the tooth or if she has a small abscess in her gumline. She states that she is out of blood glucose test strips and has not been checking her blood glucose levels daily. She mentions that she has been having intermittent abdominal pain specifically in the RUQ and epigastric region. She no longer drinks alcohol and she last used illicit drugs 1 year ago after the death of her son. Denies trismus, vomiting, diarrhea, fevers, chills, syncope, seizures.     PAST MEDICAL / SURGICAL / SOCIAL / FAMILY HISTORY      has a past medical history of Depression, Diabetes mellitus ([facility] [address] Hyperlipidemia, Pancreatitis, Pneumonia due to infectious organism, and [facility] has a past surgical history that includes Cholecystectomy; Ectopic pregnancy surgery; Carpal tunnel release (); Colonoscopy (N/A, 2019); Upper gastrointestinal endoscopy (N/A, 2019); and Upper gastrointestinal endoscopy (N/A, 2021). Social History     Socioeconomic History    Marital status: Single     Spouse name: Not on file    Number of children: Not on file    Years of education: Not on file    Highest education level: Not on file   Occupational History    Not on file   Tobacco Use    Smoking status: Former     Packs/day: 0.50     Years: 30.00     Pack years: 15.00     Types: Cigarettes     Quit date: 2023     Years since quittin.1    Smokeless tobacco: Never    Tobacco comments:     vapes   Substance and Sexual Activity    Alcohol use: Yes     Comment: Rare    Drug use: Yes     Types: Cocaine     Comment: 15 relapsed    Sexual activity: Yes     Partners: Male   Other Topics Concern    Not on file   Social History Narrative    Not on file     Social Determinants of Health     Financial Resource Strain: Not on file   Food Insecurity: Not on file   Transportation Needs: Not on file   Physical Activity: Not on file   Stress: Not on file   Social Connections: Not on file   Intimate Partner Violence: Not on file   Housing Stability: Not on file       Family History   Problem Relation Age of Onset    Diabetes Mother     Coronary Art Dis Mother     Cancer Father         Colon    Diabetes Sister     Diabetes Brother     Bipolar Disorder Brother     Alcohol Abuse Brother     Substance Abuse Brother        Allergies:  Patient has no known allergies. Home Medications:  Prior to Admission medications    Medication Sig Start Date End Date Taking?  Authorizing Provider   nitrofurantoin, macrocrystal-monohydrate, (MACROBID) 100 MG capsule Take 1 capsule by mouth 2 times daily for 5 days 3/17/23 3/22/23 Yes Karley Rand, DO   clindamycin (CLEOCIN) 150 MG capsule Take 3 capsules by mouth 3 times daily for 7 days 3/17/23 3/24/23 Yes Tico Hassan, DO   HUMULIN R 100 UNIT/ML injection  1/19/23   Historical Provider, MD   [employer] Alcohol Swabs 70 % PADS  1/19/23   Historical Provider, MD   [facility] X 5/16\" 0.5 ML MISC  1/19/23   Historical Provider, MD   senna-docusate (Iris Vesuvius) 8.6-50 MG per tablet Take 1 tablet by mouth daily 3/17/23   Salty Trinidad MD   pantoprazole (PROTONIX) 40 MG tablet Take 1 tablet by mouth every morning (before breakfast) 3/17/23   Salty Trinidad MD   Multiple Vitamins-Minerals (MULTIVITAMIN WITH MINERALS) tablet Take 1 tablet by mouth daily 3/17/23   Salty Trinidad MD   traZODone (DESYREL) 150 MG tablet Take 1 tablet by mouth nightly 3/17/23   Salty Trinidad MD   lovastatin (MEVACOR) 40 MG tablet Take 1 tablet by mouth nightly 3/17/23   Salty Trinidad MD   insulin glargine (LANTUS) 100 UNIT/ML injection vial Inject 22 Units into the skin nightly 3/17/23   Salty Trinidad MD   empagliflozin (JARDIANCE) 10 MG tablet Take 1 tablet by mouth daily 3/17/23   Salty Trinidad MD   fenofibrate (TRICOR) 54 MG tablet Take 1 tablet by mouth daily [facility] pharmacy: Please dispense generic fenofibrate unless prescriber denote  Patient not taking: Reported on 3/17/2023 10/15/22   Otilia Pan MD   Lancets MISC 1 each by Does not apply route daily 10/5/22   Tabitha Murdock MD   blood glucose monitor strips Test 4 times a day & as needed for symptoms of irregular blood glucose. Dispense sufficient amount for indicated testing frequency plus additional to accommodate PRN testing needs.  10/5/22   Tabitha Murdock MD   ondansetron (ZOFRAN ODT) 4 MG disintegrating tablet Take 1 tablet by mouth every 8 hours as needed for Nausea or Vomiting 1/4/22   Sukumar Mott MD         REVIEW OF SYSTEMS       Review of Systems   Constitutional: Positive for appetite change and fatigue. Negative for activity change, chills, diaphoresis and fever. HENT:  Positive for dental problem. Negative for congestion, drooling, ear pain, facial swelling, postnasal drip, rhinorrhea, sinus pressure, sinus pain and trouble swallowing. Eyes:  Negative for photophobia, pain and redness. Respiratory:  Negative for cough and shortness of breath. Cardiovascular:  Negative for chest pain. Gastrointestinal:  Positive for abdominal pain and nausea. Negative for abdominal distention, constipation, diarrhea and vomiting. Genitourinary:  Positive for frequency and urgency. Musculoskeletal:  Negative for arthralgias, back pain, joint swelling, neck pain and neck stiffness. Skin:  Negative for rash and wound. Neurological:  Negative for seizures, syncope, facial asymmetry and headaches. Psychiatric/Behavioral:  Negative for agitation. The patient is not nervous/anxious and is not hyperactive. PHYSICAL EXAM      INITIAL VITALS:   BP (!) 157/80   Pulse 85   Temp 97.9 °F (36.6 °C) (Oral)   Resp 18   LMP 05/21/2018 (LMP Unknown)   SpO2 99%     Physical Exam  Constitutional:       General: She is not in acute distress. Appearance: Normal appearance. She is normal weight. She is not ill-appearing or toxic-appearing. HENT:      Head: Normocephalic and atraumatic. Right Ear: Ear canal and external ear normal.      Left Ear: Ear canal and external ear normal.      Nose: Nose normal. No congestion. Mouth/Throat:      Mouth: Mucous membranes are moist.      Pharynx: Oropharynx is clear. Comments: Poor dentition with multiple missing teeth. Teeth number 25-26 fractures with erosion of enamel. Mild gingival swelling. Eyes:      Extraocular Movements: Extraocular movements intact. Pupils: Pupils are equal, round, and reactive to light. Cardiovascular:      Rate and Rhythm: Normal rate and regular rhythm. Pulses: Normal pulses. Heart sounds: Normal heart sounds. No murmur heard. Pulmonary:      Effort: Pulmonary effort is normal. No respiratory distress. Breath sounds: No stridor. No wheezing. Abdominal:      General: Abdomen is flat. There is no distension. Palpations: Abdomen is soft. Tenderness: There is abdominal tenderness (RUQ/epigastric region). There is no guarding. Musculoskeletal:         General: No deformity or signs of injury. Cervical back: Neck supple. No tenderness. Right lower leg: No edema. Left lower leg: No edema. Lymphadenopathy:      Cervical: No cervical adenopathy. Skin:     General: Skin is warm and dry. Findings: Rash present. No erythema. Neurological:      General: No focal deficit present. Mental Status: She is alert. Mental status is at baseline. Psychiatric:         Mood and Affect: Mood normal.         Behavior: Behavior normal.         Thought Content: Thought content normal.         Judgment: Judgment normal.         DDX/DIAGNOSTIC RESULTS / EMERGENCY DEPARTMENT COURSE / MDM     Medical Decision Making  50-year-old female with PMH including type II DM with neuropathy who presents with elevated blood glucose levels, abdominal pain, urinary frequency/hesitancy, and a dental infection (teeth #25-26). She is hemodynamically stable, afebrile, and A&O x3. She mentions that she has been dealing with tooth pain in the above region for the past several months. The pain is intermittent. Additionally this abdominal pain is located in the epigastric and right upper quadrant regions. It is intermittent. Abdominal exam is benign with mild tenderness to palpation, however otherwise normal.  Blood glucose level 251 on arrival to the emergency department. We will check CMP, CBC with differential, lipase, and UA. Encouraged her to drink water while here. Holding off on starting IV fluids.   Also obtaining a CT facial bones with IV contrast to evaluate for potential mandibular abscess vs osteonecrosis. Starting patient on clindamycin to treat the probable dental infection in the setting of type II DM. Plan for clindamycin 450 mg every 8 hours for the next 7 days. Treating pain with Tylenol 1000 mg (history of substance abuse and NSAIDs are not recommended in diabetic patient due to nephrotoxicity)    Amount and/or Complexity of Data Reviewed  Labs: ordered. Radiology: ordered. Risk  OTC drugs. Prescription drug management. EKG  N/a    All EKG's are interpreted by the Emergency Department Physician who either signs or Co-signs this chart in the absence of a cardiologist.    EMERGENCY DEPARTMENT COURSE:    ED Course as of 03/17/23 1825   Fri Mar 17, 2023   1649 No concerns for DKA. CMP with glucose of 308  1, sodium 138, potassium 4.6, bicarb 22, anion gap of 10. No ketones in urine [GC]   1650 No concerns for pancreatitis. LFTs unremarkable and lipase is within normal limits at 41 [GC]   1650 CBC with differential with hemoglobin of 10.3. No signs or symptoms of bleeding [GC]   1821 Patient states that she has been experiencing increased urinary hesitancy and frequency. UA with small amount of leuks. Plan to treat with 5 days of Macrobid. No concerns for pyelonephritits. Remains afebrile [GC]   1823 Giving dose of Macrobid and Clindamycin here [GC]   3430 Given appropriate follow-up instructions and return instructions [GC]      ED Course User Index  [GC] Yash Purcell DO       PROCEDURES:  N/a    CONSULTS:  None    CRITICAL CARE:  There was significant risk of life threatening deterioration of patient's condition requiring my direct management. Critical care time <30 minutes, excluding any documented procedures. FINAL IMPRESSION      1. Acute cystitis without hematuria    2. Dental infection    3.  Hyperglycemia due to diabetes mellitus [facility])          DISPOSITION / PLAN     DISPOSITION Decision To Discharge 03/17/2023 05:49:22 PM      PATIENT REFERRED TO:  MD Lawrence [address] 66  [phone]    Call in 1 day  for ED follow-up    DISCHARGE MEDICATIONS:  Discharge Medication List as of 3/17/2023  5:51 PM        START taking these medications    Details   nitrofurantoin, macrocrystal-monohydrate, (MACROBID) 100 MG capsule Take 1 capsule by mouth 2 times daily for 5 days, Disp-10 capsule, R-0Print      clindamycin (CLEOCIN) 150 MG capsule Take 3 capsules by mouth 3 times daily for 7 days, Disp-63 capsule, R-0Print             Destini Kent DO  Emergency Medicine Resident    (Please note that portions of thisnote were completed with a voice recognition program.  Efforts were made to edit the dictations but occasionally words are mis-transcribed.)       Destini Kent DO  Resident  03/17/23 1800 Aylin Patrick DO  Resident  03/17/23 [id number]

## 2023-03-18 ENCOUNTER — HOSPITAL ENCOUNTER (EMERGENCY)
Age: 53
Discharge: HOME | End: 2023-03-18
Attending: EMERGENCY MEDICINE
Payer: MEDICAID

## 2023-03-18 VITALS
SYSTOLIC BLOOD PRESSURE: 189 MMHG | RESPIRATION RATE: 12 BRPM | OXYGEN SATURATION: 99 % | TEMPERATURE: 97.5 F | DIASTOLIC BLOOD PRESSURE: 81 MMHG | HEART RATE: 84 BPM

## 2023-03-18 DIAGNOSIS — Z59.89 INSURANCE COVERAGE PROBLEMS: ICD-10-CM

## 2023-03-18 DIAGNOSIS — N30.00 ACUTE CYSTITIS WITHOUT HEMATURIA: Primary | ICD-10-CM

## 2023-03-18 LAB
CHP ED QC CHECK: NORMAL
GLUCOSE BLD-MCNC: 383 MG/DL
GLUCOSE BLD-MCNC: 383 MG/DL (ref 65–105)

## 2023-03-18 PROCEDURE — 99284 EMERGENCY DEPT VISIT MOD MDM: CPT

## 2023-03-18 PROCEDURE — 96372 THER/PROPH/DIAG INJ SC/IM: CPT

## 2023-03-18 PROCEDURE — 6370000000 HC RX 637 (ALT 250 FOR IP): Performed by: STUDENT IN AN ORGANIZED HEALTH CARE EDUCATION/TRAINING PROGRAM

## 2023-03-18 PROCEDURE — 82947 ASSAY GLUCOSE BLOOD QUANT: CPT

## 2023-03-18 RX ORDER — PENICILLIN V POTASSIUM 250 MG/1
500 TABLET ORAL ONCE
Status: DISCONTINUED | OUTPATIENT
Start: 2023-03-18 | End: 2023-03-18

## 2023-03-18 RX ORDER — PHENAZOPYRIDINE HYDROCHLORIDE 100 MG/1
200 TABLET, FILM COATED ORAL ONCE
Status: COMPLETED | OUTPATIENT
Start: 2023-03-18 | End: 2023-03-18

## 2023-03-18 RX ORDER — CEPHALEXIN 500 MG/1
500 CAPSULE ORAL 4 TIMES DAILY
Qty: 28 CAPSULE | Refills: 0 | Status: SHIPPED | OUTPATIENT
Start: 2023-03-18 | End: 2023-03-25

## 2023-03-18 RX ORDER — INSULIN LISPRO 100 [IU]/ML
10 INJECTION, SOLUTION INTRAVENOUS; SUBCUTANEOUS ONCE
Status: COMPLETED | OUTPATIENT
Start: 2023-03-18 | End: 2023-03-18

## 2023-03-18 RX ORDER — CEPHALEXIN 500 MG/1
500 CAPSULE ORAL ONCE
Status: COMPLETED | OUTPATIENT
Start: 2023-03-18 | End: 2023-03-18

## 2023-03-18 RX ADMIN — INSULIN LISPRO 10 UNITS: 100 INJECTION, SOLUTION INTRAVENOUS; SUBCUTANEOUS at 16:03

## 2023-03-18 RX ADMIN — PHENAZOPYRIDINE HYDROCHLORIDE 200 MG: 100 TABLET ORAL at 15:56

## 2023-03-18 RX ADMIN — CEPHALEXIN 500 MG: 500 CAPSULE ORAL at 15:56

## 2023-03-18 SDOH — ECONOMIC STABILITY - INCOME SECURITY: OTHER PROBLEMS RELATED TO HOUSING AND ECONOMIC CIRCUMSTANCES: Z59.89

## 2023-03-18 ASSESSMENT — ENCOUNTER SYMPTOMS
ABDOMINAL PAIN: 1
NAUSEA: 0
DIARRHEA: 0
VOMITING: 0

## 2023-03-18 ASSESSMENT — PAIN - FUNCTIONAL ASSESSMENT: PAIN_FUNCTIONAL_ASSESSMENT: 0-10

## 2023-03-18 ASSESSMENT — PAIN SCALES - GENERAL: PAINLEVEL_OUTOF10: 6

## 2023-03-18 NOTE — ED PROVIDER NOTES
[facility]  ED  Emergency Department Encounter  Emergency Medicine Resident     Pt Name:Fang Jade  MRN: [id number]  [place] 1970  Date of evaluation: 3/18/23  PCP:  Christos Bass MD  Note Started: 3:57 PM EDT      CHIEF COMPLAINT       Chief Complaint   Patient presents with    Hematuria       HISTORY OF PRESENT ILLNESS  (Location/Symptom, Timing/Onset, Context/Setting, Quality, Duration, Modifying Factors, Severity.)      Jose C Alvares is a 47 y.o. female who presents with abdominal pain that has been continuing since yesterday. She was seen here in the emergency department yesterday for dental infection, urinary tract infection. She states she was unable to  medications yesterday since she recently got insurance the pharmacy told her that her prescriptions were not covered. She is also not been able to obtain any insulin since the first of the month. She denies any dysuria, nausea, vomiting. PAST MEDICAL / SURGICAL / SOCIAL / FAMILY HISTORY      has a past medical history of Depression, Diabetes mellitus ([facility] [address] Hyperlipidemia, Pancreatitis, Pneumonia due to infectious organism, and Substance abuse ([facility] [address] has a past surgical history that includes Cholecystectomy; Ectopic pregnancy surgery; Carpal tunnel release (); Colonoscopy (N/A, 2019); Upper gastrointestinal endoscopy (N/A, 2019); and Upper gastrointestinal endoscopy (N/A, 2021).     Social History     Socioeconomic History    Marital status: Single     Spouse name: Not on file    Number of children: Not on file    Years of education: Not on file    Highest education level: Not on file   Occupational History    Not on file   Tobacco Use    Smoking status: Former     Packs/day: 0.50     Years: 30.00     Pack years: 15.00     Types: Cigarettes     Quit date: 2023     Years since quittin.1    Smokeless tobacco: Never    Tobacco comments:     vapes   Substance and Sexual Activity    Alcohol use: Yes     Comment: Rare    Drug use: Yes     Types: Cocaine     Comment: 12/5/16 relapsed    Sexual activity: Yes     Partners: Male   Other Topics Concern    Not on file   Social History Narrative    Not on file     Social Determinants of Health     Financial Resource Strain: Not on file   Food Insecurity: Not on file   Transportation Needs: Not on file   Physical Activity: Not on file   Stress: Not on file   Social Connections: Not on file   Intimate Partner Violence: Not on file   Housing Stability: Not on file       Family History   Problem Relation Age of Onset    Diabetes Mother     Coronary Art Dis Mother     Cancer Father         Colon    Diabetes Sister     Diabetes Brother     Bipolar Disorder Brother     Alcohol Abuse Brother     Substance Abuse Brother        Allergies:  Patient has no known allergies. Home Medications:  Prior to Admission medications    Medication Sig Start Date End Date Taking?  Authorizing Provider   cephALEXin (KEFLEX) 500 MG capsule Take 1 capsule by mouth 4 times daily for 7 days 3/18/23 3/25/23 Yes Louise Flanagan, DO   HUMULIN R 100 UNIT/ML injection  1/19/23   Historical Provider, MD   [employer] Alcohol Swabs 70 % PADS  1/19/23   Historical Provider, MD   [facility] X 5/16\" 0.5 ML MISC  1/19/23   Historical Provider, MD   senna-docusate (Fleta Lizzie) 8.6-50 MG per tablet Take 1 tablet by mouth daily 3/17/23   Tomas Fountain MD   pantoprazole (PROTONIX) 40 MG tablet Take 1 tablet by mouth every morning (before breakfast) 3/17/23   Tomas Fountain MD   Multiple Vitamins-Minerals (MULTIVITAMIN WITH MINERALS) tablet Take 1 tablet by mouth daily 3/17/23   Tomas Fountain MD   traZODone (DESYREL) 150 MG tablet Take 1 tablet by mouth nightly 3/17/23   Tomas Fountain MD   lovastatin (MEVACOR) 40 MG tablet Take 1 tablet by mouth nightly 3/17/23   Tomas Fountain MD   insulin glargine (LANTUS) 100 UNIT/ML injection vial Inject 22 Units into the skin nightly 3/17/23   [facility] Samuel De Leon MD   empagliflozin (JARDIANCE) 10 MG tablet Take 1 tablet by mouth daily 3/17/23   Haroon Cotto MD   nitrofurantoin, macrocrystal-monohydrate, (MACROBID) 100 MG capsule Take 1 capsule by mouth 2 times daily for 5 days 3/17/23 3/22/23  Juma Catherine DO   clindamycin (CLEOCIN) 150 MG capsule Take 3 capsules by mouth 3 times daily for 7 days 3/17/23 3/24/23  Juma Catherine DO   fenofibrate (TRICOR) 54 MG tablet Take 1 tablet by mouth [facility]: Please dispense generic fenofibrate unless prescriber denote  Patient not taking: Reported on 3/17/2023 10/15/22   Johnathan Owens MD   Lancets MISC 1 each by Does not apply route daily 10/5/22   John Reynolds MD   blood glucose monitor strips Test 4 times a day & as needed for symptoms of irregular blood glucose. Dispense sufficient amount for indicated testing frequency plus additional to accommodate PRN testing needs. 10/5/22   John Reynolds MD   ondansetron (ZOFRAN ODT) 4 MG disintegrating tablet Take 1 tablet by mouth every 8 hours as needed for Nausea or Vomiting 1/4/22   Chaya Greene MD       REVIEW OF SYSTEMS       Review of Systems   Constitutional:  Negative for fever. HENT:  Positive for dental problem. Gastrointestinal:  Positive for abdominal pain. Negative for diarrhea, nausea and vomiting. Genitourinary:  Negative for dysuria. PHYSICAL EXAM      INITIAL VITALS:   LMP 05/21/2018 (LMP Unknown)     Physical Exam  Constitutional:       General: She is not in acute distress. Appearance: She is ill-appearing. She is not toxic-appearing. HENT:      Head: Normocephalic and atraumatic. Right Ear: External ear normal.      Left Ear: External ear normal.      Nose: Nose normal.      Mouth/Throat:      Comments: Multiple dental caries   Eyes:      Conjunctiva/sclera: Conjunctivae normal.   Cardiovascular:      Rate and Rhythm: Normal rate and regular rhythm. Pulses: Normal pulses. Heart sounds: Normal heart sounds. Pulmonary:      Effort: Pulmonary effort is normal. No respiratory distress. Breath sounds: Normal breath sounds. Abdominal:      General: Abdomen is flat. There is no distension. Palpations: Abdomen is soft. Tenderness: There is abdominal tenderness. There is no guarding or rebound. Comments: RUQ TTP, negative Means's sign. No LUQ/suprapubic ttp. No rebound or guarding. Musculoskeletal:         General: No swelling. Cervical back: Neck supple. No tenderness. Right lower leg: No edema. Left lower leg: No edema. Skin:     General: Skin is warm. Capillary Refill: Capillary refill takes less than 2 seconds. Neurological:      Mental Status: She is alert and oriented to person, place, and time. Gait: Gait normal.   Psychiatric:         Mood and Affect: Mood normal.         DDX/DIAGNOSTIC RESULTS / EMERGENCY DEPARTMENT COURSE / MDM     Medical Decision Making  This is a 47year old female with history of insulin-dependent diabetes presenting today after being unable to fill prescriptions for clindamycin and Macrobid given yesterday. Patient states that her symptoms are ongoing and has been unable to obtain insulin as well. Differential including hyperglycemia, urinary tract infection, dental infection, DKA less likely. Will obtain point-of-care glucose here. Labs from yesterday reviewed, pancreatitis unlikely with low lipase, no transaminitis, low bilirubin. UA positive however no urine culture obtained. Amount and/or Complexity of Data Reviewed  External Data Reviewed: labs, radiology and notes. Labs: ordered. Decision-making details documented in ED Course. Risk  Prescription drug management. Diagnosis or treatment significantly limited by social determinants of health.       EMERGENCY DEPARTMENT COURSE:    ED Course as of 03/18/23 1644   Sat Mar 18, 2023   1555 Due to patient's difficulty obtaining medications, including her insulin, will discuss the patient with . [ML]   1603 Glucose, Random: 383  Will give insulin while here. [ML]   4251 Will also need dental follow up, will provide with list of dental clinics. [ML]      ED Course User Index  [ML] Eliazar Coyle DO         FINAL IMPRESSION      1. Acute cystitis without hematuria    2.  Insurance coverage problems          DISPOSITION / PLAN     DISPOSITION        PATIENT REFERRED TO:  Keo Horn MD  [address] [place] [address] Road:  New Prescriptions    CEPHALEXIN (KEFLEX) 500 MG CAPSULE    Take 1 capsule by mouth 4 times daily for 7 days       Crista Victor DO  Emergency Medicine Resident    (Please note that portions of thisnote were completed with a voice recognition program.  Efforts were made to edit the dictations but occasionally words are mis-transcribed.)       Eliazar Coyle DO  Resident  03/18/23 [phone]

## 2023-03-18 NOTE — ED PROVIDER NOTES
[facility]     Emergency Department     Faculty Attestation    I performed a history and physical examination of the patient and discussed management with the resident. I have reviewed and agree with the residents findings including all diagnostic interpretations, and treatment plans as written at the time of my review. Any areas of disagreement are noted on the chart. I was personally present for the key portions of any procedures. I have documented in the chart those procedures where I was not present during the key portions. For Physician Assistant/ Nurse Practitioner cases/documentation I have personally evaluated this patient and have completed at least one if not all key elements of the E/M (history, physical exam, and MDM). Additional findings are as noted. Primary Care Physician: Glenda Moran MD    Note Started: 3:57 PM EDT    History: This is a 47 y.o. female who presents to the Emergency Department with complaint of issues with prescription fill. Patient was seen yesterday and sent out on prescriptions for medications. Says she went to the pharmacy said that they were not covered by her insurance. Patient states she has been out of her insulin since the beginning of the month secondary to insurance reasons as well. Physical:   vitals were not taken for this visit. Patient is awake alert nontoxic-appearing. She has some mild epigastric tenderness is no guarding or rebound,    Impression: Prescription refill    Plan: Switch clindamycin Macrobid to Keflex. Fingerstick glucose to determine serum glucose,  consult    Medical Decision Making  Amount and/or Complexity of Data Reviewed  External Data Reviewed: labs and notes. Labs: ordered. Decision-making details documented in ED Course. Risk  Prescription drug management.           (Please note that portions of this note were completed with a voice recognition program. Efforts were made to edit the dictations but occasionally words are mis-transcribed.)    Anotni Plaza.  Syeda Atkinson MD, [facility]  Attending Emergency Medicine Physician        Eder Ferrer MD  03/18/23 1600

## 2023-03-18 NOTE — ED NOTES
Writer spoke with patient at bedside. Introductions completed. Patient report she is unable to obtain her insulin. Patient reported she was incarcerated but released recently. Patient reported her doctor provided her with a prescription yesterday but [facility] was unable to fill the prescription. Patient reported due to her incarceration her social security and medical benefits were cut off. Patient reported she recently received approval for her medical but not cash assistance as she missed her re certification appointment. Patient instructed to contact her medical provider on the back of her card to see where she could get her prescriptions filled. Patient was given resources for medication assistance payment. Patient instructed to contact 211 for community resources that could assist with medication. Patient report working with Miriam Mcmillan to obtain housing as well. Patient had no other request during this discussion.       KHADIJAH Pulido  03/18/23 5045

## 2023-03-18 NOTE — ED NOTES
This patient was assessed by the doctor only. Nurse processed and completed the orders from this doctor ie labs, meds, and/or EKG.       Brendon Cranker, RN  03/18/23 0244

## 2023-03-18 NOTE — DISCHARGE INSTRUCTIONS
Thank you for coming to [facility]. [place]'s emergency department! You were seen today for UTI and dental infection. See list of dental clinics below. You were prescribed keflex, please pick these medications up at the pharmacy. Follow up with your primary care doctor. If you have any worsening of symptoms or any other concerns, please return to the emergency department. We are always available! Dental Clinics:    [address]  [address]  [address]  [phone]  Open 8am-4:30pm  (appt exam & xrays $37.00)    [facility]  (Service for the homeless)  [address]   [phone]    Dentist who take medicaid:    [employer]  [address]  [phone] call [phone]  For appt. Mitchell Ku  [address] [facility]  [phone] call [phone]  For appt. [address] [facility]  [phone]  (takes [facility] w/PCP referral  Only one who accepts FHP)    Ignacio Lay DDS  [facility]  [phone]  [facility]  [address] [facility]  [address] [place] [address]  Accepts medicaid, low cost exams,  Fillings, cleanings, x-rays, sealants  Open Mon-Fri 8a-5p, open one evening  A week for appts. Dentist Accepting New Non-Medicaid Patients:    Lety Garduno  [address] [facility]    Ardena Show  [address]  [phone]    Oral Surgeon's:    Bandar Singh  [address] [facility]  [phone]   ([address] Medicaid)    Dr. Baron Deluna  [phone]  (Takes [place] Advantage/Medicaid)    [place] [address]  [phone]  [facility] [address]

## 2023-03-21 ENCOUNTER — OFFICE VISIT (OUTPATIENT)
Dept: FAMILY MEDICINE CLINIC | Age: 53
End: 2023-03-21

## 2023-03-21 VITALS
HEART RATE: 79 BPM | BODY MASS INDEX: 20.73 KG/M2 | TEMPERATURE: 98 F | WEIGHT: 121.4 LBS | SYSTOLIC BLOOD PRESSURE: 119 MMHG | HEIGHT: 64 IN | DIASTOLIC BLOOD PRESSURE: 72 MMHG

## 2023-03-21 DIAGNOSIS — M25.511 CHRONIC RIGHT SHOULDER PAIN: ICD-10-CM

## 2023-03-21 DIAGNOSIS — Z79.4 TYPE 2 DIABETES MELLITUS WITH DIABETIC POLYNEUROPATHY, WITH LONG-TERM CURRENT USE OF INSULIN (HCC): Primary | ICD-10-CM

## 2023-03-21 DIAGNOSIS — M75.01 ADHESIVE CAPSULITIS OF RIGHT SHOULDER: ICD-10-CM

## 2023-03-21 DIAGNOSIS — G89.29 CHRONIC RIGHT SHOULDER PAIN: ICD-10-CM

## 2023-03-21 DIAGNOSIS — E11.42 TYPE 2 DIABETES MELLITUS WITH DIABETIC POLYNEUROPATHY, WITH LONG-TERM CURRENT USE OF INSULIN (HCC): Primary | ICD-10-CM

## 2023-03-21 ASSESSMENT — PATIENT HEALTH QUESTIONNAIRE - PHQ9
4. FEELING TIRED OR HAVING LITTLE ENERGY: 1
3. TROUBLE FALLING OR STAYING ASLEEP: 3
5. POOR APPETITE OR OVEREATING: 1
SUM OF ALL RESPONSES TO PHQ9 QUESTIONS 1 & 2: 2
6. FEELING BAD ABOUT YOURSELF - OR THAT YOU ARE A FAILURE OR HAVE LET YOURSELF OR YOUR FAMILY DOWN: 0
SUM OF ALL RESPONSES TO PHQ QUESTIONS 1-9: 7
SUM OF ALL RESPONSES TO PHQ QUESTIONS 1-9: 7
7. TROUBLE CONCENTRATING ON THINGS, SUCH AS READING THE NEWSPAPER OR WATCHING TELEVISION: 0
2. FEELING DOWN, DEPRESSED OR HOPELESS: 1
10. IF YOU CHECKED OFF ANY PROBLEMS, HOW DIFFICULT HAVE THESE PROBLEMS MADE IT FOR YOU TO DO YOUR WORK, TAKE CARE OF THINGS AT HOME, OR GET ALONG WITH OTHER PEOPLE: 1
1. LITTLE INTEREST OR PLEASURE IN DOING THINGS: 1
9. THOUGHTS THAT YOU WOULD BE BETTER OFF DEAD, OR OF HURTING YOURSELF: 0
DEPRESSION UNABLE TO ASSESS: URGENT/EMERGENT SITUATION
SUM OF ALL RESPONSES TO PHQ QUESTIONS 1-9: 7
SUM OF ALL RESPONSES TO PHQ QUESTIONS 1-9: 7
8. MOVING OR SPEAKING SO SLOWLY THAT OTHER PEOPLE COULD HAVE NOTICED. OR THE OPPOSITE, BEING SO FIGETY OR RESTLESS THAT YOU HAVE BEEN MOVING AROUND A LOT MORE THAN USUAL: 0

## 2023-03-21 NOTE — LETTER
March 21, 2023       [address]  [address]  [address]      To Who it May Concern:    Ana Jonas is a [place] 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 If you have any questions or concerns, please don't hesitate to call.         Sincerely,        Charisma Boggs MD

## 2023-03-21 NOTE — PROGRESS NOTES
Attending Physician Statement  I have discussed the care of Jeronimo Seay, 47 y.o. female,including pertinent history and exam findings,  with the resident Dr. Ness Monet MD.  History:  Chief Complaint   Patient presents with    Urinary Tract Infection     Follow up from ER for UTI hasn't gotten her med's for the UTI so she still has     Shoulder Pain     Right shoulder pain      I have reviewed the key elements of the encounter with the resident. Examination was done by resident as documented in residents note. BP Readings from Last 3 Encounters:   03/23/23 131/78   03/21/23 119/72   03/18/23 (!) 189/81     /72 (Site: Left Upper Arm, Position: Sitting, Cuff Size: Medium Adult)   Pulse 79   Temp 98 °F (36.7 °C) (Oral)   Ht 5' 4.02\" (1.626 m)   Wt 121 lb 6.4 oz (55.1 kg)   LMP 05/21/2018 (LMP Unknown)   BMI 20.83 kg/m²   Lab Results   Component Value Date    WBC 7.0 03/17/2023    HGB 10.3 (L) 03/17/2023    HCT 31.1 (L) 03/17/2023     03/17/2023    CHOL 120 10/09/2022    TRIG 360 (H) 10/09/2022    HDL 19 (L) 10/09/2022    LDLDIRECT 91 02/16/2019    ALT 19 03/17/2023    AST 13 03/17/2023     03/17/2023    K 4.6 03/17/2023     03/17/2023    CREATININE 0.84 03/17/2023    BUN 24 (H) 03/17/2023    CO2 22 03/17/2023    TSH 0.54 10/10/2022    INR 1.0 11/16/2012    LABA1C 7.8 03/17/2023    LABMICR 147 (H) 02/21/2019     Lab Results   Component Value Date    CALCIUM 9.5 03/17/2023    PHOS 2.4 (L) 02/15/2017     Lab Results   Component Value Date    LDLCHOLESTEROL 29 10/09/2022    LDLDIRECT 91 02/16/2019     I agree with the assessment, plan and diagnosis of    Diagnosis Orders   1. Type 2 diabetes mellitus with diabetic polyneuropathy, with long-term current use of insulin [facility])  [facility] for Social Determinants of Health (Primary Care Practices)      2. Adhesive capsulitis of right shoulder  XR SHOULDER RIGHT (MIN 2 VIEWS)    [facility]      3.  Chronic right
Visit Information    Have you changed or started any medications since your last visit including any over-the-counter medicines, vitamins, or herbal medicines? no   Have you stopped taking any of your medications? Is so, why? -  no  Are you having any side effects from any of your medications? - no    Have you seen any other physician or provider since your last visit?  no   Have you had any other diagnostic tests since your last visit? yes - Labs, CT   Have you been seen in the emergency room and/or had an admission in a hospital since we last saw you?  yes - [facility]   Have you had your routine dental cleaning in the past 6 months?  no     Do you have an active MyChart account? If no, what is the barrier?   No: Pending    Patient Care Team:  Jaswinder Gross MD as PCP - General (Family Medicine)    Medical History Review  Past Medical, Family, and Social History reviewed and does contribute to the patient presenting condition    Health Maintenance   Topic Date Due    COVID-19 Vaccine (1) Never done    Diabetic retinal exam  Never done    Hepatitis B vaccine (1 of 3 - Risk 3-dose series) Never done    Cervical cancer screen  Never done    Shingles vaccine (1 of 2) Never done    Diabetic Alb to Cr ratio (uACR) test  02/21/2020    Flu vaccine (1) 08/01/2022    Depression Monitoring  01/19/2023    Diabetic foot exam  10/05/2023    Lipids  10/09/2023    Breast cancer screen  01/26/2024    A1C test (Diabetic or Prediabetic)  03/17/2024    GFR test (Diabetes, CKD 3-4, OR last GFR 15-59)  03/17/2024    DTaP/Tdap/Td vaccine (2 - Td or Tdap) 09/21/2028    Colorectal Cancer Screen  09/16/2029    Pneumococcal 0-64 years Vaccine  Completed    Hepatitis C screen  Completed    HIV screen  Completed    Hepatitis A vaccine  Aged Out    Hib vaccine  Aged Out    Meningococcal (ACWY) vaccine  Aged Out
Therapy - [facility]      3. Chronic right shoulder pain  M25.511 XR SHOULDER RIGHT (MIN 2 VIEWS)    G89.29 [facility]'s          Plan:    Social/insurance issues with getting medication refills. [facility] referral provided. [de-identified] note with patient's credentials provided for Social Security as per patient's request.  Zaheer Bryan patient to call us if she needs any more refills. Advised her to go to ER if she develops severe abdominal pain, diarrhea, vomiting. Physical therapy for likely adhesive capsulitis. We will get x-ray right shoulder to rule out underlying arthritis. Likely referral to orthopedics. UTI, unable to get antibiotics. Advised to drink plenty of fluids. Return in about 2 weeks (around 4/4/2023) for UTI, DM2, shoulder pain. Requested Prescriptions      No prescriptions requested or ordered in this encounter       There are no discontinued medications. Discussed use, benefit, and side effects of prescribed medications. Barriers to medication compliance addressed. All patient questions answered. Pt voiced understanding. Disclaimer: Some orall of this note was transcribed using voice-recognition software. This may cause typographical errors occasionally. Although all effort is made to fix these errors, please do not hesitate to contact our office if there Kenney Sellers concern with the understanding of this note.     Mer Davenport MD  Family Medicine PGY-3  03/21/23 at 10:49 AM

## 2023-03-21 NOTE — PATIENT INSTRUCTIONS
Thank you for letting us take care of you today. We hope all your questions were addressed. If a question was overlooked or something else comes to mind after you return home, please contact a member of your Care Team listed below. Your Care Team at [place] [address] is Team #3  David Knig MD (Faculty)  Donna Gan MD (Faculty  Jazminrichard Sena MD (Resident)  Radha Thakur (Resident)   Bishop Bola MD (Resident)  Mike Marie., EDDIE Leslie., EDDIE Quiñonez., SHARIF Payne., Colleen Ac., Hiwot Hawthorne ([address] [facility])  Victorina Bowman, [address] (Clinical Practice Manager)  Vanda Renteria [facility] (Clinical Pharmacist)     Office phone number: [phone]    If you need to get in right away due to illness, please be advised we have \"Same Day\" appointments available Monday-Friday. Please call us at [phone] option #3 to schedule your \"Same Day\" appointment.

## 2023-03-22 ENCOUNTER — CARE COORDINATION (OUTPATIENT)
Dept: FAMILY MEDICINE CLINIC | Age: 53
End: 2023-03-22

## 2023-03-23 ENCOUNTER — OFFICE VISIT (OUTPATIENT)
Dept: FAMILY MEDICINE CLINIC | Age: 53
End: 2023-03-23
Payer: MEDICAID

## 2023-03-23 ENCOUNTER — TELEPHONE (OUTPATIENT)
Dept: FAMILY MEDICINE CLINIC | Age: 53
End: 2023-03-23

## 2023-03-23 VITALS
WEIGHT: 120 LBS | TEMPERATURE: 97.2 F | DIASTOLIC BLOOD PRESSURE: 78 MMHG | HEART RATE: 77 BPM | SYSTOLIC BLOOD PRESSURE: 131 MMHG | BODY MASS INDEX: 20.59 KG/M2

## 2023-03-23 DIAGNOSIS — M25.511 CHRONIC RIGHT SHOULDER PAIN: ICD-10-CM

## 2023-03-23 DIAGNOSIS — G89.29 CHRONIC RIGHT SHOULDER PAIN: ICD-10-CM

## 2023-03-23 DIAGNOSIS — K04.7 DENTAL ABSCESS: Primary | ICD-10-CM

## 2023-03-23 PROCEDURE — 99213 OFFICE O/P EST LOW 20 MIN: CPT | Performed by: STUDENT IN AN ORGANIZED HEALTH CARE EDUCATION/TRAINING PROGRAM

## 2023-03-23 RX ORDER — LIDOCAINE 50 MG/G
1 PATCH TOPICAL DAILY
Qty: 30 PATCH | Refills: 1 | Status: SHIPPED | OUTPATIENT
Start: 2023-03-23 | End: 2023-04-22

## 2023-03-23 NOTE — PATIENT INSTRUCTIONS
Thank you for letting us take care of you today. We hope all your questions were addressed. If a question was overlooked or something else comes to mind after you return home, please contact a member of your Care Team listed below. Your Care Team at [place] [address] is Team #3  Margot Flores MD (Faculty)  Be Welsh MD (Faculty  Lucymylene Sebastian MD (Resident)  Jaylin Skinner (Resident)   Mike Rivera MD (Resident)  SHARIF Sarkar., EDDIE Turcios., A  Keegan Connor ([facility])  Criselda Philip, [address] [facility] ([address] [facility])    Angelica Candelario [facility] (Clinical Pharmacist)     Office phone number: [phone]    If you need to get in right away due to illness, please be advised we have \"Same Day\" appointments available Monday-Friday. Please call us at [phone] option #3 to schedule your \"Same Day\" appointment.

## 2023-03-23 NOTE — CARE COORDINATION
time, date, person, place, city, president, affect: normal, thought content exhibits logical connections, when questioned about suicide, the patient expresses no suicidal ideation, hx suicidal ideation 4-5 years ago . Self Care Deficits Noted:  transportation. Durable Medical Equipment: other Blood sugar meter  Oxygen: No   Nebulizer: No   CPAP: No   Enteral Feedings: No   Home Infusions: No   Wound Vac: No     Risk Analysis    Frequent Admission or ED visits: Yes   Chronic Illness: Diabetes Mellitus- insulin dependent x 20 yrs, Emotional Problems- hx anxiety, insomnia, depression, Substance Abuse- last use 5 months ago- drug of choice- cocaine , neuropathy, hx gastroparesis, hx chronic pancreatitis   Homeless or lacks support: No   Drives: No   Compliance Issues: no  Guardianship issues:no  Age > 80: No   Uninsured or under insured: Yes   10 + Medications: No   History of Falls: No   Balance Issues: No   Unsteady Gait: No     Risk for Falls- Internal Environment  Questions for the Client:    Do you urinate frequently, especially at night? No  If yes, how often? N/A  times. Are you taking medication that increases urination frequency? No    Do you remain alert for sudden movements of pets underfoot, if present? NA      Do you avoid rushing when you move, such as to answer the door or telephone? Yes    Do you hold on to grab bars when getting in or out of the tub or shower or when changing position while bathing? Yes    Have you had any previous falls or injuries in the home?   No    Financial Assessment      Affordable Medications: Approval for medication coverage through medical insurance is pending  Adequate Food: Yes   Appropriate Housing: Yes   Connected with Services (Sr Svcs/PASSPORT/COA/Meals onWheels/Mcaid/AidSvcs/Cleaning or Yard Services): [facility] Literacy Assessment    Patient demonstrates the ability to:      Current Outpatient Medications   Medication Sig Dispense

## 2023-03-27 NOTE — PROGRESS NOTES
Attending Physician Statement    Wt Readings from Last 3 Encounters:   03/23/23 120 lb (54.4 kg)   03/21/23 121 lb 6.4 oz (55.1 kg)   03/17/23 121 lb (54.9 kg)     Temp Readings from Last 3 Encounters:   03/23/23 97.2 °F (36.2 °C) (Oral)   03/21/23 98 °F (36.7 °C) (Oral)   03/18/23 97.5 °F (36.4 °C)     BP Readings from Last 3 Encounters:   03/23/23 131/78   03/21/23 119/72   03/18/23 (!) 189/81     Pulse Readings from Last 3 Encounters:   03/23/23 77   03/21/23 79   03/18/23 84         I have discussed the care of Jeaneth Ny, including pertinent history and exam findings with the resident. I have reviewed the key elements of all parts of the encounter with the resident. I agree with the assessment, plan and orders as documented by the resident.   (GE Modifier)
Visit Information    Have you changed or started any medications since your last visit including any over-the-counter medicines, vitamins, or herbal medicines? no   Have you stopped taking any of your medications? Is so, why? -  no  Are you having any side effects from any of your medications? - no    Have you seen any other physician or provider since your last visit?  no   Have you had any other diagnostic tests since your last visit?  no   Have you been seen in the emergency room and/or had an admission in a hospital since we last saw you?  no   Have you had your routine dental cleaning in the past 6 months?  no     Do you have an active MyChart account? If no, what is the barrier?   No:     Patient Care Team:  Reji Ramos MD as PCP - General (Family Medicine)  Uma Patel RN as   [id number] [facility] History Review  Past Medical, Family, and Social History reviewed and does not contribute to the patient presenting condition    Health Maintenance   Topic Date Due    COVID-19 Vaccine (1) Never done    Diabetic retinal exam  Never done    Hepatitis B vaccine (1 of 3 - Risk 3-dose series) Never done    Cervical cancer screen  Never done    Shingles vaccine (1 of 2) Never done    Diabetic Alb to Cr ratio (uACR) test  02/21/2020    Flu vaccine (1) 08/01/2022    Diabetic foot exam  10/05/2023    Lipids  10/09/2023    Breast cancer screen  01/26/2024    A1C test (Diabetic or Prediabetic)  03/17/2024    GFR test (Diabetes, CKD 3-4, OR last GFR 15-59)  03/17/2024    Depression Monitoring  03/21/2024    DTaP/Tdap/Td vaccine (2 - Td or Tdap) 09/21/2028    Colorectal Cancer Screen  09/16/2029    Pneumococcal 0-64 years Vaccine  Completed    Hepatitis C screen  Completed    HIV screen  Completed    Hepatitis A vaccine  Aged Out    Hib vaccine  Aged Out    Meningococcal (ACWY) vaccine  Aged Out
are no discontinued medications. Discussed use, benefit, and side effects of prescribed medications. Barriers to medication compliance addressed. All patient questions answered. Pt voiced understanding. Disclaimer: Some orall of this note was transcribed using voice-recognition software. This may cause typographical errors occasionally. Although all effort is made to fix these errors, please do not hesitate to contact our office if there Freddy Gaytan concern with the understanding of this note.     Kelsi Juárez MD  Family Medicine PGY-3  03/23/23 at 9:11 AM

## 2023-04-04 ENCOUNTER — TELEPHONE (OUTPATIENT)
Dept: FAMILY MEDICINE CLINIC | Age: 53
End: 2023-04-04

## 2023-04-04 DIAGNOSIS — N39.0 URINARY TRACT INFECTION WITHOUT HEMATURIA, SITE UNSPECIFIED: Primary | ICD-10-CM

## 2023-04-04 DIAGNOSIS — E11.42 TYPE 2 DIABETES MELLITUS WITH DIABETIC POLYNEUROPATHY, WITH LONG-TERM CURRENT USE OF INSULIN (HCC): ICD-10-CM

## 2023-04-04 DIAGNOSIS — Z79.4 TYPE 2 DIABETES MELLITUS WITH DIABETIC POLYNEUROPATHY, WITH LONG-TERM CURRENT USE OF INSULIN (HCC): ICD-10-CM

## 2023-04-04 RX ORDER — ONDANSETRON 4 MG/1
4 TABLET, ORALLY DISINTEGRATING ORAL EVERY 8 HOURS PRN
Qty: 20 TABLET | Refills: 0 | OUTPATIENT
Start: 2023-04-04

## 2023-04-04 RX ORDER — LANCETS 30 GAUGE
1 EACH MISCELLANEOUS DAILY
Qty: 100 EACH | Refills: 3 | OUTPATIENT
Start: 2023-04-04

## 2023-04-04 RX ORDER — GLUCOSAMINE HCL/CHONDROITIN SU 500-400 MG
CAPSULE ORAL
Qty: 200 STRIP | Refills: 3 | OUTPATIENT
Start: 2023-04-04

## 2023-04-04 RX ORDER — SYRINGE-NEEDLE,INSULIN,0.5 ML 31 GX5/16"
1 SYRINGE, EMPTY DISPOSABLE MISCELLANEOUS 3 TIMES DAILY
Qty: 100 EACH | Refills: 5 | OUTPATIENT
Start: 2023-04-04

## 2023-04-04 RX ORDER — FENOFIBRATE 54 MG/1
54 TABLET ORAL DAILY
Qty: 30 TABLET | Refills: 3 | OUTPATIENT
Start: 2023-04-04

## 2023-04-04 RX ORDER — INSULIN HUMAN 100 [IU]/ML
INJECTION, SOLUTION PARENTERAL
Qty: 10 ML | OUTPATIENT
Start: 2023-04-04

## 2023-04-04 RX ORDER — ALCOHOL ANTISEPTIC PADS
PADS, MEDICATED (EA) TOPICAL
Qty: 100 EACH | Refills: 4 | OUTPATIENT
Start: 2023-04-04

## 2023-04-04 NOTE — TELEPHONE ENCOUNTER
Last visit: 03/23/2023  Last Med refill: 01/19/2023  Does patient have enough medication for 72 hours: no    Next Visit Date: 04/14/2023  Future Appointments   Date Time Provider Alice Covington   4/14/2023  8:45 AM MD Aylin Osman [facility]   5/25/2023  2:30 PM Jarad Swartz MD [address] [place] [address] Maintenance   Topic Date Due    COVID-19 Vaccine (1) Never done    Diabetic retinal exam  Never done    Hepatitis B vaccine (1 of 3 - Risk 3-dose series) Never done    Cervical cancer screen  Never done    Shingles vaccine (1 of 2) Never done    Diabetic Alb to Cr ratio (uACR) test  02/21/2020    Flu vaccine (Season Ended) 08/01/2023    Diabetic foot exam  10/05/2023    Lipids  10/09/2023    Breast cancer screen  01/26/2024    A1C test (Diabetic or Prediabetic)  03/17/2024    GFR test (Diabetes, CKD 3-4, OR last GFR 15-59)  03/17/2024    Depression Monitoring  03/21/2024    DTaP/Tdap/Td vaccine (2 - Td or Tdap) 09/21/2028    Colorectal Cancer Screen  09/16/2029    Pneumococcal 0-64 years Vaccine  Completed    Hepatitis C screen  Completed    HIV screen  Completed    Hepatitis A vaccine  Aged Out    Hib vaccine  Aged Out    Meningococcal (ACWY) vaccine  Aged Out       Hemoglobin A1C (%)   Date Value   03/17/2023 7.8   10/05/2022 9.8   12/27/2021 6.5 (H)             ( goal A1C is < 7)   Microalb/Crt.  Ratio (mcg/mg creat)   Date Value   02/21/2019 147 (H)     LDL Cholesterol (mg/dL)   Date Value   10/09/2022 29   12/26/2021 80       (goal LDL is <100)   AST (U/L)   Date Value   03/17/2023 13     ALT (U/L)   Date Value   03/17/2023 19     BUN (mg/dL)   Date Value   03/17/2023 24 (H)     BP Readings from Last 3 Encounters:   03/23/23 131/78   03/21/23 119/72   03/18/23 (!) 189/81          (goal 120/80)    All Future Testing planned in [facility]  Lab Frequency Next Occurrence   TSH Once 10/05/2022   Vitamin D 25 Hydroxy Once 10/05/2022   Lipid Panel Once 10/12/2022   XR SHOULDER RIGHT (MIN 2 VIEWS) Once

## 2023-04-06 NOTE — TELEPHONE ENCOUNTER
Phone call to patient to discuss Medicaid concerns. Patient was informed that her medicaid was inactive therefore making it difficult to obtain her prescription medication. Patient relayed that she was recently incarcerated. This  called [employer] on the patient's behalf for more information. Called patient and strongly encouraged patient to contact [employer] immediately to complete the phone interview. Patient returned call and stated that both her Medicaid and SNAP benefits were approved and she will be able to use her Medicaid in 24 hours. Encouraged patient to call this  if she has any issues with using her Medicaid. 3.175

## 2023-04-07 RX ORDER — NITROFURANTOIN 25; 75 MG/1; MG/1
100 CAPSULE ORAL 2 TIMES DAILY
Qty: 10 CAPSULE | Refills: 0 | Status: SHIPPED | OUTPATIENT
Start: 2023-04-07 | End: 2023-04-12

## 2023-04-21 ENCOUNTER — TELEPHONE (OUTPATIENT)
Dept: FAMILY MEDICINE CLINIC | Age: 53
End: 2023-04-21

## 2023-04-24 ENCOUNTER — TELEPHONE (OUTPATIENT)
Dept: FAMILY MEDICINE CLINIC | Age: 53
End: 2023-04-24

## 2023-04-24 DIAGNOSIS — N39.0 URINARY TRACT INFECTION WITHOUT HEMATURIA, SITE UNSPECIFIED: Primary | ICD-10-CM

## 2023-04-24 NOTE — TELEPHONE ENCOUNTER
Has an UTI again med's from the last one have not worked for her, needs something else, has an appointment on Friday. Please Advise.

## 2023-04-25 NOTE — TELEPHONE ENCOUNTER
Writer called patient to inform her that  an UA and Culture. Not to sure as to want happen but the phone went . Writer called patient back but patient didn't answer, writer EVELYN.

## 2023-04-25 NOTE — TELEPHONE ENCOUNTER
PC back from pt after phone disconnected. Pt states won't be able to do her Urinalysis or Urine Culture beforehand as she doesn't have a care and she already has to find transportation for her appt Friday and it would be too much to do so twice in a week so she will not be getting those done and will just see us Friday for her appt.

## 2023-04-28 ENCOUNTER — HOSPITAL ENCOUNTER (OUTPATIENT)
Age: 53
Setting detail: SPECIMEN
Discharge: HOME | End: 2023-04-28

## 2023-04-28 ENCOUNTER — OFFICE VISIT (OUTPATIENT)
Dept: FAMILY MEDICINE CLINIC | Age: 53
End: 2023-04-28
Payer: MEDICAID

## 2023-04-28 VITALS
WEIGHT: 127 LBS | SYSTOLIC BLOOD PRESSURE: 124 MMHG | DIASTOLIC BLOOD PRESSURE: 72 MMHG | BODY MASS INDEX: 21.8 KG/M2 | TEMPERATURE: 98.2 F | HEART RATE: 80 BPM

## 2023-04-28 DIAGNOSIS — K59.00 CONSTIPATION, UNSPECIFIED CONSTIPATION TYPE: ICD-10-CM

## 2023-04-28 DIAGNOSIS — K21.00 GASTROESOPHAGEAL REFLUX DISEASE WITH ESOPHAGITIS, UNSPECIFIED WHETHER HEMORRHAGE: ICD-10-CM

## 2023-04-28 DIAGNOSIS — N39.0 URINARY TRACT INFECTION WITHOUT HEMATURIA, SITE UNSPECIFIED: Primary | ICD-10-CM

## 2023-04-28 DIAGNOSIS — Z79.4 TYPE 2 DIABETES MELLITUS WITH DIABETIC POLYNEUROPATHY, WITH LONG-TERM CURRENT USE OF INSULIN (HCC): ICD-10-CM

## 2023-04-28 DIAGNOSIS — N39.0 URINARY TRACT INFECTION WITHOUT HEMATURIA, SITE UNSPECIFIED: ICD-10-CM

## 2023-04-28 DIAGNOSIS — E11.42 TYPE 2 DIABETES MELLITUS WITH DIABETIC POLYNEUROPATHY, WITH LONG-TERM CURRENT USE OF INSULIN (HCC): ICD-10-CM

## 2023-04-28 LAB
BILIRUBIN, POC: NORMAL
BLOOD URINE, POC: NORMAL
CLARITY, POC: NORMAL
COLOR, POC: NORMAL
GLUCOSE URINE, POC: NORMAL
KETONES, POC: NORMAL
LEUKOCYTE EST, POC: NORMAL
NITRITE, POC: NORMAL
PH, POC: 6
PROTEIN, POC: 100
SPECIFIC GRAVITY, POC: 1.01
UROBILINOGEN, POC: 0.2

## 2023-04-28 PROCEDURE — 99213 OFFICE O/P EST LOW 20 MIN: CPT

## 2023-04-28 PROCEDURE — 3051F HG A1C>EQUAL 7.0%<8.0%: CPT

## 2023-04-28 PROCEDURE — 81002 URINALYSIS NONAUTO W/O SCOPE: CPT

## 2023-04-28 PROCEDURE — 99211 OFF/OP EST MAY X REQ PHY/QHP: CPT

## 2023-04-28 RX ORDER — MULTIVITAMIN,THERAPEUTIC
1 TABLET ORAL DAILY
Qty: 30 TABLET | Refills: 3 | Status: SHIPPED | OUTPATIENT
Start: 2023-04-28

## 2023-04-28 RX ORDER — POLYETHYLENE GLYCOL 3350 17 G/17G
17 POWDER, FOR SOLUTION ORAL DAILY PRN
Qty: 510 G | Refills: 5 | Status: SHIPPED | OUTPATIENT
Start: 2023-04-28 | End: 2023-05-28

## 2023-04-28 RX ORDER — PANTOPRAZOLE SODIUM 40 MG/1
40 TABLET, DELAYED RELEASE ORAL
Qty: 30 TABLET | Refills: 3 | Status: SHIPPED | OUTPATIENT
Start: 2023-04-28

## 2023-04-28 RX ORDER — METRONIDAZOLE 7.5 MG/G
GEL VAGINAL
Qty: 70 G | Refills: 0 | Status: SHIPPED | OUTPATIENT
Start: 2023-04-28 | End: 2023-05-05

## 2023-04-28 ASSESSMENT — ENCOUNTER SYMPTOMS
ABDOMINAL PAIN: 0
SHORTNESS OF BREATH: 0
NAUSEA: 0
VOMITING: 0
CONSTIPATION: 1
DIARRHEA: 0

## 2023-04-30 LAB
MICROORGANISM SPEC CULT: ABNORMAL
SPECIMEN DESCRIPTION: ABNORMAL

## 2023-05-02 ENCOUNTER — TELEPHONE (OUTPATIENT)
Dept: FAMILY MEDICINE CLINIC | Age: 53
End: 2023-05-02

## 2023-05-02 DIAGNOSIS — N39.0 URINARY TRACT INFECTION WITHOUT HEMATURIA, SITE UNSPECIFIED: Primary | ICD-10-CM

## 2023-05-02 RX ORDER — PHENAZOPYRIDINE HYDROCHLORIDE 100 MG/1
100 TABLET, FILM COATED ORAL 3 TIMES DAILY PRN
Qty: 21 TABLET | Refills: 0 | Status: SHIPPED | OUTPATIENT
Start: 2023-05-02 | End: 2023-05-16

## 2023-05-02 RX ORDER — CIPROFLOXACIN 500 MG/1
500 TABLET, FILM COATED ORAL 2 TIMES DAILY
Qty: 14 TABLET | Refills: 0 | Status: SHIPPED | OUTPATIENT
Start: 2023-05-02 | End: 2023-05-09

## 2023-05-16 ENCOUNTER — INITIAL CONSULT (OUTPATIENT)
Dept: PAIN MANAGEMENT | Age: 53
End: 2023-05-16

## 2023-05-16 VITALS — WEIGHT: 127 LBS | BODY MASS INDEX: 21.68 KG/M2 | OXYGEN SATURATION: 99 % | HEIGHT: 64 IN | HEART RATE: 97 BPM

## 2023-05-16 DIAGNOSIS — F14.10 COCAINE ABUSE (HCC): ICD-10-CM

## 2023-05-16 DIAGNOSIS — M48.02 CERVICAL SPINAL STENOSIS: ICD-10-CM

## 2023-05-16 DIAGNOSIS — M75.81 RIGHT ROTATOR CUFF TENDINITIS: Primary | ICD-10-CM

## 2023-05-16 RX ORDER — PROCHLORPERAZINE 25 MG/1
SUPPOSITORY RECTAL
COMMUNITY
Start: 2023-04-01

## 2023-05-16 RX ORDER — TERBINAFINE HYDROCHLORIDE 250 MG/1
TABLET ORAL
COMMUNITY
Start: 2023-05-11

## 2023-05-16 RX ORDER — PROCHLORPERAZINE 25 MG/1
SUPPOSITORY RECTAL
COMMUNITY
Start: 2023-05-08

## 2023-05-16 RX ORDER — ARIPIPRAZOLE 10 MG/1
TABLET ORAL
COMMUNITY
Start: 2023-04-19

## 2023-05-16 RX ORDER — KETOCONAZOLE 20 MG/G
CREAM TOPICAL
COMMUNITY
Start: 2023-04-14

## 2023-05-16 RX ORDER — PEN NEEDLE, DIABETIC 30 GX3/16"
NEEDLE, DISPOSABLE MISCELLANEOUS
COMMUNITY
Start: 2023-05-08

## 2023-05-16 RX ORDER — GABAPENTIN 300 MG/1
CAPSULE ORAL
COMMUNITY
Start: 2023-05-05

## 2023-05-16 RX ORDER — ESCITALOPRAM OXALATE 5 MG/1
TABLET ORAL
COMMUNITY
Start: 2023-05-04

## 2023-05-16 ASSESSMENT — ENCOUNTER SYMPTOMS
BACK PAIN: 1
VOMITING: 0
CHEST TIGHTNESS: 0
NAUSEA: 0
DIARRHEA: 0
SHORTNESS OF BREATH: 0
CONSTIPATION: 0
WHEEZING: 0

## 2023-05-16 NOTE — PROGRESS NOTES
The patient is a 47 y. o.  /  female. Chief Complaint   Patient presents with    Shoulder Pain    New Patient        Shoulder Pain   Pertinent negatives include no fever or numbness. Requesting physician for the evaluation of Alicia Mcclendon 1970: Eustace Lesch, MD    Pain History  Female with history of cocaine abuse  In past have seen orthopedics and neurosurgery for neck and left shoulder pain  Had MRI of the cervical spine 4 years back and was diagnosed with cervical spinal stenosis and left-sided foraminal narrowing at C5 and C6  Also was diagnosed with left rotator cuff tendinitis  Was also seen 4 years ago in [facility] pain [facility] for back pain    Today she is referred for evaluation of shoulder pain located in the right side  No particular injury  Going on for several months describes it as sharp pain that aggravates with arm movement  No radiation of pain down the arm  No associated numbness or paresthesia  No weakness  No changes in bladder or bowel control  No recent physical therapy  Diagnostic work-up only plain x-ray film that did not show any significant arthritic changes  No previous injection or surgical history shoulder  Have tried NSAIDs  Pain score today  8  1. Location:right shoulder  2. Radiation:yes  3. Character:aching  5. Duration:months  6. Onset: months  7. Did an injury cause pain: no  8. Aggravating factors:lifting arm position  9. Alleviating factors:nothing  10. Associated symptoms (numbness / tingling / weakness):  yes weakness  -Where at:in right arm  -Down into finger tips or toes (specify which finger or toes):no  -constant or intermitting: intermitting  11. Red Flags: (weight loss / chills / loss of bladder or bowel control):no    Previous management history  1.  Previous diagnostic workup: (Imaging/EMG)   CT, MRI, or Xray: yes   What part of the body:shoulder  What facility did they have it at:[facility]  What year or specific date:

## 2023-05-18 RX ORDER — INSULIN HUMAN 100 [IU]/ML
INJECTION, SOLUTION PARENTERAL
Qty: 10 ML | Refills: 3 | Status: SHIPPED | OUTPATIENT
Start: 2023-05-18

## 2023-05-19 ENCOUNTER — TELEPHONE (OUTPATIENT)
Dept: FAMILY MEDICINE CLINIC | Age: 53
End: 2023-05-19

## 2023-05-19 NOTE — TELEPHONE ENCOUNTER
Pharmacy contacted office regarding clarification needed for humulin. Writer spoke to Dr. Christa Prader, will forward to Dr. Christa Prader to look into. Pt is out of medication.

## 2023-05-19 NOTE — TELEPHONE ENCOUNTER
Called and spoke with patient regarding insulin issue. Patient identity confirmed with 2 separate identifiers. Patient states that she is seeing endocrinology at the [facility] and she is not sure what her insulin sliding scale should be but she uses the Humulin with every meal.  Per chart review of our records patient was last on Humulin in January 2022 and was stopped by the internal medicine team in order to simplify patient's insulin regimen and switch to Lantus. Per patient report she states that she is still on Humulin with every meal and this is per her endocrinologist.  Patient states that she has enough Humulin to last her to Monday or Tuesday. Patient strongly encouraged to call the on-call provider for [facility] endocrinology and discuss with them further. Patient also strongly encouraged to have the [facility] send over records of her current insulin plan/treatment plan so that we may accurately assist her in the future. Patient states that she will call them on Monday.

## 2023-05-19 NOTE — TELEPHONE ENCOUNTER
Myra Champagne from [place] 's outpatient pharmacy is needing clarification for patient's Humulin, they are needing to know how many times the patient is to use the Humulin.     [facility] ext: [phone]    Please advise

## 2023-05-23 ENCOUNTER — HOSPITAL ENCOUNTER (OUTPATIENT)
Dept: PHYSICAL THERAPY | Age: 53
Setting detail: THERAPIES SERIES
Discharge: HOME | End: 2023-05-23
Payer: MEDICAID

## 2023-05-23 PROCEDURE — 97161 PT EVAL LOW COMPLEX 20 MIN: CPT
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
## 2023-06-05 ENCOUNTER — HOSPITAL ENCOUNTER (OUTPATIENT)
Dept: MRI IMAGING | Age: 53
Discharge: HOME | End: 2023-06-07
Payer: MEDICAID

## 2023-06-05 DIAGNOSIS — M48.02 CERVICAL SPINAL STENOSIS: ICD-10-CM

## 2023-06-05 PROCEDURE — 72141 MRI NECK SPINE W/O DYE: CPT

## 2023-06-06 ENCOUNTER — HOSPITAL ENCOUNTER (OUTPATIENT)
Dept: PHYSICAL THERAPY | Age: 53
Setting detail: THERAPIES SERIES
Discharge: HOME | End: 2023-06-06
Payer: MEDICAID

## 2023-06-06 PROCEDURE — 97140 MANUAL THERAPY 1/> REGIONS: CPT

## 2023-06-06 PROCEDURE — 97110 THERAPEUTIC EXERCISES: CPT

## 2023-06-06 NOTE — FLOWSHEET NOTE
[x] [facility] &  Therapy  [address]  [phone]  F: [phone] [] [address]  [address]   [address]  P: [phone]  F: [phone] [] [address]  [address]  P: [phone]  F: [phone] [] [address]  P: [phone]  F: [phone] [] [address]  [facility]   [address]   [place]: [phone]  F: [phone]      Physical Therapy Daily Treatment Note    Date:  2023  Patient Name:  Elen Shultz    :  1970  MRN: [id number]  Physician: Anjana Covarrubias MD          Insurance: [facility] after Eval   Medical Diagnosis: Right rotator cuff tendinitis (M75.81 [ICD-10-CM]); Cervical spinal stenosis (M48.02 [ICD-10-CM])           Rehab Codes: M25.511, M25.611, M54.2  Onset Date: 23                                 Next 's appt: Pain 23  Visit# / total visits: 2/12    Cancels/No Shows: 0/0     Subjective:    Pain:  [x] Yes  [] No Location: R shoulder  Pain Rating: (0-10 scale) 7/10  Pain altered Tx:  [] No  [] Yes  Action:  Comments: Ongoing R shoulder pain no issues with neck today.      Objective:  Modalities:   Precautions:  Exercises:  Exercise Reps/ Time Weight/ Level Comments   UBE 2/2 L1 Collect subjective, discussed treatment plan         Standing      Rows 15x lime    Lat Ext 15x lime    Triceps  15x lime    ER 15x lime    IR 15x lime          Wall slides  15x           Seated      Cervical AROM 10x ea            Supine      Cane chest press 15x     Cane Flexion  15x     Cane H-Abd 15x     Cane ER 15x     AROM flexion R shld 10x     ER at side  15x                             Other:    Manual Therapy: R shoulder PROM with distraction and gentle joint oscillations in

## 2023-06-08 ENCOUNTER — HOSPITAL ENCOUNTER (OUTPATIENT)
Dept: PHYSICAL THERAPY | Age: 53
Setting detail: THERAPIES SERIES
Discharge: HOME | End: 2023-06-08
Payer: MEDICAID

## 2023-06-08 NOTE — FLOWSHEET NOTE
[x] [facility] - [facility] &  Therapy  [address]    [phone]  F: [phone]   [] [address]  [facility]   [address]  P: [phone]  F: [phone]  [] [address] &  Therapy  [address]  P: [phone]  F: [phone] [] [address]  P: [phone]  F: [phone]  [] [address]  [facility]   [address]   [place]: [phone]  F: [phone]   [] [address]  [place]: [phone]   F: [phone]     Physical Therapy Cancel/No Show note    Date: 2023  Patient: Luis Cline  : 1970  MRN: [id number]    Cancels/No Shows to date:     For today's appointment patient:    [x]  Cancelled    [] Rescheduled appointment    [] No-show     Reason given by patient:    []  Patient ill    []  Conflicting appointment    [] No transportation      [] Conflict with work    [x] No reason given    [] Weather related    [] COVID-19    [] Other:      Comments:        [x] Next appointment was confirmed PREVIOUSLY    Electronically signed by: Amando Mccall PTA

## 2023-06-15 ENCOUNTER — HOSPITAL ENCOUNTER (OUTPATIENT)
Dept: PHYSICAL THERAPY | Age: 53
Setting detail: THERAPIES SERIES
End: 2023-06-15
Payer: MEDICAID

## 2023-06-22 ENCOUNTER — HOSPITAL ENCOUNTER (OUTPATIENT)
Dept: PHYSICAL THERAPY | Age: 53
Setting detail: THERAPIES SERIES
Discharge: HOME | End: 2023-06-22
Payer: MEDICAID

## 2023-06-22 NOTE — FLOWSHEET NOTE
[x] [address]  [address]    [phone]  F: [phone]     Physical Therapy Cancel/No Show note    Date: 2023  Patient: Willie Qiu  : 1970  MRN: [id number]    Cancels/No Shows to date:     For today's appointment patient:    []  Cancelled    [] Rescheduled appointment    [x] No-show     Reason given by patient:    []  Patient ill    []  Conflicting appointment    [] No transportation      [] Conflict with work    [x] No reason given    [] Weather related    [] [id number]    [] Other:      Comments:  No future appointments wiill discharge if not rescheduled by 23      [] Next appointment was confirmed PREVIOUSLY    Electronically signed by: Ingris Victoria, PT

## 2023-07-03 ENCOUNTER — CARE COORDINATION (OUTPATIENT)
Dept: FAMILY MEDICINE CLINIC | Age: 53
End: 2023-07-03

## 2023-07-07 NOTE — CARE COORDINATION
Carlene Tatum has not returned phone calls from [facility] for attempted follow up and scheduling of home vs for past 2 1/2 months. Attempted phone call this afternoon and call went to voice mail. Patient discharged from [facility] due to inability to provide program services. Letter mailed to patient home for discharge from services.     Jyoti Wharton RN, BSN    [facility]

## 2023-08-09 DIAGNOSIS — Z79.4 TYPE 2 DIABETES MELLITUS WITH DIABETIC POLYNEUROPATHY, WITH LONG-TERM CURRENT USE OF INSULIN (HCC): ICD-10-CM

## 2023-08-09 DIAGNOSIS — E11.42 TYPE 2 DIABETES MELLITUS WITH DIABETIC POLYNEUROPATHY, WITH LONG-TERM CURRENT USE OF INSULIN (HCC): ICD-10-CM

## 2023-08-09 NOTE — TELEPHONE ENCOUNTER
List:     Type 2 diabetes mellitus with diabetic polyneuropathy, with long-term current use of insulin ([facility])     Depression     GERD with esophagitis     Type 2 diabetes mellitus with hyperglycemia, with long-term current use of insulin ([facility])     Bipolar disorder, mixed ([facility])     Cocaine abuse ([address])     Pneumonia due to infectious organism     Acute cystitis without hematuria     Tobacco use     Sepsis due to other etiology ([address])     High anion gap metabolic acidosis     Hyponatremia     Diabetic ketoacidosis without coma ([facility])     Respiratory alkalosis     Abdominal pain, epigastric     Acute on chronic pancreatitis ([facility])     Abdominal pain     Non-intractable vomiting     Vasculitis of mesenteric artery ([facility])     History of cocaine use     Hyperglycemia     Neuropathy due to type 2 diabetes mellitus ([address])     Other hyperlipidemia     Dry mouth and eyes     Abn findings on dx imaging of abd regions, inc retroperiton     Dry eyes, bilateral     Hemorrhoids     Dental abscess     Primary insomnia     UTI symptoms     Chronic right shoulder pain     Right rotator cuff tendinitis     Cervical spinal stenosis

## 2023-08-10 RX ORDER — LOVASTATIN 40 MG/1
40 TABLET ORAL NIGHTLY
Qty: 30 TABLET | Refills: 2 | Status: SHIPPED | OUTPATIENT
Start: 2023-08-10

## 2023-09-12 DIAGNOSIS — K21.00 GASTROESOPHAGEAL REFLUX DISEASE WITH ESOPHAGITIS, UNSPECIFIED WHETHER HEMORRHAGE: ICD-10-CM

## 2023-09-12 NOTE — TELEPHONE ENCOUNTER
Last visit: 4/28/23  Last Med refill: 4/28/23  Does patient have enough medication for 72 hours: No:     Next Visit Date:  Future Appointments   Date Time Provider [address]   12/18/2023  3:30  [address] [facility]  [facility]   12/18/2023  3:30 PM Jerzy Bourgeois  [facility] Maintenance   Topic Date Due    Hepatitis B vaccine (1 of 3 - 3-dose series) Never done    COVID-19 Vaccine (1) Never done    Diabetic retinal exam  Never done    Cervical cancer screen  Never done    Shingles vaccine (1 of 2) Never done    Pneumococcal 0-64 years Vaccine (2 - PCV) 09/21/2019    Diabetic Alb to Cr ratio (uACR) test  02/21/2020    Flu vaccine (1) 08/01/2023    Diabetic foot exam  10/05/2023    Lipids  10/09/2023    Breast cancer screen  01/26/2024    A1C test (Diabetic or Prediabetic)  03/17/2024    GFR test (Diabetes, CKD 3-4, OR last GFR 15-59)  03/17/2024    Depression Monitoring  03/21/2024    DTaP/Tdap/Td vaccine (2 - Td or Tdap) 09/21/2028    Colorectal Cancer Screen  09/16/2029    Hepatitis C screen  Completed    HIV screen  Completed    Hepatitis A vaccine  Aged Out    Hib vaccine  Aged Out    Meningococcal (ACWY) vaccine  Aged Out       Hemoglobin A1C (%)   Date Value   03/17/2023 7.8   10/05/2022 9.8   12/27/2021 6.5 (H)             ( goal A1C is < 7)   No components found for: \"LABMICR\"  LDL Cholesterol (mg/dL)   Date Value   10/09/2022 29   12/26/2021 80       (goal LDL is <100)   AST (U/L)   Date Value   03/17/2023 13     ALT (U/L)   Date Value   03/17/2023 19     BUN (mg/dL)   Date Value   03/17/2023 24 (H)     BP Readings from Last 3 Encounters:   04/28/23 124/72   04/13/23 136/72   03/23/23 131/78          (goal 120/80)    All Future Testing planned in [facility]  Lab Frequency Next Occurrence   TSH Once 10/05/2022   Vitamin D 25 Hydroxy Once 10/05/2022   Lipid Panel Once 10/12/2022   XR SHOULDER RIGHT (MIN 2 VIEWS) Once 03/21/2023   Urinalysis with Microscopic Once 04/24/2023

## 2023-09-19 DIAGNOSIS — K21.00 GASTROESOPHAGEAL REFLUX DISEASE WITH ESOPHAGITIS, UNSPECIFIED WHETHER HEMORRHAGE: ICD-10-CM

## 2023-09-19 RX ORDER — PANTOPRAZOLE SODIUM 40 MG/1
TABLET, DELAYED RELEASE ORAL
Qty: 30 TABLET | Refills: 3 | Status: SHIPPED | OUTPATIENT
Start: 2023-09-19

## 2023-09-19 NOTE — TELEPHONE ENCOUNTER
Please address the medication refill and close the encounter. If I can be of assistance, please route to the applicable pool. Thank you.       Last visit: 4-28-23  Last Med refill: 3-17-23  Does patient have enough medication for 72 hours: No:     Next Visit Date:  Future Appointments   Date Time Provider [address]   12/18/2023  3:30  [address] [facility]  [facility]   12/18/2023  3:30 PM Desire Cid  [facility] Maintenance   Topic Date Due    Hepatitis B vaccine (1 of 3 - 3-dose series) Never done    COVID-19 Vaccine (1) Never done    Diabetic retinal exam  Never done    Cervical cancer screen  Never done    Shingles vaccine (1 of 2) Never done    Pneumococcal 0-64 years Vaccine (2 - PCV) 09/21/2019    Diabetic Alb to Cr ratio (uACR) test  02/21/2020    Flu vaccine (1) 08/01/2023    Diabetic foot exam  10/05/2023    Lipids  10/09/2023    Breast cancer screen  01/26/2024    A1C test (Diabetic or Prediabetic)  03/17/2024    GFR test (Diabetes, CKD 3-4, OR last GFR 15-59)  03/17/2024    Depression Monitoring  03/21/2024    DTaP/Tdap/Td vaccine (2 - Td or Tdap) 09/21/2028    Colorectal Cancer Screen  09/16/2029    Hepatitis C screen  Completed    HIV screen  Completed    Hepatitis A vaccine  Aged Out    Hib vaccine  Aged Out    Meningococcal (ACWY) vaccine  Aged Out       Hemoglobin A1C (%)   Date Value   03/17/2023 7.8   10/05/2022 9.8   12/27/2021 6.5 (H)             ( goal A1C is < 7)   No components found for: \"LABMICR\"  LDL Cholesterol (mg/dL)   Date Value   10/09/2022 29   12/26/2021 80       (goal LDL is <100)   AST (U/L)   Date Value   03/17/2023 13     ALT (U/L)   Date Value   03/17/2023 19     BUN (mg/dL)   Date Value   03/17/2023 24 (H)     BP Readings from Last 3 Encounters:   04/28/23 124/72   04/13/23 136/72   03/23/23 131/78          (goal 120/80)    All Future Testing planned in [facility]  Lab Frequency Next Occurrence   TSH Once 10/05/2022   Vitamin D 25

## 2023-09-25 RX ORDER — MULTIVITAMIN WITH FOLIC ACID 400 MCG
1 TABLET ORAL DAILY
Qty: 30 TABLET | Refills: 3 | Status: SHIPPED | OUTPATIENT
Start: 2023-09-25

## 2024-12-29 ENCOUNTER — HOSPITAL ENCOUNTER (INPATIENT)
Age: 54
LOS: 4 days | Discharge: HOME | End: 2025-01-03
Attending: EMERGENCY MEDICINE
Payer: MEDICAID

## 2024-12-29 ENCOUNTER — APPOINTMENT (OUTPATIENT)
Dept: GENERAL RADIOLOGY | Age: 54
End: 2024-12-29
Payer: MEDICAID

## 2024-12-29 DIAGNOSIS — J18.9 PNEUMONIA OF LEFT LUNG DUE TO INFECTIOUS ORGANISM, UNSPECIFIED PART OF LUNG: Primary | ICD-10-CM

## 2024-12-29 DIAGNOSIS — U07.1 COVID-19: ICD-10-CM

## 2024-12-29 DIAGNOSIS — N17.9 AKI (ACUTE KIDNEY INJURY) (HCC): ICD-10-CM

## 2024-12-29 DIAGNOSIS — E87.6 HYPOKALEMIA: ICD-10-CM

## 2024-12-29 DIAGNOSIS — J69.0 ASPIRATION PNEUMONIA OF LEFT LOWER LOBE, UNSPECIFIED ASPIRATION PNEUMONIA TYPE (HCC): ICD-10-CM

## 2024-12-29 DIAGNOSIS — R19.7 DIARRHEA, UNSPECIFIED TYPE: ICD-10-CM

## 2024-12-29 LAB
ALBUMIN SERPL-MCNC: 3.4 G/DL (ref 3.5–5.2)
ALBUMIN/GLOB SERPL: 1 {RATIO} (ref 1–2.5)
ALP SERPL-CCNC: 101 U/L (ref 35–104)
ALT SERPL-CCNC: 13 U/L (ref 10–35)
ANION GAP SERPL CALCULATED.3IONS-SCNC: 11 MMOL/L (ref 9–16)
AST SERPL-CCNC: 24 U/L (ref 10–35)
BASOPHILS # BLD: 0.04 K/UL (ref 0–0.2)
BASOPHILS NFR BLD: 1 % (ref 0–2)
BILIRUB DIRECT SERPL-MCNC: <0.1 MG/DL (ref 0–0.2)
BILIRUB INDIRECT SERPL-MCNC: ABNORMAL MG/DL (ref 0–1)
BILIRUB SERPL-MCNC: <0.2 MG/DL (ref 0–1.2)
BUN SERPL-MCNC: 26 MG/DL (ref 6–20)
CALCIUM SERPL-MCNC: 9.1 MG/DL (ref 8.6–10.4)
CHLORIDE SERPL-SCNC: 104 MMOL/L (ref 98–107)
CO2 SERPL-SCNC: 24 MMOL/L (ref 20–31)
CREAT SERPL-MCNC: 1.3 MG/DL (ref 0.6–0.9)
EOSINOPHIL # BLD: 0.28 K/UL (ref 0–0.44)
EOSINOPHILS RELATIVE PERCENT: 5 % (ref 1–4)
ERYTHROCYTE [DISTWIDTH] IN BLOOD BY AUTOMATED COUNT: 13.3 % (ref 11.8–14.4)
GFR, ESTIMATED: 49 ML/MIN/1.73M2
GLOBULIN SER CALC-MCNC: 3.3 G/DL
GLUCOSE SERPL-MCNC: 349 MG/DL (ref 74–99)
HCT VFR BLD AUTO: 26.3 % (ref 36.3–47.1)
HGB BLD-MCNC: 8.8 G/DL (ref 11.9–15.1)
IMM GRANULOCYTES # BLD AUTO: <0.03 K/UL (ref 0–0.3)
IMM GRANULOCYTES NFR BLD: 0 %
LIPASE SERPL-CCNC: 74 U/L (ref 13–60)
LYMPHOCYTES NFR BLD: 1.93 K/UL (ref 1.1–3.7)
LYMPHOCYTES RELATIVE PERCENT: 35 % (ref 24–43)
MCH RBC QN AUTO: 28.1 PG (ref 25.2–33.5)
MCHC RBC AUTO-ENTMCNC: 33.5 G/DL (ref 28.4–34.8)
MCV RBC AUTO: 84 FL (ref 82.6–102.9)
MONOCYTES NFR BLD: 0.46 K/UL (ref 0.1–1.2)
MONOCYTES NFR BLD: 8 % (ref 3–12)
NEUTROPHILS NFR BLD: 51 % (ref 36–65)
NEUTS SEG NFR BLD: 2.83 K/UL (ref 1.5–8.1)
NRBC BLD-RTO: 0 PER 100 WBC
PLATELET # BLD AUTO: 211 K/UL (ref 138–453)
PMV BLD AUTO: 10.1 FL (ref 8.1–13.5)
POTASSIUM SERPL-SCNC: 3.3 MMOL/L (ref 3.7–5.3)
PROT SERPL-MCNC: 6.7 G/DL (ref 6.6–8.7)
RBC # BLD AUTO: 3.13 M/UL (ref 3.95–5.11)
SODIUM SERPL-SCNC: 139 MMOL/L (ref 136–145)
TROPONIN I SERPL HS-MCNC: 12 NG/L (ref 0–14)
WBC OTHER # BLD: 5.6 K/UL (ref 3.5–11.3)

## 2024-12-29 PROCEDURE — 85025 COMPLETE CBC W/AUTO DIFF WBC: CPT

## 2024-12-29 PROCEDURE — 84484 ASSAY OF TROPONIN QUANT: CPT

## 2024-12-29 PROCEDURE — 71046 X-RAY EXAM CHEST 2 VIEWS: CPT

## 2024-12-29 PROCEDURE — 96375 TX/PRO/DX INJ NEW DRUG ADDON: CPT

## 2024-12-29 PROCEDURE — 80076 HEPATIC FUNCTION PANEL: CPT

## 2024-12-29 PROCEDURE — 6370000000 HC RX 637 (ALT 250 FOR IP)

## 2024-12-29 PROCEDURE — 2500000003 HC RX 250 WO HCPCS

## 2024-12-29 PROCEDURE — 96374 THER/PROPH/DIAG INJ IV PUSH: CPT

## 2024-12-29 PROCEDURE — 8E0ZXY6 ISOLATION: ICD-10-PCS | Performed by: HOSPITALIST

## 2024-12-29 PROCEDURE — 99285 EMERGENCY DEPT VISIT HI MDM: CPT

## 2024-12-29 PROCEDURE — 2580000003 HC RX 258

## 2024-12-29 PROCEDURE — 93005 ELECTROCARDIOGRAM TRACING: CPT

## 2024-12-29 PROCEDURE — 83690 ASSAY OF LIPASE: CPT

## 2024-12-29 PROCEDURE — 80048 BASIC METABOLIC PNL TOTAL CA: CPT

## 2024-12-29 PROCEDURE — 6360000002 HC RX W HCPCS

## 2024-12-29 RX ORDER — 0.9 % SODIUM CHLORIDE 0.9 %
1000 INTRAVENOUS SOLUTION INTRAVENOUS ONCE
Status: COMPLETED | OUTPATIENT
Start: 2024-12-29 | End: 2024-12-29

## 2024-12-29 RX ORDER — ONDANSETRON 2 MG/ML
4 INJECTION INTRAMUSCULAR; INTRAVENOUS ONCE
Status: COMPLETED | OUTPATIENT
Start: 2024-12-29 | End: 2024-12-29

## 2024-12-29 RX ADMIN — ONDANSETRON 4 MG: 2 INJECTION INTRAMUSCULAR; INTRAVENOUS at 22:48

## 2024-12-29 RX ADMIN — SODIUM CHLORIDE 1000 ML: 9 INJECTION, SOLUTION INTRAVENOUS at 22:49

## 2024-12-29 RX ADMIN — POTASSIUM BICARBONATE 40 MEQ: 782 TABLET, EFFERVESCENT ORAL at 23:53

## 2024-12-29 RX ADMIN — WATER 1000 MG: 1 INJECTION INTRAMUSCULAR; INTRAVENOUS; SUBCUTANEOUS at 23:59

## 2024-12-29 RX ADMIN — BENZOCAINE AND MENTHOL 1 LOZENGE: 15; 3.6 LOZENGE ORAL at 22:49

## 2024-12-29 ASSESSMENT — PAIN DESCRIPTION - LOCATION: LOCATION: CHEST

## 2024-12-29 ASSESSMENT — PAIN SCALES - GENERAL: PAINLEVEL_OUTOF10: 3

## 2024-12-30 PROBLEM — K86.1 ACUTE ON CHRONIC PANCREATITIS (HCC): Status: RESOLVED | Noted: 2019-02-14 | Resolved: 2024-12-30

## 2024-12-30 PROBLEM — H04.123 DRY EYES, BILATERAL: Status: RESOLVED | Noted: 2022-10-13 | Resolved: 2024-12-30

## 2024-12-30 PROBLEM — E11.10 DIABETIC KETOACIDOSIS WITHOUT COMA (HCC): Status: RESOLVED | Noted: 2017-02-14 | Resolved: 2024-12-30

## 2024-12-30 PROBLEM — U07.1 COVID-19: Status: ACTIVE | Noted: 2024-12-30

## 2024-12-30 PROBLEM — R10.13 ABDOMINAL PAIN, EPIGASTRIC: Status: RESOLVED | Noted: 2018-08-26 | Resolved: 2024-12-30

## 2024-12-30 PROBLEM — H04.123 DRY MOUTH AND EYES: Status: RESOLVED | Noted: 2022-10-13 | Resolved: 2024-12-30

## 2024-12-30 PROBLEM — N30.00 ACUTE CYSTITIS WITHOUT HEMATURIA: Status: RESOLVED | Noted: 2017-01-08 | Resolved: 2024-12-30

## 2024-12-30 PROBLEM — K85.90 ACUTE ON CHRONIC PANCREATITIS (HCC): Status: RESOLVED | Noted: 2019-02-14 | Resolved: 2024-12-30

## 2024-12-30 PROBLEM — R68.2 DRY MOUTH AND EYES: Status: RESOLVED | Noted: 2022-10-13 | Resolved: 2024-12-30

## 2024-12-30 PROBLEM — N17.9 ACUTE KIDNEY INJURY (HCC): Status: ACTIVE | Noted: 2024-12-30

## 2024-12-30 PROBLEM — R10.9 ABDOMINAL PAIN: Status: RESOLVED | Noted: 2021-12-26 | Resolved: 2024-12-30

## 2024-12-30 LAB
ALBUMIN SERPL-MCNC: 3 G/DL (ref 3.5–5.2)
ALBUMIN/GLOB SERPL: 1 {RATIO} (ref 1–2.5)
ALP SERPL-CCNC: 84 U/L (ref 35–104)
ALT SERPL-CCNC: 13 U/L (ref 10–35)
AMPHET UR QL SCN: NEGATIVE
ANION GAP SERPL CALCULATED.3IONS-SCNC: 10 MMOL/L (ref 9–16)
AST SERPL-CCNC: 23 U/L (ref 10–35)
B PARAP IS1001 DNA NPH QL NAA+NON-PROBE: NOT DETECTED
B PERT DNA SPEC QL NAA+PROBE: NOT DETECTED
BACTERIA URNS QL MICRO: NORMAL
BARBITURATES UR QL SCN: NEGATIVE
BASOPHILS # BLD: 0.03 K/UL (ref 0–0.2)
BASOPHILS NFR BLD: 1 % (ref 0–2)
BENZODIAZ UR QL: NEGATIVE
BILIRUB SERPL-MCNC: <0.2 MG/DL (ref 0–1.2)
BILIRUB UR QL STRIP: NEGATIVE
BUN SERPL-MCNC: 19 MG/DL (ref 6–20)
C PNEUM DNA NPH QL NAA+NON-PROBE: NOT DETECTED
CALCIUM SERPL-MCNC: 8.3 MG/DL (ref 8.6–10.4)
CANNABINOIDS UR QL SCN: NEGATIVE
CASTS #/AREA URNS LPF: NORMAL /LPF (ref 0–8)
CHLORIDE SERPL-SCNC: 106 MMOL/L (ref 98–107)
CLARITY UR: CLEAR
CO2 SERPL-SCNC: 22 MMOL/L (ref 20–31)
COCAINE UR QL SCN: NEGATIVE
COLOR UR: YELLOW
CREAT SERPL-MCNC: 1.2 MG/DL (ref 0.6–0.9)
CRP SERPL HS-MCNC: 37.6 MG/L (ref 0–5)
EOSINOPHIL # BLD: 0.28 K/UL (ref 0–0.44)
EOSINOPHILS RELATIVE PERCENT: 4 % (ref 1–4)
EPI CELLS #/AREA URNS HPF: NORMAL /HPF (ref 0–5)
ERYTHROCYTE [DISTWIDTH] IN BLOOD BY AUTOMATED COUNT: 13.1 % (ref 11.8–14.4)
FENTANYL UR QL: NEGATIVE
FLUAV RNA NPH QL NAA+NON-PROBE: NOT DETECTED
FLUBV RNA NPH QL NAA+NON-PROBE: NOT DETECTED
GFR, ESTIMATED: 53 ML/MIN/1.73M2
GLUCOSE BLD-MCNC: 154 MG/DL
GLUCOSE BLD-MCNC: 154 MG/DL (ref 65–105)
GLUCOSE BLD-MCNC: 163 MG/DL (ref 65–105)
GLUCOSE BLD-MCNC: 198 MG/DL (ref 65–105)
GLUCOSE BLD-MCNC: 200 MG/DL
GLUCOSE BLD-MCNC: 200 MG/DL (ref 65–105)
GLUCOSE BLD-MCNC: 262 MG/DL
GLUCOSE BLD-MCNC: 262 MG/DL (ref 65–105)
GLUCOSE BLD-MCNC: 60 MG/DL
GLUCOSE BLD-MCNC: 60 MG/DL (ref 65–105)
GLUCOSE SERPL-MCNC: 226 MG/DL (ref 74–99)
GLUCOSE UR STRIP-MCNC: ABNORMAL MG/DL
HADV DNA NPH QL NAA+NON-PROBE: NOT DETECTED
HCOV 229E RNA NPH QL NAA+NON-PROBE: NOT DETECTED
HCOV HKU1 RNA NPH QL NAA+NON-PROBE: NOT DETECTED
HCOV NL63 RNA NPH QL NAA+NON-PROBE: NOT DETECTED
HCOV OC43 RNA NPH QL NAA+NON-PROBE: NOT DETECTED
HCT VFR BLD AUTO: 24.7 % (ref 36.3–47.1)
HGB BLD-MCNC: 8.2 G/DL (ref 11.9–15.1)
HGB UR QL STRIP.AUTO: ABNORMAL
HMPV RNA NPH QL NAA+NON-PROBE: NOT DETECTED
HPIV1 RNA NPH QL NAA+NON-PROBE: NOT DETECTED
HPIV2 RNA NPH QL NAA+NON-PROBE: NOT DETECTED
HPIV3 RNA NPH QL NAA+NON-PROBE: NOT DETECTED
HPIV4 RNA NPH QL NAA+NON-PROBE: NOT DETECTED
IMM GRANULOCYTES # BLD AUTO: <0.03 K/UL (ref 0–0.3)
IMM GRANULOCYTES NFR BLD: 0 %
KETONES UR STRIP-MCNC: NEGATIVE MG/DL
L PNEUMO1 AG UR QL IA.RAPID: NEGATIVE
LEUKOCYTE ESTERASE UR QL STRIP: NEGATIVE
LYMPHOCYTES NFR BLD: 2.06 K/UL (ref 1.1–3.7)
LYMPHOCYTES RELATIVE PERCENT: 31 % (ref 24–43)
M PNEUMO DNA NPH QL NAA+NON-PROBE: NOT DETECTED
MAGNESIUM SERPL-MCNC: 1.6 MG/DL (ref 1.6–2.6)
MCH RBC QN AUTO: 28 PG (ref 25.2–33.5)
MCHC RBC AUTO-ENTMCNC: 33.2 G/DL (ref 28.4–34.8)
MCV RBC AUTO: 84.3 FL (ref 82.6–102.9)
METHADONE UR QL: NEGATIVE
MONOCYTES NFR BLD: 0.49 K/UL (ref 0.1–1.2)
MONOCYTES NFR BLD: 7 % (ref 3–12)
NEUTROPHILS NFR BLD: 57 % (ref 36–65)
NEUTS SEG NFR BLD: 3.76 K/UL (ref 1.5–8.1)
NITRITE UR QL STRIP: NEGATIVE
NRBC BLD-RTO: 0 PER 100 WBC
OPIATES UR QL SCN: NEGATIVE
OXYCODONE UR QL SCN: NEGATIVE
PCP UR QL SCN: NEGATIVE
PH UR STRIP: 7.5 [PH] (ref 5–8)
PLATELET # BLD AUTO: 216 K/UL (ref 138–453)
PMV BLD AUTO: 10.7 FL (ref 8.1–13.5)
POTASSIUM SERPL-SCNC: 4.3 MMOL/L (ref 3.7–5.3)
PROCALCITONIN SERPL-MCNC: 0.09 NG/ML (ref 0–0.09)
PROT SERPL-MCNC: 6 G/DL (ref 6.6–8.7)
PROT UR STRIP-MCNC: ABNORMAL MG/DL
RBC # BLD AUTO: 2.93 M/UL (ref 3.95–5.11)
RBC #/AREA URNS HPF: NORMAL /HPF (ref 0–4)
RSV RNA NPH QL NAA+NON-PROBE: NOT DETECTED
RV+EV RNA NPH QL NAA+NON-PROBE: NOT DETECTED
SARS-COV-2 RNA NPH QL NAA+NON-PROBE: DETECTED
SODIUM SERPL-SCNC: 138 MMOL/L (ref 136–145)
SODIUM UR-SCNC: 87 MMOL/L
SP GR UR STRIP: 1.01 (ref 1–1.03)
SPECIMEN DESCRIPTION: ABNORMAL
TEST INFORMATION: NORMAL
TOTAL PROTEIN, URINE: 212 MG/DL
UROBILINOGEN UR STRIP-ACNC: NORMAL EU/DL (ref 0–1)
WBC #/AREA URNS HPF: NORMAL /HPF (ref 0–5)
WBC OTHER # BLD: 6.6 K/UL (ref 3.5–11.3)

## 2024-12-30 PROCEDURE — 6360000002 HC RX W HCPCS

## 2024-12-30 PROCEDURE — 6370000000 HC RX 637 (ALT 250 FOR IP): Performed by: INTERNAL MEDICINE

## 2024-12-30 PROCEDURE — 81001 URINALYSIS AUTO W/SCOPE: CPT

## 2024-12-30 PROCEDURE — 82947 ASSAY GLUCOSE BLOOD QUANT: CPT

## 2024-12-30 PROCEDURE — 2500000003 HC RX 250 WO HCPCS: Performed by: NURSE PRACTITIONER

## 2024-12-30 PROCEDURE — 2580000003 HC RX 258

## 2024-12-30 PROCEDURE — 6360000002 HC RX W HCPCS: Performed by: NURSE PRACTITIONER

## 2024-12-30 PROCEDURE — 1200000000 HC SEMI PRIVATE

## 2024-12-30 PROCEDURE — 6370000000 HC RX 637 (ALT 250 FOR IP): Performed by: NURSE PRACTITIONER

## 2024-12-30 PROCEDURE — 83735 ASSAY OF MAGNESIUM: CPT

## 2024-12-30 PROCEDURE — 2580000003 HC RX 258: Performed by: STUDENT IN AN ORGANIZED HEALTH CARE EDUCATION/TRAINING PROGRAM

## 2024-12-30 PROCEDURE — 84300 ASSAY OF URINE SODIUM: CPT

## 2024-12-30 PROCEDURE — 96375 TX/PRO/DX INJ NEW DRUG ADDON: CPT

## 2024-12-30 PROCEDURE — 0202U NFCT DS 22 TRGT SARS-COV-2: CPT

## 2024-12-30 PROCEDURE — 87449 NOS EACH ORGANISM AG IA: CPT

## 2024-12-30 PROCEDURE — 6370000000 HC RX 637 (ALT 250 FOR IP): Performed by: STUDENT IN AN ORGANIZED HEALTH CARE EDUCATION/TRAINING PROGRAM

## 2024-12-30 PROCEDURE — 86140 C-REACTIVE PROTEIN: CPT

## 2024-12-30 PROCEDURE — 84145 PROCALCITONIN (PCT): CPT

## 2024-12-30 PROCEDURE — 85025 COMPLETE CBC W/AUTO DIFF WBC: CPT

## 2024-12-30 PROCEDURE — 99223 1ST HOSP IP/OBS HIGH 75: CPT | Performed by: STUDENT IN AN ORGANIZED HEALTH CARE EDUCATION/TRAINING PROGRAM

## 2024-12-30 PROCEDURE — 84156 ASSAY OF PROTEIN URINE: CPT

## 2024-12-30 PROCEDURE — 96376 TX/PRO/DX INJ SAME DRUG ADON: CPT

## 2024-12-30 PROCEDURE — 80053 COMPREHEN METABOLIC PANEL: CPT

## 2024-12-30 PROCEDURE — 80307 DRUG TEST PRSMV CHEM ANLYZR: CPT

## 2024-12-30 PROCEDURE — 2580000003 HC RX 258: Performed by: NURSE PRACTITIONER

## 2024-12-30 RX ORDER — GUAIFENESIN 600 MG/1
600 TABLET, EXTENDED RELEASE ORAL 2 TIMES DAILY
Status: DISCONTINUED | OUTPATIENT
Start: 2024-12-30 | End: 2025-01-03 | Stop reason: HOSPADM

## 2024-12-30 RX ORDER — HEPARIN SODIUM 5000 [USP'U]/ML
5000 INJECTION, SOLUTION INTRAVENOUS; SUBCUTANEOUS EVERY 8 HOURS SCHEDULED
Status: DISCONTINUED | OUTPATIENT
Start: 2024-12-31 | End: 2025-01-03 | Stop reason: HOSPADM

## 2024-12-30 RX ORDER — GLUCAGON 1 MG/ML
1 KIT INJECTION PRN
Status: DISCONTINUED | OUTPATIENT
Start: 2024-12-30 | End: 2025-01-03 | Stop reason: HOSPADM

## 2024-12-30 RX ORDER — ONDANSETRON 2 MG/ML
4 INJECTION INTRAMUSCULAR; INTRAVENOUS EVERY 6 HOURS PRN
Status: DISCONTINUED | OUTPATIENT
Start: 2024-12-30 | End: 2025-01-03 | Stop reason: HOSPADM

## 2024-12-30 RX ORDER — IPRATROPIUM BROMIDE AND ALBUTEROL SULFATE 2.5; .5 MG/3ML; MG/3ML
1 SOLUTION RESPIRATORY (INHALATION)
Status: DISCONTINUED | OUTPATIENT
Start: 2024-12-30 | End: 2024-12-30

## 2024-12-30 RX ORDER — BENZONATATE 100 MG/1
100 CAPSULE ORAL 3 TIMES DAILY PRN
Status: DISCONTINUED | OUTPATIENT
Start: 2024-12-30 | End: 2025-01-03 | Stop reason: HOSPADM

## 2024-12-30 RX ORDER — IPRATROPIUM BROMIDE AND ALBUTEROL SULFATE 2.5; .5 MG/3ML; MG/3ML
1 SOLUTION RESPIRATORY (INHALATION) EVERY 4 HOURS PRN
Status: DISCONTINUED | OUTPATIENT
Start: 2024-12-30 | End: 2025-01-03 | Stop reason: HOSPADM

## 2024-12-30 RX ORDER — INSULIN LISPRO 100 [IU]/ML
10 INJECTION, SOLUTION INTRAVENOUS; SUBCUTANEOUS ONCE
Status: COMPLETED | OUTPATIENT
Start: 2024-12-30 | End: 2024-12-30

## 2024-12-30 RX ORDER — ACETAMINOPHEN 325 MG/1
325 TABLET ORAL EVERY 6 HOURS PRN
Status: DISCONTINUED | OUTPATIENT
Start: 2024-12-30 | End: 2025-01-03 | Stop reason: HOSPADM

## 2024-12-30 RX ORDER — ALBUTEROL SULFATE 0.83 MG/ML
2.5 SOLUTION RESPIRATORY (INHALATION)
Status: DISCONTINUED | OUTPATIENT
Start: 2024-12-30 | End: 2025-01-03 | Stop reason: HOSPADM

## 2024-12-30 RX ORDER — MULTIVITAMIN WITH IRON
1 TABLET ORAL DAILY
Status: DISCONTINUED | OUTPATIENT
Start: 2024-12-30 | End: 2025-01-03 | Stop reason: HOSPADM

## 2024-12-30 RX ORDER — SODIUM CHLORIDE 9 MG/ML
INJECTION, SOLUTION INTRAVENOUS CONTINUOUS
Status: DISCONTINUED | OUTPATIENT
Start: 2024-12-30 | End: 2025-01-02

## 2024-12-30 RX ORDER — ONDANSETRON 2 MG/ML
4 INJECTION INTRAMUSCULAR; INTRAVENOUS ONCE
Status: COMPLETED | OUTPATIENT
Start: 2024-12-30 | End: 2024-12-30

## 2024-12-30 RX ORDER — ENOXAPARIN SODIUM 100 MG/ML
40 INJECTION SUBCUTANEOUS DAILY
Status: DISCONTINUED | OUTPATIENT
Start: 2024-12-30 | End: 2024-12-30

## 2024-12-30 RX ORDER — GABAPENTIN 300 MG/1
300 CAPSULE ORAL 3 TIMES DAILY
Status: DISCONTINUED | OUTPATIENT
Start: 2024-12-30 | End: 2025-01-03 | Stop reason: HOSPADM

## 2024-12-30 RX ORDER — DEXTROSE MONOHYDRATE 100 MG/ML
INJECTION, SOLUTION INTRAVENOUS CONTINUOUS PRN
Status: DISCONTINUED | OUTPATIENT
Start: 2024-12-30 | End: 2025-01-03 | Stop reason: HOSPADM

## 2024-12-30 RX ORDER — INSULIN LISPRO 100 [IU]/ML
0-4 INJECTION, SOLUTION INTRAVENOUS; SUBCUTANEOUS
Status: DISCONTINUED | OUTPATIENT
Start: 2024-12-30 | End: 2025-01-02

## 2024-12-30 RX ORDER — FENOFIBRATE 54 MG/1
54 TABLET ORAL DAILY
Status: DISCONTINUED | OUTPATIENT
Start: 2024-12-30 | End: 2025-01-03 | Stop reason: HOSPADM

## 2024-12-30 RX ORDER — ONDANSETRON 4 MG/1
4 TABLET, ORALLY DISINTEGRATING ORAL EVERY 8 HOURS PRN
Status: DISCONTINUED | OUTPATIENT
Start: 2024-12-30 | End: 2025-01-03 | Stop reason: HOSPADM

## 2024-12-30 RX ORDER — PANTOPRAZOLE SODIUM 40 MG/1
40 TABLET, DELAYED RELEASE ORAL
Status: DISCONTINUED | OUTPATIENT
Start: 2024-12-31 | End: 2025-01-03 | Stop reason: HOSPADM

## 2024-12-30 RX ORDER — AZITHROMYCIN 250 MG/1
500 TABLET, FILM COATED ORAL EVERY 24 HOURS
Status: COMPLETED | OUTPATIENT
Start: 2024-12-30 | End: 2024-12-31

## 2024-12-30 RX ORDER — ATORVASTATIN CALCIUM 20 MG/1
10 TABLET, FILM COATED ORAL DAILY
Status: DISCONTINUED | OUTPATIENT
Start: 2024-12-30 | End: 2025-01-03 | Stop reason: HOSPADM

## 2024-12-30 RX ADMIN — ATORVASTATIN CALCIUM 10 MG: 20 TABLET, FILM COATED ORAL at 08:32

## 2024-12-30 RX ADMIN — SODIUM CHLORIDE: 9 INJECTION, SOLUTION INTRAVENOUS at 03:08

## 2024-12-30 RX ADMIN — SODIUM CHLORIDE: 9 INJECTION, SOLUTION INTRAVENOUS at 19:52

## 2024-12-30 RX ADMIN — INSULIN LISPRO 2 UNITS: 100 INJECTION, SOLUTION INTRAVENOUS; SUBCUTANEOUS at 17:58

## 2024-12-30 RX ADMIN — AZITHROMYCIN DIHYDRATE 500 MG: 250 TABLET ORAL at 23:18

## 2024-12-30 RX ADMIN — FENOFIBRATE 54 MG: 54 TABLET ORAL at 08:33

## 2024-12-30 RX ADMIN — GABAPENTIN 300 MG: 300 CAPSULE ORAL at 08:57

## 2024-12-30 RX ADMIN — GABAPENTIN 300 MG: 300 CAPSULE ORAL at 20:02

## 2024-12-30 RX ADMIN — ONDANSETRON 4 MG: 2 INJECTION INTRAMUSCULAR; INTRAVENOUS at 00:19

## 2024-12-30 RX ADMIN — GUAIFENESIN 600 MG: 600 TABLET, MULTILAYER, EXTENDED RELEASE ORAL at 20:01

## 2024-12-30 RX ADMIN — WATER 1000 MG: 1 INJECTION INTRAMUSCULAR; INTRAVENOUS; SUBCUTANEOUS at 23:18

## 2024-12-30 RX ADMIN — GUAIFENESIN 600 MG: 600 TABLET, MULTILAYER, EXTENDED RELEASE ORAL at 10:53

## 2024-12-30 RX ADMIN — GABAPENTIN 300 MG: 300 CAPSULE ORAL at 14:05

## 2024-12-30 RX ADMIN — INSULIN LISPRO 10 UNITS: 100 INJECTION, SOLUTION INTRAVENOUS; SUBCUTANEOUS at 00:48

## 2024-12-30 RX ADMIN — INSULIN LISPRO 1 UNITS: 100 INJECTION, SOLUTION INTRAVENOUS; SUBCUTANEOUS at 08:00

## 2024-12-30 RX ADMIN — BENZOCAINE AND MENTHOL 1 LOZENGE: 15; 3.6 LOZENGE ORAL at 23:53

## 2024-12-30 RX ADMIN — THERA TABS 1 TABLET: TAB at 08:32

## 2024-12-30 RX ADMIN — BENZONATATE 100 MG: 100 CAPSULE ORAL at 20:01

## 2024-12-30 RX ADMIN — AZITHROMYCIN MONOHYDRATE 500 MG: 500 INJECTION, POWDER, LYOPHILIZED, FOR SOLUTION INTRAVENOUS at 00:03

## 2024-12-30 ASSESSMENT — PAIN SCALES - GENERAL: PAINLEVEL_OUTOF10: 5

## 2024-12-30 NOTE — ED NOTES
Writer perfect served admitting service regarding patient stating that she takes gabapentin 3x daily   Will continue with plan of care

## 2024-12-30 NOTE — ED NOTES
ED to inpatient nurses report      Chief Complaint:  Chief Complaint   Patient presents with    Diarrhea    Cough     Present to ED from: home    MOA:     LOC: alert and orientated to name, place, date  Mobility: Independent  Oxygen Baseline: RA    Current needs required: none   Pending ED orders: none  Present condition: stable    Why did the patient come to the ED? Diarrhea, abdominal pain, cough  What is the plan? admission  Any procedures or intervention occur? none  Any safety concerns??    Mental Status:  Level of Consciousness: Alert (0)    Psych Assessment:   Psychosocial  Psychosocial (WDL): Within Defined Limits  Vital signs   Vitals:    12/29/24 2143   BP: (!) 161/73   Pulse: 85   Resp: 18   Temp: 97.5 °F (36.4 °C)   TempSrc: Oral   SpO2: 100%   Weight: 54.4 kg (120 lb)        Vitals:  Patient Vitals for the past 24 hrs:   BP Temp Temp src Pulse Resp SpO2 Weight   12/29/24 2143 (!) 161/73 97.5 °F (36.4 °C) Oral 85 18 100 % 54.4 kg (120 lb)      Visit Vitals  BP (!) 161/73   Pulse 85   Temp 97.5 °F (36.4 °C) (Oral)   Resp 18   Wt 54.4 kg (120 lb)   SpO2 100%   BMI 20.60 kg/m²        LDAs:   Peripheral IV 12/29/24 Proximal;Right;Ventral Forearm (Active)       Ambulatory Status:  Presents to emergency department  because of falls (Syncope, seizure, or loss of consciousness): No, Age > 70: No, Altered Mental Status, Intoxication with alcohol or substance confusion (Disorientation, impaired judgment, poor safety awaremess, or inability to follow instructions): No, Impaired Mobility: Ambulates or transfers with assistive devices or assistance; Unable to ambulate or transer.: No    Diagnosis:  DISPOSITION Decision To Admit 12/29/2024 11:36:59 PM   Final diagnoses:   Hypokalemia   Diarrhea, unspecified type   Pneumonia of left lung due to infectious organism, unspecified part of lung   JUAN (acute kidney injury) (HCC)        Consults:  IP CONSULT TO HOSPITALIST     Treatment Team:   Treatment Team:   Mar  lozenge    potassium bicarb-citric acid (EFFER-K) effervescent tablet 40 mEq    cefTRIAXone (ROCEPHIN) 1,000 mg in sterile water 10 mL IV syringe     Order Specific Question:   Antimicrobial Indications     Answer:   Pneumonia (CAP)    azithromycin (ZITHROMAX) 500 mg in sodium chloride 0.9 % 250 mL IVPB ([id number])     Order Specific Question:   Antimicrobial Indications     Answer:   Pneumonia (CAP)       SURGICAL HISTORY       Past Surgical History:   Procedure Laterality Date    CARPAL TUNNEL RELEASE  2014    jerome carpal tunnel    CHOLECYSTECTOMY      COLONOSCOPY N/A 9/16/2019    COLONOSCOPY WITH BIOPSY performed by Blake Draper MD at [facility]    ECTOPIC PREGNANCY SURGERY      UPPER GASTROINTESTINAL ENDOSCOPY N/A 9/16/2019    EGD ESOPHAGOGASTRODUODENOSCOPY performed by Blake Draper MD at [facility]    UPPER GASTROINTESTINAL ENDOSCOPY N/A 12/27/2021    EGD BIOPSY performed by Jake Bhakta MD at [facility]       PAST MEDICAL HISTORY       Past Medical History:   Diagnosis Date    Depression     Diabetes mellitus (HCC)     Hyperlipidemia     Pancreatitis     Pneumonia due to infectious organism 1/8/2017    Substance abuse (HCC)        Labs:  Labs Reviewed   BASIC METABOLIC PANEL - Abnormal; Notable for the following components:       Result Value    Potassium 3.3 (*)     Glucose 349 (*)     BUN 26 (*)     Creatinine 1.3 (*)     Est, Glom Filt Rate 49 (*)     All other components within normal limits   CBC WITH AUTO DIFFERENTIAL - Abnormal; Notable for the following components:    RBC 3.13 (*)     Hemoglobin 8.8 (*)     Hematocrit 26.3 (*)     Eosinophils % 5 (*)     All other components within normal limits   LIPASE - Abnormal; Notable for the following components:    Lipase 74 (*)     All other components within normal limits   HEPATIC FUNCTION PANEL - Abnormal; Notable for the following components:    Albumin 3.4 (*)     All other components within normal limits   TROPONIN       Electronically signed by

## 2024-12-30 NOTE — ED NOTES
ED to inpatient nurses report      Chief Complaint:  Chief Complaint   Patient presents with    Diarrhea    Cough     Present to ED from: Home    MOA:     LOC: alert and orientated to name, place, date  Mobility: Independent  Oxygen Baseline: RA    Current needs required: none   Pending ED orders: none  Present condition: stable    Why did the patient come to the ED? Diarrhea, abdominal pain, cough  What is the plan? admission  Any procedures or intervention occur? none  Any safety concerns??    Mental Status:  Level of Consciousness: Alert (0)    Psych Assessment:   Psychosocial  Psychosocial (WDL): Within Defined Limits  Vital signs   Vitals:    12/29/24 2143 12/29/24 2330 12/30/24 0045 12/30/24 0310   BP: (!) 161/73  (!) 171/56 (!) 154/73   Pulse: 85      Resp: 18      Temp: 97.5 °F (36.4 °C)      TempSrc: Oral      SpO2: 100% 97% 99%    Weight: 54.4 kg (120 lb)           Vitals:  Patient Vitals for the past 24 hrs:   BP Temp Temp src Pulse Resp SpO2 Weight   12/30/24 0310 (!) 154/73 -- -- -- -- -- --   12/30/24 0045 (!) 171/56 -- -- -- -- 99 % --   12/29/24 2330 -- -- -- -- -- 97 % --   12/29/24 2143 (!) 161/73 97.5 °F (36.4 °C) Oral 85 18 100 % 54.4 kg (120 lb)      Visit Vitals  BP (!) 154/73   Pulse 85   Temp 97.5 °F (36.4 °C) (Oral)   Resp 18   Wt 54.4 kg (120 lb)   SpO2 99%   BMI 20.60 kg/m²        LDAs:   Peripheral IV 12/29/24 Proximal;Right;Ventral Forearm (Active)       Ambulatory Status:  Presents to emergency department  because of falls (Syncope, seizure, or loss of consciousness): No, Age > 70: No, Altered Mental Status, Intoxication with alcohol or substance confusion (Disorientation, impaired judgment, poor safety awaremess, or inability to follow instructions): No, Impaired Mobility: Ambulates or transfers with assistive devices or assistance; Unable to ambulate or transer.: No    Diagnosis:  DISPOSITION Admitted 12/30/2024 12:30:05 AM   Final diagnoses:   Hypokalemia   Diarrhea, unspecified type   Order Specific Question:   CAP duration of therapy      Answer:   5 days     azithromycin (ZITHROMAX) tablet 500 mg      Order Specific Question:   Antimicrobial Indications      Answer:   Pneumonia (CAP)      Order Specific Question:   CAP duration of therapy      Answer:   Other      Order Specific Question:   Other CAP Duration      Answer:   3 days    insulin lispro (HUMALOG,ADMELOG) injection vial 10 Units       SURGICAL HISTORY       Past Surgical History:   Procedure Laterality Date    CARPAL TUNNEL RELEASE  2014    jeorme carpal tunnel    CHOLECYSTECTOMY      COLONOSCOPY N/A 9/16/2019    COLONOSCOPY WITH BIOPSY performed by Blake Draper MD at [facility] OR    ECTOPIC PREGNANCY SURGERY      UPPER GASTROINTESTINAL ENDOSCOPY N/A 9/16/2019    EGD ESOPHAGOGASTRODUODENOSCOPY performed by Blake Draper MD at [facility] OR    UPPER GASTROINTESTINAL ENDOSCOPY N/A 12/27/2021    EGD BIOPSY performed by Jake Bhakta MD at [facility]       PAST MEDICAL HISTORY       Past Medical History:   Diagnosis Date    Depression     Diabetes mellitus (HCC)     Hyperlipidemia     Pancreatitis     Pneumonia due to infectious organism 1/8/2017    Substance abuse (HCC)        Labs:  Labs Reviewed   BASIC METABOLIC PANEL - Abnormal; Notable for the following components:       Result Value    Potassium 3.3 (*)     Glucose 349 (*)     BUN 26 (*)     Creatinine 1.3 (*)     Est, Glom Filt Rate 49 (*)     All other components within normal limits   CBC WITH AUTO DIFFERENTIAL - Abnormal; Notable for the following components:    RBC 3.13 (*)     Hemoglobin 8.8 (*)     Hematocrit 26.3 (*)     Eosinophils % 5 (*)     All other components within normal limits   LIPASE - Abnormal; Notable for the following components:    Lipase 74 (*)     All other components within normal limits   HEPATIC FUNCTION PANEL - Abnormal; Notable for the following components:    Albumin 3.4 (*)     All other components within normal limits   URINALYSIS WITH REFLEX TO

## 2024-12-30 NOTE — ED PROVIDER NOTES
[facility] EMERGENCY DEPARTMENT  Emergency Department Encounter  Emergency Medicine Resident     Pt Name:Fang Walters  MRN: [id number]  Birthdate 1970  Date of evaluation: 12/29/24  PCP:  Miriam Hanna MD  Note Started: 9:58 PM EST      CHIEF COMPLAINT       Chief Complaint   Patient presents with    Diarrhea    Cough       HISTORY OF PRESENT ILLNESS  (Location/Symptom, Timing/Onset, Context/Setting, Quality, Duration, Modifying Factors, Severity.)      Fang Walters is a 54 y.o. female with PMHx of DM, HLD who presents to the ED with c/o cough with pleuritic chest pain for the past few days.  Patient also endorses subjective fever, chills, congestion, SOB, nausea, and multiple episodes of diarrhea.  Patient states that she is having so much diarrhea that she is waking up covered in stool.  She thought her diarrhea was improving; however, she again woke up today covered in diarrhea, so she came to the ED for evaluation.  Patient denies any abdominal pain, vomiting, bloody stool, urinary symptoms.  She states her grandkids are sick with similar symptoms.    PAST MEDICAL / SURGICAL / SOCIAL / FAMILY HISTORY      has a past medical history of Depression, Diabetes mellitus (HCC), Hyperlipidemia, Pancreatitis, Pneumonia due to infectious organism, and Substance abuse (HCC).     has a past surgical history that includes Cholecystectomy; Ectopic pregnancy surgery; Carpal tunnel release (2014); Colonoscopy (N/A, 9/16/2019); Upper gastrointestinal endoscopy (N/A, 9/16/2019); and Upper gastrointestinal endoscopy (N/A, 12/27/2021).    Social History     Socioeconomic History    Marital status: Single     Spouse name: Not on file    Number of children: Not on file    Years of education: Not on file    Highest education level: Not on file   Occupational History    Not on file   Tobacco Use    Smoking status: Former     Current packs/day: 0.00     Average packs/day: 0.5 packs/day for 30.0 years (15.0 ttl pk-yrs)     ordered.  ECG/medicine tests: ordered.    Risk  OTC drugs.  Prescription drug management.  Decision regarding hospitalization.        EMERGENCY DEPARTMENT COURSE:  Fang Walters is a 54 y.o. female who presented to the ED with c/o nausea and active cough with pleuritic chest pain and shortness of breath which began a few days ago. While in the department, patient remained hemodynamically stable, afebrile.  No leukocytosis.  Chronic anemia with hemoglobin noted at 8.8 today, no evidence of active bleeding.  Mild hypokalemia, potassium replaced in the ED.  Mild JUAN, 1 L normal saline given.  Hyperglycemia at 349.  LFTs and lipase are grossly unremarkable.  EKG is normal sinus rhythm without significant change from prior.  Troponin is 12.  UA is negative for UTI.  Chest x-ray indicates a left perihilar infiltrate.  Will give Rocephin and azithromycin in the ED.  Will order Legionella antigen given pneumonia, diarrhea, and trace hematuria.  Discussed case with [facility] who agree with plan for admission.  At this time, they are requesting a UDS.. Results were discussed with patient. Patient verbalized understanding and agrees with plan for admission.  All questions answered.         CONSULTS:  IP CONSULT TO HOSPITALIST  IP CONSULT TO FAMILY MEDICINE    FINAL IMPRESSION      1. Pneumonia of left lung due to infectious organism, unspecified part of lung    2. Hypokalemia    3. Diarrhea, unspecified type    4. JUAN (acute kidney injury) ([facility])          DISPOSITION / PLAN     DISPOSITION Admitted 12/30/2024 12:30:05 AM               PATIENT REFERRED TO:  No follow-up provider specified.    DISCHARGE MEDICATIONS:  New Prescriptions    No medications on file       Chely Sahni, DO  Emergency Medicine Resident    (Please note that portions of this note were completed with a voice recognition program.  Efforts were made to edit the dictations but occasionally words are mis-transcribed.)

## 2024-12-30 NOTE — CARE COORDINATION
Case Management Assessment  Initial Evaluation    Date/Time of Evaluation: 12/30/2024 10:55 AM  Assessment Completed by: Evy Espitia RN    If patient is discharged prior to next notation, then this note serves as note for discharge by case management.    Patient Name: Fang Walters                   YOB: 1970  Diagnosis: Pneumonia due to infectious organism [J18.9]                   Date / Time: 12/29/2024  9:52 PM    Patient Admission Status: Inpatient   Readmission Risk (Low < 19, Mod (19-27), High > 27): Readmission Risk Score: 13.6    Current PCP: Miriam Hanna MD  PCP verified by CM? (P) Yes (pt is looking for new pcp)    Chart Reviewed: Yes      History Provided by: (P) Patient  Patient Orientation: (P) Alert and Oriented    Patient Cognition: (P) Alert    Hospitalization in the last 30 days (Readmission):  No    If yes, Readmission Assessment in CM Navigator will be completed.    Advance Directives:      Code Status: Full Code   Patient's Primary Decision Maker is: (P) Legal Next of Kin      Discharge Planning:    Patient lives with: (P) Children Type of Home: (P) House  Primary Care Giver: (P) Self  Patient Support Systems include:     Current Financial resources: (P) Medicaid  Current community resources:    Current services prior to admission: (P) None            Current DME:              Type of Home Care services:  (P) None    ADLS  Prior functional level: (P) Independent in ADLs/IADLs  Current functional level: (P) Independent in ADLs/IADLs    PT AM-PAC:   /24  OT AM-PAC:   /24    Family can provide assistance at DC: (P) Yes  Would you like Case Management to discuss the discharge plan with any other family members/significant others, and if so, who?    Plans to Return to Present Housing: (P) Yes  Other Identified Issues/Barriers to RETURNING to current housing: none  Potential Assistance needed at discharge: (P) N/A            Potential DME:    Patient expects to discharge to: (P)

## 2024-12-30 NOTE — ED PROVIDER NOTES
[facility] EMERGENCY DEPARTMENT     Emergency Department     Faculty Attestation    I performed a history and physical examination of the patient and discussed management with the resident. I reviewed the resident’s note and agree with the documented findings and plan of care. Any areas of disagreement are noted on the chart. I was personally present for the key portions of any procedures. I have documented in the chart those procedures where I was not present during the key portions. I have reviewed the emergency nurses triage note. I agree with the chief complaint, past medical history, past surgical history, allergies, medications, social and family history as documented unless otherwise noted below. For Physician Assistant/ Nurse Practitioner cases/documentation I have personally evaluated this patient and have completed at least one if not all key elements of the E/M (history, physical exam, and MDM). Additional findings are as noted.    Note Started: 10:42 PM EST    Here with viral complaints nausea significant diarrhea cough congestion runny nose no fevers.  No recent travel no sick contacts no recent antibiotic use.  Patient states diarrhea is become so frequent that she cannot get to the bathroom and even with coughing is having some incontinence.  Previously healthy until this episode.  On exam lungs some scattered wheezing that cleared with coughing.  Heart regular abdomen soft no focal tenderness rebound or guarding.  Will check labs fluids meds reevaluate    EKG interpretation: Sinus rhythm 88 normal intervals normal axis no acute ST or T changes no acute finding    Critical Care     none    Gilberto Manuel MD, FACEP, FAAEM  Attending Emergency  Physician           Gilberto Manuel MD  12/29/24 1544

## 2024-12-30 NOTE — ED NOTES
Arrived patient to ED presents with cough and nasal congestion. Patient states that symptoms started few days ago and got worsen. Patient also complain of diarrhea and chest pain when coughing. Painscale of 3/10. Alert and oriented. Able to follow commands. Bed on lowest position. Call light provided.

## 2024-12-30 NOTE — ED PROVIDER NOTES
Faculty Sign-Out Attestation  Handoff taken on the following patient from prior Attending Physician: Mar  Note Started: 11:49 PM EST    I was available and discussed any additional care issues that arose and coordinated the management plans with the resident(s) caring for the patient during my duty period. Any areas of disagreement with resident’s documentation of care or procedures are noted on the chart. I was personally present for the key portions of any/all procedures during my duty period. I have documented in the chart those procedures where I was not present during the key portions.    Pneumonia / kobe, hi k, admit,     Tony Parks DO  Attending Physician       Tony Parks DO  12/29/24 [id number]

## 2024-12-30 NOTE — ED NOTES
ED to inpatient nurses report      Chief Complaint:  Chief Complaint   Patient presents with    Diarrhea    Cough     Present to ED from: Home    MOA:     LOC: alert and orientated to name, place, date  Mobility: Independent  Oxygen Baseline: RA    Current needs required: none   Pending ED orders: none  Present condition: stable    Why did the patient come to the ED? Abdominal pain, diarrhea, cough  What is the plan? admission  Any procedures or intervention occur? none  Any safety concerns??    Mental Status:  Level of Consciousness: Alert (0)    Psych Assessment:   Psychosocial  Psychosocial (WDL): Within Defined Limits  Vital signs   Vitals:    12/30/24 0310 12/30/24 0315 12/30/24 0557 12/30/24 0600   BP: (!) 154/73 (!) 163/60  (!) 169/68   Pulse:       Resp:       Temp:       TempSrc:       SpO2:   95%    Weight:            Vitals:  Patient Vitals for the past 24 hrs:   BP Temp Temp src Pulse Resp SpO2 Weight   12/30/24 0600 (!) 169/68 -- -- -- -- -- --   12/30/24 0557 -- -- -- -- -- 95 % --   12/30/24 0315 (!) 163/60 -- -- -- -- -- --   12/30/24 0310 (!) 154/73 -- -- -- -- -- --   12/30/24 0045 (!) 171/56 -- -- -- -- 99 % --   12/29/24 2330 -- -- -- -- -- 97 % --   12/29/24 2143 (!) 161/73 97.5 °F (36.4 °C) Oral 85 18 100 % 54.4 kg (120 lb)      Visit Vitals  BP (!) 169/68   Pulse 85   Temp 97.5 °F (36.4 °C) (Oral)   Resp 18   Wt 54.4 kg (120 lb)   SpO2 95%   BMI 20.60 kg/m²        LDAs:   Peripheral IV 12/29/24 Proximal;Right;Ventral Forearm (Active)       Ambulatory Status:  Presents to emergency department  because of falls (Syncope, seizure, or loss of consciousness): No, Age > 70: No, Altered Mental Status, Intoxication with alcohol or substance confusion (Disorientation, impaired judgment, poor safety awaremess, or inability to follow instructions): No, Impaired Mobility: Ambulates or transfers with assistive devices or assistance; Unable to ambulate or transer.: No    Diagnosis:  DISPOSITION Admitted

## 2024-12-30 NOTE — ED NOTES
ED to inpatient nurses report      Chief Complaint:  Chief Complaint   Patient presents with    Diarrhea    Cough     Present to ED from: Home    MOA:     LOC: alert and orientated to name, place, date  Mobility: Independent  Oxygen Baseline: RA    Current needs required: RA   Pending ED orders: None  Present condition: Stable     Why did the patient come to the ED? Cough, diarrhea, with abdominal pain   What is the plan? Admit for pneumonia   Any procedures or intervention occur? None  Any safety concerns?? None    Mental Status:  Level of Consciousness: Alert (0)    Psych Assessment:   Psychosocial  Psychosocial (WDL): Within Defined Limits  Vital signs   Vitals:    12/30/24 1000 12/30/24 1001 12/30/24 1030 12/30/24 1100   BP: (!) 133/40  (!) 159/59 (!) 168/62   Pulse:       Resp:       Temp:       TempSrc:       SpO2:  94% 93% 92%   Weight:            Vitals:  Patient Vitals for the past 24 hrs:   BP Temp Temp src Pulse Resp SpO2 Weight   12/30/24 1100 (!) 168/62 -- -- -- -- 92 % --   12/30/24 1030 (!) 159/59 -- -- -- -- 93 % --   12/30/24 1001 -- -- -- -- -- 94 % --   12/30/24 1000 (!) 133/40 -- -- -- -- -- --   12/30/24 0702 -- -- -- -- -- 93 % --   12/30/24 0701 -- -- -- -- -- 91 % --   12/30/24 0700 (!) 146/56 -- -- -- -- -- --   12/30/24 0600 (!) 169/68 -- -- -- -- -- --   12/30/24 0557 -- -- -- -- -- 95 % --   12/30/24 0315 (!) 163/60 -- -- -- -- -- --   12/30/24 0310 (!) 154/73 -- -- -- -- -- --   12/30/24 0045 (!) 171/56 -- -- -- -- 99 % --   12/29/24 2330 -- -- -- -- -- 97 % --   12/29/24 2143 (!) 161/73 97.5 °F (36.4 °C) Oral 85 18 100 % 54.4 kg (120 lb)      Visit Vitals  BP (!) 168/62   Pulse 85   Temp 97.5 °F (36.4 °C) (Oral)   Resp 18   Wt 54.4 kg (120 lb)   SpO2 92%   BMI 20.60 kg/m²        LDAs:   Peripheral IV 12/29/24 Proximal;Right;Ventral Forearm (Active)       Ambulatory Status:  Presents to emergency department  because of falls (Syncope, seizure, or loss of consciousness): No, Age > 70: No,  FINGERSTICK - Abnormal; Notable for the following components:    POC Glucose 154 (*)     All other components within normal limits   POC GLUCOSE FINGERSTICK - Abnormal; Notable for the following components:    POC Glucose 200 (*)     All other components within normal limits   POC GLUCOSE FINGERSTICK - Abnormal; Notable for the following components:    POC Glucose 163 (*)     All other components within normal limits   POCT GLUCOSE - Normal   POCT GLUCOSE - Normal   POCT GLUCOSE - Normal   LEGIONELLA ANTIGEN, URINE   GRAM STAIN   CULTURE, RESPIRATORY (WITH GRAM STAIN)   TROPONIN   URINE DRUG SCREEN   MICROSCOPIC URINALYSIS   SODIUM, URINE, RANDOM   PROTEIN, URINE, RANDOM   MAGNESIUM   PROCALCITONIN   POCT GLUCOSE       Electronically signed by Donn Bo RN on 12/30/2024 at 4:29 PM

## 2024-12-30 NOTE — H&P
[facility]  Office: [phone]  Malcolm Vivas DO, David Clifford DO, Gianluca Chung DO, Miguel Burger DO, Briseida Marinelli MD, Flaca Robin MD, Shahrzad Light MD, Zully Rodriguez MD,  Norris Felix MD, Tracee Kenyon MD, Jimmy Montana MD,  Kirt Wellington DO, Dilia Loya MD, Giovany Mike MD, Gilberto Vivas DO, Reanna Crow MD,  Braden Kovacs DO, Sarah Charles MD, Aubrie Roman MD, Janessa Hwang MD, Chiqui Renee MD,  Joel Bowen MD, Jordyn Maradiaga MD, Michelle Solano MD, Severiano Murdock MD, Marcell Rodriges MD, Sonal Barrera MD, Farhad Hoskins DO, Serge Powell MD, Kirt Alvarez MD, Mohsin Reza, MD, Shirley Waterhouse, CNP,  Nuha Elizabeth CNP, Farhad Ruiz, CNP,  Johanna Martell, NAS, Leila France, CNP, Leora Whitfield, CNP, Martha Cosby, CNP, Brittney Mccoy, CNP, Marium Ornelas, PA-C, Talia Alvarez PA-C, Mecca Echols, CNP, Roseann Chandra, CNP,  Isamar Juarez, CNP, Kisha Naranjo, CNP, Xochilt Contreras, CNP,  Gale Bill CNP, Dinorah Ferrara, CNP         [facility]   IN-PATIENT SERVICE   [facility]    HISTORY AND PHYSICAL EXAMINATION            Date:   12/30/2024  Patient name:  Fang Walters  Date of admission:  12/29/2024  9:52 PM  MRN:   [id number]  Account:  [id number]  YOB: 1970  PCP:    Miriam Hanna MD  Room:   09/09  Code Status:    Full Code    Chief Complaint:     Chief Complaint   Patient presents with   • Diarrhea   • Cough     History Obtained From:     patient, electronic medical record    History of Present Illness:     This is a 54-year-old female with a significant past medical history of hyperlipidemia, type 2 diabetes mellitus, depression and substance abuse disorder who initially presented to the emergency department with a chief complaint of cough with associated SOB, fevers, chills, chest pain only with cough, nausea, vomiting and diarrhea for the past 3 days.     In the emergency department she was initially

## 2024-12-31 ENCOUNTER — APPOINTMENT (OUTPATIENT)
Dept: GENERAL RADIOLOGY | Age: 54
End: 2024-12-31
Payer: MEDICAID

## 2024-12-31 LAB
ALBUMIN SERPL-MCNC: 2.7 G/DL (ref 3.5–5.2)
ALBUMIN/GLOB SERPL: 0.9 {RATIO} (ref 1–2.5)
ALP SERPL-CCNC: 75 U/L (ref 35–104)
ALT SERPL-CCNC: 9 U/L (ref 10–35)
ANION GAP SERPL CALCULATED.3IONS-SCNC: 7 MMOL/L (ref 9–16)
AST SERPL-CCNC: 20 U/L (ref 10–35)
BASOPHILS # BLD: 0.03 K/UL (ref 0–0.2)
BASOPHILS NFR BLD: 1 % (ref 0–2)
BILIRUB SERPL-MCNC: <0.2 MG/DL (ref 0–1.2)
BUN SERPL-MCNC: 16 MG/DL (ref 6–20)
CALCIUM SERPL-MCNC: 8.5 MG/DL (ref 8.6–10.4)
CHLORIDE SERPL-SCNC: 111 MMOL/L (ref 98–107)
CO2 SERPL-SCNC: 23 MMOL/L (ref 20–31)
CREAT SERPL-MCNC: 1 MG/DL (ref 0.6–0.9)
D DIMER PPP FEU-MCNC: 0.64 UG/ML FEU (ref 0–0.57)
EKG ATRIAL RATE: 88 BPM
EKG P AXIS: 42 DEGREES
EKG P-R INTERVAL: 136 MS
EKG Q-T INTERVAL: 370 MS
EKG QRS DURATION: 80 MS
EKG QTC CALCULATION (BAZETT): 447 MS
EKG R AXIS: 76 DEGREES
EKG T AXIS: 73 DEGREES
EKG VENTRICULAR RATE: 88 BPM
EOSINOPHIL # BLD: 0.49 K/UL (ref 0–0.44)
EOSINOPHILS RELATIVE PERCENT: 8 % (ref 1–4)
ERYTHROCYTE [DISTWIDTH] IN BLOOD BY AUTOMATED COUNT: 13.2 % (ref 11.8–14.4)
ERYTHROCYTE [SEDIMENTATION RATE] IN BLOOD BY PHOTOMETRIC METHOD: 29 MM/HR (ref 0–30)
FERRITIN SERPL-MCNC: 249 NG/ML (ref 15–150)
GFR, ESTIMATED: 67 ML/MIN/1.73M2
GLUCOSE BLD-MCNC: 171 MG/DL (ref 65–105)
GLUCOSE BLD-MCNC: 171 MG/DL (ref 65–105)
GLUCOSE BLD-MCNC: 210 MG/DL (ref 65–105)
GLUCOSE BLD-MCNC: 226 MG/DL (ref 65–105)
GLUCOSE SERPL-MCNC: 189 MG/DL (ref 74–99)
HCT VFR BLD AUTO: 24.3 % (ref 36.3–47.1)
HGB BLD-MCNC: 7.7 G/DL (ref 11.9–15.1)
IMM GRANULOCYTES # BLD AUTO: <0.03 K/UL (ref 0–0.3)
IMM GRANULOCYTES NFR BLD: 0 %
INR PPP: 1.1
LYMPHOCYTES NFR BLD: 2.79 K/UL (ref 1.1–3.7)
LYMPHOCYTES RELATIVE PERCENT: 46 % (ref 24–43)
MAGNESIUM SERPL-MCNC: 1.5 MG/DL (ref 1.6–2.6)
MCH RBC QN AUTO: 28 PG (ref 25.2–33.5)
MCHC RBC AUTO-ENTMCNC: 31.7 G/DL (ref 28.4–34.8)
MCV RBC AUTO: 88.4 FL (ref 82.6–102.9)
MONOCYTES NFR BLD: 0.46 K/UL (ref 0.1–1.2)
MONOCYTES NFR BLD: 8 % (ref 3–12)
NEUTROPHILS NFR BLD: 38 % (ref 36–65)
NEUTS SEG NFR BLD: 2.28 K/UL (ref 1.5–8.1)
NRBC BLD-RTO: 0 PER 100 WBC
PLATELET # BLD AUTO: 186 K/UL (ref 138–453)
PMV BLD AUTO: 10.3 FL (ref 8.1–13.5)
POTASSIUM SERPL-SCNC: 3.9 MMOL/L (ref 3.7–5.3)
PROT SERPL-MCNC: 5.6 G/DL (ref 6.6–8.7)
PROTHROMBIN TIME: 13.6 SEC (ref 11.7–14.9)
RBC # BLD AUTO: 2.75 M/UL (ref 3.95–5.11)
SODIUM SERPL-SCNC: 141 MMOL/L (ref 136–145)
WBC OTHER # BLD: 6.1 K/UL (ref 3.5–11.3)

## 2024-12-31 PROCEDURE — 6370000000 HC RX 637 (ALT 250 FOR IP): Performed by: STUDENT IN AN ORGANIZED HEALTH CARE EDUCATION/TRAINING PROGRAM

## 2024-12-31 PROCEDURE — 6360000002 HC RX W HCPCS: Performed by: STUDENT IN AN ORGANIZED HEALTH CARE EDUCATION/TRAINING PROGRAM

## 2024-12-31 PROCEDURE — 6370000000 HC RX 637 (ALT 250 FOR IP): Performed by: NURSE PRACTITIONER

## 2024-12-31 PROCEDURE — 1200000000 HC SEMI PRIVATE

## 2024-12-31 PROCEDURE — 85379 FIBRIN DEGRADATION QUANT: CPT

## 2024-12-31 PROCEDURE — 6370000000 HC RX 637 (ALT 250 FOR IP): Performed by: HOSPITALIST

## 2024-12-31 PROCEDURE — 83735 ASSAY OF MAGNESIUM: CPT

## 2024-12-31 PROCEDURE — 2500000003 HC RX 250 WO HCPCS: Performed by: NURSE PRACTITIONER

## 2024-12-31 PROCEDURE — 85652 RBC SED RATE AUTOMATED: CPT

## 2024-12-31 PROCEDURE — 80053 COMPREHEN METABOLIC PANEL: CPT

## 2024-12-31 PROCEDURE — 2580000003 HC RX 258: Performed by: STUDENT IN AN ORGANIZED HEALTH CARE EDUCATION/TRAINING PROGRAM

## 2024-12-31 PROCEDURE — 71046 X-RAY EXAM CHEST 2 VIEWS: CPT

## 2024-12-31 PROCEDURE — 82728 ASSAY OF FERRITIN: CPT

## 2024-12-31 PROCEDURE — 99233 SBSQ HOSP IP/OBS HIGH 50: CPT | Performed by: HOSPITALIST

## 2024-12-31 PROCEDURE — 36415 COLL VENOUS BLD VENIPUNCTURE: CPT

## 2024-12-31 PROCEDURE — 82947 ASSAY GLUCOSE BLOOD QUANT: CPT

## 2024-12-31 PROCEDURE — 6360000002 HC RX W HCPCS: Performed by: NURSE PRACTITIONER

## 2024-12-31 PROCEDURE — 6360000002 HC RX W HCPCS: Performed by: HOSPITALIST

## 2024-12-31 PROCEDURE — 85610 PROTHROMBIN TIME: CPT

## 2024-12-31 PROCEDURE — 85025 COMPLETE CBC W/AUTO DIFF WBC: CPT

## 2024-12-31 RX ORDER — MAGNESIUM SULFATE IN WATER 40 MG/ML
2000 INJECTION, SOLUTION INTRAVENOUS PRN
Status: DISCONTINUED | OUTPATIENT
Start: 2024-12-31 | End: 2025-01-03 | Stop reason: HOSPADM

## 2024-12-31 RX ORDER — POTASSIUM CHLORIDE 7.45 MG/ML
10 INJECTION INTRAVENOUS PRN
Status: DISCONTINUED | OUTPATIENT
Start: 2024-12-31 | End: 2025-01-03 | Stop reason: HOSPADM

## 2024-12-31 RX ORDER — INSULIN GLARGINE 100 [IU]/ML
22 INJECTION, SOLUTION SUBCUTANEOUS NIGHTLY
Status: DISCONTINUED | OUTPATIENT
Start: 2024-12-31 | End: 2025-01-03 | Stop reason: HOSPADM

## 2024-12-31 RX ORDER — POTASSIUM CHLORIDE 1500 MG/1
40 TABLET, EXTENDED RELEASE ORAL PRN
Status: DISCONTINUED | OUTPATIENT
Start: 2024-12-31 | End: 2025-01-03 | Stop reason: HOSPADM

## 2024-12-31 RX ORDER — CALCIUM GLUCONATE 20 MG/ML
2000 INJECTION, SOLUTION INTRAVENOUS ONCE
Status: COMPLETED | OUTPATIENT
Start: 2024-12-31 | End: 2024-12-31

## 2024-12-31 RX ADMIN — BENZOCAINE AND MENTHOL 1 LOZENGE: 15; 3.6 LOZENGE ORAL at 14:52

## 2024-12-31 RX ADMIN — INSULIN GLARGINE 22 UNITS: 100 INJECTION, SOLUTION SUBCUTANEOUS at 22:00

## 2024-12-31 RX ADMIN — GABAPENTIN 300 MG: 300 CAPSULE ORAL at 22:01

## 2024-12-31 RX ADMIN — PANTOPRAZOLE SODIUM 40 MG: 40 TABLET, DELAYED RELEASE ORAL at 05:46

## 2024-12-31 RX ADMIN — INSULIN LISPRO 2 UNITS: 100 INJECTION, SOLUTION INTRAVENOUS; SUBCUTANEOUS at 12:18

## 2024-12-31 RX ADMIN — THERA TABS 1 TABLET: TAB at 08:19

## 2024-12-31 RX ADMIN — HEPARIN SODIUM 5000 UNITS: 5000 INJECTION INTRAVENOUS; SUBCUTANEOUS at 14:40

## 2024-12-31 RX ADMIN — HEPARIN SODIUM 5000 UNITS: 5000 INJECTION INTRAVENOUS; SUBCUTANEOUS at 05:46

## 2024-12-31 RX ADMIN — SODIUM CHLORIDE: 9 INJECTION, SOLUTION INTRAVENOUS at 05:46

## 2024-12-31 RX ADMIN — AZITHROMYCIN DIHYDRATE 500 MG: 250 TABLET ORAL at 23:49

## 2024-12-31 RX ADMIN — BENZONATATE 100 MG: 100 CAPSULE ORAL at 14:52

## 2024-12-31 RX ADMIN — INSULIN LISPRO 1 UNITS: 100 INJECTION, SOLUTION INTRAVENOUS; SUBCUTANEOUS at 22:00

## 2024-12-31 RX ADMIN — ATORVASTATIN CALCIUM 10 MG: 20 TABLET, FILM COATED ORAL at 08:18

## 2024-12-31 RX ADMIN — CALCIUM GLUCONATE 2000 MG: 20 INJECTION, SOLUTION INTRAVENOUS at 19:49

## 2024-12-31 RX ADMIN — BENZOCAINE AND MENTHOL 1 LOZENGE: 15; 3.6 LOZENGE ORAL at 18:06

## 2024-12-31 RX ADMIN — BENZOCAINE AND MENTHOL 1 LOZENGE: 15; 3.6 LOZENGE ORAL at 23:50

## 2024-12-31 RX ADMIN — SODIUM CHLORIDE: 9 INJECTION, SOLUTION INTRAVENOUS at 19:46

## 2024-12-31 RX ADMIN — HEPARIN SODIUM 5000 UNITS: 5000 INJECTION INTRAVENOUS; SUBCUTANEOUS at 22:00

## 2024-12-31 RX ADMIN — GABAPENTIN 300 MG: 300 CAPSULE ORAL at 08:19

## 2024-12-31 RX ADMIN — GUAIFENESIN 600 MG: 600 TABLET, MULTILAYER, EXTENDED RELEASE ORAL at 19:52

## 2024-12-31 RX ADMIN — WATER 1000 MG: 1 INJECTION INTRAMUSCULAR; INTRAVENOUS; SUBCUTANEOUS at 23:50

## 2024-12-31 RX ADMIN — FENOFIBRATE 54 MG: 54 TABLET ORAL at 08:19

## 2024-12-31 RX ADMIN — GUAIFENESIN 600 MG: 600 TABLET, MULTILAYER, EXTENDED RELEASE ORAL at 08:18

## 2024-12-31 RX ADMIN — GABAPENTIN 300 MG: 300 CAPSULE ORAL at 14:40

## 2024-12-31 ASSESSMENT — ENCOUNTER SYMPTOMS
ALLERGIC/IMMUNOLOGIC NEGATIVE: 1
EYES NEGATIVE: 1
VOMITING: 1
DIARRHEA: 1
COUGH: 1
NAUSEA: 1
SHORTNESS OF BREATH: 1

## 2024-12-31 ASSESSMENT — PAIN SCALES - GENERAL
PAINLEVEL_OUTOF10: 3
PAINLEVEL_OUTOF10: 2

## 2024-12-31 NOTE — PROGRESS NOTES
Physician Progress Note      PATIENT:               BRADEN OCAMPO  CSN #:                  [id number]  :                       1970  ADMIT DATE:       2024 9:52 PM  DISCH DATE:  RESPONDING  PROVIDER #:        Joel Bowen MD          QUERY TEXT:    Pt admitted with COVID-19 and noted to have Pneumonia with elevated Crp. If   possible, please document in progress notes and discharge summary if you are   evaluating and/or treating:    The medical record reflects the following:  Risk Factors: DM2, Tobacco use  Clinical Indicators: 55 yo F initially presented to the emergency department   with a chief complaint of cough with associated SOB, fevers, chills, chest   pain only with cough, nausea, vomiting and diarrhea for the past 3 days. CXR=   reticulonodular opacities in the left perihilar region suggestive of   pneumonia. Positive for Covid.. Initial CRP 37.6.  No leukocytosis,   procalcitonin 0.09.JUAN-with Creat 1.3  Treatment: IV Hydration, Tessalon, Mucinex, daily lab monitoring  Options provided:  -- Sepsis present on admission due to COVID-19 pneumonia  -- Sepsis present on admission due to COVID-19 infection  -- Covid-19 pneumonia without sepsis  -- Covid-19 infection without sepsis  -- Other - I will add my own diagnosis  -- Disagree - Not applicable / Not valid  -- Disagree - Clinically unable to determine / Unknown  -- Refer to Clinical Documentation Reviewer    PROVIDER RESPONSE TEXT:    This patient has Covid-19 infection without sepsis.    Query created by: Mallory Gonzalez on 2024 12:31 PM      Electronically signed by:  Joel Bowen MD 2024 6:34 PM

## 2024-12-31 NOTE — PLAN OF CARE
Problem: Chronic Conditions and Co-morbidities  Goal: Patient's chronic conditions and co-morbidity symptoms are monitored and maintained or improved  12/31/2024 1701 by Louisa Hopkins RN  Outcome: Progressing  12/31/2024 0641 by Art Vargas RN  Outcome: Progressing     Problem: Discharge Planning  Goal: Discharge to home or other facility with appropriate resources  12/31/2024 1701 by Louisa Hopkins RN  Outcome: Progressing  12/31/2024 0641 by Art Vargas RN  Outcome: Progressing     Problem: Pain  Goal: Verbalizes/displays adequate comfort level or baseline comfort level  12/31/2024 1701 by Louisa Hopkins RN  Outcome: Progressing  12/31/2024 0641 by Art Vargas RN  Outcome: Progressing     Problem: Safety - Adult  Goal: Free from fall injury  Outcome: Progressing

## 2024-12-31 NOTE — PROGRESS NOTES
[facility]  Office: [phone]  Malcolm Vivas DO, David Clifford DO, Gianluca Chung DO, Miguel Burger DO, Briseida Marinelli MD, Flaca Robin MD, Shahrzad Light MD, Zully Rodriguez MD,  Norris Felix MD, Tracee Kenyon MD, Gene Rosario DO, Jimmy Montana MD,  Kirt Wellington DO, Dilia Loya MD, Giovany Mike MD, Gilberto Vivas DO, Reanna Crow MD, Serge Powell MD, Braden Kovacs DO, Sarah Charles MD, Aubrie Roman MD, Janessa Hwang MD, Chiqui Renee MD,  Mark Gaston DO, Gayatri Abdi MD,  Shirley Waterhouse, CNP,  Nuha Elizabeth, CNP, Xochilt Contreras, CNP, Farhad Ruiz, CNP,  Johanna Martell, [facility], Leila France, CNP, Leora Whitfield, CNP, Eileen Mann, CNP, Martha Cosby, CNP, Brittney Mccoy, CNP, ALTAGRACIA VergaraC, Joslyn Mora, CNS, Lisa Ibarra, CNP, Gale Bill, CNP         [facility]   IN-PATIENT SERVICE   [facility]    Progress Note    12/31/2024    4:27 PM    Name:   Fang Walters  MRN:     [id number]     Acct:      [id number]   Room:   0316/0316-01   Day:  1  Admit Date:  12/29/2024  9:52 PM    PCP:   Miriam Hanna MD  Code Status:  Full Code    Subjective:     C/C:   Chief Complaint   Patient presents with    Diarrhea    Cough     Interval History Status: improved.     Patient states she is doing well.  No new complaints.    Brief History:     This is a 54-year-old female with a significant past medical history of hyperlipidemia, type 2 diabetes mellitus, depression and substance abuse disorder who initially presented to the emergency department with a chief complaint of cough with associated SOB, fevers, chills, chest pain only with cough, nausea, vomiting and diarrhea for the past 3 days.      In the emergency department she was initially afebrile and hemodynamically stable.  Saturating well on room air.  Initial labs demonstrated hypokalemia 3.3, elevated BUN and creatinine 26 and 1.3 respectively, hyperglycemia 449, no  Abdomen is soft.      Tenderness: There is no abdominal tenderness.   Musculoskeletal:         General: No swelling. Normal range of motion.      Cervical back: Normal range of motion and neck supple.   Skin:     General: Skin is warm and dry.   Neurological:      General: No focal deficit present.      Mental Status: She is alert and oriented to person, place, and time.         Assessment:        Hospital Problems             Last Modified POA    * (Principal) Pneumonia due to infectious organism 12/30/2024 Yes    Hyperglycemia 12/30/2024 Yes    Depression 12/30/2024 Yes    GERD with esophagitis 12/30/2024 Yes    Type 2 diabetes mellitus with hyperglycemia, with long-term current use of insulin ([facility]) 12/30/2024 Yes    Bipolar disorder, mixed ([facility]) 12/30/2024 Yes    Tobacco use 12/30/2024 Yes    COVID-19 12/30/2024 Yes    Acute kidney injury ([facility]) 12/30/2024 Yes       Plan:        Community acquired pneumonia  - CXR done and reviewed  - Started on Ceftriaxone and Azithromycin and will continue  - Await cultures  - Pulmonary toilet    2. COVID 19 pneumonia  - PCR for SARS-CoV-2 positive  - No hypoxia  - Symptoms started 3 days ago  - CXR done and reviewed  - No indication for steroids or Remdesevir  - OK to start Paxlovid, started  - Maintain isolation precautions  - Inflammatory markers done and reviewed  - Pt has not been vaccinated against COVID 19  - She states she had COVID 2 years ago    3. JUAN  - Sec to hypovolemia  - IV fluids for hydration  - Avoid nephrotoxic agents  - Monitor BMP daily  - Improving    4. Fevers, chills, cough, dyspnea, nausea, vomiting, diarrhea  - Likely sec to above  - Supportive and symptomatic care    5. Hypokalemia  - Replace per protocol  - Monitor BMP daily  - Resolved    6. Hyperglycemia  - Sec to poorly controlled DM typ 2  - Accuchecks  - Lantus, Lispro and ISS  - HbA1c ordered and pending    7. Anemia  - Watch for bleeding  - Monitor CBC daily    8. Hyperchloremia  - Sec to IV

## 2025-01-01 LAB
ALBUMIN SERPL-MCNC: 2.7 G/DL (ref 3.5–5.2)
ALBUMIN/GLOB SERPL: 0.9 {RATIO} (ref 1–2.5)
ALP SERPL-CCNC: 90 U/L (ref 35–104)
ALT SERPL-CCNC: 7 U/L (ref 10–35)
ANION GAP SERPL CALCULATED.3IONS-SCNC: 9 MMOL/L (ref 9–16)
AST SERPL-CCNC: 17 U/L (ref 10–35)
BASOPHILS # BLD: 0.03 K/UL (ref 0–0.2)
BASOPHILS NFR BLD: 1 % (ref 0–2)
BILIRUB SERPL-MCNC: <0.2 MG/DL (ref 0–1.2)
BUN SERPL-MCNC: 15 MG/DL (ref 6–20)
CALCIUM SERPL-MCNC: 8.6 MG/DL (ref 8.6–10.4)
CHLORIDE SERPL-SCNC: 109 MMOL/L (ref 98–107)
CO2 SERPL-SCNC: 21 MMOL/L (ref 20–31)
CREAT SERPL-MCNC: 1 MG/DL (ref 0.6–0.9)
CRP SERPL HS-MCNC: 18.8 MG/L (ref 0–5)
EOSINOPHIL # BLD: 0.39 K/UL (ref 0–0.44)
EOSINOPHILS RELATIVE PERCENT: 6 % (ref 1–4)
ERYTHROCYTE [DISTWIDTH] IN BLOOD BY AUTOMATED COUNT: 13.1 % (ref 11.8–14.4)
EST. AVERAGE GLUCOSE BLD GHB EST-MCNC: 146 MG/DL
GFR, ESTIMATED: 67 ML/MIN/1.73M2
GLUCOSE BLD-MCNC: 174 MG/DL (ref 65–105)
GLUCOSE BLD-MCNC: 176 MG/DL (ref 65–105)
GLUCOSE BLD-MCNC: 202 MG/DL (ref 65–105)
GLUCOSE BLD-MCNC: 236 MG/DL (ref 65–105)
GLUCOSE SERPL-MCNC: 210 MG/DL (ref 74–99)
HBA1C MFR BLD: 6.7 % (ref 4–6)
HCT VFR BLD AUTO: 23.2 % (ref 36.3–47.1)
HGB BLD-MCNC: 7.6 G/DL (ref 11.9–15.1)
IMM GRANULOCYTES # BLD AUTO: 0.03 K/UL (ref 0–0.3)
IMM GRANULOCYTES NFR BLD: 1 %
LYMPHOCYTES NFR BLD: 2.62 K/UL (ref 1.1–3.7)
LYMPHOCYTES RELATIVE PERCENT: 41 % (ref 24–43)
MAGNESIUM SERPL-MCNC: 1.5 MG/DL (ref 1.6–2.6)
MCH RBC QN AUTO: 28.3 PG (ref 25.2–33.5)
MCHC RBC AUTO-ENTMCNC: 32.8 G/DL (ref 28.4–34.8)
MCV RBC AUTO: 86.2 FL (ref 82.6–102.9)
MONOCYTES NFR BLD: 0.4 K/UL (ref 0.1–1.2)
MONOCYTES NFR BLD: 6 % (ref 3–12)
NEUTROPHILS NFR BLD: 45 % (ref 36–65)
NEUTS SEG NFR BLD: 2.87 K/UL (ref 1.5–8.1)
NRBC BLD-RTO: 0 PER 100 WBC
PLATELET # BLD AUTO: 209 K/UL (ref 138–453)
PMV BLD AUTO: 10.1 FL (ref 8.1–13.5)
POTASSIUM SERPL-SCNC: 3.6 MMOL/L (ref 3.7–5.3)
PROT SERPL-MCNC: 5.7 G/DL (ref 6.6–8.7)
RBC # BLD AUTO: 2.69 M/UL (ref 3.95–5.11)
SODIUM SERPL-SCNC: 139 MMOL/L (ref 136–145)
WBC OTHER # BLD: 6.3 K/UL (ref 3.5–11.3)

## 2025-01-01 PROCEDURE — 6370000000 HC RX 637 (ALT 250 FOR IP): Performed by: STUDENT IN AN ORGANIZED HEALTH CARE EDUCATION/TRAINING PROGRAM

## 2025-01-01 PROCEDURE — 6370000000 HC RX 637 (ALT 250 FOR IP): Performed by: INTERNAL MEDICINE

## 2025-01-01 PROCEDURE — 85025 COMPLETE CBC W/AUTO DIFF WBC: CPT

## 2025-01-01 PROCEDURE — 2580000003 HC RX 258: Performed by: STUDENT IN AN ORGANIZED HEALTH CARE EDUCATION/TRAINING PROGRAM

## 2025-01-01 PROCEDURE — 83735 ASSAY OF MAGNESIUM: CPT

## 2025-01-01 PROCEDURE — 80053 COMPREHEN METABOLIC PANEL: CPT

## 2025-01-01 PROCEDURE — 6360000002 HC RX W HCPCS: Performed by: NURSE PRACTITIONER

## 2025-01-01 PROCEDURE — 6370000000 HC RX 637 (ALT 250 FOR IP): Performed by: HOSPITALIST

## 2025-01-01 PROCEDURE — 1200000000 HC SEMI PRIVATE

## 2025-01-01 PROCEDURE — 6370000000 HC RX 637 (ALT 250 FOR IP): Performed by: NURSE PRACTITIONER

## 2025-01-01 PROCEDURE — 83036 HEMOGLOBIN GLYCOSYLATED A1C: CPT

## 2025-01-01 PROCEDURE — 36415 COLL VENOUS BLD VENIPUNCTURE: CPT

## 2025-01-01 PROCEDURE — 82947 ASSAY GLUCOSE BLOOD QUANT: CPT

## 2025-01-01 PROCEDURE — 6360000002 HC RX W HCPCS: Performed by: HOSPITALIST

## 2025-01-01 PROCEDURE — 87641 MR-STAPH DNA AMP PROBE: CPT

## 2025-01-01 PROCEDURE — 97161 PT EVAL LOW COMPLEX 20 MIN: CPT

## 2025-01-01 PROCEDURE — 86140 C-REACTIVE PROTEIN: CPT

## 2025-01-01 PROCEDURE — 2500000003 HC RX 250 WO HCPCS: Performed by: NURSE PRACTITIONER

## 2025-01-01 PROCEDURE — 6360000002 HC RX W HCPCS: Performed by: STUDENT IN AN ORGANIZED HEALTH CARE EDUCATION/TRAINING PROGRAM

## 2025-01-01 PROCEDURE — 99232 SBSQ HOSP IP/OBS MODERATE 35: CPT | Performed by: HOSPITALIST

## 2025-01-01 PROCEDURE — 97530 THERAPEUTIC ACTIVITIES: CPT

## 2025-01-01 RX ORDER — INSULIN LISPRO 100 [IU]/ML
7 INJECTION, SOLUTION INTRAVENOUS; SUBCUTANEOUS
Status: DISCONTINUED | OUTPATIENT
Start: 2025-01-01 | End: 2025-01-03 | Stop reason: HOSPADM

## 2025-01-01 RX ORDER — LEVOFLOXACIN 500 MG/1
500 TABLET, FILM COATED ORAL DAILY
Status: DISCONTINUED | OUTPATIENT
Start: 2025-01-01 | End: 2025-01-03 | Stop reason: HOSPADM

## 2025-01-01 RX ORDER — LINEZOLID 600 MG/1
600 TABLET, FILM COATED ORAL EVERY 12 HOURS SCHEDULED
Status: DISCONTINUED | OUTPATIENT
Start: 2025-01-01 | End: 2025-01-03

## 2025-01-01 RX ORDER — MAGNESIUM HYDROXIDE/ALUMINUM HYDROXICE/SIMETHICONE 120; 1200; 1200 MG/30ML; MG/30ML; MG/30ML
30 SUSPENSION ORAL EVERY 6 HOURS PRN
Status: DISCONTINUED | OUTPATIENT
Start: 2025-01-01 | End: 2025-01-03 | Stop reason: HOSPADM

## 2025-01-01 RX ORDER — HYDRALAZINE HYDROCHLORIDE 20 MG/ML
10 INJECTION INTRAMUSCULAR; INTRAVENOUS EVERY 6 HOURS PRN
Status: DISCONTINUED | OUTPATIENT
Start: 2025-01-01 | End: 2025-01-03 | Stop reason: HOSPADM

## 2025-01-01 RX ORDER — DEXAMETHASONE SODIUM PHOSPHATE 10 MG/ML
6 INJECTION INTRAMUSCULAR; INTRAVENOUS EVERY 24 HOURS
Status: DISCONTINUED | OUTPATIENT
Start: 2025-01-01 | End: 2025-01-03 | Stop reason: HOSPADM

## 2025-01-01 RX ADMIN — BENZOCAINE AND MENTHOL 1 LOZENGE: 15; 3.6 LOZENGE ORAL at 19:56

## 2025-01-01 RX ADMIN — POTASSIUM CHLORIDE 40 MEQ: 1500 TABLET, EXTENDED RELEASE ORAL at 11:37

## 2025-01-01 RX ADMIN — INSULIN GLARGINE 22 UNITS: 100 INJECTION, SOLUTION SUBCUTANEOUS at 21:08

## 2025-01-01 RX ADMIN — HEPARIN SODIUM 5000 UNITS: 5000 INJECTION INTRAVENOUS; SUBCUTANEOUS at 13:48

## 2025-01-01 RX ADMIN — HEPARIN SODIUM 5000 UNITS: 5000 INJECTION INTRAVENOUS; SUBCUTANEOUS at 05:48

## 2025-01-01 RX ADMIN — HEPARIN SODIUM 5000 UNITS: 5000 INJECTION INTRAVENOUS; SUBCUTANEOUS at 21:09

## 2025-01-01 RX ADMIN — DEXAMETHASONE SODIUM PHOSPHATE 6 MG: 10 INJECTION INTRAMUSCULAR; INTRAVENOUS at 17:33

## 2025-01-01 RX ADMIN — BENZONATATE 100 MG: 100 CAPSULE ORAL at 19:51

## 2025-01-01 RX ADMIN — BENZONATATE 100 MG: 100 CAPSULE ORAL at 03:32

## 2025-01-01 RX ADMIN — LEVOFLOXACIN 500 MG: 500 TABLET, FILM COATED ORAL at 17:32

## 2025-01-01 RX ADMIN — PANTOPRAZOLE SODIUM 40 MG: 40 TABLET, DELAYED RELEASE ORAL at 05:48

## 2025-01-01 RX ADMIN — INSULIN LISPRO 1 UNITS: 100 INJECTION, SOLUTION INTRAVENOUS; SUBCUTANEOUS at 13:48

## 2025-01-01 RX ADMIN — HYDRALAZINE HYDROCHLORIDE 10 MG: 20 INJECTION INTRAMUSCULAR; INTRAVENOUS at 21:29

## 2025-01-01 RX ADMIN — MAGNESIUM SULFATE HEPTAHYDRATE 2000 MG: 40 INJECTION, SOLUTION INTRAVENOUS at 09:04

## 2025-01-01 RX ADMIN — GABAPENTIN 300 MG: 300 CAPSULE ORAL at 19:50

## 2025-01-01 RX ADMIN — ALUMINUM HYDROXIDE, MAGNESIUM HYDROXIDE, AND SIMETHICONE 30 ML: 200; 200; 20 SUSPENSION ORAL at 03:32

## 2025-01-01 RX ADMIN — GABAPENTIN 300 MG: 300 CAPSULE ORAL at 13:48

## 2025-01-01 RX ADMIN — INSULIN LISPRO 7 UNITS: 100 INJECTION, SOLUTION INTRAVENOUS; SUBCUTANEOUS at 17:34

## 2025-01-01 RX ADMIN — LINEZOLID 600 MG: 600 TABLET, FILM COATED ORAL at 19:51

## 2025-01-01 RX ADMIN — SODIUM CHLORIDE: 9 INJECTION, SOLUTION INTRAVENOUS at 19:50

## 2025-01-01 RX ADMIN — FENOFIBRATE 54 MG: 54 TABLET ORAL at 08:58

## 2025-01-01 RX ADMIN — GUAIFENESIN 600 MG: 600 TABLET, MULTILAYER, EXTENDED RELEASE ORAL at 19:51

## 2025-01-01 RX ADMIN — WATER 1000 MG: 1 INJECTION INTRAMUSCULAR; INTRAVENOUS; SUBCUTANEOUS at 23:34

## 2025-01-01 RX ADMIN — GUAIFENESIN 600 MG: 600 TABLET, MULTILAYER, EXTENDED RELEASE ORAL at 08:58

## 2025-01-01 RX ADMIN — BENZONATATE 100 MG: 100 CAPSULE ORAL at 11:35

## 2025-01-01 RX ADMIN — THERA TABS 1 TABLET: TAB at 08:58

## 2025-01-01 RX ADMIN — GABAPENTIN 300 MG: 300 CAPSULE ORAL at 08:58

## 2025-01-01 ASSESSMENT — ENCOUNTER SYMPTOMS
VOMITING: 1
COLOR CHANGE: 0
COUGH: 1
ALLERGIC/IMMUNOLOGIC NEGATIVE: 1
EYES NEGATIVE: 1
EYE DISCHARGE: 0
APNEA: 0
DIARRHEA: 1
NAUSEA: 1
SHORTNESS OF BREATH: 1
ABDOMINAL DISTENTION: 0

## 2025-01-01 ASSESSMENT — PAIN SCALES - GENERAL
PAINLEVEL_OUTOF10: 0
PAINLEVEL_OUTOF10: 3
PAINLEVEL_OUTOF10: 4

## 2025-01-01 NOTE — PLAN OF CARE
Problem: Chronic Conditions and Co-morbidities  Goal: Patient's chronic conditions and co-morbidity symptoms are monitored and maintained or improved  1/1/2025 0535 by Annel Skinner RN  Outcome: Progressing  12/31/2024 1701 by Louisa Hopkins RN  Outcome: Progressing     Problem: Discharge Planning  Goal: Discharge to home or other facility with appropriate resources  1/1/2025 0535 by Annel Skinner RN  Outcome: Progressing  12/31/2024 1701 by Louisa Hopkins RN  Outcome: Progressing     Problem: Pain  Goal: Verbalizes/displays adequate comfort level or baseline comfort level  1/1/2025 0535 by Annel Skinner RN  Outcome: Progressing  12/31/2024 1701 by Louisa Hopkins RN  Outcome: Progressing     Problem: Safety - Adult  Goal: Free from fall injury  1/1/2025 0535 by Annel Skinner RN  Outcome: Progressing  12/31/2024 1701 by Louisa Hopkins RN  Outcome: Progressing

## 2025-01-01 NOTE — PROGRESS NOTES
Physical Therapy  Facility/Department: [facility] RENAL//MED SURG  Physical Therapy Initial Assessment    Name: Fang Walters  : 1970  MRN: [id number]  Date of Service: 2025    Chief Complaint   Patient presents with    Diarrhea    Cough       Discharge Recommendations:    No further therapy required at discharge.     PT Equipment Recommendations  Equipment Needed: No      Patient Diagnosis(es): The primary encounter diagnosis was Pneumonia of left lung due to infectious organism, unspecified part of lung. Diagnoses of Hypokalemia, Diarrhea, unspecified type, and JUAN (acute kidney injury) (HCC) were also pertinent to this visit.  Past Medical History:  has a past medical history of Depression, Diabetes mellitus (HCC), Hyperlipidemia, Pancreatitis, Pneumonia due to infectious organism, and Substance abuse (HCC).  Past Surgical History:  has a past surgical history that includes Cholecystectomy; Ectopic pregnancy surgery; Carpal tunnel release (); Colonoscopy (N/A, 2019); Upper gastrointestinal endoscopy (N/A, 2019); and Upper gastrointestinal endoscopy (N/A, 2021).    Assessment  Assessment: Pt grossly SBA, amb 30' no AD SBA. Pt without acute PT needs, Physical Therapy to sign off.  Therapy Prognosis: Good  Decision Making: Low Complexity  Requires PT Follow-Up: No  Activity Tolerance  Activity Tolerance: Patient tolerated treatment well    Plan  Physical Therapy Plan  Additional Comments: d/c PT  Safety Devices  Type of Devices: Call light within reach, Nurse notified, All fall risk precautions in place, Patient at risk for falls, Gait belt, Left in chair  Restraints  Restraints Initially in Place: No    Restrictions  Restrictions/Precautions  Restrictions/Precautions: Isolation, General Precautions (covid-19, Droplet Plus)  Activity Level: Up as Tolerated, Up with Assist  Required Braces or Orthoses?: No     Subjective  General  Patient assessed for rehabilitation services?: Yes  Response

## 2025-01-01 NOTE — PROGRESS NOTES
[facility]  Office: [phone]  Malcolm Vivas DO, David Clifford DO, Gianluca Chung DO, Miguel Burger DO, Briseida Marinelli MD, Flaca Robin MD, Shahrzad Light MD, Zully Rodriguez MD,  Norris Felix MD, Tracee Kenyon MD, Gene Rosario DO, Jimmy Montana MD,  Kirt Wellington DO, Dilia Loya MD, Giovany Mike MD, Gilberto Vivas DO, Reanna Crow MD, Serge Powell MD, Braden Kovacs DO, Sarah Charles MD, Aubrie Roman MD, Janessa Hwang MD, Chiqui Renee MD,  Mark Gaston DO, Gayatri Abdi MD,  Shirley Waterhouse, CNP,  Nuha Elizabeth, CNP, Xochilt Contreras, CNP, Farhad Ruiz, CNP,  Johanna Martell, [facility], Leila France, CNP, Leora Whitfield, CNP, Eileen Mann, CNP, Martha Cosby, CNP, Brittney Mccoy, CNP, ALTAGRACIA VergaraC, Joslyn Mora, CNS, Lisa Ibarra, CNP, Gale Bill, CNP         [facility]   IN-PATIENT SERVICE   [facility]    Progress Note    1/1/2025    2:42 PM    Name:   Fang Walters  MRN:     [id number]     Acct:      [id number]   Room:   0316/0316-01   Day:  2  Admit Date:  12/29/2024  9:52 PM    PCP:   Miriam Hanna MD  Code Status:  Full Code    Subjective:     C/C:   Chief Complaint   Patient presents with    Diarrhea    Cough     Interval History Status: improved.     Patient states she is doing well.  No new complaints.    Brief History:     This is a 54-year-old female with a significant past medical history of hyperlipidemia, type 2 diabetes mellitus, depression and substance abuse disorder who initially presented to the emergency department with a chief complaint of cough with associated SOB, fevers, chills, chest pain only with cough, nausea, vomiting and diarrhea for the past 3 days.      In the emergency department she was initially afebrile and hemodynamically stable.  Saturating well on room air.  Initial labs demonstrated hypokalemia 3.3, elevated BUN and creatinine 26 and 1.3 respectively, hyperglycemia 449, no  left perihilar pneumonia.     XR CHEST (2 VW)    Result Date: 12/29/2024  Reticulo nodular opacities in the left perihilar region suggestive of pneumonia. Follow-up to resolution recommended.       Physical Examination:        Physical Exam  Vitals reviewed.   Constitutional:       Appearance: Normal appearance.   HENT:      Head: Normocephalic and atraumatic.      Mouth/Throat:      Mouth: Mucous membranes are moist.   Eyes:      Extraocular Movements: Extraocular movements intact.      Pupils: Pupils are equal, round, and reactive to light.   Cardiovascular:      Rate and Rhythm: Normal rate and regular rhythm.      Pulses: Normal pulses.      Heart sounds: Normal heart sounds.   Pulmonary:      Effort: Pulmonary effort is normal.      Breath sounds: Normal breath sounds.   Abdominal:      Palpations: Abdomen is soft.      Tenderness: There is no abdominal tenderness.   Musculoskeletal:         General: No swelling. Normal range of motion.      Cervical back: Normal range of motion and neck supple.   Skin:     General: Skin is warm and dry.   Neurological:      General: No focal deficit present.      Mental Status: She is alert and oriented to person, place, and time.         Assessment:        Hospital Problems             Last Modified POA    * (Principal) Pneumonia due to infectious organism 12/30/2024 Yes    Hyperglycemia 12/30/2024 Yes    Depression 12/30/2024 Yes    GERD with esophagitis 12/30/2024 Yes    Type 2 diabetes mellitus with hyperglycemia, with long-term current use of insulin ([facility]) 12/30/2024 Yes    Bipolar disorder, mixed ([facility]) 12/30/2024 Yes    Tobacco use 12/30/2024 Yes    COVID-19 12/30/2024 Yes    Acute kidney injury (HCC) 12/30/2024 Yes       Plan:        Community acquired pneumonia  - CXR done and reviewed  - Started on Ceftriaxone and Azithromycin and will continue  - Await cultures  - Pulmonary toilet    2. COVID 19 pneumonia  - PCR for SARS-CoV-2 positive  - CXR done and reviewed  - OK

## 2025-01-01 NOTE — CONSULTS
[facility] -   Infectious diseases evaluation  admission date 12/29/2024    reason for consultation:   Covid     Impression :   Current:  Covid illness  LLL bacterial pneumonia w hypoxemia  CRP elevation  DM 2  ? Hx drug use    Other:    Discussion / summary of stay / plan of care/ Recommendations:     HENCE:   1/1/25 Covid and LLL pneumonia seems like bacterial  getting more hypoxic  paxlovid and ceftriaxone and zithromax started 12/30  Nasal MRSA  Zyvox and levaquin pend cx  Repeat CRP track response to therapy  37 - 18 better  Cough clear to brown( vapes) - get Sputum culture  Get CT chest better eval pneumonia   On decadron  1/2 she feels better - on RA    Infection Control Recommendations   [place] Precautions  Contact Isolation plus till 1/8/2025      Antimicrobial Stewardship Recommendations   Simplification of therapy  Targeted therapy      History of Present Illness:   Initial history:  Fang Walters is a 54 y.o.-year-old female Beatties mellitus, presented because of shortness of breath with cough, was found to have positive for COVID and chest x-ray suggested left lower lobe pneumonia.  Started on ceftriaxone and Zithromax since 12/30, along with Paxlovid, today 1/1/2025 her oxygenation seems to have gone down and hence infectious was consulted.    WBC is normal, her CRP was 38  No fever  Has been on decadron    Interval changes  1/2/2025   Patient Vitals for the past 8 hrs:   BP Pulse   01/01/25 2241 122/60 75       Summary of relevant labs:  Labs:  W 6.3  CRP 38  Creat 1  Micro:  Resp PCR + COVID  UA neg  Procedures      Cardiology      Imaging:      CXR 12/30 LLL pneumonia         I have personally reviewed the past medical history, past surgical history, medications, social history, and family history, and I haveupdated the database accordingly.      Allergies:   Patient has no known allergies.     Review of Systems:     Review of Systems

## 2025-01-02 ENCOUNTER — APPOINTMENT (OUTPATIENT)
Dept: CT IMAGING | Age: 55
End: 2025-01-02
Payer: MEDICAID

## 2025-01-02 PROBLEM — E87.6 HYPOKALEMIA: Status: ACTIVE | Noted: 2025-01-02

## 2025-01-02 PROBLEM — N17.9 AKI (ACUTE KIDNEY INJURY) (HCC): Status: ACTIVE | Noted: 2025-01-02

## 2025-01-02 LAB
GLUCOSE BLD-MCNC: 232 MG/DL (ref 65–105)
GLUCOSE BLD-MCNC: 270 MG/DL (ref 65–105)
GLUCOSE BLD-MCNC: 317 MG/DL (ref 65–105)
GLUCOSE BLD-MCNC: 387 MG/DL (ref 65–105)
MRSA, DNA, NASAL: NEGATIVE
SPECIMEN DESCRIPTION: NORMAL

## 2025-01-02 PROCEDURE — 6370000000 HC RX 637 (ALT 250 FOR IP): Performed by: HOSPITALIST

## 2025-01-02 PROCEDURE — 71250 CT THORAX DX C-: CPT

## 2025-01-02 PROCEDURE — 6360000002 HC RX W HCPCS: Performed by: STUDENT IN AN ORGANIZED HEALTH CARE EDUCATION/TRAINING PROGRAM

## 2025-01-02 PROCEDURE — 6360000002 HC RX W HCPCS: Performed by: HOSPITALIST

## 2025-01-02 PROCEDURE — 82947 ASSAY GLUCOSE BLOOD QUANT: CPT

## 2025-01-02 PROCEDURE — 6370000000 HC RX 637 (ALT 250 FOR IP): Performed by: NURSE PRACTITIONER

## 2025-01-02 PROCEDURE — 6370000000 HC RX 637 (ALT 250 FOR IP): Performed by: STUDENT IN AN ORGANIZED HEALTH CARE EDUCATION/TRAINING PROGRAM

## 2025-01-02 PROCEDURE — 99232 SBSQ HOSP IP/OBS MODERATE 35: CPT | Performed by: STUDENT IN AN ORGANIZED HEALTH CARE EDUCATION/TRAINING PROGRAM

## 2025-01-02 PROCEDURE — 2580000003 HC RX 258: Performed by: STUDENT IN AN ORGANIZED HEALTH CARE EDUCATION/TRAINING PROGRAM

## 2025-01-02 PROCEDURE — 1200000000 HC SEMI PRIVATE

## 2025-01-02 PROCEDURE — 99254 IP/OBS CNSLTJ NEW/EST MOD 60: CPT | Performed by: INTERNAL MEDICINE

## 2025-01-02 PROCEDURE — 6360000002 HC RX W HCPCS: Performed by: NURSE PRACTITIONER

## 2025-01-02 PROCEDURE — 6370000000 HC RX 637 (ALT 250 FOR IP): Performed by: INTERNAL MEDICINE

## 2025-01-02 PROCEDURE — 2500000003 HC RX 250 WO HCPCS: Performed by: STUDENT IN AN ORGANIZED HEALTH CARE EDUCATION/TRAINING PROGRAM

## 2025-01-02 RX ORDER — INSULIN LISPRO 100 [IU]/ML
0-8 INJECTION, SOLUTION INTRAVENOUS; SUBCUTANEOUS
Status: DISCONTINUED | OUTPATIENT
Start: 2025-01-02 | End: 2025-01-03 | Stop reason: HOSPADM

## 2025-01-02 RX ORDER — METOPROLOL TARTRATE 1 MG/ML
5 INJECTION, SOLUTION INTRAVENOUS EVERY 4 HOURS PRN
Status: DISCONTINUED | OUTPATIENT
Start: 2025-01-02 | End: 2025-01-03 | Stop reason: HOSPADM

## 2025-01-02 RX ADMIN — LINEZOLID 600 MG: 600 TABLET, FILM COATED ORAL at 08:11

## 2025-01-02 RX ADMIN — INSULIN LISPRO 7 UNITS: 100 INJECTION, SOLUTION INTRAVENOUS; SUBCUTANEOUS at 12:29

## 2025-01-02 RX ADMIN — PANTOPRAZOLE SODIUM 40 MG: 40 TABLET, DELAYED RELEASE ORAL at 05:42

## 2025-01-02 RX ADMIN — ONDANSETRON 4 MG: 4 TABLET, ORALLY DISINTEGRATING ORAL at 12:33

## 2025-01-02 RX ADMIN — GABAPENTIN 300 MG: 300 CAPSULE ORAL at 13:46

## 2025-01-02 RX ADMIN — INSULIN LISPRO 7 UNITS: 100 INJECTION, SOLUTION INTRAVENOUS; SUBCUTANEOUS at 08:08

## 2025-01-02 RX ADMIN — HEPARIN SODIUM 5000 UNITS: 5000 INJECTION INTRAVENOUS; SUBCUTANEOUS at 13:46

## 2025-01-02 RX ADMIN — INSULIN GLARGINE 22 UNITS: 100 INJECTION, SOLUTION SUBCUTANEOUS at 21:02

## 2025-01-02 RX ADMIN — GUAIFENESIN 600 MG: 600 TABLET, MULTILAYER, EXTENDED RELEASE ORAL at 19:52

## 2025-01-02 RX ADMIN — INSULIN LISPRO 7 UNITS: 100 INJECTION, SOLUTION INTRAVENOUS; SUBCUTANEOUS at 16:41

## 2025-01-02 RX ADMIN — BENZOCAINE AND MENTHOL 1 LOZENGE: 15; 3.6 LOZENGE ORAL at 19:52

## 2025-01-02 RX ADMIN — BENZONATATE 100 MG: 100 CAPSULE ORAL at 19:52

## 2025-01-02 RX ADMIN — GABAPENTIN 300 MG: 300 CAPSULE ORAL at 19:53

## 2025-01-02 RX ADMIN — GABAPENTIN 300 MG: 300 CAPSULE ORAL at 08:11

## 2025-01-02 RX ADMIN — FENOFIBRATE 54 MG: 54 TABLET ORAL at 08:10

## 2025-01-02 RX ADMIN — METOPROLOL TARTRATE 5 MG: 5 INJECTION INTRAVENOUS at 19:09

## 2025-01-02 RX ADMIN — HYDRALAZINE HYDROCHLORIDE 10 MG: 20 INJECTION INTRAMUSCULAR; INTRAVENOUS at 08:20

## 2025-01-02 RX ADMIN — HEPARIN SODIUM 5000 UNITS: 5000 INJECTION INTRAVENOUS; SUBCUTANEOUS at 05:41

## 2025-01-02 RX ADMIN — SODIUM CHLORIDE: 9 INJECTION, SOLUTION INTRAVENOUS at 05:40

## 2025-01-02 RX ADMIN — GUAIFENESIN 600 MG: 600 TABLET, MULTILAYER, EXTENDED RELEASE ORAL at 08:11

## 2025-01-02 RX ADMIN — INSULIN LISPRO 2 UNITS: 100 INJECTION, SOLUTION INTRAVENOUS; SUBCUTANEOUS at 16:40

## 2025-01-02 RX ADMIN — INSULIN LISPRO 4 UNITS: 100 INJECTION, SOLUTION INTRAVENOUS; SUBCUTANEOUS at 21:01

## 2025-01-02 RX ADMIN — THERA TABS 1 TABLET: TAB at 08:11

## 2025-01-02 RX ADMIN — LINEZOLID 600 MG: 600 TABLET, FILM COATED ORAL at 19:52

## 2025-01-02 RX ADMIN — HEPARIN SODIUM 5000 UNITS: 5000 INJECTION INTRAVENOUS; SUBCUTANEOUS at 21:01

## 2025-01-02 RX ADMIN — DEXAMETHASONE SODIUM PHOSPHATE 6 MG: 10 INJECTION INTRAMUSCULAR; INTRAVENOUS at 16:41

## 2025-01-02 RX ADMIN — HYDRALAZINE HYDROCHLORIDE 10 MG: 20 INJECTION INTRAMUSCULAR; INTRAVENOUS at 18:29

## 2025-01-02 RX ADMIN — LEVOFLOXACIN 500 MG: 500 TABLET, FILM COATED ORAL at 08:11

## 2025-01-02 RX ADMIN — INSULIN LISPRO 6 UNITS: 100 INJECTION, SOLUTION INTRAVENOUS; SUBCUTANEOUS at 12:29

## 2025-01-02 RX ADMIN — INSULIN LISPRO 4 UNITS: 100 INJECTION, SOLUTION INTRAVENOUS; SUBCUTANEOUS at 08:08

## 2025-01-02 ASSESSMENT — ENCOUNTER SYMPTOMS: SHORTNESS OF BREATH: 0

## 2025-01-02 ASSESSMENT — PAIN SCALES - GENERAL: PAINLEVEL_OUTOF10: 0

## 2025-01-02 NOTE — PROGRESS NOTES
ondansetron, magnesium hydroxide, albuterol, ipratropium 0.5 mg-albuterol 2.5 mg, benzonatate, benzocaine-menthol    Data:     Past Medical History:   has a past medical history of Depression, Diabetes mellitus (HCC), Hyperlipidemia, Pancreatitis, Pneumonia due to infectious organism, and Substance abuse (HCC).    Social History:   reports that she quit smoking about 1 years ago. Her smoking use included cigarettes. She started smoking about 32 years ago. She has a 15 pack-year smoking history. She has never used smokeless tobacco. She reports current alcohol use. She reports current drug use. Drug: Cocaine.     Family History:   Family History   Problem Relation Age of Onset    Diabetes Mother     Coronary Art Dis Mother     Cancer Father         Colon    Diabetes Sister     Diabetes Brother     Bipolar Disorder Brother     Alcohol Abuse Brother     Substance Abuse Brother        Vitals:  /60   Pulse 75   Temp 99.1 °F (37.3 °C) (Oral)   Resp 18   Ht 1.626 m (5' 4\")   Wt 53.1 kg (117 lb)   LMP 2018 (LMP Unknown)   SpO2 99%   BMI 20.08 kg/m²   Temp (24hrs), Av.1 °F (37.3 °C), Min:99.1 °F (37.3 °C), Max:99.1 °F (37.3 °C)    Recent Labs     25  0813 25  1148 25  1605 25  2052   POCGLU 174* 202* 236* 176*       I/O (24Hr):  No intake or output data in the 24 hours ending 25 0820    Labs:  Hematology:  Recent Labs     24  1109 24  0652 24  1646 25  0737   WBC  --  6.1  --  6.3   RBC  --  2.75*  --  2.69*   HGB  --  7.7*  --  7.6*   HCT  --  24.3*  --  23.2*   MCV  --  88.4  --  86.2   MCH  --  28.0  --  28.3   MCHC  --  31.7  --  32.8   RDW  --  13.2  --  13.1   PLT  --  186  --  209   MPV  --  10.3  --  10.1   SEDRATE  --   --  29  --    CRP 37.6*  --   --  18.8*   INR  --  1.1  --   --    DDIMER  --   --  0.64*  --      Chemistry:  Recent Labs     24  1757 24  0652 25  0737   NA  --  141 139   K  --  3.9 3.6*   CL  --  111*     GERD with esophagitis 12/30/2024 Yes    Type 2 diabetes mellitus with hyperglycemia, with long-term current use of insulin ([facility]) 12/30/2024 Yes    Bipolar disorder, mixed ([facility]) 12/30/2024 Yes    Tobacco use 12/30/2024 Yes    COVID-19 12/30/2024 Yes    Acute kidney injury ([facility]) 12/30/2024 Yes       Plan:        LLL bacterial pneumonia with hypoxia  Resting on room air currently  Supplemental O2 as needed  ID following  Continue on Levaquin and Zyvox  CT chest ordered, results pending    COVID-positive  ID following  Continue on Decadron and Paxlovid    DM 2  Continue Lantus 22 units nightly  Medium dose ISS      Sonal Barrera MD  1/2/2025  8:20 AM

## 2025-01-02 NOTE — PROGRESS NOTES
[facility]  Occupational Therapy Not Seen Note    DATE: 2025    NAME: Fang Walters  MRN: [id number]   : 1970      Patient not seen this date for Occupational Therapy due to:    Patient independent with ADLs and functional tasks with no acute OT needs. Will defer OT evaluation at this time. Please reorder OT if future needs arise.     Electronically signed by MARGARET Jamil/L on 2025 at 11:35 AM

## 2025-01-02 NOTE — PLAN OF CARE
Problem: Chronic Conditions and Co-morbidities  Goal: Patient's chronic conditions and co-morbidity symptoms are monitored and maintained or improved  1/2/2025 1103 by Jena Lopez RN  Outcome: Progressing  1/2/2025 0522 by Art Vargas RN  Outcome: Progressing     Problem: Discharge Planning  Goal: Discharge to home or other facility with appropriate resources  1/2/2025 1103 by Jena Lopez RN  Outcome: Progressing  1/2/2025 0522 by Art Vargas RN  Outcome: Progressing     Problem: Pain  Goal: Verbalizes/displays adequate comfort level or baseline comfort level  1/2/2025 1103 by Jena Lopez RN  Outcome: Progressing  1/2/2025 0522 by Art Vargas RN  Outcome: Progressing     Problem: Safety - Adult  Goal: Free from fall injury  1/2/2025 1103 by Jena Lopez RN  Outcome: Progressing  1/2/2025 0522 by Art Vargas RN  Outcome: Progressing

## 2025-01-03 VITALS
BODY MASS INDEX: 19.97 KG/M2 | HEIGHT: 64 IN | WEIGHT: 117 LBS | SYSTOLIC BLOOD PRESSURE: 136 MMHG | OXYGEN SATURATION: 95 % | TEMPERATURE: 97.5 F | DIASTOLIC BLOOD PRESSURE: 50 MMHG | RESPIRATION RATE: 22 BRPM | HEART RATE: 73 BPM

## 2025-01-03 LAB
GLUCOSE BLD-MCNC: 310 MG/DL (ref 65–105)
GLUCOSE BLD-MCNC: 329 MG/DL (ref 65–105)
GLUCOSE BLD-MCNC: 338 MG/DL (ref 65–105)

## 2025-01-03 PROCEDURE — 6360000002 HC RX W HCPCS: Performed by: NURSE PRACTITIONER

## 2025-01-03 PROCEDURE — 99232 SBSQ HOSP IP/OBS MODERATE 35: CPT | Performed by: INTERNAL MEDICINE

## 2025-01-03 PROCEDURE — 82947 ASSAY GLUCOSE BLOOD QUANT: CPT

## 2025-01-03 PROCEDURE — 6370000000 HC RX 637 (ALT 250 FOR IP): Performed by: HOSPITALIST

## 2025-01-03 PROCEDURE — 99239 HOSP IP/OBS DSCHRG MGMT >30: CPT | Performed by: HOSPITALIST

## 2025-01-03 PROCEDURE — 2500000003 HC RX 250 WO HCPCS: Performed by: NURSE PRACTITIONER

## 2025-01-03 PROCEDURE — 6370000000 HC RX 637 (ALT 250 FOR IP): Performed by: STUDENT IN AN ORGANIZED HEALTH CARE EDUCATION/TRAINING PROGRAM

## 2025-01-03 PROCEDURE — 6370000000 HC RX 637 (ALT 250 FOR IP): Performed by: NURSE PRACTITIONER

## 2025-01-03 PROCEDURE — 6360000002 HC RX W HCPCS: Performed by: STUDENT IN AN ORGANIZED HEALTH CARE EDUCATION/TRAINING PROGRAM

## 2025-01-03 PROCEDURE — 6370000000 HC RX 637 (ALT 250 FOR IP): Performed by: INTERNAL MEDICINE

## 2025-01-03 RX ORDER — LEVOFLOXACIN 500 MG/1
500 TABLET, FILM COATED ORAL DAILY
Qty: 4 TABLET | Refills: 0 | Status: SHIPPED | OUTPATIENT
Start: 2025-01-04 | End: 2025-01-08

## 2025-01-03 RX ADMIN — GABAPENTIN 300 MG: 300 CAPSULE ORAL at 08:23

## 2025-01-03 RX ADMIN — LEVOFLOXACIN 500 MG: 500 TABLET, FILM COATED ORAL at 08:23

## 2025-01-03 RX ADMIN — INSULIN LISPRO 7 UNITS: 100 INJECTION, SOLUTION INTRAVENOUS; SUBCUTANEOUS at 08:24

## 2025-01-03 RX ADMIN — INSULIN LISPRO 7 UNITS: 100 INJECTION, SOLUTION INTRAVENOUS; SUBCUTANEOUS at 16:58

## 2025-01-03 RX ADMIN — INSULIN LISPRO 6 UNITS: 100 INJECTION, SOLUTION INTRAVENOUS; SUBCUTANEOUS at 08:23

## 2025-01-03 RX ADMIN — INSULIN LISPRO 7 UNITS: 100 INJECTION, SOLUTION INTRAVENOUS; SUBCUTANEOUS at 12:42

## 2025-01-03 RX ADMIN — LINEZOLID 600 MG: 600 TABLET, FILM COATED ORAL at 08:23

## 2025-01-03 RX ADMIN — HEPARIN SODIUM 5000 UNITS: 5000 INJECTION INTRAVENOUS; SUBCUTANEOUS at 14:51

## 2025-01-03 RX ADMIN — GUAIFENESIN 600 MG: 600 TABLET, MULTILAYER, EXTENDED RELEASE ORAL at 08:23

## 2025-01-03 RX ADMIN — FENOFIBRATE 54 MG: 54 TABLET ORAL at 08:23

## 2025-01-03 RX ADMIN — HEPARIN SODIUM 5000 UNITS: 5000 INJECTION INTRAVENOUS; SUBCUTANEOUS at 06:13

## 2025-01-03 RX ADMIN — THERA TABS 1 TABLET: TAB at 08:23

## 2025-01-03 RX ADMIN — MAGNESIUM HYDROXIDE 30 ML: 400 SUSPENSION ORAL at 06:35

## 2025-01-03 RX ADMIN — GABAPENTIN 300 MG: 300 CAPSULE ORAL at 14:51

## 2025-01-03 RX ADMIN — WATER 1000 MG: 1 INJECTION INTRAMUSCULAR; INTRAVENOUS; SUBCUTANEOUS at 00:57

## 2025-01-03 RX ADMIN — PANTOPRAZOLE SODIUM 40 MG: 40 TABLET, DELAYED RELEASE ORAL at 06:13

## 2025-01-03 RX ADMIN — INSULIN LISPRO 6 UNITS: 100 INJECTION, SOLUTION INTRAVENOUS; SUBCUTANEOUS at 12:42

## 2025-01-03 RX ADMIN — INSULIN LISPRO 6 UNITS: 100 INJECTION, SOLUTION INTRAVENOUS; SUBCUTANEOUS at 16:58

## 2025-01-03 ASSESSMENT — ENCOUNTER SYMPTOMS
EYE DISCHARGE: 0
SHORTNESS OF BREATH: 0
APNEA: 0
COUGH: 1
COLOR CHANGE: 0
EYE REDNESS: 0
ABDOMINAL DISTENTION: 0
PHOTOPHOBIA: 0

## 2025-01-03 NOTE — PROGRESS NOTES
Discharge AVS reviewed with patient. Educated when to take next dose of medications. Peripheral IV removed, dressing applied. Patient verbalizes understanding.

## 2025-01-03 NOTE — PROGRESS NOTES
CLINICAL PHARMACY NOTE: MEDS TO BEDS    Total # of Prescriptions Filled: 1   The following medications were delivered to the patient:  Levofloxacin 500 mg    Additional Documentation:   Delivered to RN on 1/3 at 4:45 pm. Pt had no copay.

## 2025-01-03 NOTE — CONSULTS
Problem Relation Age of Onset    Diabetes Mother     Coronary Art Dis Mother     Cancer Father         Colon    Diabetes Sister     Diabetes Brother     Bipolar Disorder Brother     Alcohol Abuse Brother     Substance Abuse Brother       Medical Decision Making:   I have independently reviewed/ordered the following labs:    CBC with Differential:   Recent Labs     01/01/25  0737   WBC 6.3   HGB 7.6*   HCT 23.2*      LYMPHOPCT 41   MONOPCT 6   EOSPCT 6*     BMP:  Recent Labs     01/01/25  0737      K 3.6*   *   CO2 21   BUN 15   CREATININE 1.0*   MG 1.5*     Hepatic Function Panel:   Recent Labs     01/01/25  0737   BILITOT <0.2   ALKPHOS 90   ALT 7*   AST 17     No results for input(s): \"RPR\" in the last 72 hours.  No results for input(s): \"HIV\" in the last 72 hours.  No results for input(s): \"BC\" in the last 72 hours.  Lab Results   Component Value Date/Time    CREATININE 1.0 01/01/2025 07:37 AM    GLUCOSE 210 01/01/2025 07:37 AM    GLUCOSE 281 10/21/2011 06:25 AM       Detailed results:        Thank you for allowing us to participate in the care of this patient.Please call with questions.    This note is created with the assistance of a speech recognition program.  While intending to generate adocument that actually reflects the content of the visit, the document can still have some errors including those of syntax and sound a like substitutions which may escape proof reading.  It such instances, actual meaningcan be extrapolated by contextual diversion.    Makenzie Dc MD  Office: [phone]  Perfect serve / office [phone]        I have discussed the care of the patient, including pertinent history and exam findings,  with the resident., student or CNP- I have seen and examined the patient and the key elements of all parts of the encounter have been performed by me.  I have decided the assessment, plan and orders as documented by the resident.student or CNP    Antoinette Wendy  Infectious Diseases

## 2025-01-03 NOTE — DISCHARGE SUMMARY
[facility]  Office: [phone]  Malcolm Vivas DO, David Clifford DO, Gianluca Chung DO, Miguel Burger DO, Briseida Marinelli MD, Flaca Robin MD, Shahrzad Light MD, Zully Rodriguez MD,  Norris Felix MD, Tracee Kenyon MD, Gene Rosario DO, Jimmy Montana MD,  Kirt Wellington DO, Dilia Loya MD, Giovany Mike MD, Gilberto Vivas DO, Reanna Crow MD, Serge Powell MD, Braden Kovacs DO, Sarah Charles MD, Aubrie Roman MD, Janessa Hwang MD, Chiqui Renee MD,  Mark Gaston DO, Gayatri Abdi MD,  Shirley Waterhouse, CNP,  Nuha Elizabeth, CNP, Xochilt Contreras, CNP, Farhad Ruiz, CNP,  Johanna Martell, [facility], Leila France, CNP, Leora Whitfield, CNP, Eileen Mann, CNP, Martha Cosby, CNP, Brittney Mccoy, CNP, Bean Reyes PA-C, Joslyn Mora, CNS, Lisa Ibarra, CNP, Gale Bill, CNP         [facility]   IN-PATIENT SERVICE   [facility]    Discharge Summary     Patient ID: Fang Walters  :  1970   MRN: [id number]     ACCOUNT:  [id number]   Patient's PCP: Miriam Hanna MD  Admit Date: 2024   Discharge Date: 1/3/2025     Length of Stay: 4  Code Status:  Full Code  Admitting Physician: No admitting provider for patient encounter.  Discharge Physician: EMELINA IRAHETA MD     Active Discharge Diagnoses:     Hospital Problem Lists:  Principal Problem:    Pneumonia due to infectious organism  Active Problems:    Hyperglycemia    Depression    GERD with esophagitis    Type 2 diabetes mellitus with hyperglycemia, with long-term current use of insulin (HCC)    Bipolar disorder, mixed (HCC)    Tobacco use    COVID-19    Acute kidney injury (HCC)    Hypokalemia    JUAN (acute kidney injury) (HCC)  Resolved Problems:    Type 2 diabetes mellitus with diabetic polyneuropathy, with long-term current use of insulin (HCC)      Admission Condition:  poor     Discharged Condition: fair    Hospital Stay:     Hospital Course:  Fang Walters

## 2025-01-03 NOTE — CARE COORDINATION
Plan remain or patient to return home independently.  Patient  is no longer on O2.  Will continue to monitor oxygen needs

## 2025-02-02 ENCOUNTER — HOSPITAL ENCOUNTER (EMERGENCY)
Age: 55
Discharge: HOME | End: 2025-02-02
Attending: EMERGENCY MEDICINE
Payer: MEDICAID

## 2025-02-02 VITALS
TEMPERATURE: 97.5 F | HEART RATE: 91 BPM | SYSTOLIC BLOOD PRESSURE: 153 MMHG | DIASTOLIC BLOOD PRESSURE: 70 MMHG | RESPIRATION RATE: 18 BRPM | WEIGHT: 124 LBS | BODY MASS INDEX: 21.28 KG/M2 | OXYGEN SATURATION: 100 %

## 2025-02-02 DIAGNOSIS — M25.441 SWELLING OF JOINT OF HAND, RIGHT: ICD-10-CM

## 2025-02-02 DIAGNOSIS — M25.442 SWELLING OF HAND JOINT, LEFT: Primary | ICD-10-CM

## 2025-02-02 PROCEDURE — 99283 EMERGENCY DEPT VISIT LOW MDM: CPT

## 2025-02-02 PROCEDURE — 6370000000 HC RX 637 (ALT 250 FOR IP): Performed by: EMERGENCY MEDICINE

## 2025-02-02 RX ORDER — PREDNISONE 20 MG/1
50 TABLET ORAL ONCE
Status: COMPLETED | OUTPATIENT
Start: 2025-02-02 | End: 2025-02-02

## 2025-02-02 RX ORDER — PREDNISONE 50 MG/1
50 TABLET ORAL DAILY
Qty: 5 TABLET | Refills: 0 | Status: SHIPPED | OUTPATIENT
Start: 2025-02-02 | End: 2025-02-07

## 2025-02-02 RX ADMIN — PREDNISONE 50 MG: 20 TABLET ORAL at 16:11

## 2025-02-02 ASSESSMENT — PAIN SCALES - GENERAL: PAINLEVEL_OUTOF10: 4

## 2025-02-02 ASSESSMENT — PAIN - FUNCTIONAL ASSESSMENT: PAIN_FUNCTIONAL_ASSESSMENT: 0-10

## 2025-02-02 NOTE — ED PROVIDER NOTES
EMERGENCY DEPARTMENT ENCOUNTER    Pt Name: Fang Walters  MRN: [id number]  Birthdate 1970  Date of evaluation: 2/2/25  CHIEF COMPLAINT       Chief Complaint   Patient presents with    Swelling     Pt has swelling to bilateral hands and feet. No heart hx. Pt reports she has kidney problems and doc is supposed to be referring her to nephro     HISTORY OF PRESENT ILLNESS   The history is provided by the patient and medical records.  The patient is a 54-year-old female with history of depression, diabetes, hyperlipidemia and substance abuse who presents to the ED for swelling to hands bilaterally.  Symptoms started 2 days ago.  No trauma reported.  Patient states she does experience swelling in her ankles and is followed by nephrology.    REVIEW OF SYSTEMS     Review of Systems  All other systems reviewed and are negative.    PASTMEDICAL HISTORY     Past Medical History:   Diagnosis Date    Depression     Diabetes mellitus ([facility])     Hyperlipidemia     Pancreatitis     Pneumonia due to infectious organism 1/8/2017    Substance abuse ([facility])      Past Problem List  Patient Active Problem List   Diagnosis Code    Depression F32.A    GERD with esophagitis K21.00    Type 2 diabetes mellitus with hyperglycemia, with long-term current use of insulin ([facility]) E11.65, Z79.4    Bipolar disorder, mixed ([facility]) F31.60    Cocaine abuse ([facility]) F14.10    Pneumonia due to infectious organism J18.9    Tobacco use Z72.0    Sepsis due to other etiology ([facility]) A41.89    High anion gap metabolic acidosis E87.29    Hyponatremia E87.1    Respiratory alkalosis E87.3    Non-intractable vomiting R11.10    Vasculitis of mesenteric artery ([facility]) I77.6    History of cocaine use F14.91    Hyperglycemia R73.9    Neuropathy due to type 2 diabetes mellitus ([facility]) E11.40    Other hyperlipidemia E78.49    Abn findings on dx imaging of abd regions, inc retroperiton R93.5    Hemorrhoids K64.9    Dental abscess K04.7    Primary insomnia F51.01    UTI symptoms R39.9

## 2025-02-02 NOTE — ED NOTES
Pt reports bilat hand swelling x past 2 days which has improved today. States she saw her PCP last week and is being referred to a nephrologist, but hasn't gotten the call to schedule the appt yet. Pt also reports intermittent swelling to bilat feet. Reports neuropathy to feet d/t diabetes. States her FSBS at home usually runs between 170 and 230.

## 2025-03-15 ENCOUNTER — HOSPITAL ENCOUNTER (EMERGENCY)
Facility: CLINIC | Age: 55
Discharge: HOME | End: 2025-03-15
Attending: EMERGENCY MEDICINE
Payer: MEDICAID

## 2025-03-15 ENCOUNTER — APPOINTMENT (OUTPATIENT)
Dept: GENERAL RADIOLOGY | Facility: CLINIC | Age: 55
End: 2025-03-15
Payer: MEDICAID

## 2025-03-15 VITALS
HEIGHT: 64 IN | OXYGEN SATURATION: 100 % | DIASTOLIC BLOOD PRESSURE: 51 MMHG | TEMPERATURE: 98 F | SYSTOLIC BLOOD PRESSURE: 164 MMHG | RESPIRATION RATE: 16 BRPM | HEART RATE: 93 BPM | BODY MASS INDEX: 22.2 KG/M2 | WEIGHT: 130 LBS

## 2025-03-15 DIAGNOSIS — R60.9 SWELLING: ICD-10-CM

## 2025-03-15 DIAGNOSIS — I10 ESSENTIAL HYPERTENSION: Primary | ICD-10-CM

## 2025-03-15 DIAGNOSIS — N18.1 STAGE 1 CHRONIC KIDNEY DISEASE: ICD-10-CM

## 2025-03-15 LAB
ALBUMIN SERPL-MCNC: 3.6 G/DL (ref 3.5–5.2)
ALBUMIN/GLOB SERPL: 1.1 {RATIO}
ALP SERPL-CCNC: 138 U/L (ref 35–104)
ALT SERPL-CCNC: 12 U/L (ref 10–35)
ANION GAP SERPL CALCULATED.3IONS-SCNC: 10 MMOL/L (ref 9–16)
AST SERPL-CCNC: 18 U/L (ref 10–35)
BASOPHILS # BLD: 0 K/UL (ref 0–0.2)
BASOPHILS NFR BLD: 1 % (ref 0–2)
BILIRUB SERPL-MCNC: 0.2 MG/DL (ref 0–1.2)
BNP SERPL-MCNC: 520 PG/ML (ref 0–300)
BUN SERPL-MCNC: 23 MG/DL (ref 6–20)
CALCIUM SERPL-MCNC: 9.6 MG/DL (ref 8.6–10.4)
CHLORIDE SERPL-SCNC: 105 MMOL/L (ref 98–107)
CO2 SERPL-SCNC: 25 MMOL/L (ref 20–31)
CREAT SERPL-MCNC: 1.1 MG/DL (ref 0.5–0.9)
CRP SERPL HS-MCNC: 5.1 MG/L (ref 0–5)
EOSINOPHIL # BLD: 0.3 K/UL (ref 0–0.4)
EOSINOPHILS RELATIVE PERCENT: 4 % (ref 1–4)
ERYTHROCYTE [DISTWIDTH] IN BLOOD BY AUTOMATED COUNT: 15.2 % (ref 12.5–15.4)
ERYTHROCYTE [SEDIMENTATION RATE] IN BLOOD BY PHOTOMETRIC METHOD: 50 MM/HR (ref 0–30)
GFR, ESTIMATED: 59 ML/MIN/1.73M2
GLUCOSE SERPL-MCNC: 142 MG/DL (ref 74–99)
HCT VFR BLD AUTO: 29.4 % (ref 36–46)
HGB BLD-MCNC: 10.2 G/DL (ref 12–16)
LYMPHOCYTES NFR BLD: 2.2 K/UL (ref 1–4.8)
LYMPHOCYTES RELATIVE PERCENT: 30 % (ref 24–44)
MCH RBC QN AUTO: 28.6 PG (ref 26–34)
MCHC RBC AUTO-ENTMCNC: 34.6 G/DL (ref 31–37)
MCV RBC AUTO: 82.5 FL (ref 80–100)
MONOCYTES NFR BLD: 0.6 K/UL (ref 0.1–1.2)
MONOCYTES NFR BLD: 8 % (ref 2–11)
NEUTROPHILS NFR BLD: 57 % (ref 36–66)
NEUTS SEG NFR BLD: 4.3 K/UL (ref 1.8–7.7)
PLATELET # BLD AUTO: 255 K/UL (ref 140–450)
PMV BLD AUTO: 7 FL (ref 6–12)
POTASSIUM SERPL-SCNC: 4 MMOL/L (ref 3.7–5.3)
PROT SERPL-MCNC: 7 G/DL (ref 6.6–8.7)
RBC # BLD AUTO: 3.56 M/UL (ref 4–5.2)
SODIUM SERPL-SCNC: 140 MMOL/L (ref 136–145)
TROPONIN I SERPL HS-MCNC: 18 NG/L (ref 0–14)
TROPONIN I SERPL HS-MCNC: 19 NG/L (ref 0–14)
WBC OTHER # BLD: 7.5 K/UL (ref 3.5–11)

## 2025-03-15 PROCEDURE — 85025 COMPLETE CBC W/AUTO DIFF WBC: CPT

## 2025-03-15 PROCEDURE — 93005 ELECTROCARDIOGRAM TRACING: CPT

## 2025-03-15 PROCEDURE — 6370000000 HC RX 637 (ALT 250 FOR IP)

## 2025-03-15 PROCEDURE — 80053 COMPREHEN METABOLIC PANEL: CPT

## 2025-03-15 PROCEDURE — 84484 ASSAY OF TROPONIN QUANT: CPT

## 2025-03-15 PROCEDURE — 85652 RBC SED RATE AUTOMATED: CPT

## 2025-03-15 PROCEDURE — 96374 THER/PROPH/DIAG INJ IV PUSH: CPT

## 2025-03-15 PROCEDURE — 6360000002 HC RX W HCPCS

## 2025-03-15 PROCEDURE — 83880 ASSAY OF NATRIURETIC PEPTIDE: CPT

## 2025-03-15 PROCEDURE — 86140 C-REACTIVE PROTEIN: CPT

## 2025-03-15 PROCEDURE — 99285 EMERGENCY DEPT VISIT HI MDM: CPT

## 2025-03-15 PROCEDURE — 71045 X-RAY EXAM CHEST 1 VIEW: CPT

## 2025-03-15 RX ORDER — LISINOPRIL 5 MG/1
5 TABLET ORAL ONCE
Status: COMPLETED | OUTPATIENT
Start: 2025-03-15 | End: 2025-03-15

## 2025-03-15 RX ORDER — LISINOPRIL AND HYDROCHLOROTHIAZIDE 10; 12.5 MG/1; MG/1
1 TABLET ORAL DAILY
Qty: 30 TABLET | Refills: 0 | Status: SHIPPED | OUTPATIENT
Start: 2025-03-15 | End: 2025-04-14

## 2025-03-15 RX ORDER — HYDRALAZINE HYDROCHLORIDE 20 MG/ML
10 INJECTION INTRAMUSCULAR; INTRAVENOUS ONCE
Status: DISCONTINUED | OUTPATIENT
Start: 2025-03-15 | End: 2025-03-15

## 2025-03-15 RX ORDER — HYDRALAZINE HYDROCHLORIDE 20 MG/ML
10 INJECTION INTRAMUSCULAR; INTRAVENOUS ONCE
Status: COMPLETED | OUTPATIENT
Start: 2025-03-15 | End: 2025-03-15

## 2025-03-15 RX ORDER — HYDROCHLOROTHIAZIDE 12.5 MG/1
12.5 TABLET ORAL DAILY
Status: DISCONTINUED | OUTPATIENT
Start: 2025-03-15 | End: 2025-03-15 | Stop reason: HOSPADM

## 2025-03-15 RX ADMIN — HYDROCHLOROTHIAZIDE 12.5 MG: 12.5 TABLET ORAL at 13:20

## 2025-03-15 RX ADMIN — LISINOPRIL 5 MG: 5 TABLET ORAL at 13:20

## 2025-03-15 RX ADMIN — HYDRALAZINE HYDROCHLORIDE 10 MG: 20 INJECTION INTRAMUSCULAR; INTRAVENOUS at 14:22

## 2025-03-15 ASSESSMENT — PAIN DESCRIPTION - LOCATION: LOCATION: HAND;FOOT

## 2025-03-15 ASSESSMENT — PAIN DESCRIPTION - ORIENTATION: ORIENTATION: RIGHT;LEFT

## 2025-03-15 ASSESSMENT — PAIN DESCRIPTION - FREQUENCY: FREQUENCY: CONTINUOUS

## 2025-03-15 ASSESSMENT — PAIN - FUNCTIONAL ASSESSMENT: PAIN_FUNCTIONAL_ASSESSMENT: 0-10

## 2025-03-15 ASSESSMENT — PAIN SCALES - GENERAL: PAINLEVEL_OUTOF10: 6

## 2025-03-15 NOTE — ED PROVIDER NOTES
[facility]  [address] [address]  Phone: [phone]      Patient Name:  Fang Walters  Medical Record Number:  [id number]  YOB: 1970  Date of Service:  3/15/2025  Primary Care Physician:  Gagan Chapin DO      CHIEF COMPLAINT:       Chief Complaint   Patient presents with    Hand Pain     Bilateral hand pain, ongoing for months. Will be seeing PCP as new pt new pt.     Foot Pain     Bilateral feet pain, ongoing for the past few months       HISTORY OF PRESENT ILLNESS:    Fang Walters is a 54 y.o. female who presents with the complaint of acute on chronic hand and feet swelling.  She does have history of insulin-dependent diabetes with neuropathy however over the last couple months she has noticed hand and feet swelling that is worsened over the last 2 weeks she is also having intermittent chest pain.  She is establishing with new PCP and her first appointment is next week however the hand swelling has been worsening so she came in for evaluation.  Denies any recent upper respiratory illness no nausea vomiting or diarrhea she is urinating.  She states her blood sugars are up and down, she is hypertensive here she has not been diagnosed with high blood pressure but states that she has noticed that it has been slowly trending higher.    CURRENT MEDICATIONS:      Discharge Medication List as of 3/15/2025  2:54 PM        CONTINUE these medications which have NOT CHANGED    Details   nirmatrelvir/ritonavir (PAXLOVID) 10 x 150 MG & 10 x 100MG TBPK Take 2 tablets (one 150 mg nirmatrelvir and one 100 mg ritonavir tablets) by mouth every 12 hours for 5 days., Disp-20 tablet, R-0Normal      Multiple Vitamin (DAILY-SAVANAH) TABS TAKE 1 TABLET BY MOUTH ONCE DAILY., Disp-30 tablet, R-3Normal      pantoprazole (PROTONIX) 40 MG tablet TAKE 1 TABLET BY MOUTH ONCE DAILY IN THE MORNING BEFORE BREAKFAST., Disp-30 tablet, R-3Normal      lovastatin (MEVACOR) 40 MG tablet Take 1 tablet    Emergency Physician Attending    (Please note that portions of this note were completed with a voice recognition program.  Efforts were made to edit the dictations but occasionally words are mis-transcribed.)       Sarita Page, SHAI - CNP  03/15/25 [id number]

## 2025-03-15 NOTE — ED NOTES
Mode of arrival (squad #, walk in, police, etc) : walk in, from home        Chief complaint(s): bilateral hand and feet swelling and pain, intermittent chest pressure        Arrival Note (brief scenario, treatment PTA, etc).: Pt presented to the ED c/o bilateral hand and feet swelling and pain that has been ongoing for the past few months. Reports she was seen and was given steroids before, that helps a little, but states that it is getting progressively worse. Reports that she does have a Hx of neuropathy, which she takes gabapentin for, but it is not helping with the pain anymore. Reports that she also has been having intermittent mid sternal chest pressure. Denies cardiac Hx, but states that she has diabetes and CKD. She will be seeing a new PCP on Tuesday to establish care. Pt alert and oriented. Denies shortness of breath. Her hands are swollen, worse on the right with some discoloration to the hand.         C= \"Have you ever felt that you should Cut down on your drinking?\"  No  A= \"Have people Annoyed you by criticizing your drinking?\"  No  G= \"Have you ever felt bad or Guilty about your drinking?\"  No  E= \"Have you ever had a drink as an Eye-opener first thing in the morning to steady your nerves or to help a hangover?\"  No      Deferred []      Reason for deferring: N/A    *If yes to two or more: probable alcohol abuse.*

## 2025-03-15 NOTE — DISCHARGE INSTRUCTIONS
Keep your upcoming appointment with PCP begin to take the blood pressure medication ordered today next dose will be due tomorrow this medication has a diuretic in it which may help with the hand and feet swelling.  You had abnormal labs today and high blood pressure.  Your doctor may consider a cardiology consult or echo to check for heart failure in the future, your blood pressure needs to be controlled.

## 2025-03-15 NOTE — ED PROVIDER NOTES
[facility]      Pt Name: Fang Walters  MRN: [id number]  Birthdate 1970  Date of evaluation: 3/15/2025    EMERGENCY DEPARTMENT ENCOUNTER      PERTINENT ATTENDING PHYSICIAN COMMENTS:      Faculty Attestation    I performed a history and physical examination of the patient and discussed management with the mid level provideer. I reviewed the mid level provider's note and agree with the documented findings and plan of care.Any areas of disagreement are noted on the chart. I was personally present for the key portions of any procedures. I have documented in the chart those procedures where I was not present during the key portions. I have reviewed the emergency nurses triage note. I agree with the chief complaint, past medical history, past surgical history, allergies, medications, social and family history as documented unless otherwise noted below. Documentation of the HPI, Physical Exam and Medical Decision Making performed by medical students or scribes is based on my personal performance of the HPI, PE and MDM. For Residents/Physician Assistant/ Nurse Practitioner cases/documentation I have personally evaluated this patient and have completed at least one if not all key elements of the E/M (history, physical exam, and MDM). Additional findings are as noted.    CHIEF COMPLAINT       Chief Complaint   Patient presents with    Hand Pain     Bilateral hand pain, ongoing for months. Will be seeing PCP as new pt new pt.     Foot Pain     Bilateral feet pain, ongoing for the past few months       HISTORY OF PRESENT ILLNESS    Fang Walters is a 54 y.o. female who presents to the emergency department complaining of bilateral hand and foot swelling for the past several months.  Also complaining of retrosternal chest pain constant for the past 2 weeks.  She rates her pain 6 out of 10.  No shortness of breath.  Some nonradiating discomfort.  Nothing seems to make it better or worse.  She says she

## 2025-03-17 LAB
EKG ATRIAL RATE: 88 BPM
EKG P AXIS: 38 DEGREES
EKG P-R INTERVAL: 132 MS
EKG Q-T INTERVAL: 382 MS
EKG QRS DURATION: 84 MS
EKG QTC CALCULATION (BAZETT): 462 MS
EKG R AXIS: 62 DEGREES
EKG T AXIS: 74 DEGREES
EKG VENTRICULAR RATE: 88 BPM

## (undated) DEVICE — BASIN EMSIS 700ML GRAPHITE PLAS KID SHP GRAD

## (undated) DEVICE — JELLY,LUBE,STERILE,FLIP TOP,TUBE,2-OZ: Brand: MEDLINE

## (undated) DEVICE — GAUZE,SPONGE,4"X4",16PLY,STRL,LF,10/TRAY: Brand: MEDLINE

## (undated) DEVICE — FORCEPS BX L240CM WRK CHN 2.8MM STD CAP W/ NDL MIC MESH

## (undated) DEVICE — MEDICINE CUP, GRADUATED, STER: Brand: MEDLINE

## (undated) DEVICE — BLOCK BITE 60FR RUBBER ADLT DENTAL